# Patient Record
Sex: FEMALE | Race: WHITE | NOT HISPANIC OR LATINO | Employment: OTHER | URBAN - METROPOLITAN AREA
[De-identification: names, ages, dates, MRNs, and addresses within clinical notes are randomized per-mention and may not be internally consistent; named-entity substitution may affect disease eponyms.]

---

## 2017-01-27 ENCOUNTER — APPOINTMENT (OUTPATIENT)
Dept: LAB | Facility: CLINIC | Age: 67
End: 2017-01-27
Payer: MEDICARE

## 2017-01-27 ENCOUNTER — TRANSCRIBE ORDERS (OUTPATIENT)
Dept: LAB | Facility: CLINIC | Age: 67
End: 2017-01-27

## 2017-01-27 DIAGNOSIS — Z79.899 ENCOUNTER FOR LONG-TERM (CURRENT) USE OF OTHER MEDICATIONS: Primary | ICD-10-CM

## 2017-01-27 LAB
ALBUMIN SERPL BCP-MCNC: 3.4 G/DL (ref 3.5–5)
ALP SERPL-CCNC: 48 U/L (ref 46–116)
ALT SERPL W P-5'-P-CCNC: 21 U/L (ref 12–78)
ANION GAP SERPL CALCULATED.3IONS-SCNC: 6 MMOL/L (ref 4–13)
AST SERPL W P-5'-P-CCNC: 8 U/L (ref 5–45)
BASOPHILS # BLD AUTO: 0.01 THOUSANDS/ΜL (ref 0–0.1)
BASOPHILS NFR BLD AUTO: 0 % (ref 0–1)
BILIRUB SERPL-MCNC: 0.33 MG/DL (ref 0.2–1)
BUN SERPL-MCNC: 22 MG/DL (ref 5–25)
CALCIUM SERPL-MCNC: 8.6 MG/DL (ref 8.3–10.1)
CHLORIDE SERPL-SCNC: 108 MMOL/L (ref 100–108)
CO2 SERPL-SCNC: 28 MMOL/L (ref 21–32)
CREAT SERPL-MCNC: 0.74 MG/DL (ref 0.6–1.3)
EOSINOPHIL # BLD AUTO: 0.06 THOUSAND/ΜL (ref 0–0.61)
EOSINOPHIL NFR BLD AUTO: 1 % (ref 0–6)
ERYTHROCYTE [DISTWIDTH] IN BLOOD BY AUTOMATED COUNT: 12.5 % (ref 11.6–15.1)
GFR SERPL CREATININE-BSD FRML MDRD: >60 ML/MIN/1.73SQ M
GLUCOSE SERPL-MCNC: 97 MG/DL (ref 65–140)
HCT VFR BLD AUTO: 42 % (ref 34.8–46.1)
HGB BLD-MCNC: 13.6 G/DL (ref 11.5–15.4)
LYMPHOCYTES # BLD AUTO: 1.43 THOUSANDS/ΜL (ref 0.6–4.47)
LYMPHOCYTES NFR BLD AUTO: 35 % (ref 14–44)
MCH RBC QN AUTO: 30.7 PG (ref 26.8–34.3)
MCHC RBC AUTO-ENTMCNC: 32.4 G/DL (ref 31.4–37.4)
MCV RBC AUTO: 95 FL (ref 82–98)
MONOCYTES # BLD AUTO: 0.4 THOUSAND/ΜL (ref 0.17–1.22)
MONOCYTES NFR BLD AUTO: 10 % (ref 4–12)
NEUTROPHILS # BLD AUTO: 2.22 THOUSANDS/ΜL (ref 1.85–7.62)
NEUTS SEG NFR BLD AUTO: 54 % (ref 43–75)
NRBC BLD AUTO-RTO: 0 /100 WBCS
PLATELET # BLD AUTO: 280 THOUSANDS/UL (ref 149–390)
PMV BLD AUTO: 11.5 FL (ref 8.9–12.7)
POTASSIUM SERPL-SCNC: 4.7 MMOL/L (ref 3.5–5.3)
PROT SERPL-MCNC: 6.7 G/DL (ref 6.4–8.2)
RBC # BLD AUTO: 4.43 MILLION/UL (ref 3.81–5.12)
SODIUM SERPL-SCNC: 142 MMOL/L (ref 136–145)
WBC # BLD AUTO: 4.14 THOUSAND/UL (ref 4.31–10.16)

## 2017-01-27 PROCEDURE — 80053 COMPREHEN METABOLIC PANEL: CPT

## 2017-01-27 PROCEDURE — 80165 DIPROPYLACETIC ACID FREE: CPT

## 2017-01-27 PROCEDURE — 36415 COLL VENOUS BLD VENIPUNCTURE: CPT

## 2017-01-27 PROCEDURE — 85025 COMPLETE CBC W/AUTO DIFF WBC: CPT

## 2017-02-01 LAB — VALPROATE FREE SERPL-MCNC: 9.6 UG/ML (ref 6–22)

## 2017-02-08 ENCOUNTER — GENERIC CONVERSION - ENCOUNTER (OUTPATIENT)
Dept: OTHER | Facility: OTHER | Age: 67
End: 2017-02-08

## 2017-02-10 ENCOUNTER — ALLSCRIPTS OFFICE VISIT (OUTPATIENT)
Dept: OTHER | Facility: OTHER | Age: 67
End: 2017-02-10

## 2017-02-23 ENCOUNTER — ALLSCRIPTS OFFICE VISIT (OUTPATIENT)
Dept: OTHER | Facility: OTHER | Age: 67
End: 2017-02-23

## 2017-03-30 ENCOUNTER — ALLSCRIPTS OFFICE VISIT (OUTPATIENT)
Dept: OTHER | Facility: OTHER | Age: 67
End: 2017-03-30

## 2017-03-31 ENCOUNTER — ALLSCRIPTS OFFICE VISIT (OUTPATIENT)
Dept: OTHER | Facility: OTHER | Age: 67
End: 2017-03-31

## 2017-04-07 ENCOUNTER — GENERIC CONVERSION - ENCOUNTER (OUTPATIENT)
Dept: OTHER | Facility: OTHER | Age: 67
End: 2017-04-07

## 2017-04-11 ENCOUNTER — GENERIC CONVERSION - ENCOUNTER (OUTPATIENT)
Dept: OTHER | Facility: OTHER | Age: 67
End: 2017-04-11

## 2017-04-14 ENCOUNTER — ALLSCRIPTS OFFICE VISIT (OUTPATIENT)
Dept: OTHER | Facility: OTHER | Age: 67
End: 2017-04-14

## 2017-04-19 ENCOUNTER — GENERIC CONVERSION - ENCOUNTER (OUTPATIENT)
Dept: OTHER | Facility: OTHER | Age: 67
End: 2017-04-19

## 2017-04-26 ENCOUNTER — ALLSCRIPTS OFFICE VISIT (OUTPATIENT)
Dept: OTHER | Facility: OTHER | Age: 67
End: 2017-04-26

## 2017-04-26 DIAGNOSIS — Z12.31 ENCOUNTER FOR SCREENING MAMMOGRAM FOR MALIGNANT NEOPLASM OF BREAST: ICD-10-CM

## 2017-05-05 ENCOUNTER — ALLSCRIPTS OFFICE VISIT (OUTPATIENT)
Dept: OTHER | Facility: OTHER | Age: 67
End: 2017-05-05

## 2017-05-09 ENCOUNTER — GENERIC CONVERSION - ENCOUNTER (OUTPATIENT)
Dept: OTHER | Facility: OTHER | Age: 67
End: 2017-05-09

## 2017-05-17 ENCOUNTER — GENERIC CONVERSION - ENCOUNTER (OUTPATIENT)
Dept: OTHER | Facility: OTHER | Age: 67
End: 2017-05-17

## 2017-05-31 ENCOUNTER — ALLSCRIPTS OFFICE VISIT (OUTPATIENT)
Dept: OTHER | Facility: OTHER | Age: 67
End: 2017-05-31

## 2017-05-31 ENCOUNTER — GENERIC CONVERSION - ENCOUNTER (OUTPATIENT)
Dept: OTHER | Facility: OTHER | Age: 67
End: 2017-05-31

## 2017-05-31 DIAGNOSIS — R21 RASH AND OTHER NONSPECIFIC SKIN ERUPTION: ICD-10-CM
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
## 2017-06-06 ENCOUNTER — GENERIC CONVERSION - ENCOUNTER (OUTPATIENT)
Dept: OTHER | Facility: OTHER | Age: 67
End: 2017-06-06

## 2017-06-14 ENCOUNTER — GENERIC CONVERSION - ENCOUNTER (OUTPATIENT)
Dept: OTHER | Facility: OTHER | Age: 67
End: 2017-06-14

## 2017-06-27 ENCOUNTER — ALLSCRIPTS OFFICE VISIT (OUTPATIENT)
Dept: OTHER | Facility: OTHER | Age: 67
End: 2017-06-27

## 2017-06-29 ENCOUNTER — GENERIC CONVERSION - ENCOUNTER (OUTPATIENT)
Dept: OTHER | Facility: OTHER | Age: 67
End: 2017-06-29

## 2017-07-06 ENCOUNTER — ALLSCRIPTS OFFICE VISIT (OUTPATIENT)
Dept: OTHER | Facility: OTHER | Age: 67
End: 2017-07-06

## 2017-07-18 ENCOUNTER — ALLSCRIPTS OFFICE VISIT (OUTPATIENT)
Dept: OTHER | Facility: OTHER | Age: 67
End: 2017-07-18

## 2017-07-20 ENCOUNTER — GENERIC CONVERSION - ENCOUNTER (OUTPATIENT)
Dept: OTHER | Facility: OTHER | Age: 67
End: 2017-07-20

## 2017-07-27 ENCOUNTER — APPOINTMENT (OUTPATIENT)
Dept: LAB | Facility: CLINIC | Age: 67
End: 2017-07-27
Payer: MEDICARE

## 2017-07-27 ENCOUNTER — ALLSCRIPTS OFFICE VISIT (OUTPATIENT)
Dept: OTHER | Facility: OTHER | Age: 67
End: 2017-07-27

## 2017-07-27 DIAGNOSIS — R21 RASH AND OTHER NONSPECIFIC SKIN ERUPTION: ICD-10-CM
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
## 2017-07-27 PROCEDURE — 36415 COLL VENOUS BLD VENIPUNCTURE: CPT

## 2017-07-27 PROCEDURE — 86618 LYME DISEASE ANTIBODY: CPT

## 2017-07-28 LAB
B BURGDOR IGG SER IA-ACNC: 0.12
B BURGDOR IGM SER IA-ACNC: 0.36

## 2017-07-31 ENCOUNTER — GENERIC CONVERSION - ENCOUNTER (OUTPATIENT)
Dept: OTHER | Facility: OTHER | Age: 67
End: 2017-07-31

## 2017-08-03 ENCOUNTER — GENERIC CONVERSION - ENCOUNTER (OUTPATIENT)
Dept: OTHER | Facility: OTHER | Age: 67
End: 2017-08-03

## 2017-08-30 ENCOUNTER — TRANSCRIBE ORDERS (OUTPATIENT)
Dept: ADMINISTRATIVE | Facility: HOSPITAL | Age: 67
End: 2017-08-30

## 2017-08-30 DIAGNOSIS — G40.209 CRYPTOGENIC PARTIAL COMPLEX EPILEPSY (HCC): ICD-10-CM

## 2017-08-30 DIAGNOSIS — G40.001 LOCALIZATION-RELATED IDIOPATHIC EPILEPSY AND EPILEPTIC SYNDROMES WITH SEIZURES OF LOCALIZED ONSET, NOT INTRACTABLE, WITH STATUS EPILEPTICUS (HCC): Primary | ICD-10-CM

## 2017-09-09 ENCOUNTER — HOSPITAL ENCOUNTER (OUTPATIENT)
Dept: MAMMOGRAPHY | Facility: HOSPITAL | Age: 67
Discharge: HOME | End: 2017-09-09
Payer: MEDICARE

## 2017-09-09 DIAGNOSIS — Z12.31 ENCOUNTER FOR SCREENING MAMMOGRAM FOR MALIGNANT NEOPLASM OF BREAST: ICD-10-CM

## 2017-09-09 PROCEDURE — 77063 BREAST TOMOSYNTHESIS BI: CPT

## 2017-09-09 PROCEDURE — G0202 SCR MAMMO BI INCL CAD: HCPCS

## 2017-09-22 ENCOUNTER — HOSPITAL ENCOUNTER (OUTPATIENT)
Dept: NEUROLOGY | Facility: AMBULATORY SURGERY CENTER | Age: 67
Discharge: HOME | End: 2017-09-22
Payer: MEDICARE

## 2017-09-22 DIAGNOSIS — G40.209 CRYPTOGENIC PARTIAL COMPLEX EPILEPSY (HCC): ICD-10-CM

## 2017-09-22 PROCEDURE — 95819 EEG AWAKE AND ASLEEP: CPT

## 2017-09-24 ENCOUNTER — GENERIC CONVERSION - ENCOUNTER (OUTPATIENT)
Dept: OTHER | Facility: OTHER | Age: 67
End: 2017-09-24

## 2017-10-02 ENCOUNTER — ALLSCRIPTS OFFICE VISIT (OUTPATIENT)
Dept: OTHER | Facility: OTHER | Age: 67
End: 2017-10-02

## 2017-10-27 NOTE — PROGRESS NOTES
Assessment  1  Lichen sclerosus (770 5) (L90 0)   2  Post-menopausal atrophic vaginitis (627 3) (Q12 8)    Plan  Lichen sclerosus    · Clobetasol Propionate 0 05 % External Ointment; APPLY SPARINGLY TO  AFFECTED AREA(S) ONCE DAILY x 12 weeks   Rx By: Owen Welch; Dispense: 0 Days ; #:1 X 60 GM Tube; Refill: 3;For: Lichen sclerosus; TRESA = N; Sent To: [facility]/PHARMACY #[id number]   Discussion/Summary  Discussion Summary:   Use clobetasol ointment daily for 12 weeks  Return to the office in 3 weeks for evaluation  Try to be more consistent with use of Elestrin  Counseling Documentation With Imm: The patient was counseled regarding instructions for management,-- risks and benefits of treatment options  Chief Complaint  Chief Complaint Free Text Note Form: C/O vaginal irritation  Started last week with itching, burning, redness, but symptoms have improved since  History of Present Illness  HPI: 68-year-old female presents with vaginal irritation that started approximately 2 weeks ago  It has been actually ongoing all summer upon further questioning  She describes burning irritation adjacent to the clitoral estrada  Denies vaginal discharge  She is on aelestrin and osphena, however is not consistent with use of either medication  Vulvar Disorder: The patient is being seen for an initial evaluation of a vulvar disorder  Symptoms:  vulvar burning-- and-- vulvar itching, but-- no vulvar swelling,-- no vulvar drainage,-- no vulvar rash-- and-- no vulvar pain  The patient is currently experiencing symptoms  Symptoms are located diffusely in the vulva and more in clitoral area  Onset was gradual 3-4 month(s) ago  The symptoms occur intermittently  She describes this as moderate in severity-- and-- unchanged  Exacerbating factors:  tight clothing, but-- not exacerbated by sitting  No relieving factors are noted  Associated symptoms:  no vaginal discharge,-- no vaginal bleeding-- and-- no pelvic pain        Review of Systems  Focused-Female:   Constitutional: no fever-- and-- no chills  Genitourinary: no dysuria,-- no pelvic pain,-- no vaginal discharge,-- no incontinence-- and-- no unexplained vaginal bleeding  Neurological: no headache  ROS Reviewed:   ROS reviewed  Active Problems  1  Arthralgia (719 40) (M25 50)   2  Atypical chest pain (786 59) (R07 89)   3  Bilateral hip pain (719 45) (M25 551,M25 552)   4  Chronic interstitial cystitis (595 1) (N30 10)   5  Depression (311) (F32 9)   6  Encounter for routine gynecological examination (V72 31) (Z01 419)   7  Encounter for screening mammogram for malignant neoplasm of breast (V76 12)   (Z12 31)   8  Hyperlipidemia (272 4) (E78 5)   9  Hypertension (401 9) (I10)   10  Lipoma (214 9) (D17 9)   11  Myalgia (729 1) (M79 1)   12  Neck pain (723 1) (M54 2)   13  Need for immunization against influenza (V04 81) (Z23)   14  Need for pneumococcal vaccination (V03 82) (Z23)   15  Nonspecific vaginitis (616 10) (N76 0)   16  Osteopenia (733 90) (M85 80)   17  Overweight (BMI 25 0-29 9) (278 02) (E66 3)   18  Pain of right lower extremity (729 5) (M79 604)   19  Post-menopausal atrophic vaginitis (627 3) (N95 2)   20  Rash (782 1) (R21)   21  Rhinitis (472 0) (J31 0)   22  Right shoulder pain (719 41) (M25 511)   23  Screening for genitourinary condition (V81 6) (Z13 89)   24  Screening for neurological condition (V80 09) (Z13 89)   25  Shortness of breath on exertion (786 05) (R06 02)   26  Symptomatic menopausal or female climacteric states (627 2) (N95 1)   27  Tachycardia (785 0) (R00 0)   28  Tension headache (307 81) (G44 209)   29  Tick bite (919 4,E906 4) (W57 XXXA)   30  Vaginitis (616 10) (N76 0)   31  Weight gain (783 1) (R63 5)    Past Medical History  1  History of Acute bronchitis (466 0) (J20 9)   2  History of Anxiety (300 00) (F41 9)   3  History of Bleeding hemorrhoids (455 8) (K64 9)   4  History of cosmetic surgery (V45 89) (Z98 890)   5   History of urinary tract infection (V13 02) (Z87 440)   6  History of Simple partial seizures (345 50) (G40 109)   7  History of Urethral discharge (788 7) (R36 9)  Active Problems And Past Medical History Reviewed: The active problems and past medical history were reviewed and updated today  Surgical History  1  History of Hysterectomy   2  History of Shoulder Surgery  Surgical History Reviewed: The surgical history was reviewed and updated today  Family History  Mother    1  Family history of    2  Family history of cerebrovascular accident (CVA) (V17 1) (Z82 3)   3  Family history of hypertension (V17 49) (Z82 49)  Father    4  Family history of    5  Family history of hypertension (V17 49) (Z82 49)   6  Family history of lung cancer (V16 1) (Z80 1)  Brother    7  Family history of lung cancer (V16 1) (Z80 1)  Family History    8  Family history of Alcoholism  Family History Reviewed: The family history was reviewed and updated today  Social History   · Denied: History of Alcohol Use (History)   · Denied: History of Drug Use   · Exercising Regularly   · Never A Smoker   · Occupation   · Retired  Social History Reviewed: The social history was reviewed and updated today  Current Meds   1  Atorvastatin Calcium 10 MG Oral Tablet; take 1 tablet by mouth every day; Therapy: 80KCF4089 to (Evaluate:75Rad6778)  Requested for: 66Erf6307; Last   Rx:2017 Ordered   2  BusPIRone HCl - 5 MG Oral Tablet; TAKE 2 TABLET 3 times daily; Therapy: 2011 to  Requested for: 54IAG1042 Recorded   3  Calcium Citrate+D3 TABS; Therapy: (Recorded:2017) to Recorded   4  Depakote  MG Oral Tablet Extended Release 24 Hour; TAKE ONE TAB TWICE   DAILY; Therapy: (Recorded:35Jva5238) to Recorded   5  Elestrin 0 52 MG/0 87 GM (0 06%) Transdermal Gel; APPLY 1 PUMP ( 0 87GM) TO   UPPER ARM ONCE DAILY;    Therapy: 90PUE1998 to (Evaluate:2017)  Requested for: 81CAO3809; Last   Rx:2016 Ordered   6  Escitalopram Oxalate 20 MG Oral Tablet; take 1 tablet every day; Therapy: (Recorded:30Mar2017) to Recorded   7  Fexofenadine HCl - 180 MG Oral Tablet; TAKE 1 TABLET DAILY AS NEEDED; Therapy: (Recorded:30Mar2017) to Recorded   8  Ipratropium Bromide 0 06 % Nasal Solution; USE 1 SPRAY IN NOSTRIL(S) DAILY AS   NEEDED; Therapy: 90Nkc8322 to (Evaluate:10Rcl7874)  Requested for: 04GBC7515; Last   OT:38HRS1395 Ordered   9  Lisinopril 20 MG Oral Tablet; take 1 tablet every day; Therapy: 15JRJ1631 to (Evaluate:30Jan2018)  Requested for: 43Tfr0203; Last   Rx:63Jdp7830 Ordered   10  Meloxicam 15 MG Oral Tablet; TAKE 1 TABLET BY MOUTH EVERY DAY AS NEEDED; Therapy: 23Htz1838 to (Evaluate:90Cyq8733)  Requested for: 00Cqf0288; Last    Rx:50Obz6822 Ordered   11  Methocarbamol 500 MG Oral Tablet; two tabs QID prn  Requested for: 06Ouf5804; Last    Rx:94Ijh1064 Ordered   12  Osphena 60 MG Oral Tablet; Take 1 tablet daily; Therapy: 82Ixm1497 to (Evaluate:50Aur2438)  Requested for: 14Zbe8455; Last    Rx:71Igo7479 Ordered   13  Pindolol 5 MG Oral Tablet; take 1 tablet every day; Therapy: 86Cdb4240 to (Evaluate:20Jan2018)  Requested for: 86Sxb4599; Last    Rx:75Evb3384 Ordered   14  Zolpidem Tartrate 5 MG Oral Tablet; Therapy: 96OKJ5696 to (Last Majel Ates)  Requested for: 85Qnq4129 Ordered  Medication List Reviewed: The medication list was reviewed and updated today  Allergies  1  Codeine Derivatives   2  Erythromycin Derivatives   3  Morphine Derivatives   4  Penicillins  5  Seasonal    Vitals  Vital Signs    Recorded: 14JUG7671 02:00PM   Heart Rate 75   Systolic 973, Sitting   Diastolic 78, Sitting   Height 5 ft 4 in   Weight 168 lb 6 oz   BMI Calculated 28 9   BSA Calculated 1 82   Pain Scale 0     Physical Exam    Constitutional   General appearance: No acute distress, well appearing and well nourished      Pulmonary   Respiratory effort: No increased work of breathing or signs of respiratory distress  Genitourinary signs of lichen sclerosis present with areas of excoriation in labial folds near clitoral estrada  faint, white patches present  Vagina: Normal, no lesions or dryness appreciated  Vagina: atrophy-- and-- dry mucosa, but-- no bleeding  Psychiatric   Orientation to person, place, and time: Normal     Mood and affect: Normal        Health Management  Encounter for routine gynecological examination   BREAST EXAM; every 1 year; Last 37SVM6238; Next Due: 36VYQ2896; Overdue  PELVIC EXAM; every 1 year; Last 10FUK8790; Next Due: 09JUL1550; Overdue  Health Maintenance   Medicare Annual Wellness Visit; every 1 year; Last 15SXK3076; Next Due: 97ERO6044; Overdue    Future Appointments    Date/Time Provider Specialty Site   10/24/2017 10:45 AM IGOR Hough   Bariatric Medicine [facility]   10/25/2017 10:00 AM Jade Arrieta DO Internal Medicine [facility] INTERNAL MED     Signatures   Electronically signed by : Madeline Roy DO; Oct  2 0315  2:24PM EST                       (Author)

## 2017-11-01 ENCOUNTER — TRANSCRIBE ORDERS (OUTPATIENT)
Dept: LAB | Facility: CLINIC | Age: 67
End: 2017-11-01

## 2017-11-01 ENCOUNTER — APPOINTMENT (OUTPATIENT)
Dept: LAB | Facility: CLINIC | Age: 67
End: 2017-11-01
Payer: MEDICARE

## 2017-11-01 DIAGNOSIS — Z79.899 NEED FOR PROPHYLACTIC CHEMOTHERAPY: Primary | ICD-10-CM

## 2017-11-01 LAB
ALBUMIN SERPL BCP-MCNC: 3.4 G/DL (ref 3.5–5)
ALP SERPL-CCNC: 51 U/L (ref 46–116)
ALT SERPL W P-5'-P-CCNC: 20 U/L (ref 12–78)
ANION GAP SERPL CALCULATED.3IONS-SCNC: 5 MMOL/L (ref 4–13)
AST SERPL W P-5'-P-CCNC: 10 U/L (ref 5–45)
BASOPHILS # BLD AUTO: 0.02 THOUSANDS/ΜL (ref 0–0.1)
BASOPHILS NFR BLD AUTO: 0 % (ref 0–1)
BILIRUB SERPL-MCNC: 0.51 MG/DL (ref 0.2–1)
BUN SERPL-MCNC: 28 MG/DL (ref 5–25)
CALCIUM SERPL-MCNC: 9.2 MG/DL (ref 8.3–10.1)
CHLORIDE SERPL-SCNC: 103 MMOL/L (ref 100–108)
CO2 SERPL-SCNC: 29 MMOL/L (ref 21–32)
CREAT SERPL-MCNC: 0.78 MG/DL (ref 0.6–1.3)
EOSINOPHIL # BLD AUTO: 0.1 THOUSAND/ΜL (ref 0–0.61)
EOSINOPHIL NFR BLD AUTO: 2 % (ref 0–6)
ERYTHROCYTE [DISTWIDTH] IN BLOOD BY AUTOMATED COUNT: 13.3 % (ref 11.6–15.1)
GFR SERPL CREATININE-BSD FRML MDRD: 78 ML/MIN/1.73SQ M
GLUCOSE P FAST SERPL-MCNC: 94 MG/DL (ref 65–99)
HCT VFR BLD AUTO: 41.5 % (ref 34.8–46.1)
HGB BLD-MCNC: 13.8 G/DL (ref 11.5–15.4)
LYMPHOCYTES # BLD AUTO: 1.49 THOUSANDS/ΜL (ref 0.6–4.47)
LYMPHOCYTES NFR BLD AUTO: 30 % (ref 14–44)
MCH RBC QN AUTO: 31.4 PG (ref 26.8–34.3)
MCHC RBC AUTO-ENTMCNC: 33.3 G/DL (ref 31.4–37.4)
MCV RBC AUTO: 94 FL (ref 82–98)
MONOCYTES # BLD AUTO: 0.49 THOUSAND/ΜL (ref 0.17–1.22)
MONOCYTES NFR BLD AUTO: 10 % (ref 4–12)
NEUTROPHILS # BLD AUTO: 2.87 THOUSANDS/ΜL (ref 1.85–7.62)
NEUTS SEG NFR BLD AUTO: 58 % (ref 43–75)
NRBC BLD AUTO-RTO: 0 /100 WBCS
PLATELET # BLD AUTO: 276 THOUSANDS/UL (ref 149–390)
PMV BLD AUTO: 11 FL (ref 8.9–12.7)
POTASSIUM SERPL-SCNC: 4.8 MMOL/L (ref 3.5–5.3)
PROT SERPL-MCNC: 6.8 G/DL (ref 6.4–8.2)
RBC # BLD AUTO: 4.4 MILLION/UL (ref 3.81–5.12)
SODIUM SERPL-SCNC: 137 MMOL/L (ref 136–145)
VALPROATE SERPL-MCNC: 54 UG/ML (ref 50–100)
WBC # BLD AUTO: 5 THOUSAND/UL (ref 4.31–10.16)

## 2017-11-01 PROCEDURE — 36415 COLL VENOUS BLD VENIPUNCTURE: CPT

## 2017-11-01 PROCEDURE — 80164 ASSAY DIPROPYLACETIC ACD TOT: CPT

## 2017-11-01 PROCEDURE — 80053 COMPREHEN METABOLIC PANEL: CPT

## 2017-11-01 PROCEDURE — 85025 COMPLETE CBC W/AUTO DIFF WBC: CPT

## 2017-11-10 ENCOUNTER — APPOINTMENT (OUTPATIENT)
Dept: LAB | Facility: CLINIC | Age: 67
End: 2017-11-10
Payer: MEDICARE

## 2017-11-10 DIAGNOSIS — I10 ESSENTIAL (PRIMARY) HYPERTENSION: ICD-10-CM

## 2017-11-10 DIAGNOSIS — E78.5 HYPERLIPIDEMIA: ICD-10-CM

## 2017-11-10 LAB
ALBUMIN SERPL BCP-MCNC: 3.2 G/DL (ref 3.5–5)
ALP SERPL-CCNC: 49 U/L (ref 46–116)
ALT SERPL W P-5'-P-CCNC: 20 U/L (ref 12–78)
ANION GAP SERPL CALCULATED.3IONS-SCNC: 7 MMOL/L (ref 4–13)
AST SERPL W P-5'-P-CCNC: 11 U/L (ref 5–45)
BILIRUB SERPL-MCNC: 0.39 MG/DL (ref 0.2–1)
BUN SERPL-MCNC: 21 MG/DL (ref 5–25)
CALCIUM SERPL-MCNC: 8.8 MG/DL (ref 8.3–10.1)
CHLORIDE SERPL-SCNC: 106 MMOL/L (ref 100–108)
CHOLEST SERPL-MCNC: 164 MG/DL (ref 50–200)
CO2 SERPL-SCNC: 28 MMOL/L (ref 21–32)
CREAT SERPL-MCNC: 0.65 MG/DL (ref 0.6–1.3)
GFR SERPL CREATININE-BSD FRML MDRD: 91 ML/MIN/1.73SQ M
GLUCOSE P FAST SERPL-MCNC: 87 MG/DL (ref 65–99)
HDLC SERPL-MCNC: 69 MG/DL (ref 40–60)
LDLC SERPL CALC-MCNC: 79 MG/DL (ref 0–100)
POTASSIUM SERPL-SCNC: 4.2 MMOL/L (ref 3.5–5.3)
PROT SERPL-MCNC: 6.6 G/DL (ref 6.4–8.2)
SODIUM SERPL-SCNC: 141 MMOL/L (ref 136–145)
TRIGL SERPL-MCNC: 80 MG/DL

## 2017-11-10 PROCEDURE — 80053 COMPREHEN METABOLIC PANEL: CPT

## 2017-11-10 PROCEDURE — 80061 LIPID PANEL: CPT

## 2017-11-10 PROCEDURE — 36415 COLL VENOUS BLD VENIPUNCTURE: CPT

## 2017-12-06 ENCOUNTER — GENERIC CONVERSION - ENCOUNTER (OUTPATIENT)
Dept: OTHER | Facility: OTHER | Age: 67
End: 2017-12-06

## 2017-12-06 DIAGNOSIS — M79.604 PAIN OF RIGHT LEG: ICD-10-CM

## 2017-12-06 DIAGNOSIS — M85.80 OTHER SPECIFIED DISORDERS OF BONE DENSITY AND STRUCTURE, UNSPECIFIED SITE (CODE): ICD-10-CM

## 2017-12-06 DIAGNOSIS — M54.2 CERVICALGIA: ICD-10-CM

## 2018-01-06 ENCOUNTER — GENERIC CONVERSION - ENCOUNTER (OUTPATIENT)
Dept: INTERNAL MEDICINE CLINIC | Facility: CLINIC | Age: 68
End: 2018-01-06

## 2018-01-06 ENCOUNTER — GENERIC CONVERSION - ENCOUNTER (OUTPATIENT)
Dept: OBGYN CLINIC | Facility: CLINIC | Age: 68
End: 2018-01-06
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
## 2018-01-10 NOTE — PROGRESS NOTES
Assessment    1  Encounter for preventive health examination (V70 0) (Z00 00)    Plan  Encounter for screening mammogram for malignant neoplasm of breast    · * MAMMO SCREENING BILATERAL W CAD; Status:Hold For - Scheduling; Requested  for:26Apr2017; Health Maintenance    · Alcohol misuse can be a problem with advancing age ; Status:Complete;   Done:  26Apr2017   · Drink plenty of fluids ; Status:Complete;   Done: 86ZYI8278   · Eat a low fat and low cholesterol diet ; Status:Complete;   Done: 68QXQ3757   · There are many exercise options for seniors ; Status:Complete;   Done: 26Apr2017   · We recommend that you follow these steps to lower your risk of osteoporosis  ;  Status:Complete;   Done: 47XEW4551  Hyperlipidemia    · Follow-up visit in 6 months Evaluation and Treatment  Follow-up  Status: Hold For -  Scheduling  Requested for: 26Apr2017  Screening for genitourinary condition    · *VB - Urinary Incontinence Screen (Dx Z13 89 Screen for UI); Status:Active; Requested  for:26Apr2017;   Screening for neurological condition    · *VB - Fall Risk Assessment  (Dx Z13 89 Screen for Neurologic Disorder); Status:Active; Requested for:26Apr2017;   Tension headache    · Methocarbamol 500 MG Oral Tablet; two tabs QID prn    Discussion/Summary    Fall screen - negative     screen - negative  Impression: Subsequent Annual Wellness Visit, with preventive exam as well as age and risk appropriate counseling completed  Cardiovascular screening and counseling: the risks and benefits of screening were discussed, due for a lipid panel and script provided at an earlier appointment  Diabetes screening and counseling: the risks and benefits of screening were discussed, due for blood glucose and script provided at an earlier appointment  Colorectal cancer screening and counseling: the risks and benefits of screening were discussed and screening is current     Breast cancer screening and counseling: the risks and benefits of screening were discussed and due for a screening mammogram    Cervical cancer screening and counseling: screening not indicated and had hysterectomy  Osteoporosis screening and counseling: the risks and benefits of screening were discussed and screening is current  Abdominal aortic aneurysm screening and counseling: screening not indicated  Glaucoma screening and counseling: the risks and benefits of screening were discussed and ophthalmologist referral    HIV screening and counseling: screening not indicated  Immunizations: the risks and benefits of influenza vaccination were discussed with the patient, influenza vaccine is up to date this year, the risks and benefits of pneumococcal vaccination were discussed with the patient, Received in 2012 at the age of 57  Next dose due 2017, hepatitis B vaccination series is not indicated at this time due to the patient's low risk of candice the disease, the risks and benefits of the Zostavax vaccine were discussed with the patient, Zostavax script written and given to patient, the risks and benefits of the Tdap vaccine were discussed with the patient and Tdap vaccine needed today  Advance Directive Planning: complete and up to date  Advice and education were given regarding fall risk reduction and nutrition (non-diabetic)  She was referred to none today  Medical Equipment/Suppliers: none today  Patient Discussion: plan discussed with the patient, follow-up visit needed in 6 months, 30 minute visit, greater than half of the time was spent on counseling  History of Present Illness  65yo right-handed female with h/o HTN, HLD, h/o osteopenia, depression, chronic interstitial cystitis, PVCs, seizure disorder, cervical foraminal stenosis and seasonal allergies here for subsequent AWV  The patient is being seen for the subsequent annual wellness visit     Medicare Screening and Risk Factors   Hospitalizations: no previous hospitalizations, she has been hospitalized 0 times and No hospitalizations over past 12 months  Once per lifetime medicare screening tests: not eligible  Medicare Screening Tests Risk Questions   Abdominal aortic aneurysm risk assessment: none indicated  Osteoporosis risk assessment: , female gender, over 48years of age, alcohol use and has osteopenia  HIV risk assessment: none indicated  Drug and Alcohol Use: The patient has never smoked cigarettes  The patient reports occasional alcohol use and drinking 1-2 drinks per week  Diet and Physical Activity: Current diet includes well balanced meals  She exercises 2 times per week  Exercise: strength training, aerobics  Mood Disorder and Cognitive Impairment Screening: PHQ-9 Depression Scale   Over the past 2 weeks, how often have you been bothered by the following problems? 1 ) Little interest or pleasure in doing things? Several days  2 ) Feeling down, depressed or hopeless? Several days  3 ) Trouble falling asleep or sleeping too much? Half the days or more  4 ) Feeling tired or having little energy? Half the days or more  5 ) Poor appetite or overeating? Several days  6 ) Feeling bad about yourself, or that you are a failure, or have let yourself or your family down? Not at all    7 ) Trouble concentrating on things, such as reading a newspaper or watching television? Not at all    8 ) Moving or speaking so slowly that other people could have noticed, or the opposite, moving or speaking faster than usual? Not at all    9 ) Thoughts that you would be off dead or of hurting yourself in some way? Not at all  TOTAL SCORE: 7    Functional Ability/Level of Safety: Hearing is normal bilaterally  The patient is currently able to do activities of daily living without limitations, able to do instrumental activities of daily living without limitations, able to participate in social activities without limitations and able to drive without limitations   Fall risk factors: The patient fell 0 times in the past 12 months  Home safety risk factors:  Lives with her  in a multistory home, but no loose rugs, no poor household lighting, no uneven floors, no household clutter, grab bars in the bathroom and handrails on the stairs  Advance Directives: Advance directives: living will, durable power of  for health care directives and advance directives  Co-Managers and Medical Equipment/Suppliers: See Patient Care Team     Last Medicare Wellness Visit Information was reviewed, patient interviewed and updates made to the record today  Falls Risk: The patient fell 0 times in the past 12 months  The patient currently has no urinary incontinence symptoms  Patient Care Team    Care Team Member Role Specialty Office Number   [place] Specialist Obstetrics/Gynecology [phone]   Juhi RADER  Specialist Bariatric Medicine [phone]     Review of Systems    Constitutional: recent ~Ulb weight loss  Eyes: negative  Cardiovascular: negative  Respiratory: negative  Gastrointestinal: negative  Genitourinary: negative  Musculoskeletal: diffuse joint pain neck pain  Neurological: headache  Psychiatric: insomnia, but no anxiety and no depression  Active Problems    1  Acute URI (465 9) (J06 9)   2  Arthralgia (719 40) (M25 50)   3  Atypical chest pain (786 59) (R07 89)   4  Bilateral hip pain (719 45) (M25 551,M25 552)   5  Chronic interstitial cystitis (595 1) (N30 10)   6  Depression (311) (F32 9)   7  Encounter for routine gynecological examination (V72 31) (Z01 419)   8  Encounter for screening mammogram for malignant neoplasm of breast (V76 12)   (Z12 31)   9  Hyperlipidemia (272 4) (E78 5)   10  Hypertension (401 9) (I10)   11  Lipoma (214 9) (D17 9)   12  Myalgia (729 1) (M79 1)   13  Neck pain (723 1) (M54 2)   14  Need for immunization against influenza (V04 81) (Z23)   15   Need for pneumococcal vaccination (V03 82) (Z23) 16  Nonspecific vaginitis (616 10) (N76 0)   17  Osteopenia (733 90) (M85 80)   18  Overweight (BMI 25 0-29 9) (278 02) (E66 3)   19  Pain of right lower extremity (729 5) (M79 604)   20  Post-menopausal atrophic vaginitis (627 3) (N95 2)   21  Rhinitis (472 0) (J31 0)   22  Right shoulder pain (719 41) (M25 511)   23  Shortness of breath on exertion (786 05) (R06 02)   24  Symptomatic menopausal or female climacteric states (627 2) (N95 1)   25  Tachycardia (785 0) (R00 0)   26  Tension headache (307 81) (G44 209)   27  Vaginitis (616 10) (N76 0)   28  Weight gain (783 1) (R63 5)    Past Medical History    1  History of Acute bronchitis (466 0) (J20 9)   2  History of Anxiety (300 00) (F41 9)   3  History of Bleeding hemorrhoids (455 8) (K64 9)   4  History of cosmetic surgery (V45 89) (Z98 890)   5  History of urinary tract infection (V13 02) (Z87 440)   6  History of Simple partial seizures (345 50) (G40 109)   7  History of Urethral discharge (788 7) (R36 9)    The active problems and past medical history were reviewed and updated today  Surgical History    1  History of Hysterectomy   2  History of Shoulder Surgery    The surgical history was reviewed and updated today  Family History  Mother    1  Family history of    2  Family history of cerebrovascular accident (CVA) (V17 1) (Z82 3)   3  Family history of hypertension (V17 49) (Z82 49)  Father    4  Family history of    5  Family history of hypertension (V17 49) (Z82 49)   6  Family history of lung cancer (V16 1) (Z80 1)  Brother    7  Family history of lung cancer (V16 1) (Z80 1)  Family History    8  Family history of Alcoholism    The family history was reviewed and updated today  Social History    · Denied: History of Alcohol Use (History)   · Denied: History of Drug Use   · Exercising Regularly   · Never A Smoker   · Occupation   · Retired  The social history was reviewed and is unchanged  Current Meds   1  Atorvastatin Calcium 10 MG Oral Tablet; take 1 tablet by mouth every day; Therapy: 12RTB2647 to (Evaluate:89Mzl2670)  Requested for: 26Pef4597; Last   Rx:71Pat1559 Ordered   2  BusPIRone HCl - 5 MG Oral Tablet; TAKE 2 TABLET 3 times daily; Therapy: 12Apr2011 to  Requested for: 11AOD5458 Recorded   3  Calcium Citrate+D3 TABS; Therapy: (Recorded:30Mar2017) to Recorded   4  Depakote  MG Oral Tablet Extended Release 24 Hour; TAKE ONE TAB TWICE   DAILY; Therapy: (Recorded:10Feb2017) to Recorded   5  Elestrin 0 52 MG/0 87 GM (0 06%) Transdermal Gel; APPLY 1 PUMP ( 0 87GM) TO   UPPER ARM ONCE DAILY; Therapy: 63OGT9711 to (Evaluate:14Oct2017)  Requested for: 19Oct2016; Last   Rx:39Uod4254 Ordered   6  Escitalopram Oxalate 20 MG Oral Tablet; take 1 tablet every day; Therapy: (Recorded:30Mar2017) to Recorded   7  Fexofenadine HCl - 180 MG Oral Tablet; TAKE 1 TABLET DAILY AS NEEDED; Therapy: (Recorded:30Mar2017) to Recorded   8  Ipratropium Bromide 0 06 % Nasal Solution; USE 1 SPRAY IN NOSTRIL(S) DAILY AS   NEEDED; Therapy: 22Feb2011 to ([phone])  Requested for: 34EFX8332; Last   Rx:09Jan2017 Ordered   9  Lexapro 20 MG Oral Tablet; Therapy: 44LDY7457 to (Last Rx:56Hvx7628)  Requested for: 95LPZ8782 Ordered   10  Lisinopril 20 MG Oral Tablet; take 1 tablet every day; Therapy: 88TLI7391 to (Evaluate:10Jun2017)  Requested for: 97Met9155; Last    Rx:19Mhq3731 Ordered   11  Meloxicam 15 MG Oral Tablet; Therapy: (Recorded:57Txg6847) to Recorded   12  Methocarbamol 500 MG Oral Tablet; two tabs QID prn; Therapy: (Recorded:45Swa4082) to Recorded   13  Osphena 60 MG Oral Tablet; Take 1 tablet daily; Therapy: 23Ncr7972 to (Evaluate:12Apr2016)  Requested for: 15Oct2015; Last    Rx:15Oct2015 Ordered   14  Pindolol 5 MG Oral Tablet; take 1 tablet every day; Therapy: 12Apr2011 to (Evaluate:29Pgy5476)  Requested for: 10BDQ2192; Last    Rx:95Zil5158 Ordered   15   Zolpidem Tartrate 5 MG Oral Tablet; Therapy: 01RSD6109 to (Last Ace Joselito)  Requested for: 27Apr2011 Ordered    The medication list was reviewed and updated today  Allergies    1  Codeine Derivatives   2  Erythromycin Derivatives   3  Morphine Derivatives   4  Penicillins    5  Seasonal    Immunizations  Influenza --- Nga Crews: [id number] Blacker: YMUQVS 30BNP4633; Nathalie Cadetude: 30-Sep-2014; Series4:  Oct 2015; Series5: 28-Sep-2016   PCV --- Series1: 28-Sep-2016   PPSV --- Series1: 2012   Tdap --- Series1: 25-Sep-2013     Vitals  Signs   Recorded: 26Apr2017 10:55AM   Temperature: 98 F  Heart Rate: 68  Respiration: 16  Systolic: 885  Diastolic: 80  Height: 5 ft 4 in  Weight: 172 lb 4 00 oz  BMI Calculated: 29 57  BSA Calculated: 1 84  O2 Saturation: 94    Procedure    Procedure: Visual Acuity Test    Follow-up  n/a for subsequent [facility]  Health Management  Encounter for routine gynecological examination   BREAST EXAM; every 1 year; Last 64OAX4892; Next Due: 70VWQ6087; Overdue  PELVIC EXAM; every 1 year; Last 47FDO2973; Next Due: 38SAI9031; Overdue  Health Maintenance   Medicare Annual Wellness Visit; every 1 year; Last 62NVQ2886; Next Due: 37LUI5506; Overdue    Future Appointments    Date/Time Provider Specialty Site   05/05/2017 01:30 PM Flavia Tompkins [address] [facility] [facility]   05/12/2017 02:00 PM IGOR Marquez  Bariatric Medicine [facility]   10/25/2017 10:00 AM Guilherme Shelton DO Internal Medicine [facility] INTERNAL MED     Signatures   Electronically signed by : Naga Chacon DO;  Apr 26 2017 12:38PM EST                       (Author)

## 2018-01-12 VITALS — HEIGHT: 64 IN | BODY MASS INDEX: 29.02 KG/M2 | WEIGHT: 170 LBS

## 2018-01-12 VITALS — HEIGHT: 64 IN | WEIGHT: 165 LBS | BODY MASS INDEX: 28.17 KG/M2

## 2018-01-12 VITALS — WEIGHT: 165.13 LBS | BODY MASS INDEX: 28.19 KG/M2 | HEIGHT: 64 IN

## 2018-01-12 VITALS — BODY MASS INDEX: 29.62 KG/M2 | HEIGHT: 64 IN | WEIGHT: 173.5 LBS

## 2018-01-12 NOTE — PROGRESS NOTES
History of Present Illness  HPI: Leta Hatch is here for initial RD session for [facility]  Current wt 173 8 lbs which she reports is the heaviest she has been  Began gaining after nursing home  Physically active doing senior spin 2x/wk but looking to get more active  Food recall reveals likely inadequate protein and excess amounts of calories from carbs  She reports carbs being a main source of issue for her and we discussed option of using a meal replacement at night or bar if she finds she is unable to control portion sizes  Currently does not drink much water but working on this  When discussing the realistic weight loss expectations she tells me she is disappointment but understands this is realistic  Bariatric MNT MWIGOR Paulson Lettsworth:   Weight Assessment: IBW: 120 lbs, ABW: 133 4 lbs, Highest Weight: 174 lbs, Lowest Weight: 120 lbs ~10 yrs ago, Goal Weight:  lbs, STG <160   Excess calories come from in between meal snacking and high calorie high fat food choices  Dietary Recall:   Breakfast: wake 8:30  10:30-11:00: 2 egg omelet, veggies, cheese  Snack: skip  Lunch: 1-2: yogurt or veggie burger or leftovers or candy bar  Snack: skip  Dinner: 7:00: ~3 oz or less protein, 1 carb, 2 veggies, salad w/creamy dressing  Snack: candy, crackers, yogurt, leftovers   Beverages: water, coffee w/almond milk, 1/2 tsp sugar x 4-5  Estimated fluid intake: 24 oz water,  Alcohol consumption: rare  Food allergies/intolerances: ?shellfish  Cooking: self  Shopping: spouse   She dines out 1-2 times per week  Exercise Frequency:  She exercises senior spin 60 min 2x/wk  Nutrition Prescription: Estimated calories for weight loss: eCalc BMR 1305, wt loss w/o exercise 566-1066, w/exercise 826-1661; [place] BMR 1399x1  3-5616=094, Estimated daily protein needs: 65-82 gm (1 2-1 5 gm/kg IBW) and Estimated daily fluid needs: 64 oz (35 cc/kg IBW)  Breakfast: 1 egg, 2 whites, 2 tbsp reduced fat cheese, 1 fruit  Snack: skip  Lunch: replacement  Snack: bar or food snack  Dinner: 250-310, 21 gm (3 oz pro, 2 veggie + salad w/1 fat)  Snack: bar (if didn't already have) or food snack   Nutrition Intervention: Counseling provided with emphasis on meal planning, portion sizes, healthy snack choices, hydration, fiber intake, protein intake, exercise and food journaling  Education materials provided: [employer] [facility] manual and calorie controlled menus  Comprehension: good  Expected Compliance: good  Goals: follow meal plan prescribed, reduce portion sizes at mealtimes, plan meals and snacks daily, Choose lower-calorie, lower-fat meal options at home and when dining out, food journal, do not skip meals or snacks, increase fluid intake 64 oz and 800-1000 calories using 1 replacement and 1 bar  Monitor/Evaluation: weekly weight, food journal, fluid intake and exercise level  Active Problems    1  Acute URI (465 9) (J06 9)   2  Arthralgia (719 40) (M25 50)   3  Atypical chest pain (786 59) (R07 89)   4  Bilateral hip pain (719 45) (M25 551,M25 552)   5  Chronic interstitial cystitis (595 1) (N30 10)   6  Depression (311) (F32 9)   7  Encounter for routine gynecological examination (V72 31) (Z01 419)   8  Encounter for screening mammogram for malignant neoplasm of breast (V76 12)   (Z12 31)   9  Hyperlipidemia (272 4) (E78 5)   10  Hypertension (401 9) (I10)   11  Lipoma (214 9) (D17 9)   12  Myalgia (729 1) (M79 1)   13  Neck pain (723 1) (M54 2)   14  Need for immunization against influenza (V04 81) (Z23)   15  Need for pneumococcal vaccination (V03 82) (Z23)   16  Nonspecific vaginitis (616 10) (N76 0)   17  Osteopenia (733 90) (M85 80)   18  Overweight (BMI 25 0-29 9) (278 02) (E66 3)   19  Pain of right lower extremity (729 5) (M79 604)   20  Post-menopausal atrophic vaginitis (627 3) (N95 2)   21  Rhinitis (472 0) (J31 0)   22  Right shoulder pain (719 41) (M25 511)   23   Shortness of breath on exertion (786 05) (R06 02)   24  Symptomatic menopausal or female climacteric states (627 2) (N95 1)   25  Tachycardia (785 0) (R00 0)   26  Tension headache (307 81) (G44 209)   27  Vaginitis (616 10) (N76 0)   28  Weight gain (783 1) (R63 5)    Past Medical History    1  History of Acute bronchitis (466 0) (J20 9)   2  History of Anxiety (300 00) (F41 9)   3  History of Bleeding hemorrhoids (455 8) (K64 9)   4  History of cosmetic surgery (V45 89) (Z98 890)   5  History of urinary tract infection (V13 02) (Z87 440)   6  History of Simple partial seizures (345 50) (G40 109)   7  History of Urethral discharge (788 7) (R36 9)    Surgical History    1  History of Hysterectomy   2  History of Shoulder Surgery    Family History  Mother    1  Family history of    2  Family history of cerebrovascular accident (CVA) (V17 1) (Z82 3)   3  Family history of hypertension (V17 49) (Z82 49)  Father    4  Family history of    5  Family history of hypertension (V17 49) (Z82 49)   6  Family history of lung cancer (V16 1) (Z80 1)  Brother    7  Family history of lung cancer (V16 1) (Z80 1)  Family History    8  Family history of Alcoholism    Social History    · Denied: History of Alcohol Use (History)   · Denied: History of Drug Use   · Exercising Regularly   · Never A Smoker   · Occupation   · Retired    Current Meds   1  Atorvastatin Calcium 10 MG Oral Tablet; take 1 tablet by mouth every day; Therapy: 76SKN6263 to (Evaluate:66Bhn2815)  Requested for: 29Mzh6275; Last   Rx:21Bes6303 Ordered   2  BusPIRone HCl - 5 MG Oral Tablet; TAKE 2 TABLET 3 times daily; Therapy: 2011 to  Requested for: 24XOR0138 Recorded   3  Calcium Citrate+D3 TABS; Therapy: (Recorded:2017) to Recorded   4  Depakote  MG Oral Tablet Extended Release 24 Hour; TAKE ONE TAB TWICE   DAILY; Therapy: (Recorded:20Bma9552) to Recorded   5   Elestrin 0 52 MG/0 87 GM (0 06%) Transdermal Gel; APPLY 1 PUMP ( 0 87GM) TO   UPPER ARM ONCE DAILY; Therapy: 41GTM0361 to (Evaluate:14Oct2017)  Requested for: 19Oct2016; Last   Rx:19Oct2016 Ordered   6  Escitalopram Oxalate 20 MG Oral Tablet; take 1 tablet every day; Therapy: (Recorded:30Mar2017) to Recorded   7  Fexofenadine HCl - 180 MG Oral Tablet; TAKE 1 TABLET DAILY AS NEEDED; Therapy: (Recorded:30Mar2017) to Recorded   8  Ipratropium Bromide 0 06 % Nasal Solution; USE 1 SPRAY IN NOSTRIL(S) DAILY AS   NEEDED; Therapy: 51Zam8635 to (Ruffus Presser)  Requested for: 97MJI3854; Last   Rx:09Jan2017 Ordered   9  Lexapro 20 MG Oral Tablet; Therapy: 62EPR1964 to (Last Rx:14Vkm4208)  Requested for: 34QEG6465 Ordered   10  Lisinopril 20 MG Oral Tablet; take 1 tablet every day; Therapy: 70XLN7387 to (Evaluate:10Jun2017)  Requested for: 85Lxl8368; Last    Rx:05Whu2425 Ordered   11  Meloxicam 15 MG Oral Tablet; Therapy: (Recorded:62Ocr7546) to Recorded   12  Methocarbamol 500 MG Oral Tablet; two tabs QID prn; Therapy: (Recorded:82Bkz4647) to Recorded   13  Osphena 60 MG Oral Tablet; Take 1 tablet daily; Therapy: 76Pry5631 to (Evaluate:12Apr2016)  Requested for: 15Oct2015; Last    Rx:91Dnc2066 Ordered   14  Pindolol 5 MG Oral Tablet; take 1 tablet every day; Therapy: 97Yhc8280 to (Evaluate:08Wyc4327)  Requested for: 68ARQ6726; Last    Rx:92Qqu5346 Ordered   15  Zolpidem Tartrate 5 MG Oral Tablet; Therapy: 53YNT1552 to (Last Meme Net)  Requested for: 27Apr2011 Ordered    Allergies    1  Codeine Derivatives   2  Erythromycin Derivatives   3  Morphine Derivatives   4  Penicillins    5   Seasonal    Vitals  Signs   Recorded: 27NUH7353 02:35PM   Height: 5 ft 4 in  Weight: 173 lb 12 8 oz  BMI Calculated: 29 83  BSA Calculated: 1 84    Results/Data  [facility]ita Flowsheet 82MOA4677 02:35PM Nonah Fitting     Test Name Result Flag Reference   Height 64     Weight 173 8     Body Fat % 41 6     Fat Mass 72 4     FFM ( Fat Free Mass) 101 4     Muscle Mass 96 2     TBW ( Total Body Water) 69 0 TBW % 39 7     Bone Mass 5 2     BMR ( Basal Metabolic Rate) 5572     Metabolic Age 66     Visceral Fat Rating 11     BMI 29 8         Future Appointments    Date/Time Provider Specialty Site   04/14/2017 01:30 PM Flavia [facility]pkins [facility]   05/12/2017 02:00 PM IGOR Bravo  Bariatric Medicine [facility]   04/26/2017 10:30 AM Chao Bellamy DO Internal Medicine [facility] INTERNAL MED     Signatures   Electronically signed by :  Jordana Cedillo, ; Mar 31 2017  2:34PM EST                       (Author)    Electronically signed by : IGOR Lim ; Mar 31 2017  2:54PM EST                       (Co-author)

## 2018-01-13 VITALS
HEIGHT: 64 IN | OXYGEN SATURATION: 94 % | DIASTOLIC BLOOD PRESSURE: 80 MMHG | HEART RATE: 68 BPM | BODY MASS INDEX: 29.41 KG/M2 | TEMPERATURE: 98 F | RESPIRATION RATE: 16 BRPM | WEIGHT: 172.25 LBS | SYSTOLIC BLOOD PRESSURE: 110 MMHG

## 2018-01-13 VITALS
OXYGEN SATURATION: 94 % | HEIGHT: 64 IN | TEMPERATURE: 98 F | BODY MASS INDEX: 28.88 KG/M2 | WEIGHT: 169.13 LBS | RESPIRATION RATE: 16 BRPM | SYSTOLIC BLOOD PRESSURE: 100 MMHG | HEART RATE: 68 BPM | DIASTOLIC BLOOD PRESSURE: 60 MMHG

## 2018-01-13 VITALS — BODY MASS INDEX: 27.66 KG/M2 | HEIGHT: 64 IN | WEIGHT: 162 LBS

## 2018-01-13 VITALS — WEIGHT: 166.2 LBS | HEIGHT: 64 IN | BODY MASS INDEX: 28.38 KG/M2

## 2018-01-13 VITALS — WEIGHT: 168 LBS | HEIGHT: 64 IN | BODY MASS INDEX: 28.68 KG/M2

## 2018-01-13 NOTE — PROGRESS NOTES
History of Present Illness  HPI: Marissa Starks was seen this morning for completion of REE as she did not fast for appointment 2 wks ago  Current wt 162 lbs, loss of 1 8 lbs x 2 wks and overall loss of 11 8 lbs x 3 1/2 months  REE is 20% > predicted amongst women her age/wt/ht  Measured 1598 kcal vs  1336 kcal predicted  Active Problems    1  Arthralgia (719 40) (M25 50)   2  Atypical chest pain (786 59) (R07 89)   3  Bilateral hip pain (719 45) (M25 551,M25 552)   4  Chronic interstitial cystitis (595 1) (N30 10)   5  Depression (311) (F32 9)   6  Encounter for routine gynecological examination (V72 31) (Z01 419)   7  Encounter for screening mammogram for malignant neoplasm of breast (V76 12)   (Z12 31)   8  Hyperlipidemia (272 4) (E78 5)   9  Hypertension (401 9) (I10)   10  Lipoma (214 9) (D17 9)   11  Myalgia (729 1) (M79 1)   12  Neck pain (723 1) (M54 2)   13  Need for immunization against influenza (V04 81) (Z23)   14  Need for pneumococcal vaccination (V03 82) (Z23)   15  Nonspecific vaginitis (616 10) (N76 0)   16  Osteopenia (733 90) (M85 80)   17  Overweight (BMI 25 0-29 9) (278 02) (E66 3)   18  Pain of right lower extremity (729 5) (M79 604)   19  Post-menopausal atrophic vaginitis (627 3) (N95 2)   20  Rash (782 1) (R21)   21  Rhinitis (472 0) (J31 0)   22  Right shoulder pain (719 41) (M25 511)   23  Screening for genitourinary condition (V81 6) (Z13 89)   24  Screening for neurological condition (V80 09) (Z13 89)   25  Shortness of breath on exertion (786 05) (R06 02)   26  Symptomatic menopausal or female climacteric states (627 2) (N95 1)   27  Tachycardia (785 0) (R00 0)   28  Tension headache (307 81) (G44 209)   29  Tick bite (919 4,E906 4) (W57 XXXA)   30  Vaginitis (616 10) (N76 0)   31  Weight gain (783 1) (R63 5)    Past Medical History    1  History of Acute bronchitis (466 0) (J20 9)   2  History of Anxiety (300 00) (F41 9)   3  History of Bleeding hemorrhoids (455 8) (K64 9)   4  History of cosmetic surgery (V45 89) (Z98 890)   5  History of urinary tract infection (V13 02) (Z87 440)   6  History of Simple partial seizures (345 50) (G40 109)   7  History of Urethral discharge (788 7) (R36 9)    Surgical History    1  History of Hysterectomy   2  History of Shoulder Surgery    Family History  Mother    1  Family history of    2  Family history of cerebrovascular accident (CVA) (V17 1) (Z82 3)   3  Family history of hypertension (V17 49) (Z82 49)  Father    4  Family history of    5  Family history of hypertension (V17 49) (Z82 49)   6  Family history of lung cancer (V16 1) (Z80 1)  Brother    7  Family history of lung cancer (V16 1) (Z80 1)  Family History    8  Family history of Alcoholism    Social History    · Denied: History of Alcohol Use (History)   · Denied: History of Drug Use   · Exercising Regularly   · Never A Smoker   · Occupation   · Retired    Current Meds   1  Atorvastatin Calcium 10 MG Oral Tablet; take 1 tablet by mouth every day; Therapy: 79NIS2235 to (Evaluate:2017)  Requested for: 2017; Last   Rx:2017 Ordered   2  BusPIRone HCl - 5 MG Oral Tablet; TAKE 2 TABLET 3 times daily; Therapy: 2011 to  Requested for: 48OEL2716 Recorded   3  Calcium Citrate+D3 TABS; Therapy: (Recorded:2017) to Recorded   4  Depakote  MG Oral Tablet Extended Release 24 Hour; TAKE ONE TAB TWICE   DAILY; Therapy: (Recorded:25Lrm1362) to Recorded   5  Elestrin 0 52 MG/0 87 GM (0 06%) Transdermal Gel; APPLY 1 PUMP ( 0 87GM) TO   UPPER ARM ONCE DAILY; Therapy: 27PPI5624 to (Evaluate:2017)  Requested for: 2016; Last   Rx:2016 Ordered   6  Escitalopram Oxalate 20 MG Oral Tablet; take 1 tablet every day; Therapy: (Recorded:2017) to Recorded   7  Fexofenadine HCl - 180 MG Oral Tablet; TAKE 1 TABLET DAILY AS NEEDED; Therapy: (Recorded:2017) to Recorded   8   Ipratropium Bromide 0 06 % Nasal Solution; USE 1 SPRAY IN NOSTRIL(S) DAILY AS   NEEDED; Therapy: 54Tnw6446 to (Evaluate:37Kvu4745)  Requested for: 81DCK5042; Last   VA:38PUQ0049 Ordered   9  Lisinopril 20 MG Oral Tablet; take 1 tablet every day; Therapy: 52CQI9741 to (Evaluate:67Vck2742)  Requested for: 30Ohf0346; Last   Rx:57Ena3320 Ordered   10  Meloxicam 15 MG Oral Tablet; Therapy: (Recorded:83Tjk8587) to Recorded   11  Methocarbamol 500 MG Oral Tablet; two tabs QID prn  Requested for: 39Iep0704; Last    Rx:08Zfk7993 Ordered   12  Osphena 60 MG Oral Tablet; Take 1 tablet daily; Therapy: 45Mdo8669 to (Evaluate:25Zlz5956)  Requested for: 88Flw9938; Last    Rx:46Qpd8814 Ordered   13  Pindolol 5 MG Oral Tablet; take 1 tablet every day; Therapy: 61Nzy5733 to (Evaluate:43Vjd4616)  Requested for: 40PNH0971; Last    Rx:58Kkf2507 Ordered   14  Zolpidem Tartrate 5 MG Oral Tablet; Therapy: 59OPZ0468 to (Last Faith Deter)  Requested for: 61Nen2283 Ordered    Allergies    1  Codeine Derivatives   2  Erythromycin Derivatives   3  Morphine Derivatives   4  Penicillins    5  Seasonal    Vitals  Signs   Recorded: 25XII5626 09:15AM   Height: 5 ft 4 in  Weight: 162 lb   BMI Calculated: 27 81  BSA Calculated: 1 79    Results/Data  Metabolic Analyzer - POC 26OPQ8750 09:47AM Vitaliy Pachecorufinalenore     Test Name Result Flag Reference   Metabolic Analyzer 1115         Future Appointments    Date/Time Provider Specialty Site   07/27/2017 09:00 AM Flavia Tompkins [address] [facility]   10/24/2017 10:45 AM IGOR Meehan  Bariatric Medicine [place] [address] [facility]   10/25/2017 10:00 AM Isabel Retana DO Internal Medicine [facility]     Signatures   Electronically signed by :  Tonya Luna, ; Jul 18 2017  9:46AM EST                       (Author)    Electronically signed by : IGOR Soria ; Jul 18 2017  3:15PM EST                       (Co-author)

## 2018-01-13 NOTE — CONSULTS
Chief Complaint  Chief Complaint Free Text Note Form: Patient is here today for MWM Consultation  Stop Montez Kirk: 4/8  History of Present Illness  Free Text HPI: Excess weight-  Severity: Mild  Onset: Past few years  Modifiers: Worsened after long term  Has tried multiple diet and exercise regimens throughout the years but nothing sustained  Increased mindless eating  Does a senior spin class twice weekly for 1 hour and plans to do yoga  Associated Symptoms: Wants to be healthier, recent issues with blood pressure and cholesterol  Past Medical History    1  History of Acute bronchitis (466 0) (J20 9)   2  History of Anxiety (300 00) (F41 9)   3  History of Bleeding hemorrhoids (455 8) (K64 9)   4  History of cosmetic surgery (V45 89) (Z98 890)   5  History of urinary tract infection (V13 02) (Z87 440)   6  History of Simple partial seizures (345 50) (G40 109)   7  History of Urethral discharge (788 7) (R36 9)  Active Problems And Past Medical History Reviewed: The active problems and past medical history were reviewed and updated today  Assessment    1  Weight gain (783 1) (R63 5)   2  Hyperlipidemia (272 4) (E78 5)   3  Hypertension (401 9) (I10)   4  Depression (311) (F32 9)   5  Overweight (BMI 25 0-29 9) (278 02) (E66 3)   6  History of Simple partial seizures (345 50) (G40 109)    Discussion/Summary  Discussion Summary:   69-year-old female with hypertension, hyperlipidemia, depression and excess weight here to pursue medical weight management to improve weight and health  Excess weight/weight gain/overweight:   -discussed options of conservative vs CIARRA program +/- meal replacement vs VLCD  -initial focus of 5-10% weight loss over 3-6 mos for improved health  -screening labs-CMP from 1/2017 within acceptable limits  TSH normal 2016   Lipids stable 2016    Hypertension: Stable  -Stable on lisinopril    Depression/anxiety:  -On Lexapro, BuSpar    History of simple partial seizures:  -On Depakote  -Would not use bupropion    Hyperlipidemia: Stable  -On statin    Patient is interested in new direction-intensive lifestyle intervention program, 6 weeks follow-up with me  We will set up initial consultation with dietitian  Patient's Capacity to Self-Care: Patient is able to Self-Care  Understands and agrees with treatment plan: The treatment plan was reviewed with the patient/guardian  The patient/guardian understands and agrees with the treatment plan   Self Referrals:   Self Referrals: No      Review of Systems  Focused-Female:   Constitutional: no fever  ENT: no sore throat  Cardiovascular: no chest pain  Respiratory: no shortness of breath  Gastrointestinal: no abdominal pain  Genitourinary: no dysuria  Musculoskeletal: arthralgias  Integumentary: no rashes  Neurological: no headache  Other Symptoms: Psych: + Depression/anxiety  Active Problems    1  Acute URI (465 9) (J06 9)   2  Arthralgia (719 40) (M25 50)   3  Atypical chest pain (786 59) (R07 89)   4  Bilateral hip pain (719 45) (M25 551,M25 552)   5  Chronic interstitial cystitis (595 1) (N30 10)   6  Depression (311) (F32 9)   7  Encounter for routine gynecological examination (V72 31) (Z01 419)   8  Encounter for screening mammogram for malignant neoplasm of breast (V76 12)   (Z12 31)   9  Hyperlipidemia (272 4) (E78 5)   10  Hypertension (401 9) (I10)   11  Lipoma (214 9) (D17 9)   12  Myalgia (729 1) (M79 1)   13  Neck pain (723 1) (M54 2)   14  Need for immunization against influenza (V04 81) (Z23)   15  Need for pneumococcal vaccination (V03 82) (Z23)   16  Nonspecific vaginitis (616 10) (N76 0)   17  Osteopenia (733 90) (M85 80)   18  Overweight (BMI 25 0-29 9) (278 02) (E66 3)   19  Pain of right lower extremity (729 5) (M79 604)   20  Post-menopausal atrophic vaginitis (627 3) (N95 2)   21  Rhinitis (472 0) (J31 0)   22  Right shoulder pain (719 41) (M25 511)   23   Shortness of breath on exertion (786 05) (R06 02)   24  Symptomatic menopausal or female climacteric states (627 2) (N95 1)   25  Tachycardia (785 0) (R00 0)   26  Tension headache (307 81) (G44 209)   27  Vaginitis (616 10) (N76 0)    Surgical History    1  History of Hysterectomy   2  History of Shoulder Surgery  Surgical History Reviewed: The surgical history was reviewed and updated today  Family History  Family History    1  Family history of Alcoholism  Family History Reviewed: The family history was reviewed and updated today  Social History    · Denied: History of Alcohol Use (History)   · Denied: History of Drug Use   · Exercising Regularly   · Never A Smoker   · Occupation   · Retired  Social History Reviewed: The social history was reviewed and updated today  Current Meds   1  Atorvastatin Calcium 10 MG Oral Tablet; take 1 tablet by mouth every day; Therapy: 05OLL9904 to (Evaluate:02Aug2017)  Requested for: 27Feb2017; Last   Rx:27Feb2017 Ordered   2  BusPIRone HCl - 5 MG Oral Tablet; TAKE 2 TABLET 3 times daily; Therapy: 12Apr2011 to  Requested for: 97HIG1399 Recorded   3  Calcium Citrate+D3 TABS; Therapy: (Recorded:30Mar2017) to Recorded   4  Depakote  MG Oral Tablet Extended Release 24 Hour; TAKE ONE TAB TWICE   DAILY; Therapy: (Recorded:10Feb2017) to Recorded   5  Elestrin 0 52 MG/0 87 GM (0 06%) Transdermal Gel; APPLY 1 PUMP ( 0 87GM) TO   UPPER ARM ONCE DAILY; Therapy: 66BRB1585 to (Evaluate:14Oct2017)  Requested for: 19Oct2016; Last   Rx:19Oct2016 Ordered   6  Escitalopram Oxalate 20 MG Oral Tablet; take 1 tablet every day; Therapy: (Recorded:30Mar2017) to Recorded   7  Fexofenadine HCl - 180 MG Oral Tablet; TAKE 1 TABLET DAILY AS NEEDED; Therapy: (Recorded:30Mar2017) to Recorded   8  Ipratropium Bromide 0 06 % Nasal Solution; USE 1 SPRAY IN NOSTRIL(S) DAILY AS   NEEDED; Therapy: 22Feb2011 to (Mariela Driver)  Requested for: 36GRQ3596; Last   Rx:09Jan2017 Ordered   9   Lexapro 20 MG Oral Tablet; Therapy: 70LFL1554 to (Last Rx:82Zvf3501)  Requested for: 08BYF7600 Ordered   10  Lisinopril 20 MG Oral Tablet; take 1 tablet every day; Therapy: 70UPQ3085 to (Evaluate:34Mod9921)  Requested for: 08Gel2060; Last    Rx:10Hpg4542 Ordered   11  Meloxicam 15 MG Oral Tablet; Therapy: (Recorded:05Ibg9170) to Recorded   12  Methocarbamol 500 MG Oral Tablet; two tabs QID prn; Therapy: (Recorded:11Ytz9795) to Recorded   13  Osphena 60 MG Oral Tablet; Take 1 tablet daily; Therapy: 20Oee3910 to (Evaluate:00Run9242)  Requested for: 56Pyv2440; Last    Rx:97Dhe5978 Ordered   14  Pindolol 5 MG Oral Tablet; take 1 tablet every day; Therapy: 80Qxm4059 to (Evaluate:23Gfn4008)  Requested for: 45SQA4104; Last    Rx:97Ckf1344 Ordered   15  Zolpidem Tartrate 5 MG Oral Tablet; Therapy: 98RTU0517 to (Last Freya Pall)  Requested for: 92Yay7260 Ordered  Medication List Reviewed: The medication list was reviewed and updated today  Allergies    1  Codeine Derivatives   2  Erythromycin Derivatives   3  Morphine Derivatives   4  Penicillins    5  Seasonal    Vitals  Vital Signs    Recorded: 73ANA6728 02:39PM   Temperature 98 1 F, Tympanic    Heart Rate 76, L Radial    Pulse Quality Norm    Systolic 172, LUE, Sitting    Diastolic 70, LUE, Sitting    Height 5 ft 4 in    Weight 174 lb 1 6 oz    BMI Calculated 29 88    BSA Calculated 1 85    Waist Circumference  40 in   Neck Circumference  15 in     Physical Exam    Constitutional   General appearance: Abnormal   well developed and overweight  Eyes No conjunctival pallor  Ears, Nose, Mouth, and Throat Moist oral mucosa  Pulmonary   Respiratory effort: No increased work of breathing or signs of respiratory distress  Auscultation of lungs: Clear to auscultation  Cardiovascular   Auscultation of heart: Normal rate and rhythm, normal S1 and S2, without murmurs  Abdomen   Abdomen: Non-tender, no masses      Musculoskeletal   Gait and station: Normal  Psychiatric   Orientation to person, place, and time: Normal     Mood and affect: Normal          Results/Data  STOP BANG Questionnaire 75DMD9327 02:45PM User, Ahs     Test Name Result Flag Reference   STOP BANG Questionnaire Risk of JORDANA Intermediate Risk     Snoring: Yes  Tired: Yes  Observed: No  Blood Pressure: Yes  BMI: No  Age: Yes  Neck Circumference: No  Gender: No   STOP BANG Questionnaire JORDANA Total Score 4     Snoring: Yes  Tired: Yes  Observed: No  Blood Pressure: Yes  BMI: No  Age: Yes  Neck Circumference: No  Gender: No       Future Appointments    Date/Time Provider Specialty Site   03/31/2017 01:00 PM Flavia Tompkins [facility]   04/26/2017 10:30 AM Katerina Mcelroy DO Internal Medicine [address]     Signatures   Electronically signed by : IGOR Zavaleta ; Mar 30 2017  5:17PM EST                       (Author)

## 2018-01-14 VITALS
WEIGHT: 168.38 LBS | DIASTOLIC BLOOD PRESSURE: 78 MMHG | HEIGHT: 64 IN | BODY MASS INDEX: 28.75 KG/M2 | HEART RATE: 75 BPM | SYSTOLIC BLOOD PRESSURE: 132 MMHG

## 2018-01-14 VITALS — BODY MASS INDEX: 28.75 KG/M2 | HEIGHT: 64 IN | WEIGHT: 168.4 LBS

## 2018-01-14 VITALS
RESPIRATION RATE: 16 BRPM | DIASTOLIC BLOOD PRESSURE: 80 MMHG | BODY MASS INDEX: 28.82 KG/M2 | OXYGEN SATURATION: 96 % | HEIGHT: 65 IN | WEIGHT: 173 LBS | SYSTOLIC BLOOD PRESSURE: 110 MMHG | HEART RATE: 71 BPM | TEMPERATURE: 98.8 F

## 2018-01-14 VITALS
SYSTOLIC BLOOD PRESSURE: 164 MMHG | RESPIRATION RATE: 18 BRPM | TEMPERATURE: 97.5 F | HEART RATE: 69 BPM | DIASTOLIC BLOOD PRESSURE: 100 MMHG | WEIGHT: 177.13 LBS | BODY MASS INDEX: 29.93 KG/M2

## 2018-01-14 VITALS — BODY MASS INDEX: 28.68 KG/M2 | HEIGHT: 64 IN | WEIGHT: 168 LBS

## 2018-01-14 VITALS — WEIGHT: 169.8 LBS | BODY MASS INDEX: 28.99 KG/M2 | HEIGHT: 64 IN

## 2018-01-14 VITALS — HEIGHT: 64 IN | WEIGHT: 173.8 LBS | BODY MASS INDEX: 29.67 KG/M2

## 2018-01-14 VITALS — HEIGHT: 64 IN | BODY MASS INDEX: 28.85 KG/M2 | WEIGHT: 169 LBS

## 2018-01-14 VITALS — WEIGHT: 165.8 LBS | HEIGHT: 64 IN | BODY MASS INDEX: 28.31 KG/M2

## 2018-01-14 VITALS — WEIGHT: 166.6 LBS | BODY MASS INDEX: 28.44 KG/M2 | HEIGHT: 64 IN

## 2018-01-15 NOTE — RESULT NOTES
Verified Results  (1) GENITAL COMPREHENSIVE CULTURE 36PWN4870 71:51FP Solomon Olivera Order Number: [id number]     Test Name Result Flag Reference   CLINICAL REPORT (Report)     Test:        Genital Comprehensive Culture  Specimen Source:  Vaginal  Specimen Type:   Genital  Specimen Date:   3/2/2016 10:05 AM  Result Date:    3/4/2016 7:45 AM  Result Status:   Final result  Resulting Lab:   [place] [address]            Tel: [phone]                 CULTURE                                       ------------------                                   2+ Growth of beta hemolytic Streptococcus Group B     *** This organism is intrinisically susceptible to Penicillin  If sensitivites to other antibiotics are required, please call the      Microbiology Department at [phone] within 5 days   ***    3+ Growth of Mixed Vaginal Samra       Plan  Vaginitis    · Cephalexin 500 MG Oral Tablet (Cephalexin Monohydrate); TAKE 1 TABLET Every  6 hours

## 2018-01-15 NOTE — PROCEDURES
Procedures by Sangita Medellin MD  at 2017  7:00 PM      Author:  Sangita Medellin MD Service:  (none) Author Type:  Physician    Filed:  2017  7:00 PM Date of Service:  2017  7:00 PM Status:  Signed    :  Sangita Medellin MD (Physician)         [address]  [place], [place]  Phone [phone]  Fax [phone]                                 Patient Name: Fany Clemente       Date performed:  2017 Medical Record #: [id number]   Report date: 17 File #: QPM    Referring physician: Dr Sangita Medellin ACCT#: -   : 1950 Study type: Routine, awake and asleep   Age: 69 y Technologist: SOLITARIO Rodriguez EEG T    Location: Outpatient ICD diagnosis: -               ELECTROENCEPHALOGRAM      Patient History:  The patient is a 69 y o  female with 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, clinically well-controlled on Depakote  Medications:  Depakote, buspar, escitalopram, pindolol, lipitor, phosphena, ambien, calcium, allegra, lisinopril      Description of Procedure: This EEG was performed with simultaneous video recording  Electrodes were applied using the International 10-20 System, with additional electrodes used including EOG, ECG and T1/T2  The EEG was recorded from 2:13:10 PM  until 03:26:37 PM for  a total of 43 5 minutes  The recording was technically satisfactory for interpretation  Skull Defect (if any):  None  Findings:     State: This sleep-deprived EEG was obtained during wakefulness and drowsiness only  Activity: The background during wakefulness consisted of low to moderate amplitude, very well-modulated 11 - 12 Hz alpha activity located symmetrically over the posterior head regions with good reactivity and attenuation to eye opening  There  is admixed low voltage fast variant seen    Hyperventilation was performed with good effort for 4 minutes and did not alter the background rhythms  Photic stimulation produced a symmetric occipital driving response to the mid-range of flash frequencies,  and was non-activating for photo-paroxysmal abnormalities  Drowsiness was reflected by slowing and attenuation of background rhythms, but full sleep was not achieved  Abnormal findings:   During drowsiness, there was the occasional appearance of discrete, moderate-amplitude spikes arising predominantly from the right posterior quadrant (P4-T4-T6), with rare independent activity seen in the left postero-temporal  (T5) derivation  No sustained ictal rhythmic discharges were noted  Other findings: The single lead ECG revealed a normal sinus rhythm averaging 72 bpm without ectopy  Interpretation: This is an abnormal sleep-deprived EEG due to infrequent, multi-focal spike activity during drowsiness  Similar to her previous studies, these findings suggest underlying focal cortical hyper-irritability and enhanced epileptogenic potential       Marilyn RADER  [facility]                   Received for:Provider  EPIC   Sep 24 2017  7:00PM [facility] Standard Time

## 2018-01-16 NOTE — PROGRESS NOTES
History of Present Illness  HPI: Tasia Ortiz is here for f/u  Current wt 168 2 lbs, loss of 5 6 lbs x 4 wks  Tracking on paper but shows me how she started with specifics and has slacked off a little bit  Knows she needs to track a little better but does find it is helping her to be more aware  Questions addressed re: social situations  She continues to exercise at [employer] and has now started gardening  Much more positive outlook with her progress this visit  Continues to work on fluid intake  Bariatric MNT [facility]:   Weight Assessment: IBW: 120 lbs , ABW: 132 lbs, Weight Change: 5 6 lb loss x 4 wk, Highest Weight: 174 lbs, Lowest Weight: 120 lbs, Goal Weight: STG <160 lbs,  lbs  Excess calories come from in between meal snacking, large portions at mealtimes and high calorie high fat food choices  Dietary Recall:   Breakfast: shake or egg omelet w/veggies  Snack: yogurt  Lunch: pro, carb, veggies  Snack: bar  Dinner: pro, carb, veggies  Beverages: water, coffee, wine  Estimated fluid intake: ~24 oz water, 5 oz wine  Alcohol consumption: wine 5 oz few times per week  Food allergies/intolerances: ? shellfish  Cooking: self  Shopping: spouse  She dines out 1-2 times per week  Exercise Frequency:  She exercises senior spin, DWNLD  Nutrition Prescription: Estimated calories for weight loss: eCalc BMR 1278, wt loss w/o exercise 534-1034, w/exercise 757-1257, Estimated daily protein needs: 65-82 gm (1 2-1 5 gm/kg IBW) and Estimated daily fluid needs: 64 oz (35 cc/kg iBW)  Nutrition Intervention: Counseling provided with emphasis on portion sizes, healthy snack choices, hydration, fiber intake, protein intake, exercise and food journaling  Education materials provided: [employer] manual    Comprehension: good  Expected Compliance: good     Goals: follow meal plan prescribed, reduce portion sizes at mealtimes, plan meals and snacks daily, Choose lower-calorie, lower-fat meal options at home and when dining out, food journal, do not skip meals or snacks, increase fluid intake 64 oz  and 1000 calories rest, 1200 calories exercise   Monitor/Evaluation: weekly weight, food journal, fluid intake and exercise level  Active Problems    1  Acute URI (465 9) (J06 9)   2  Arthralgia (719 40) (M25 50)   3  Atypical chest pain (786 59) (R07 89)   4  Bilateral hip pain (719 45) (M25 551,M25 552)   5  Chronic interstitial cystitis (595 1) (N30 10)   6  Depression (311) (F32 9)   7  Encounter for routine gynecological examination (V72 31) (Z01 419)   8  Encounter for screening mammogram for malignant neoplasm of breast (V76 12)   (Z12 31)   9  Hyperlipidemia (272 4) (E78 5)   10  Hypertension (401 9) (I10)   11  Lipoma (214 9) (D17 9)   12  Myalgia (729 1) (M79 1)   13  Neck pain (723 1) (M54 2)   14  Need for immunization against influenza (V04 81) (Z23)   15  Need for pneumococcal vaccination (V03 82) (Z23)   16  Nonspecific vaginitis (616 10) (N76 0)   17  Osteopenia (733 90) (M85 80)   18  Overweight (BMI 25 0-29 9) (278 02) (E66 3)   19  Pain of right lower extremity (729 5) (M79 604)   20  Post-menopausal atrophic vaginitis (627 3) (N95 2)   21  Rhinitis (472 0) (J31 0)   22  Right shoulder pain (719 41) (M25 511)   23  Screening for genitourinary condition (V81 6) (Z13 89)   24  Screening for neurological condition (V80 09) (Z13 89)   25  Shortness of breath on exertion (786 05) (R06 02)   26  Symptomatic menopausal or female climacteric states (627 2) (N95 1)   27  Tachycardia (785 0) (R00 0)   28  Tension headache (307 81) (G44 209)   29  Vaginitis (616 10) (N76 0)   30  Weight gain (783 1) (R63 5)    Past Medical History    1  History of Acute bronchitis (466 0) (J20 9)   2  History of Anxiety (300 00) (F41 9)   3  History of Bleeding hemorrhoids (455 8) (K64 9)   4  History of cosmetic surgery (V45 89) (Z98 890)   5   History of urinary tract infection (V13 02) (Z87 440)   6  History of Simple partial seizures (345 50) (G40 109)   7  History of Urethral discharge (788 7) (R36 9)    Surgical History    1  History of Hysterectomy   2  History of Shoulder Surgery    Family History  Mother    1  Family history of    2  Family history of cerebrovascular accident (CVA) (V17 1) (Z82 3)   3  Family history of hypertension (V17 49) (Z82 49)  Father    4  Family history of    5  Family history of hypertension (V17 49) (Z82 49)   6  Family history of lung cancer (V16 1) (Z80 1)  Brother    7  Family history of lung cancer (V16 1) (Z80 1)  Family History    8  Family history of Alcoholism    Social History    · Denied: History of Alcohol Use (History)   · Denied: History of Drug Use   · Exercising Regularly   · Never A Smoker   · Occupation   · Retired    Current Meds   1  Atorvastatin Calcium 10 MG Oral Tablet; take 1 tablet by mouth every day; Therapy: 56QGK3482 to (Evaluate:50Pda0868)  Requested for: 2017; Last   Rx:2017 Ordered   2  BusPIRone HCl - 5 MG Oral Tablet; TAKE 2 TABLET 3 times daily; Therapy: 2011 to  Requested for: 42KOR6738 Recorded   3  Calcium Citrate+D3 TABS; Therapy: (Recorded:2017) to Recorded   4  Depakote  MG Oral Tablet Extended Release 24 Hour; TAKE ONE TAB TWICE   DAILY; Therapy: (Recorded:2017) to Recorded   5  Elestrin 0 52 MG/0 87 GM (0 06%) Transdermal Gel; APPLY 1 PUMP ( 0 87GM) TO   UPPER ARM ONCE DAILY; Therapy: 04XBO3345 to (Evaluate:2017)  Requested for: 2016; Last   Rx:2016 Ordered   6  Escitalopram Oxalate 20 MG Oral Tablet; take 1 tablet every day; Therapy: (Recorded:2017) to Recorded   7  Fexofenadine HCl - 180 MG Oral Tablet; TAKE 1 TABLET DAILY AS NEEDED; Therapy: (Recorded:2017) to Recorded   8  Ipratropium Bromide 0 06 % Nasal Solution; USE 1 SPRAY IN NOSTRIL(S) DAILY AS   NEEDED;    Therapy: 2011 to ( 478437)  Requested for: 22LRJ5909; Last   Rx:09Jan2017 Ordered   9  Lexapro 20 MG Oral Tablet; Therapy: 12KIW4695 to (Last Rx:80Cym1585)  Requested for: 36FJE9505 Ordered   10  Lisinopril 20 MG Oral Tablet; take 1 tablet every day; Therapy: 02DWQ9186 to (Evaluate:10Jun2017)  Requested for: 80Hee2683; Last    Rx:41Bdm4114 Ordered   11  Meloxicam 15 MG Oral Tablet; Therapy: (Recorded:88Fyy1855) to Recorded   12  Methocarbamol 500 MG Oral Tablet; two tabs QID prn  Requested for: 16Dvu2572; Last    Rx:83Cfa0731 Ordered   13  Osphena 60 MG Oral Tablet; Take 1 tablet daily; Therapy: 78Abh2381 to (Evaluate:12Apr2016)  Requested for: 07Txs7462; Last    Rx:11Yds5498 Ordered   14  Pindolol 5 MG Oral Tablet; take 1 tablet every day; Therapy: 98Vrs2595 to (Evaluate:26Puf0698)  Requested for: 73FUL2799; Last    Rx:55Ghr0929 Ordered   15  Zolpidem Tartrate 5 MG Oral Tablet; Therapy: 94NVD9316 to (Last Nidhi Raoul)  Requested for: 82Oyr6066 Ordered    Allergies    1  Codeine Derivatives   2  Erythromycin Derivatives   3  Morphine Derivatives   4  Penicillins    5  Seasonal    Vitals  Signs   Recorded: 97ORN8481 01:50PM   Height: 5 ft 4 in  Weight: 168 lb 3 2 oz  BMI Calculated: 28 87  BSA Calculated: 1 82    Future Appointments    Date/Time Provider Specialty Site   05/12/2017 02:00 PM IGOR Golden  Bariatric Medicine [facility]   10/25/2017 10:00 AM Tyrone Woods DO Internal Medicine [facility] INTERNAL MED     Signatures   Electronically signed by :  Elier Kim, ; May  5 2017  3:01PM EST                       (Author)    Electronically signed by : IGOR Lin ; May  8 2017  8:09AM EST                       (Co-author)

## 2018-01-16 NOTE — PROGRESS NOTES
History of Present Illness  HPI: Michael Garza is here for 2 wk f/u  Current wt: 171 4 lbs  Loss of 2 4 lbs x 2 wks  Discouraged by increase from Tuesday at class  Discussed fluid shifts  She also feels that she begin indulging in Easter candy and wine  She is not tracking but realizes that she should be and is going to make it a point to start  Recognizing mindfulness and how stress affects her  Having trouble differentiating hunger from thirst and needs to work on this  Discussed timing of meals and how she can move things around to aide in hunger late in the day  Bariatric MNT [facility]:   Weight Assessment: IBW: 120 lbs, ABW: 132 8 lbs, Weight Change: 2 4 lb loss x 2 wks, Highest Weight: 174 lbs, Lowest Weight: 120 lbs, Goal Weight: STG <160 lbs,  lbs  Excess calories come from in between meal snacking, large portions at mealtimes and high calorie high fat food choices  Dietary Recall:   Breakfast: 1 egg + 2 whites, veggies, cheese  Snack: skip  Lunch: ~2: replacement  Snack: 4: bar  Dinner: 7:30-8: ~3 oz lean pro, veggies, 1 tbsp carb  Snack: skip or fruit or popcorn   Beverages: water, coffee, wine  Alcohol consumption: occasional    Food allergies/intolerances: ? shellfish  Cooking: self  Shopping: spouse  She dines out 1-2 times per week  Exercise Frequency:  She exercises senior spin 60 min 2x/wk  Nutrition Prescription: Estimated calories for weight loss: eCalc BMR 1294, wt loss w/o exercise 682-1182, w/exercise 812-1311, Estimated daily protein needs: 65-82 gm (1 2-1 5 gm/kg IBW) and Estimated daily fluid needs: 64 oz (35 cc/kg IBW)  Snack: move bar a little later or 1/2 at one time & 1/2 close to dinner OR try veggies while prepping  Nutrition Intervention: Counseling provided with emphasis on meal planning, portion sizes, healthy snack choices, hydration, protein intake, exercise and food journaling     Education materials provided: [employer] manual  Comprehension: good  Expected Compliance: good  Goals: follow meal plan prescribed, plan meals and snacks daily, Choose lower-calorie, lower-fat meal options at home and when dining out, food journal, do not skip meals or snacks and 800-1000 calories using 1 replacement & 1 bar  Monitor/Evaluation: weekly weight, food journal, fluid intake and exercise level  Active Problems    1  Acute URI (465 9) (J06 9)   2  Arthralgia (719 40) (M25 50)   3  Atypical chest pain (786 59) (R07 89)   4  Bilateral hip pain (719 45) (M25 551,M25 552)   5  Chronic interstitial cystitis (595 1) (N30 10)   6  Depression (311) (F32 9)   7  Encounter for routine gynecological examination (V72 31) (Z01 419)   8  Encounter for screening mammogram for malignant neoplasm of breast (V76 12)   (Z12 31)   9  Hyperlipidemia (272 4) (E78 5)   10  Hypertension (401 9) (I10)   11  Lipoma (214 9) (D17 9)   12  Myalgia (729 1) (M79 1)   13  Neck pain (723 1) (M54 2)   14  Need for immunization against influenza (V04 81) (Z23)   15  Need for pneumococcal vaccination (V03 82) (Z23)   16  Nonspecific vaginitis (616 10) (N76 0)   17  Osteopenia (733 90) (M85 80)   18  Overweight (BMI 25 0-29 9) (278 02) (E66 3)   19  Pain of right lower extremity (729 5) (M79 604)   20  Post-menopausal atrophic vaginitis (627 3) (N95 2)   21  Rhinitis (472 0) (J31 0)   22  Right shoulder pain (719 41) (M25 511)   23  Shortness of breath on exertion (786 05) (R06 02)   24  Symptomatic menopausal or female climacteric states (627 2) (N95 1)   25  Tachycardia (785 0) (R00 0)   26  Tension headache (307 81) (G44 209)   27  Vaginitis (616 10) (N76 0)   28  Weight gain (783 1) (R63 5)    Past Medical History    1  History of Acute bronchitis (466 0) (J20 9)   2  History of Anxiety (300 00) (F41 9)   3  History of Bleeding hemorrhoids (455 8) (K64 9)   4  History of cosmetic surgery (V45 89) (Z98 890)   5  History of urinary tract infection (V13 02) (Z87 440)   6  History of Simple partial seizures (345 50) (G40 109)   7  History of Urethral discharge (788 7) (R36 9)    Surgical History    1  History of Hysterectomy   2  History of Shoulder Surgery    Family History  Mother    1  Family history of    2  Family history of cerebrovascular accident (CVA) (V17 1) (Z82 3)   3  Family history of hypertension (V17 49) (Z82 49)  Father    4  Family history of    5  Family history of hypertension (V17 49) (Z82 49)   6  Family history of lung cancer (V16 1) (Z80 1)  Brother    7  Family history of lung cancer (V16 1) (Z80 1)  Family History    8  Family history of Alcoholism    Social History    · Denied: History of Alcohol Use (History)   · Denied: History of Drug Use   · Exercising Regularly   · Never A Smoker   · Occupation   · Retired    Current Meds   1  Atorvastatin Calcium 10 MG Oral Tablet; take 1 tablet by mouth every day; Therapy: 69OEY6142 to (Evaluate:86Jah4851)  Requested for: 2017; Last   Rx:2017 Ordered   2  BusPIRone HCl - 5 MG Oral Tablet; TAKE 2 TABLET 3 times daily; Therapy: 2011 to  Requested for: 49GRT9511 Recorded   3  Calcium Citrate+D3 TABS; Therapy: (Recorded:2017) to Recorded   4  Depakote  MG Oral Tablet Extended Release 24 Hour; TAKE ONE TAB TWICE   DAILY; Therapy: (Recorded:89Nut8206) to Recorded   5  Elestrin 0 52 MG/0 87 GM (0 06%) Transdermal Gel; APPLY 1 PUMP ( 0 87GM) TO   UPPER ARM ONCE DAILY; Therapy: 42CRB7319 to (Evaluate:2017)  Requested for: 2016; Last   Rx:2016 Ordered   6  Escitalopram Oxalate 20 MG Oral Tablet; take 1 tablet every day; Therapy: (Recorded:2017) to Recorded   7  Fexofenadine HCl - 180 MG Oral Tablet; TAKE 1 TABLET DAILY AS NEEDED; Therapy: (Recorded:2017) to Recorded   8  Ipratropium Bromide 0 06 % Nasal Solution; USE 1 SPRAY IN NOSTRIL(S) DAILY AS   NEEDED;    Therapy: 57Nxb3949 to (Zaira Bowser)  Requested for: 54JSL6346; Last   XX:76KOD2200 Ordered   9  Lexapro 20 MG Oral Tablet; Therapy: 94NKE2831 to (Last Rx:74Spa0016)  Requested for: 42EAX1593 Ordered   10  Lisinopril 20 MG Oral Tablet; take 1 tablet every day; Therapy: 62UGY6371 to (Evaluate:59Ysb9532)  Requested for: 18Ugi4871; Last    Rx:61Red6152 Ordered   11  Meloxicam 15 MG Oral Tablet; Therapy: (Recorded:63Uvq1631) to Recorded   12  Methocarbamol 500 MG Oral Tablet; two tabs QID prn; Therapy: (Recorded:24Ncd9819) to Recorded   13  Osphena 60 MG Oral Tablet; Take 1 tablet daily; Therapy: 65Gja4658 to (Evaluate:41Vrc1499)  Requested for: 47Msj1622; Last    Rx:40Zdy1008 Ordered   14  Pindolol 5 MG Oral Tablet; take 1 tablet every day; Therapy: 96Bse6196 to (Evaluate:72Moc2411)  Requested for: 20SMS2184; Last    Rx:20Pfl1518 Ordered   15  Zolpidem Tartrate 5 MG Oral Tablet; Therapy: 01EBC8163 to (Last Anirudh Mole)  Requested for: 59Vjk5793 Ordered    Allergies    1  Codeine Derivatives   2  Erythromycin Derivatives   3  Morphine Derivatives   4  Penicillins    5  Seasonal    Future Appointments    Date/Time Provider Specialty Site   05/12/2017 02:00 PM IGOR Vasquez  Bariatric Medicine [facility]   04/26/2017 10:30 AM Mayi Lang DO Internal Medicine [facility] INTERNAL MED     Signatures   Electronically signed by :  Erick Mojica, ; Apr 14 2017  1:58PM EST                       (Author)

## 2018-01-17 NOTE — PROGRESS NOTES
Chief Complaint  Chief Complaint Free Text Note Form: Patient here for MWM follow up  History of Present Illness  HPI: indoor cycling 2hrs per week  heavy gardening-10hrs per week      Past Medical History    1  History of Acute bronchitis (466 0) (J20 9)   2  History of Anxiety (300 00) (F41 9)   3  History of Bleeding hemorrhoids (455 8) (K64 9)   4  History of cosmetic surgery (V45 89) (Z98 890)   5  History of urinary tract infection (V13 02) (Z87 440)   6  History of Simple partial seizures (345 50) (G40 109)   7  History of Urethral discharge (788 7) (R36 9)    Assessment    1  Weight gain (783 1) (R63 5)   2  Hypertension (401 9) (I10)   3  Hyperlipidemia (272 4) (E78 5)   4  Overweight (BMI 25 0-29 9) (278 02) (E66 3)    Discussion/Summary  Discussion Summary:   68-year-old female with hypertension, hyperlipidemia, depression and excess weight here to pursue medical weight management to improve weight and health  Initial: 173 8  Current:165 5  Change: -8 4lb (-5%)    Excess weight/weight gain/overweight:   -Enrolled in [employer] program   -initial focus of 5-10% weight loss over 3-6 mos for improved health (met)  -screening labs-CMP from 1/2017 within acceptable limits  TSH normal 2016  Lipids stable 2016    Hypertension: Stable  -Stable on lisinopril    Depression/anxiety:  -On Lexapro, BuSpar    History of simple partial seizures:  -On Depakote  -Would not use bupropion    Hyperlipidemia: Stable  -On statin    RTC in 3-6 mos w/ me  Goals and Barriers: The patient has the current Goals: Add 2 days per week of full body strength training-see resistance band handout  Patient's Capacity to Self-Care: Patient is able to Self-Care  Patient Education: Educational resources provided: Resistance band handout provided  Understands and agrees with treatment plan: The treatment plan was reviewed with the patient/guardian   The patient/guardian understands and agrees with the treatment plan      Active Problems    1  Arthralgia (719 40) (M25 50)   2  Atypical chest pain (786 59) (R07 89)   3  Bilateral hip pain (719 45) (M25 551,M25 552)   4  Chronic interstitial cystitis (595 1) (N30 10)   5  Depression (311) (F32 9)   6  Encounter for routine gynecological examination (V72 31) (Z01 419)   7  Encounter for screening mammogram for malignant neoplasm of breast (V76 12)   (Z12 31)   8  Hyperlipidemia (272 4) (E78 5)   9  Hypertension (401 9) (I10)   10  Lipoma (214 9) (D17 9)   11  Myalgia (729 1) (M79 1)   12  Neck pain (723 1) (M54 2)   13  Need for immunization against influenza (V04 81) (Z23)   14  Need for pneumococcal vaccination (V03 82) (Z23)   15  Nonspecific vaginitis (616 10) (N76 0)   16  Osteopenia (733 90) (M85 80)   17  Overweight (BMI 25 0-29 9) (278 02) (E66 3)   18  Pain of right lower extremity (729 5) (M79 604)   19  Post-menopausal atrophic vaginitis (627 3) (N95 2)   20  Rash (782 1) (R21)   21  Rhinitis (472 0) (J31 0)   22  Right shoulder pain (719 41) (M25 511)   23  Screening for genitourinary condition (V81 6) (Z13 89)   24  Screening for neurological condition (V80 09) (Z13 89)   25  Shortness of breath on exertion (786 05) (R06 02)   26  Symptomatic menopausal or female climacteric states (627 2) (N95 1)   27  Tachycardia (785 0) (R00 0)   28  Tension headache (307 81) (G44 209)   29  Tick bite (919 4,E906 4) (W57 XXXA)   30  Vaginitis (616 10) (N76 0)   31  Weight gain (783 1) (R63 5)    Surgical History    1  History of Hysterectomy   2  History of Shoulder Surgery    Family History  Mother    1  Family history of    2  Family history of cerebrovascular accident (CVA) (V17 1) (Z82 3)   3  Family history of hypertension (V17 49) (Z82 49)  Father    4  Family history of    5  Family history of hypertension (V17 49) (Z82 49)   6  Family history of lung cancer (V16 1) (Z80 1)  Brother    7  Family history of lung cancer (V16 1) (Z80 1)  Family History    8   Family history of Alcoholism    Social History    · Denied: History of Alcohol Use (History)   · Denied: History of Drug Use   · Exercising Regularly   · Never A Smoker   · Occupation   · Retired    Current Meds   1  Atorvastatin Calcium 10 MG Oral Tablet; take 1 tablet by mouth every day; Therapy: 46FQO0245 to (Evaluate:29Trh8585)  Requested for: 59Amt5511; Last   Rx:89Rcn6931 Ordered   2  BusPIRone HCl - 5 MG Oral Tablet; TAKE 2 TABLET 3 times daily; Therapy: 12Apr2011 to  Requested for: 90QBG0677 Recorded   3  Calcium Citrate+D3 TABS; Therapy: (Recorded:30Mar2017) to Recorded   4  Depakote  MG Oral Tablet Extended Release 24 Hour; TAKE ONE TAB TWICE   DAILY; Therapy: (Recorded:10Feb2017) to Recorded   5  Elestrin 0 52 MG/0 87 GM (0 06%) Transdermal Gel; APPLY 1 PUMP ( 0 87GM) TO   UPPER ARM ONCE DAILY; Therapy: 13DLH9375 to (Evaluate:14Oct2017)  Requested for: 19Oct2016; Last   Rx:78Toi1037 Ordered   6  Escitalopram Oxalate 20 MG Oral Tablet; take 1 tablet every day; Therapy: (Recorded:30Mar2017) to Recorded   7  Fexofenadine HCl - 180 MG Oral Tablet; TAKE 1 TABLET DAILY AS NEEDED; Therapy: (Recorded:30Mar2017) to Recorded   8  Ipratropium Bromide 0 06 % Nasal Solution; USE 1 SPRAY IN NOSTRIL(S) DAILY AS   NEEDED; Therapy: 71Lak6393 to (Evaluate:02Gif4925)  Requested for: 35AGM2119; Last   CY:72CNE4686 Ordered   9  Lisinopril 20 MG Oral Tablet; take 1 tablet every day; Therapy: 66PNH1010 to (Evaluate:10Jun2017)  Requested for: 83Ttj0192; Last   Rx:01Sle7635 Ordered   10  Meloxicam 15 MG Oral Tablet; Therapy: (Recorded:22Tcu8563) to Recorded   11  Methocarbamol 500 MG Oral Tablet; two tabs QID prn  Requested for: 26Apr2017; Last    Rx:95Fuo2787 Ordered   12  Osphena 60 MG Oral Tablet; Take 1 tablet daily; Therapy: 67Ibq9207 to (Evaluate:12Apr2016)  Requested for: 65Mog7940; Last    Rx:15Oct2015 Ordered   13  Pindolol 5 MG Oral Tablet; take 1 tablet every day;     Therapy: 12Apr2011 to (Evaluate:31Eek6583)  Requested for: 15SFW9912; Last    Rx:41Qlb1400 Ordered   14  Zolpidem Tartrate 5 MG Oral Tablet; Therapy: 57RMO3431 to (Last Zerita Stallion)  Requested for: 27Apr2011 Ordered    Allergies    1  Codeine Derivatives   2  Erythromycin Derivatives   3  Morphine Derivatives   4  Penicillins    5  Seasonal    Vitals  Vital Signs    Recorded: 82ZVT7591 11:03AM   Temperature 97 7 F   Heart Rate 64   Respiration 16   Systolic 708   Diastolic 70   Height 5 ft 4 in   Weight 165 lb 8 0 oz   BMI Calculated 28 41   BSA Calculated 1 8     Physical Exam    Constitutional   General appearance: Abnormal   well developed and overweight  Eyes No conjunctival pallor  Ears, Nose, Mouth, and Throat Moist oral mucosa  Pulmonary   Respiratory effort: No increased work of breathing or signs of respiratory distress  Auscultation of lungs: Clear to auscultation  Cardiovascular   Auscultation of heart: Normal rate and rhythm, normal S1 and S2, without murmurs  Abdomen   Abdomen: Non-tender, no masses  Musculoskeletal   Gait and station: Normal     Psychiatric   Orientation to person, place, and time: Normal     Mood and affect: Normal          Future Appointments    Date/Time Provider Specialty Site   07/06/2017 11:30 AM Sophia [facility]   10/24/2017 10:45 AM IGOR Amaral   Bariatric Medicine [facility]   10/25/2017 10:00 AM Rodriguez Bullard DO Internal Medicine [facility] INTERNAL MED     Signatures   Electronically signed by : IGOR Trejo ; Jun 27 2017 11:44AM EST                       (Author)

## 2018-01-18 NOTE — RESULT NOTES
Verified Results  (1) LYME ANTIBODY PROFILE W/REFLEX TO WESTERN BLOT [id number] 12:19PM Leticia Xiao Order Number: [id number]     Test Name Result Flag Reference   LYME IGG 0 12  0 00-0 79   NEGATIVE(0 00-0 79)-Absence of detectable Borrelia IgG Antibodies  A negative result does not exclude the possibility of Borrelia infection  If early Lyme disease is suspected,a second sample should be collected & tested 4 weeks after initial testing  LYME IGM 0 36  0 00-0 79   NEGATIVE (0 00-0 79)-Absence of detectable Borrelia IgM antibodies  A negative result does not exclude the possibility of Borrelia infection  If early lyme disease is suspected, a second sample should be collected & tested 4 weeks after initial testing

## 2018-01-18 NOTE — PROGRESS NOTES
History of Present Illness  HPI: Steven Anthony is here for 3 month f/u with [facility]  REE not performed as she did not fast  Current wt 163 8 lbs, loss of 10 lbs x 3 months (5 8%)  [facility] shows fat loss of 7 8 lbs (78%)  She is not tracking but states she downloaded the [employer] stacey because she needs to get serious  Goals adjusted for her  She is doing better in social situations and hydration is improved  She will continue in the [facility] program    Bariatric MNT [facility]:   Weight Assessment: IBW: 120 lbs, ABW: 130 9 lbs, Weight Change: 10 lb loss x 3m, Highest Weight: 174 lbs, Lowest Weight: 120 lbs, Goal Weight: STG <160 lbs,  lbs  Excess calories come from in between meal snacking, large portions at mealtimes and high calorie high fat food choices  Dietary Recall:   Breakfast: shake OR 3 egg beater, veggies  Snack: food snack  Lunch: bar or shake  Snack: bar or yogurt  Dinner: lean protein, veggies, sometimes carb  Beverages: water, coffee, wine  Alcohol consumption: occasional    Food allergies/intolerances: ?shellfish  Cooking: self  Shopping: spouse  She dines out 1-2 times per week  Exercise Frequency:  She exercises gardening, senior spin  Nutrition Prescription: Estimated calories for weight loss: [facility] BMR 1360x1  3-3291=387, eCalc BMR 1253, wt loss w/o exercise 504-1004, w/exercise 723-1223, Estimated daily protein needs: 65-82 gm (1 2-1 5 gm/kg IBW) and Estimated daily fluid needs: 64 oz (35 cc/kg IBW)  Nutrition Intervention: Counseling provided with emphasis on portion sizes, healthy snack choices, hydration, protein intake, exercise and food journaling  Education materials provided: [employer] manual    Comprehension: good  Expected Compliance: good     Goals: follow meal plan prescribed, plan meals and snacks daily, Choose lower-calorie, lower-fat meal options at home and when dining out, food journal, do not skip meals or snacks, increase fluid intake 64 oz  and 7934-4915 calories using 0-1 replacement and 0-1 bar  Monitor/Evaluation: weekly weight, food journal, fluid intake and exercise level  Active Problems    1  Arthralgia (719 40) (M25 50)   2  Atypical chest pain (786 59) (R07 89)   3  Bilateral hip pain (719 45) (M25 551,M25 552)   4  Chronic interstitial cystitis (595 1) (N30 10)   5  Depression (311) (F32 9)   6  Encounter for routine gynecological examination (V72 31) (Z01 419)   7  Encounter for screening mammogram for malignant neoplasm of breast (V76 12)   (Z12 31)   8  Hyperlipidemia (272 4) (E78 5)   9  Hypertension (401 9) (I10)   10  Lipoma (214 9) (D17 9)   11  Myalgia (729 1) (M79 1)   12  Neck pain (723 1) (M54 2)   13  Need for immunization against influenza (V04 81) (Z23)   14  Need for pneumococcal vaccination (V03 82) (Z23)   15  Nonspecific vaginitis (616 10) (N76 0)   16  Osteopenia (733 90) (M85 80)   17  Overweight (BMI 25 0-29 9) (278 02) (E66 3)   18  Pain of right lower extremity (729 5) (M79 604)   19  Post-menopausal atrophic vaginitis (627 3) (N95 2)   20  Rash (782 1) (R21)   21  Rhinitis (472 0) (J31 0)   22  Right shoulder pain (719 41) (M25 511)   23  Screening for genitourinary condition (V81 6) (Z13 89)   24  Screening for neurological condition (V80 09) (Z13 89)   25  Shortness of breath on exertion (786 05) (R06 02)   26  Symptomatic menopausal or female climacteric states (627 2) (N95 1)   27  Tachycardia (785 0) (R00 0)   28  Tension headache (307 81) (G44 209)   29  Tick bite (919 4,E906 4) (W57 XXXA)   30  Vaginitis (616 10) (N76 0)   31  Weight gain (783 1) (R63 5)    Past Medical History    1  History of Acute bronchitis (466 0) (J20 9)   2  History of Anxiety (300 00) (F41 9)   3  History of Bleeding hemorrhoids (455 8) (K64 9)   4  History of cosmetic surgery (V45 89) (Z98 890)   5  History of urinary tract infection (V13 02) (Z87 440)   6  History of Simple partial seizures (345 50) (G40 109)   7  History of Urethral discharge (788 7) (R36 9)    Surgical History    1  History of Hysterectomy   2  History of Shoulder Surgery    Family History  Mother    1  Family history of    2  Family history of cerebrovascular accident (CVA) (V17 1) (Z82 3)   3  Family history of hypertension (V17 49) (Z82 49)  Father    4  Family history of    5  Family history of hypertension (V17 49) (Z82 49)   6  Family history of lung cancer (V16 1) (Z80 1)  Brother    7  Family history of lung cancer (V16 1) (Z80 1)  Family History    8  Family history of Alcoholism    Social History    · Denied: History of Alcohol Use (History)   · Denied: History of Drug Use   · Exercising Regularly   · Never A Smoker   · Occupation   · Retired    Current Meds   1  Atorvastatin Calcium 10 MG Oral Tablet; take 1 tablet by mouth every day; Therapy: 68WIF4358 to (Evaluate:13Gwh5270)  Requested for: 93Llb8803; Last   Rx:04Mnv7968 Ordered   2  BusPIRone HCl - 5 MG Oral Tablet; TAKE 2 TABLET 3 times daily; Therapy: 2011 to  Requested for: 72NLL0677 Recorded   3  Calcium Citrate+D3 TABS; Therapy: (Recorded:2017) to Recorded   4  Depakote  MG Oral Tablet Extended Release 24 Hour; TAKE ONE TAB TWICE   DAILY; Therapy: (Recorded:52Qzu2176) to Recorded   5  Elestrin 0 52 MG/0 87 GM (0 06%) Transdermal Gel; APPLY 1 PUMP ( 0 87GM) TO   UPPER ARM ONCE DAILY; Therapy: 07SLZ9501 to (Evaluate:2017)  Requested for: 2016; Last   Rx:83Ddt0197 Ordered   6  Escitalopram Oxalate 20 MG Oral Tablet; take 1 tablet every day; Therapy: (Recorded:2017) to Recorded   7  Fexofenadine HCl - 180 MG Oral Tablet; TAKE 1 TABLET DAILY AS NEEDED; Therapy: (Recorded:2017) to Recorded   8  Ipratropium Bromide 0 06 % Nasal Solution; USE 1 SPRAY IN NOSTRIL(S) DAILY AS   NEEDED; Therapy: 86Jkf4835 to (Evaluate:30Wfk8464)  Requested for: 82IIX6966; Last   ZN:77QNL9945 Ordered   9   Lisinopril 20 MG Oral Tablet; take 1 tablet every day; Therapy: 23PGM4419 to (Evaluate:10Jun2017)  Requested for: 03Zym9918; Last   Rx:51Bmk3442 Ordered   10  Meloxicam 15 MG Oral Tablet; Therapy: (Recorded:87Hmr3356) to Recorded   11  Methocarbamol 500 MG Oral Tablet; two tabs QID prn  Requested for: 34Bdv2786; Last    Rx:48Qim2272 Ordered   12  Osphena 60 MG Oral Tablet; Take 1 tablet daily; Therapy: 25Xlf6788 to (Evaluate:12Apr2016)  Requested for: 44Gff2342; Last    Rx:47Xns1569 Ordered   13  Pindolol 5 MG Oral Tablet; take 1 tablet every day; Therapy: 42Loc5621 to (Evaluate:18Anl4664)  Requested for: 27BIB1137; Last    Rx:84Gao7258 Ordered   14  Zolpidem Tartrate 5 MG Oral Tablet; Therapy: 28OST8020 to (Last Luis Balbuena)  Requested for: 27Apr2011 Ordered    Allergies    1  Codeine Derivatives   2  Erythromycin Derivatives   3  Morphine Derivatives   4  Penicillins    5  Seasonal    Vitals  Signs   Recorded: 69TER0492 12:26PM   Waist Circumference: 36  Recorded: 53YYO9798 12:00PM   Height: 5 ft 4 in  Weight: 163 lb 12 8 oz  BMI Calculated: 28 12  BSA Calculated: 1 8    Results/Data  [facility] Flowsheet 59ZKS9854 12:29PM Russ Valdez     Test Name Result Flag Reference   Height 64     Weight 163 8     Body Fat % 39 4     Fat Mass 64 6     FFM ( Fat Free Mass) 99 2     Muscle Mass 94 2     TBW ( Total Body Water) 66 8     TBW % 40 8     Bone Mass 5 0     BMR ( Basal Metabolic Rate) 8762     Metabolic Age 75     Visceral Fat Rating 11     BMI 28 1         Future Appointments    Date/Time Provider Specialty Site   07/18/2017 09:00 AM Russ Mehtanco  [facility]   07/27/2017 09:00 AM Flavia Tompkins [facility]   10/24/2017 10:45 AM Mortimer Keeling, M D  Bariatric Medicine [facility]   10/25/2017 10:00 AM Yane Christiansen DO Internal Medicine [facility]     Signatures   Electronically signed by :  Zach Mo, ; Jul 6 2017 12:25PM EST                       (Author) Electronically signed by : IGOR Rodriguez ; Jul 6 2017  5:26PM EST                       (Co-author)

## 2018-01-22 VITALS
HEIGHT: 64 IN | HEART RATE: 76 BPM | BODY MASS INDEX: 29.72 KG/M2 | TEMPERATURE: 98.1 F | SYSTOLIC BLOOD PRESSURE: 128 MMHG | DIASTOLIC BLOOD PRESSURE: 70 MMHG | WEIGHT: 174.1 LBS

## 2018-01-22 VITALS
HEART RATE: 64 BPM | RESPIRATION RATE: 16 BRPM | WEIGHT: 165.5 LBS | BODY MASS INDEX: 28.25 KG/M2 | TEMPERATURE: 97.7 F | DIASTOLIC BLOOD PRESSURE: 70 MMHG | HEIGHT: 64 IN | SYSTOLIC BLOOD PRESSURE: 104 MMHG

## 2018-01-22 VITALS — WEIGHT: 163.8 LBS | HEIGHT: 64 IN | BODY MASS INDEX: 27.96 KG/M2

## 2018-01-22 VITALS — BODY MASS INDEX: 29.26 KG/M2 | WEIGHT: 171.4 LBS | HEIGHT: 64 IN

## 2018-01-22 VITALS — WEIGHT: 168.2 LBS | BODY MASS INDEX: 28.71 KG/M2 | HEIGHT: 64 IN

## 2018-01-24 VITALS
WEIGHT: 173 LBS | DIASTOLIC BLOOD PRESSURE: 80 MMHG | HEIGHT: 64 IN | BODY MASS INDEX: 29.53 KG/M2 | RESPIRATION RATE: 16 BRPM | OXYGEN SATURATION: 98 % | SYSTOLIC BLOOD PRESSURE: 120 MMHG | TEMPERATURE: 97.5 F | HEART RATE: 68 BPM

## 2018-01-25 DIAGNOSIS — I10 ESSENTIAL HYPERTENSION: Primary | ICD-10-CM

## 2018-01-25 RX ORDER — PINDOLOL 5 MG/1
TABLET ORAL
Qty: 30 TABLET | Refills: 5 | Status: SHIPPED | OUTPATIENT
Start: 2018-01-25 | End: 2018-01-26 | Stop reason: SDUPTHER

## 2018-01-26 DIAGNOSIS — I10 ESSENTIAL HYPERTENSION: ICD-10-CM

## 2018-01-26 RX ORDER — PINDOLOL 5 MG/1
TABLET ORAL
Qty: 30 TABLET | Refills: 5 | Status: SHIPPED | OUTPATIENT
Start: 2018-01-26 | End: 2018-03-26 | Stop reason: SDUPTHER

## 2018-02-02 DIAGNOSIS — I10 BENIGN ESSENTIAL HYPERTENSION: Primary | ICD-10-CM

## 2018-02-02 RX ORDER — LISINOPRIL 20 MG/1
TABLET ORAL
Qty: 30 TABLET | Refills: 5 | Status: SHIPPED | OUTPATIENT
Start: 2018-02-02 | End: 2018-07-21 | Stop reason: SDUPTHER

## 2018-02-03 ENCOUNTER — HOSPITAL ENCOUNTER (OUTPATIENT)
Dept: RADIOLOGY | Age: 68
Discharge: HOME | End: 2018-02-03
Payer: MEDICARE

## 2018-02-03 DIAGNOSIS — M85.80 OTHER SPECIFIED DISORDERS OF BONE DENSITY AND STRUCTURE, UNSPECIFIED SITE (CODE): ICD-10-CM

## 2018-02-03 PROCEDURE — 77080 DXA BONE DENSITY AXIAL: CPT

## 2018-02-05 ENCOUNTER — OFFICE VISIT (OUTPATIENT)
Dept: OBGYN CLINIC | Facility: CLINIC | Age: 68
End: 2018-02-05
Payer: MEDICARE

## 2018-02-05 VITALS
DIASTOLIC BLOOD PRESSURE: 78 MMHG | HEIGHT: 64 IN | BODY MASS INDEX: 30.01 KG/M2 | WEIGHT: 175.8 LBS | SYSTOLIC BLOOD PRESSURE: 138 MMHG

## 2018-02-05 DIAGNOSIS — E55.9 VITAMIN D DEFICIENCY: Primary | ICD-10-CM

## 2018-02-05 DIAGNOSIS — L90.0 LICHEN SCLEROSUS: ICD-10-CM

## 2018-02-05 DIAGNOSIS — N90.7 INCLUSION CYST OF VULVA: ICD-10-CM

## 2018-02-05 PROCEDURE — 99214 OFFICE O/P EST MOD 30 MIN: CPT | Performed by: OBSTETRICS & GYNECOLOGY

## 2018-02-05 RX ORDER — IPRATROPIUM BROMIDE 42 UG/1
SPRAY, METERED NASAL DAILY
COMMUNITY
Start: 2011-02-22

## 2018-02-05 RX ORDER — CLOBETASOL PROPIONATE 0.5 MG/G
OINTMENT TOPICAL
COMMUNITY
Start: 2017-10-02 | End: 2018-05-26 | Stop reason: SDUPTHER

## 2018-02-05 RX ORDER — METHOCARBAMOL 500 MG/1
TABLET, FILM COATED ORAL AS NEEDED
COMMUNITY
Start: 2017-11-20 | End: 2019-02-07 | Stop reason: ALTCHOICE

## 2018-02-05 RX ORDER — ATORVASTATIN CALCIUM 10 MG/1
1 TABLET, FILM COATED ORAL DAILY
COMMUNITY
Start: 2011-12-05 | End: 2018-02-19 | Stop reason: SDUPTHER

## 2018-02-05 RX ORDER — MELATONIN
5000 DAILY
Qty: 150 TABLET | Refills: 0
Start: 2018-02-05 | End: 2020-03-02

## 2018-02-05 RX ORDER — MELOXICAM 15 MG/1
1 TABLET ORAL DAILY PRN
COMMUNITY
Start: 2017-08-29 | End: 2018-02-09 | Stop reason: SDUPTHER

## 2018-02-05 RX ORDER — DIVALPROEX SODIUM 500 MG/1
1 TABLET, EXTENDED RELEASE ORAL 2 TIMES DAILY
COMMUNITY

## 2018-02-05 RX ORDER — FEXOFENADINE HCL 180 MG/1
1 TABLET ORAL DAILY PRN
COMMUNITY

## 2018-02-05 RX ORDER — ESCITALOPRAM OXALATE 20 MG/1
1 TABLET ORAL DAILY
COMMUNITY

## 2018-02-05 RX ORDER — ZOLPIDEM TARTRATE 5 MG/1
TABLET ORAL
COMMUNITY
Start: 2011-04-27 | End: 2018-08-22

## 2018-02-05 NOTE — PROGRESS NOTES
Assessment/Plan    Yeison Cantu was seen today for follow-up  Diagnoses and all orders for this visit:    Vitamin D deficiency  -     cholecalciferol (VITAMIN D3) 1,000 units tablet; Take 5 tablets (5,000 Units total) by mouth daily    Lichen sclerosus    Inclusion cyst of vulva      Continue Clobetasol ointment  May use coconut oil in between dosing  Cyst drained manually on exam  Warm compresses PRN  Follow up in 6 month(s)  Subjective     Talon Lund is a 69 y o  female here for a followup visit  Patient is complaining of itch near end of dosing interval   Sx's started many months ago and are improving  Patient reports that sx's are not related to her menses  She sometimes forgets a dose here or there  She continues her Elestrin gel  Patient Active Problem List   Diagnosis    Arthralgia    Bilateral hip pain    Chronic interstitial cystitis    Depression    Disorder of bone and cartilage    Hyperlipidemia    Hypertension    Lichen sclerosus    Lipoma    Myalgia    Osteopenia    Post-menopausal atrophic vaginitis    Symptomatic menopausal or female climacteric states       Gynecologic History  No LMP recorded  Patient is postmenopausal   Contraception: postmenopause    Past Medical History:   Diagnosis Date    Arthralgia     Chronic interstitial cystitis     Depression     Hyperlipidemia     Hypertension     Lichen sclerosus     Lipoma     Osteopenia      Past Surgical History:   Procedure Laterality Date    HYSTERECTOMY      SHOULDER SURGERY       Family History   Problem Relation Age of Onset    Stroke Mother     Hypertension Mother     Hypertension Father     Cancer Father      lung cancer    Cancer Brother      lung cancer     Social History     Social History    Marital status: /Civil Union     Spouse name: N/A    Number of children: N/A    Years of education: N/A     Occupational History    Not on file       Social History Main Topics    Smoking status: Never Smoker    Smokeless tobacco: Never Used    Alcohol use 6 0 oz/week     10 Glasses of wine per week    Drug use: No    Sexual activity: Yes      Comment: occasionally     Other Topics Concern    Not on file     Social History Narrative    No narrative on file     Codeine; Erythromycin base; Morphine; Oxycodone-acetaminophen; and Penicillins    Current Outpatient Prescriptions:     atorvastatin (LIPITOR) 10 mg tablet, Take 1 tablet by mouth daily, Disp: , Rfl:     calcium citrate-Vitamin D (CALCIUM CITRATE + D3) 200 mg-250 units, Take by mouth, Disp: , Rfl:     clobetasol (TEMOVATE) 0 05 % ointment, Apply topically, Disp: , Rfl:     divalproex sodium (DEPAKOTE ER) 500 mg 24 hr tablet, Take 1 tablet by mouth 2 (two) times a day, Disp: , Rfl:     escitalopram (LEXAPRO) 20 mg tablet, Take 1 tablet by mouth daily, Disp: , Rfl:     Estradiol (ELESTRIN) 0 52 MG/0 87 GM (0 06%) GEL, Place on the skin Daily, Disp: , Rfl:     fexofenadine (ALLEGRA) 180 MG tablet, Take 1 tablet by mouth daily as needed, Disp: , Rfl:     ipratropium (ATROVENT) 0 06 % nasal spray, into each nostril Daily, Disp: , Rfl:     lisinopril (ZESTRIL) 20 mg tablet, TAKE 1 TABLET EVERY DAY, Disp: 30 tablet, Rfl: 5    meloxicam (MOBIC) 15 mg tablet, Take 1 tablet by mouth daily as needed, Disp: , Rfl:     methocarbamol (ROBAXIN) 500 mg tablet, Take by mouth, Disp: , Rfl:     Ospemifene (OSPHENA) 60 MG TABS, Take 1 tablet by mouth daily, Disp: , Rfl:     pindolol (VISKEN) 5 mg tablet, TAKE 1 TABLET EVERY DAY, Disp: 30 tablet, Rfl: 5    zolpidem (AMBIEN) 5 mg tablet, Take by mouth, Disp: , Rfl:     cholecalciferol (VITAMIN D3) 1,000 units tablet, Take 5 tablets (5,000 Units total) by mouth daily, Disp: 150 tablet, Rfl: 0      Review of Systems  See HPI  Denies fevers/chills, nausea, vomiting, abnormal vaginal bleeding, discharge, pelvic pain, diarrhea      Objective     /78   Ht 5' 4" (1 626 m)   Wt 79 7 kg (175 lb 12 8 oz)   BMI 30 18 kg/m²       GEN: The patient was alert and oriented x3, pleasant well-appearing female in no acute distress  RESP:  Normal respirations  ABD:  Soft, nontender, non-distended  PELVIC:  Less than 1 centimeter inclusion cyst is the most posterior portion of the labia on the left     With manual compression, the cyst began to drain a thick white substance  No bleeding  The vaginal mucosa is thin and shiny appearing with redness and minimal ruggae  No bleeding noted  Faint white patches on labia bilaterally and extending to perineum

## 2018-02-09 DIAGNOSIS — M79.10 MYALGIA: ICD-10-CM

## 2018-02-09 DIAGNOSIS — M25.50 ARTHRALGIA, UNSPECIFIED JOINT: Primary | ICD-10-CM

## 2018-02-09 RX ORDER — MELOXICAM 15 MG/1
TABLET ORAL
Qty: 30 TABLET | Refills: 0 | Status: SHIPPED | OUTPATIENT
Start: 2018-02-09 | End: 2018-03-12 | Stop reason: SDUPTHER

## 2018-02-19 DIAGNOSIS — E78.2 MIXED HYPERLIPIDEMIA: Primary | ICD-10-CM

## 2018-02-19 RX ORDER — ATORVASTATIN CALCIUM 10 MG/1
TABLET, FILM COATED ORAL
Qty: 30 TABLET | Refills: 5 | Status: SHIPPED | OUTPATIENT
Start: 2018-02-19 | End: 2018-02-23 | Stop reason: SDUPTHER

## 2018-02-23 DIAGNOSIS — E78.2 MIXED HYPERLIPIDEMIA: ICD-10-CM

## 2018-02-23 RX ORDER — ATORVASTATIN CALCIUM 10 MG/1
TABLET, FILM COATED ORAL
Qty: 30 TABLET | Refills: 5 | Status: SHIPPED | OUTPATIENT
Start: 2018-02-23 | End: 2019-02-07 | Stop reason: SDUPTHER

## 2018-02-28 NOTE — MISCELLANEOUS
Message  pt showed up for an appt that needed to be rescheduled  we had called and left messages since december  spoke with  once who said she would call back, she was walking out the door  pt was annoyed asked about seeing another doctor, since it was a follow up appt, i told her she needed to stay with dr Sophia Gill  pt said she would go home and think about it      Active Problems    1  Arthralgia (719 40) (M25 50)   2  Atypical chest pain (786 59) (R07 89)   3  Bilateral hip pain (719 45) (M25 551,M25 552)   4  Chronic interstitial cystitis (595 1) (N30 10)   5  Depression (311) (F32 9)   6  Encounter for routine gynecological examination (V72 31) (Z01 419)   7  Encounter for screening mammogram for malignant neoplasm of breast (V76 12)   (Z12 31)   8  Hyperlipidemia (272 4) (E78 5)   9  Hypertension (401 9) (I10)   10  Lichen sclerosus (511 0) (L90 0)   11  Lipoma (214 9) (D17 9)   12  Myalgia (729 1) (M79 1)   13  Neck pain (723 1) (M54 2)   14  Need for immunization against influenza (V04 81) (Z23)   15  Nonspecific vaginitis (616 10) (N76 0)   16  Osteopenia (733 90) (M85 80)   17  Overweight (BMI 25 0-29 9) (278 02) (E66 3)   18  Pain of right lower extremity (729 5) (M79 604)   19  Post-menopausal atrophic vaginitis (627 3) (N95 2)   20  Right shoulder pain (719 41) (M25 511)   21  Screening for genitourinary condition (V81 6) (Z13 89)   22  Screening for neurological condition (V80 09) (Z13 89)   23  Symptomatic menopausal or female climacteric states (627 2) (N95 1)   24  Tension headache (307 81) (G44 209)   25  Weight gain (783 1) (R63 5)    Current Meds   1  Atorvastatin Calcium 10 MG Oral Tablet; take 1 tablet by mouth every day; Therapy: 78LGZ9406 to (Evaluate:89Llq4817)  Requested for: 82Tsk4209; Last   Rx:98Png7960 Ordered   2  BusPIRone HCl - 5 MG Oral Tablet; TAKE 2 TABLET 3 times daily; Therapy: 12Apr2011 to  Requested for: 28KPO0910 Recorded   3  Calcium Citrate+D3 TABS;    Therapy: (Recorded:30Mar2017) to Recorded   4  Clobetasol Propionate 0 05 % External Ointment; APPLY SPARINGLY TO AFFECTED   AREA(S) ONCE DAILY x 12 weeks; Therapy: 60SJL1690 to (Last Rx:02Oct2017)  Requested for: 64KGJ9620 Ordered   5  Depakote  MG Oral Tablet Extended Release 24 Hour (Divalproex Sodium ER);   TAKE ONE TAB TWICE DAILY; Therapy: (Recorded:12Lgu6583) to Recorded   6  Elestrin 0 52 MG/0 87 GM (0 06%) Transdermal Gel; APPLY 1 PUMP ( 0 87GM) TO   UPPER ARM ONCE DAILY; Therapy: 93HXN5177 to (Evaluate:14Oct2017)  Requested for: 86GCI4426; Last   Rx:19Oct2016 Ordered   7  Escitalopram Oxalate 20 MG Oral Tablet; take 1 tablet every day; Therapy: (Recorded:30Mar2017) to Recorded   8  Fexofenadine HCl - 180 MG Oral Tablet; TAKE 1 TABLET DAILY AS NEEDED; Therapy: (Recorded:30Mar2017) to Recorded   9  Ipratropium Bromide 0 06 % Nasal Solution; USE 1 SPRAY IN NOSTRIL(S) DAILY AS   NEEDED; Therapy: 94Pcc3003 to (Evaluate:20Jfy8401)  Requested for: 84UVN9943; Last   NE:62CNV6815 Ordered   10  Lisinopril 20 MG Oral Tablet; take 1 tablet every day; Therapy: 04EDR7907 to (Evaluate:30Jan2018)  Requested for: 03Gdf6711; Last    Rx:89Oyj1152 Ordered   11  Meloxicam 15 MG Oral Tablet; TAKE 1 TABLET BY MOUTH EVERY DAY AS NEEDED; Therapy: 97Fun8695 to (Evaluate:26Jan2018)  Requested for: 12Slp0449; Last    Rx:07Vhb4009 Ordered   12  Methocarbamol 500 MG Oral Tablet; Take 2 tablets 4 times a day as needed; Therapy: 21WKU4544 to (Evaluate:80Aqq8138)  Requested for: 20LWH8470; Last    Rx:20Nov2017 Ordered   13  Osphena 60 MG Oral Tablet; Take 1 tablet daily; Therapy: 46Nlw8809 to (Evaluate:73Ofx6227)  Requested for: 94Uin6963; Last    Rx:78Awo8359 Ordered   14  Pindolol 5 MG Oral Tablet; take 1 tablet every day; Therapy: 12Apr2011 to (Evaluate:20Jan2018)  Requested for: 89Chk6566; Last    Rx:41Osa7271 Ordered   15  Zolpidem Tartrate 5 MG Oral Tablet;     Therapy: 63UNB5074 to (Last Nady[facility]) Requested for: 27Apr2011 Ordered    Allergies    1  Codeine Derivatives   2  Erythromycin Derivatives   3  Morphine Derivatives   4  Penicillins    5   Seasonal    Signatures   Electronically signed by : Davina Singh, ; Brandt 15 2018  2:34PM EST                       (Author)

## 2018-03-12 DIAGNOSIS — M79.10 MYALGIA: ICD-10-CM

## 2018-03-12 DIAGNOSIS — M25.50 ARTHRALGIA, UNSPECIFIED JOINT: ICD-10-CM

## 2018-03-12 RX ORDER — MELOXICAM 15 MG/1
TABLET ORAL
Qty: 30 TABLET | Refills: 2 | Status: SHIPPED | OUTPATIENT
Start: 2018-03-12 | End: 2018-06-23 | Stop reason: SDUPTHER

## 2018-03-26 DIAGNOSIS — I10 ESSENTIAL HYPERTENSION: ICD-10-CM

## 2018-03-26 RX ORDER — PINDOLOL 5 MG/1
TABLET ORAL
Qty: 30 TABLET | Refills: 5 | Status: SHIPPED | OUTPATIENT
Start: 2018-03-26 | End: 2018-09-18 | Stop reason: SDUPTHER

## 2018-05-10 ENCOUNTER — APPOINTMENT (OUTPATIENT)
Dept: LAB | Facility: CLINIC | Age: 68
End: 2018-05-10
Payer: MEDICARE

## 2018-05-10 ENCOUNTER — TRANSCRIBE ORDERS (OUTPATIENT)
Dept: LAB | Facility: CLINIC | Age: 68
End: 2018-05-10

## 2018-05-10 DIAGNOSIS — Z79.899 HIGH RISK MEDICATION USE: Primary | ICD-10-CM

## 2018-05-10 LAB
ALBUMIN SERPL BCP-MCNC: 3.5 G/DL (ref 3.5–5)
ALP SERPL-CCNC: 46 U/L (ref 46–116)
ALT SERPL W P-5'-P-CCNC: 19 U/L (ref 12–78)
ANION GAP SERPL CALCULATED.3IONS-SCNC: 7 MMOL/L (ref 4–13)
AST SERPL W P-5'-P-CCNC: 12 U/L (ref 5–45)
BASOPHILS # BLD AUTO: 0.02 THOUSANDS/ΜL (ref 0–0.1)
BASOPHILS NFR BLD AUTO: 0 % (ref 0–1)
BILIRUB SERPL-MCNC: 0.35 MG/DL (ref 0.2–1)
BUN SERPL-MCNC: 29 MG/DL (ref 5–25)
CALCIUM SERPL-MCNC: 8.9 MG/DL (ref 8.3–10.1)
CHLORIDE SERPL-SCNC: 104 MMOL/L (ref 100–108)
CO2 SERPL-SCNC: 27 MMOL/L (ref 21–32)
CREAT SERPL-MCNC: 0.88 MG/DL (ref 0.6–1.3)
EOSINOPHIL # BLD AUTO: 0.06 THOUSAND/ΜL (ref 0–0.61)
EOSINOPHIL NFR BLD AUTO: 1 % (ref 0–6)
ERYTHROCYTE [DISTWIDTH] IN BLOOD BY AUTOMATED COUNT: 12.9 % (ref 11.6–15.1)
GFR SERPL CREATININE-BSD FRML MDRD: 67 ML/MIN/1.73SQ M
GLUCOSE P FAST SERPL-MCNC: 97 MG/DL (ref 65–99)
HCT VFR BLD AUTO: 41.8 % (ref 34.8–46.1)
HGB BLD-MCNC: 14 G/DL (ref 11.5–15.4)
LYMPHOCYTES # BLD AUTO: 1.49 THOUSANDS/ΜL (ref 0.6–4.47)
LYMPHOCYTES NFR BLD AUTO: 32 % (ref 14–44)
MCH RBC QN AUTO: 32 PG (ref 26.8–34.3)
MCHC RBC AUTO-ENTMCNC: 33.5 G/DL (ref 31.4–37.4)
MCV RBC AUTO: 96 FL (ref 82–98)
MONOCYTES # BLD AUTO: 0.53 THOUSAND/ΜL (ref 0.17–1.22)
MONOCYTES NFR BLD AUTO: 12 % (ref 4–12)
NEUTROPHILS # BLD AUTO: 2.47 THOUSANDS/ΜL (ref 1.85–7.62)
NEUTS SEG NFR BLD AUTO: 55 % (ref 43–75)
NRBC BLD AUTO-RTO: 0 /100 WBCS
PLATELET # BLD AUTO: 272 THOUSANDS/UL (ref 149–390)
PMV BLD AUTO: 11.2 FL (ref 8.9–12.7)
POTASSIUM SERPL-SCNC: 4.6 MMOL/L (ref 3.5–5.3)
PROT SERPL-MCNC: 6.7 G/DL (ref 6.4–8.2)
RBC # BLD AUTO: 4.37 MILLION/UL (ref 3.81–5.12)
SODIUM SERPL-SCNC: 138 MMOL/L (ref 136–145)
VALPROATE SERPL-MCNC: 56 UG/ML (ref 50–100)
WBC # BLD AUTO: 4.6 THOUSAND/UL (ref 4.31–10.16)

## 2018-05-10 PROCEDURE — 80164 ASSAY DIPROPYLACETIC ACD TOT: CPT

## 2018-05-10 PROCEDURE — 36415 COLL VENOUS BLD VENIPUNCTURE: CPT

## 2018-05-10 PROCEDURE — 80053 COMPREHEN METABOLIC PANEL: CPT

## 2018-05-10 PROCEDURE — 85025 COMPLETE CBC W/AUTO DIFF WBC: CPT

## 2018-05-14 ENCOUNTER — TELEPHONE (OUTPATIENT)
Dept: OBGYN CLINIC | Facility: CLINIC | Age: 68
End: 2018-05-14

## 2018-05-14 DIAGNOSIS — N95.1 SYMPTOMATIC MENOPAUSAL OR FEMALE CLIMACTERIC STATES: Primary | ICD-10-CM

## 2018-05-26 DIAGNOSIS — L90.0 LICHEN SCLEROSUS ET ATROPHICUS: Primary | ICD-10-CM

## 2018-05-26 RX ORDER — CLOBETASOL PROPIONATE 0.5 MG/G
OINTMENT TOPICAL
Qty: 60 G | Refills: 3 | Status: SHIPPED | OUTPATIENT
Start: 2018-05-26 | End: 2019-05-02 | Stop reason: SDUPTHER

## 2018-06-23 DIAGNOSIS — M79.10 MYALGIA: ICD-10-CM

## 2018-06-23 DIAGNOSIS — M25.50 ARTHRALGIA, UNSPECIFIED JOINT: ICD-10-CM

## 2018-06-25 RX ORDER — MELOXICAM 15 MG/1
TABLET ORAL
Qty: 30 TABLET | Refills: 2 | Status: SHIPPED | OUTPATIENT
Start: 2018-06-25 | End: 2018-08-02

## 2018-07-21 DIAGNOSIS — I10 BENIGN ESSENTIAL HYPERTENSION: ICD-10-CM

## 2018-07-22 RX ORDER — LISINOPRIL 20 MG/1
TABLET ORAL
Qty: 30 TABLET | Refills: 5 | Status: SHIPPED | OUTPATIENT
Start: 2018-07-22 | End: 2019-01-22 | Stop reason: SDUPTHER

## 2018-07-24 ENCOUNTER — OFFICE VISIT (OUTPATIENT)
Dept: INTERNAL MEDICINE CLINIC | Facility: CLINIC | Age: 68
End: 2018-07-24
Payer: MEDICARE

## 2018-07-24 VITALS
OXYGEN SATURATION: 98 % | BODY MASS INDEX: 29.53 KG/M2 | SYSTOLIC BLOOD PRESSURE: 130 MMHG | HEIGHT: 64 IN | WEIGHT: 173 LBS | TEMPERATURE: 98 F | HEART RATE: 65 BPM | DIASTOLIC BLOOD PRESSURE: 76 MMHG

## 2018-07-24 DIAGNOSIS — M19.90 ARTHRITIS: ICD-10-CM

## 2018-07-24 DIAGNOSIS — K29.00 ACUTE GASTRITIS WITHOUT HEMORRHAGE, UNSPECIFIED GASTRITIS TYPE: Primary | ICD-10-CM

## 2018-07-24 PROCEDURE — 99214 OFFICE O/P EST MOD 30 MIN: CPT | Performed by: NURSE PRACTITIONER

## 2018-07-24 RX ORDER — PANTOPRAZOLE SODIUM 40 MG/1
40 TABLET, DELAYED RELEASE ORAL DAILY
Qty: 60 TABLET | Refills: 0 | Status: SHIPPED | OUTPATIENT
Start: 2018-07-24 | End: 2018-11-11

## 2018-07-24 NOTE — ASSESSMENT & PLAN NOTE
As noted, pt is advised to withhold oral nsaids due to sx of gastritis  Pt is instead given an rx for voltaren gel to apply locally to her neck to treat her arthritis pain  Pt advised to call if her pain is not adequately controlled by this means

## 2018-07-24 NOTE — ASSESSMENT & PLAN NOTE
Pt is on chronic NSAIDs for neck pain and presentation suggests gastritis secondary to this long term use  I have prescribed 6 weeks of pantoprazole  Pt advised to take medication 30 minutes prior to her first meal   Advised that she should withhold oral nsaids during this treatment to allow healing of gastric mucosa  She is scheduled for f/u with Dr Christy Tompkins next week and will be able to determine if symptoms are improving or if additional evaluation is necessary

## 2018-08-02 ENCOUNTER — OFFICE VISIT (OUTPATIENT)
Dept: INTERNAL MEDICINE CLINIC | Facility: CLINIC | Age: 68
End: 2018-08-02
Payer: MEDICARE

## 2018-08-02 VITALS
OXYGEN SATURATION: 98 % | BODY MASS INDEX: 29.53 KG/M2 | SYSTOLIC BLOOD PRESSURE: 116 MMHG | TEMPERATURE: 98.1 F | DIASTOLIC BLOOD PRESSURE: 60 MMHG | RESPIRATION RATE: 16 BRPM | WEIGHT: 173 LBS | HEART RATE: 67 BPM | HEIGHT: 64 IN

## 2018-08-02 DIAGNOSIS — I10 ESSENTIAL HYPERTENSION: ICD-10-CM

## 2018-08-02 DIAGNOSIS — Z12.39 SCREENING FOR MALIGNANT NEOPLASM OF BREAST: ICD-10-CM

## 2018-08-02 DIAGNOSIS — R40.0 DAYTIME SLEEPINESS: ICD-10-CM

## 2018-08-02 DIAGNOSIS — K29.00 ACUTE GASTRITIS WITHOUT HEMORRHAGE, UNSPECIFIED GASTRITIS TYPE: Primary | ICD-10-CM

## 2018-08-02 DIAGNOSIS — Z11.59 NEED FOR HEPATITIS C SCREENING TEST: ICD-10-CM

## 2018-08-02 DIAGNOSIS — R06.83 SNORING: ICD-10-CM

## 2018-08-02 DIAGNOSIS — G47.00 INSOMNIA, UNSPECIFIED TYPE: ICD-10-CM

## 2018-08-02 PROCEDURE — 99214 OFFICE O/P EST MOD 30 MIN: CPT | Performed by: INTERNAL MEDICINE

## 2018-08-02 PROCEDURE — G0439 PPPS, SUBSEQ VISIT: HCPCS | Performed by: INTERNAL MEDICINE

## 2018-08-02 RX ORDER — BUSPIRONE HYDROCHLORIDE 15 MG/1
15 TABLET ORAL 2 TIMES DAILY
COMMUNITY

## 2018-08-02 NOTE — PATIENT INSTRUCTIONS
Obesity   AMBULATORY CARE:   Obesity  is when your body mass index (BMI) is greater than 30  Your healthcare provider will use your height and weight to measure your BMI  The risks of obesity include  many health problems, such as injuries or physical disability  You may need tests to check for the following:  · Diabetes     · High blood pressure or high cholesterol     · Heart disease     · Gallbladder or liver disease     · Cancer of the colon, breast, prostate, liver, or kidney     · Sleep apnea     · Arthritis or gout  Seek care immediately if:   · You have a severe headache, confusion, or difficulty speaking  · You have weakness on one side of your body  · You have chest pain, sweating, or shortness of breath  Contact your healthcare provider if:   · You have symptoms of gallbladder or liver disease, such as pain in your upper abdomen  · You have knee or hip pain and discomfort while walking  · You have symptoms of diabetes, such as intense hunger and thirst, and frequent urination  · You have symptoms of sleep apnea, such as snoring or daytime sleepiness  · You have questions or concerns about your condition or care  Treatment for obesity  focuses on helping you lose weight to improve your health  Even a small decrease in BMI can reduce the risk for many health problems  Your healthcare provider will help you set a weight-loss goal   · Lifestyle changes  are the first step in treating obesity  These include making healthy food choices and getting regular physical activity  Your healthcare provider may suggest a weight-loss program that involves coaching, education, and therapy  · Medicine  may help you lose weight when it is used with a healthy diet and physical activity  · Surgery  can help you lose weight if you are very obese and have other health problems  There are several types of weight-loss surgery  Ask your healthcare provider for more information    Be successful losing weight:   · Set small, realistic goals  An example of a small goal is to walk for 20 minutes 5 days a week  Anther goal is to lose 5% of your body weight  · Tell friends, family members, and coworkers about your goals  and ask for their support  Ask a friend to lose weight with you, or join a weight-loss support group  · Identify foods or triggers that may cause you to overeat , and find ways to avoid them  Remove tempting high-calorie foods from your home and workplace  Place a bowl of fresh fruit on your kitchen counter  If stress causes you to eat, then find other ways to cope with stress  · Keep a diary to track what you eat and drink  Also write down how many minutes of physical activity you do each day  Weigh yourself once a week and record it in your diary  Eating changes: You will need to eat 500 to 1,000 fewer calories each day than you currently eat to lose 1 to 2 pounds a week  The following changes will help you cut calories:  · Eat smaller portions  Use small plates, no larger than 9 inches in diameter  Fill your plate half full of fruits and vegetables  Measure your food using measuring cups until you know what a serving size looks like  · Eat 3 meals and 1 or 2 snacks each day  Plan your meals in advance  Yvonne More and eat at home most of the time  Eat slowly  · Eat fruits and vegetables at every meal   They are low in calories and high in fiber, which makes you feel full  Do not add butter, margarine, or cream sauce to vegetables  Use herbs to season steamed vegetables  · Eat less fat and fewer fried foods  Eat more baked or grilled chicken and fish  These protein sources are lower in calories and fat than red meat  Limit fast food  Dress your salads with olive oil and vinegar instead of bottled dressing  · Limit the amount of sugar you eat  Do not drink sugary beverages  Limit alcohol  Activity changes:  Physical activity is good for your body in many ways   It helps you burn calories and build strong muscles  It decreases stress and depression, and improves your mood  It can also help you sleep better  Talk to your healthcare provider before you begin an exercise program   · Exercise for at least 30 minutes 5 days a week  Start slowly  Set aside time each day for physical activity that you enjoy and that is convenient for you  It is best to do both weight training and an activity that increases your heart rate, such as walking, bicycling, or swimming  · Find ways to be more active  Do yard work and housecleaning  Walk up the stairs instead of using elevators  Spend your leisure time going to events that require walking, such as outdoor festivals or fairs  This extra physical activity can help you lose weight and keep it off  Follow up with your healthcare provider as directed: You may need to meet with a dietitian  Write down your questions so you remember to ask them during your visits  © 2017 [address] Information is for End User's use only and may not be sold, redistributed or otherwise used for commercial purposes  All illustrations and images included in CareNotes® are the copyrighted property of [employer]  or Dipak Hay  The above information is an  only  It is not intended as medical advice for individual conditions or treatments  Talk to your doctor, nurse or pharmacist before following any medical regimen to see if it is safe and effective for you  Urinary Incontinence   WHAT YOU NEED TO KNOW:   What is urinary incontinence? Urinary incontinence (UI) is when you lose control of your bladder  What causes UI? UI occurs because your bladder cannot store or empty urine properly  The following are the most common types of UI:  · Stress incontinence  is when you leak urine due to increased bladder pressure  This may happen when you cough, sneeze, or exercise       · Urge incontinence  is when you feel the need to urinate right away and leak urine accidentally  · Mixed incontinence  is when you have both stress and urge UI  What are the signs and symptoms of UI?   · You feel like your bladder does not empty completely when you urinate  · You urinate often and need to urinate immediately  · You leak urine when you sleep, or you wake up with the urge to urinate  · You leak urine when you cough, sneeze, exercise, or laugh  How is UI diagnosed? Your healthcare provider will ask how often you leak urine and whether you have stress or urge symptoms  Tell him which medicines you take, how often you urinate, and how much liquid you drink each day  You may need any of the following tests:  · Urine tests  may show infection or kidney function  · A pelvic exam  may be done to check for blockages  A pelvic exam will also show if your bladder, uterus, or other organs have moved out of place  · An x-ray, ultrasound, or CT  may show problems with parts of your urinary system  You may be given contrast liquid to help your organs show up better in the pictures  Tell the healthcare provider if you have ever had an allergic reaction to contrast liquid  Do not enter the MRI room with anything metal  Metal can cause serious injury  Tell the healthcare provider if you have any metal in or on your body  · A bladder scan  will show how much urine is left in your bladder after you urinate  You will be asked to urinate and then healthcare providers will use a small ultrasound machine to check the urine left in your bladder  · Cystometry  is used to check the function of your urinary system  Your healthcare provider checks the pressure in your bladder while filling it with fluid  Your bladder pressure may also be tested when your bladder is full and while you urinate  How is UI treated? · Medicines  can help strengthen your bladder control      · Electrical stimulation  is used to send a small amount of electrical energy to your pelvic floor muscles  This helps control your bladder function  Electrodes may be placed outside your body or in your rectum  For women, the electrodes may be placed in the vagina  · A bulking agent  may be injected into the wall of your urethra to make it thicker  This helps keep your urethra closed and decreases urine leakage  · Devices  such as a clamp, pessary, or tampon may help stop urine leaks  Ask your healthcare provider for more information about these and other devices  · Surgery  may be needed if other treatments do not work  Several types of surgery can help improve your bladder control  Ask your healthcare provider for more information about the surgery you may need  How can I manage my symptoms? · Do pelvic muscle exercises often  Your pelvic muscles help you stop urinating  Squeeze these muscles tight for 5 seconds, then relax for 5 seconds  Gradually work up to squeezing for 10 seconds  Do 3 sets of 15 repetitions a day, or as directed  This will help strengthen your pelvic muscles and improve bladder control  · A catheter  may be used to help empty your bladder  A catheter is a tiny, plastic tube that is put into your bladder to drain your urine  Your healthcare provider may tell you to use a catheter to prevent your bladder from getting too full and leaking urine  · Keep a UI record  Write down how often you leak urine and how much you leak  Make a note of what you were doing when you leaked urine  · Train your bladder  Go to the bathroom at set times, such as every 2 hours, even if you do not feel the urge to go  You can also try to hold your urine when you feel the urge to go  For example, hold your urine for 5 minutes when you feel the urge to go  As that becomes easier, hold your urine for 10 minutes  · Drink liquids as directed  Ask your healthcare provider how much liquid to drink each day and which liquids are best for you   You may need to limit the amount of liquid you drink to help control your urine leakage  Limit or do not have drinks that contain caffeine or alcohol  Do not drink any liquid right before you go to bed  · Prevent constipation  Eat a variety of high-fiber foods  Good examples are high-fiber cereals, beans, vegetables, and whole-grain breads  Prune juice may help make your bowel movement softer  Walking is the best way to trigger your intestines to have a bowel movement  · Exercise regularly and maintain a healthy weight  Ask your healthcare provider how much you should weigh and about the best exercise plan for you  Weight loss and exercise will decrease pressure on your bladder and help you control your leakage  Ask him to help you create a weight loss plan if you are overweight  When should I seek immediate care? · You have severe pain  · You are confused or cannot think clearly  When should I contact my healthcare provider? · You have a fever  · You see blood in your urine  · You have pain when you urinate  · You have new or worse pain, even after treatment  · Your mouth feels dry or you have vision changes  · Your urine is cloudy or smells bad  · You have questions or concerns about your condition or care  CARE AGREEMENT:   You have the right to help plan your care  Learn about your health condition and how it may be treated  Discuss treatment options with your caregivers to decide what care you want to receive  You always have the right to refuse treatment  The above information is an  only  It is not intended as medical advice for individual conditions or treatments  Talk to your doctor, nurse or pharmacist before following any medical regimen to see if it is safe and effective for you  © 2017 [address] Information is for End User's use only and may not be sold, redistributed or otherwise used for commercial purposes   All illustrations and images included in CareNotes® are the copyrighted property of A D A M , Inc  or Dipak Hay  Cigarette Smoking and Your Health   AMBULATORY CARE:   Risks to your health if you smoke:  Nicotine and other chemicals found in tobacco damage every cell in your body  Even if you are a light smoker, you have an increased risk for cancer, heart disease, and lung disease  If you are pregnant or have diabetes, smoking increases your risk for complications  Benefits to your health if you stop smoking:   · You decrease respiratory symptoms such as coughing, wheezing, and shortness of breath  · You reduce your risk for cancers of the lung, mouth, throat, kidney, bladder, pancreas, stomach, and cervix  If you already have cancer, you increase the benefits of chemotherapy  You also reduce your risk for cancer returning or a second cancer from developing  · You reduce your risk for heart disease, blood clots, heart attack, and stroke  · You reduce your risk for lung infections, and diseases such as pneumonia, asthma, chronic bronchitis, and emphysema  · Your circulation improves  More oxygen can be delivered to your body  If you have diabetes, you lower your risk for complications, such as kidney, artery, and eye diseases  You also lower your risk for nerve damage  Nerve damage can lead to amputations, poor vision, and blindness  · You improve your body's ability to heal and to fight infections  Benefits to the health of others if you stop smoking:  Tobacco is harmful to nonsmokers who breathe in your secondhand smoke  The following are ways the health of others around you may improve when you stop smoking:  · You lower the risks for lung cancer and heart disease in nonsmoking adults  · If you are pregnant, you lower the risk for miscarriage, early delivery, low birth weight, and stillbirth  You also lower your baby's risk for SIDS, obesity, developmental delay, and neurobehavioral problems, such as ADHD  · If you have children, you lower their risk for ear infections, colds, pneumonia, bronchitis, and asthma  For more information and support to stop smoking:   · Smokefree  gov  Phone: 6- 067 - 815-8067  Web Address: www smokefree  gov  Follow up with your healthcare provider as directed:  Write down your questions so you remember to ask them during your visits  © 2017 [facility] Information is for End User's use only and may not be sold, redistributed or otherwise used for commercial purposes  All illustrations and images included in CareNotes® are the copyrighted property of A D A M , Inc  or Dipak Hay  The above information is an  only  It is not intended as medical advice for individual conditions or treatments  Talk to your doctor, nurse or pharmacist before following any medical regimen to see if it is safe and effective for you  Fall Prevention   AMBULATORY CARE:   Fall prevention  includes ways to make your home and other areas safer  It also includes ways you can move more carefully to prevent a fall  Health conditions that cause changes in your blood pressure, vision, or muscle strength and coordination may increase your risk for falls  Medicines may also increase your risk for falls if they make you dizzy, weak, or sleepy  Call 911 or have someone else call if:   · You have fallen and are unconscious  · You have fallen and cannot move part of your body  Contact your healthcare provider if:   · You have fallen and have pain or a headache  · You have questions or concerns about your condition or care  Fall prevention tips:   · Stand or sit up slowly  This may help you keep your balance and prevent falls  · Use assistive devices as directed  Your healthcare provider may suggest that you use a cane or walker to help you keep your balance  You may need to have grab bars put in your bathroom near the toilet or in the shower      · Wear shoes that fit well and have soles that   Wear shoes both inside and outside  Use slippers with good   Do not wear shoes with high heels  · Wear a personal alarm  This is a device that allows you to call 911 if you fall and need help  Ask your healthcare provider for more information  · Stay active  Exercise can help strengthen your muscles and improve your balance  Your healthcare provider may recommend water aerobics or walking  He or she may also recommend physical therapy to improve your coordination  Never start an exercise program without talking to your healthcare provider first      · Manage your medical conditions  Keep all appointments with your healthcare providers  Visit your eye doctor as directed  Home safety tips:   · Add items to prevent falls in the bathroom  Put nonslip strips on your bath or shower floor to prevent you from slipping  Use a bath mat if you do not have carpet in the bathroom  This will prevent you from falling when you step out of the bath or shower  Use a shower seat so you do not need to stand while you shower  Sit on the toilet or a chair in your bathroom to dry yourself and put on clothing  This will prevent you from losing your balance from drying or dressing yourself while you are standing  · Keep paths clear  Remove books, shoes, and other objects from walkways and stairs  Place cords for telephones and lamps out of the way so that you do not need to walk over them  Tape them down if you cannot move them  Remove small rugs  If you cannot remove a rug, secure it with double-sided tape  This will prevent you from tripping  · Install bright lights in your home  Use night lights to help light paths to the bathroom or kitchen  Always turn on the light before you start walking  · Keep items you use often on shelves within reach  Do not use a step stool to help you reach an item  · Paint or place reflective tape on the edges of your stairs    This will help you see the stairs better  Follow up with your healthcare provider as directed:  Write down your questions so you remember to ask them during your visits  © 2017 [address] [employer] Information is for End User's use only and may not be sold, redistributed or otherwise used for commercial purposes  All illustrations and images included in CareNotes® are the copyrighted property of A D A M , Inc  or Dipak Hay  The above information is an  only  It is not intended as medical advice for individual conditions or treatments  Talk to your doctor, nurse or pharmacist before following any medical regimen to see if it is safe and effective for you  Advance Directives   WHAT YOU NEED TO KNOW:   What are advance directives? Advance directives are legal documents that state your wishes and plans for medical care  These plans are made ahead of time in case you lose your ability to make decisions for yourself  Advance directives can apply to any medical decision, such as the treatments you want, and if you want to donate organs  What are the types of advance directives? There are many types of advance directives, and each state has rules about how to use them  You may choose a combination of any of the following:  · Living will: This is a written record of the treatment you want  You can also choose which treatments you do not want, which to limit, and which to stop at a certain time  This includes surgery, medicine, IV fluid, and tube feedings  · Durable power of  for healthcare [facility]): This is a written record that states who you want to make healthcare choices for you when you are unable to make them for yourself  This person, called a proxy, is usually a family member or a friend  You may choose more than 1 proxy  · Do not resuscitate (DNR) order:  A DNR order is used in case your heart stops beating or you stop breathing   It is a request not to have certain forms of treatment, such as CPR  A DNR order may be included in other types of advance directives  · Medical directive: This covers the care that you want if you are in a coma, near death, or unable to make decisions for yourself  You can list the treatments you want for each condition  Treatment may include pain medicine, surgery, blood transfusions, dialysis, IV or tube feedings, and a ventilator (breathing machine)  · Values history: This document has questions about your views, beliefs, and how you feel and think about life  This information can help others choose the care that you would choose  Why are advance directives important? An advance directive helps you control your care  Although spoken wishes may be used, it is better to have your wishes written down  Spoken wishes can be misunderstood, or not followed  Treatments may be given even if you do not want them  An advance directive may make it easier for your family to make difficult choices about your care  How do I decide what to put in my advance directives? · Make informed decisions:  Make sure you fully understand treatments or care you may receive  Think about the benefits and problems your decisions could cause for you or your family  Talk to healthcare providers if you have concerns or questions before you write down your wishes  You may also want to talk with your Temple or , or a   Check your state laws to make sure that what you put in your advance directive is legal      · Sign all forms:  Sign and date your advance directive when you have finished  You may also need 2 witnesses to sign the forms  Witnesses cannot be your doctor or his staff, your spouse, heirs or beneficiaries, people you owe money to, or your chosen proxy  Talk to your family, proxy, and healthcare providers about your advance directive  Give each person a copy, and keep one for yourself in a place you can get to easily   Do not keep it hidden or locked away  · Review and revise your plans: You can revise your advance directive at any time, as long as you are able to make decisions  Review your plan every year, and when there are changes in your life, or your health  When you make changes, let your family, proxy, and healthcare providers know  Give each a new copy  Where can I find more information? · [employer]  [address] , [place]  Phone: 1- 825 - [phone]  Phone: 7- 535 - 635-9805  Web Address: http://www  aafp org  · [address] [facility])  [address] [facility], [address] [facility] , [address] [place] [address]  Phone: 1- [phone] - [phone]  Phone: [phone]  Web Address: Devin kramer  CARE AGREEMENT:   You have the right to help plan your care  To help with this plan, you must learn about your health condition and treatment options  You must also learn about advance directives and how they are used  Work with your healthcare providers to decide what care will be used to treat you  You always have the right to refuse treatment  The above information is an  only  It is not intended as medical advice for individual conditions or treatments  Talk to your doctor, nurse or pharmacist before following any medical regimen to see if it is safe and effective for you  © 2017 [address]  Information is for End User's use only and may not be sold, redistributed or otherwise used for commercial purposes  All illustrations and images included in CareNotes® are the copyrighted property of [employer]  or Dipak Hay  Thank you for enrolling in [address] [place] [address]  Please follow the instructions below to securely access your online medical record  [employer] allows you to send messages to your doctor, view your test results, renew your prescriptions, schedule appointments, and more      [address] uses Single Sign on (SSO) Technology for you to log in and access our [facility], including [employer]  No more remembering multiple user names and passwords! We are going to guide you through, step by step, to help you set up your Michele Ho account which will provide access to your MyChart account  How Do I Sign Up? 1  In your Internet browser, go to [email or web address]       2  Click on the [facility] patient account and then click Dont have an                 Account? Create one now      3  Enter your demographic information and chose a user name (email address) and password  Think of one that is secure and easy to remember  Enter a Referral code if you have one (this is not your MyChart code ) Accept the Terms and Conditions and the Privacy Policy  4  Select your security questions that you will use to reset your password should you forget it  Click Submit  5  Enter your [employer] Activation Code exactly as it appears below  You will not need to use this code after you have completed the sign-up process  If you do not sign up before the expiration date, you must request a new code  "[employer] Activation Code: [email or web address]  Expires: 8/7/2018 10:39 AM    6  Confirm your email address  An email confirmation was sent to you  Please open that email and click Confirm your Email   You should then be redirected to our Michele Ho Single sign on page, where you will log on with the user name and password you created! Proceed to the "[employer] Icon to view your personal health information  Additional Information  If you have questions, you can e-mail patient  [email or web address] or call [phone] to talk to our customer support staff  Remember, [email or web address] is NOT to be used for urgent needs  For medical emergencies, dial 911

## 2018-08-02 NOTE — PROGRESS NOTES
Assessment/Plan:    Acute gastritis without hemorrhage  Improving, she has d/c'ed NSAIDs and advised to continue course of pantoprazole x 1 month then taper off  May use TUMS and H2B for breakthrough symptoms  Hypertension  Benign, controlled on lisinopril 20mg daily and pindolol 5mg daily     Insomnia  She has been using [place] long term and often wakes up and urinates  Associated with daytime sleepiness and occasional snoring  Will eval for JORDANA      1  Acute gastritis without hemorrhage, unspecified gastritis type     2  Need for hepatitis C screening test  Hepatitis C antibody   3  Screening for malignant neoplasm of breast  Mammo screening bilateral w cad   4  Essential hypertension  Lipid panel    Home Study   5  Snoring  Home Study   6  Daytime sleepiness  Home Study   7  Insomnia, unspecified type  Home Study       Subjective:      Patient ID: Jacqueline Hook is a 77 y o  female  67yo female with HLD, seizure d/o, MDD, HTN, CIC, PVCs, cervical foraminal stenosis, allergic rhinitis here for follow up care  Hypertension   This is a chronic problem  The current episode started more than 1 year ago  The problem is controlled  Associated symptoms include headaches and neck pain  Past treatments include beta blockers and ACE inhibitors  There are no compliance problems  She was started on protonix due to heartburn, abd pain, gas and regurgitation that had been occurring for several months  She has since stopped advil and meloxicam   She has used voltaren gel on her neck and takes tylenol  She takes [place] for sleep, 5mg 1/2 tab  She denies side effects in the morning  She has daytime sleepiness, nocturia  Denies morning HA, apnea  She occasionally snores      The following portions of the patient's history were reviewed and updated as appropriate: allergies, current medications, past family history, past medical history, past social history, past surgical history and problem list     Current Outpatient Prescriptions:     atorvastatin (LIPITOR) 10 mg tablet, TAKE 1 TABLET BY MOUTH EVERY DAY, Disp: 30 tablet, Rfl: 5    busPIRone (BUSPAR) 15 mg tablet, Take 15 mg by mouth 2 (two) times a day, Disp: , Rfl:     calcium citrate-Vitamin D (CALCIUM CITRATE + D3) 200 mg-250 units, Take by mouth, Disp: , Rfl:     cholecalciferol (VITAMIN D3) 1,000 units tablet, Take 5 tablets (5,000 Units total) by mouth daily, Disp: 150 tablet, Rfl: 0    clobetasol (TEMOVATE) 0 05 % ointment, APPLY SPARINGLY TO AFFECTED AREA(S) ONCE DAILY X 12 WEEKS, Disp: 60 g, Rfl: 3    diclofenac sodium (VOLTAREN) 1 %, Apply 2 g topically 4 (four) times a day as needed (pain), Disp: 100 g, Rfl: 0    divalproex sodium (DEPAKOTE ER) 500 mg 24 hr tablet, Take 1 tablet by mouth 2 (two) times a day, Disp: , Rfl:     escitalopram (LEXAPRO) 20 mg tablet, Take 1 tablet by mouth daily, Disp: , Rfl:     Estradiol (ELESTRIN) 0 52 MG/0 87 GM (0 06%) GEL, Place 0 87 g on the skin daily, Disp: 2 Bottle, Rfl: 6    fexofenadine (ALLEGRA) 180 MG tablet, Take 1 tablet by mouth daily as needed, Disp: , Rfl:     ipratropium (ATROVENT) 0 06 % nasal spray, into each nostril Daily, Disp: , Rfl:     lisinopril (ZESTRIL) 20 mg tablet, TAKE 1 TABLET BY MOUTH EVERY DAY, Disp: 30 tablet, Rfl: 5    methocarbamol (ROBAXIN) 500 mg tablet, Take by mouth, Disp: , Rfl:     Ospemifene (OSPHENA) 60 MG TABS, Take 1 tablet by mouth daily, Disp: , Rfl:     pantoprazole (PROTONIX) 40 mg tablet, Take 1 tablet (40 mg total) by mouth daily, Disp: 60 tablet, Rfl: 0    pindolol (VISKEN) 5 mg tablet, TAKE 1 TABLET EVERY DAY, Disp: 30 tablet, Rfl: 5    zolpidem (AMBIEN) 5 mg tablet, Take by mouth, Disp: , Rfl:       Review of Systems   Constitutional: Negative  HENT: Negative  Respiratory: Negative  Cardiovascular: Negative  Gastrointestinal: Negative for abdominal pain  Heartburn   Genitourinary: Positive for urgency          Urinary frequency   Musculoskeletal: Positive for neck pain and neck stiffness  Hip pain     Neurological: Positive for seizures and headaches  Negative for dizziness  Psychiatric/Behavioral: Positive for dysphoric mood and sleep disturbance  Objective:    /60 (BP Location: Left arm, Patient Position: Sitting)   Pulse 67   Temp 98 1 °F (36 7 °C)   Resp 16   Ht 5' 4" (1 626 m)   Wt 78 5 kg (173 lb)   SpO2 98%   BMI 29 70 kg/m²        Physical Exam   Constitutional: She is oriented to person, place, and time  She appears well-developed and well-nourished  No distress  HENT:   Right Ear: External ear normal    Left Ear: External ear normal    Nose: Nose normal    Mouth/Throat: Oropharynx is clear and moist  No oropharyngeal exudate  Neck: Neck supple  No thyromegaly present  Cardiovascular: Normal rate, regular rhythm, normal heart sounds and intact distal pulses  Pulmonary/Chest: Effort normal and breath sounds normal  No respiratory distress  She has no wheezes  Right breast exhibits no inverted nipple, no mass, no nipple discharge, no skin change and no tenderness  Left breast exhibits no inverted nipple, no mass, no nipple discharge, no skin change and no tenderness  Breasts are symmetrical    Abdominal: Soft  Bowel sounds are normal  She exhibits no distension  There is no tenderness  Lymphadenopathy:     She has no cervical adenopathy  Neurological: She is alert and oriented to person, place, and time  Psychiatric: She has a normal mood and affect  Vitals reviewed

## 2018-08-02 NOTE — ASSESSMENT & PLAN NOTE
She has been using [place] long term and often wakes up and urinates  Associated with daytime sleepiness and occasional snoring    Will eval for JORDANA

## 2018-08-02 NOTE — PROGRESS NOTES
Assessment and Plan:    Problem List Items Addressed This Visit     None      Visit Diagnoses     Need for hepatitis C screening test        Relevant Orders    Hepatitis C antibody    Screening for malignant neoplasm of breast        Relevant Orders    Mammo screening bilateral w cad        Health Maintenance Due   Topic Date Due    Hepatitis C Screening  1950    SLP PLAN OF CARE  1950    Fall Risk  06/18/2013    Urinary Incontinence Screening  06/18/2013    GLAUCOMA SCREENING 65 + YR  06/18/2015         HPI:  Noe Sloan is a 77 y o  female here for her Subsequent Wellness Visit      Patient Active Problem List   Diagnosis    Arthralgia    Bilateral hip pain    Chronic interstitial cystitis    Depression    Disorder of bone and cartilage    Hyperlipidemia    Hypertension    Lichen sclerosus    Lipoma    Myalgia    Osteopenia    Post-menopausal atrophic vaginitis    Symptomatic menopausal or female climacteric states    Arthritis    Acute gastritis without hemorrhage     Past Medical History:   Diagnosis Date    Anxiety     Arthralgia     Chronic interstitial cystitis     Depression     Hyperlipidemia     Hypertension     Lichen sclerosus     Lipoma     Osteopenia     Simple partial seizures ([address] )     last assessed 3/30/17    Tachycardia     last assessed 6/24/15     Past Surgical History:   Procedure Laterality Date    HYSTERECTOMY      SHOULDER SURGERY       Family History   Problem Relation Age of Onset    Stroke Mother         CVA    Hypertension Mother     Hypertension Father     Cancer Father         lung cancer    Cancer Brother         lung cancer    Alcohol abuse Family      History   Smoking Status    Never Smoker   Smokeless Tobacco    Never Used     History   Alcohol Use    6 0 oz/week    10 Glasses of wine per week     Comment: no per [employer]      History   Drug Use No       Current Outpatient Prescriptions   Medication Sig Dispense Refill    atorvastatin (LIPITOR) 10 mg tablet TAKE 1 TABLET BY MOUTH EVERY DAY 30 tablet 5    busPIRone (BUSPAR) 15 mg tablet Take 15 mg by mouth 2 (two) times a day      calcium citrate-Vitamin D (CALCIUM CITRATE + D3) 200 mg-250 units Take by mouth      cholecalciferol (VITAMIN D3) 1,000 units tablet Take 5 tablets (5,000 Units total) by mouth daily 150 tablet 0    clobetasol (TEMOVATE) 0 05 % ointment APPLY SPARINGLY TO AFFECTED AREA(S) ONCE DAILY X 12 WEEKS 60 g 3    diclofenac sodium (VOLTAREN) 1 % Apply 2 g topically 4 (four) times a day as needed (pain) 100 g 0    divalproex sodium (DEPAKOTE ER) 500 mg 24 hr tablet Take 1 tablet by mouth 2 (two) times a day      escitalopram (LEXAPRO) 20 mg tablet Take 1 tablet by mouth daily      Estradiol (ELESTRIN) 0 52 MG/0 87 GM (0 06%) GEL Place 0 87 g on the skin daily 2 Bottle 6    fexofenadine (ALLEGRA) 180 MG tablet Take 1 tablet by mouth daily as needed      ipratropium (ATROVENT) 0 06 % nasal spray into each nostril Daily      lisinopril (ZESTRIL) 20 mg tablet TAKE 1 TABLET BY MOUTH EVERY DAY 30 tablet 5    meloxicam (MOBIC) 15 mg tablet TAKE 1 TABLET BY MOUTH EVERY DAY AS NEEDED 30 tablet 2    methocarbamol (ROBAXIN) 500 mg tablet Take by mouth      Ospemifene (OSPHENA) 60 MG TABS Take 1 tablet by mouth daily      pantoprazole (PROTONIX) 40 mg tablet Take 1 tablet (40 mg total) by mouth daily 60 tablet 0    pindolol (VISKEN) 5 mg tablet TAKE 1 TABLET EVERY DAY 30 tablet 5    zolpidem (AMBIEN) 5 mg tablet Take by mouth       No current facility-administered medications for this visit        Allergies   Allergen Reactions    Codeine Hives    Erythromycin Base GI Intolerance    Morphine GI Intolerance    Oxycodone-Acetaminophen Hives    Penicillins Rash     Immunization History   Administered Date(s) Administered    H1N1, All Formulations 11/10/2009    Influenza Split High Dose Preservative Free IM 09/30/2014, 09/28/2016    Influenza TIV (IM) 09/19/2012, 09/25/2013, 10/01/2015, 09/20/2017    Pneumococcal Conjugate 13-Valent 09/28/2016    Pneumococcal Polysaccharide PPV23 01/01/2012    Tdap 09/25/2013       Patient Care Team:  Duncan Kearney DO as PCP - General  Marianne Monae MD  [facility], DO      Medicare Screening Tests and Risk Assessments:  AWV Clinical     ISAR:   Previous hospitalizations?:  No       Once in a Lifetime Medicare Screening:   EKG performed?:  No    AAA screening performed? (if performed, please add date to Health Maintenance):  No       Medicare Screening Tests and Risk Assessment:   AAA Risk Assessment    None Indicated:  Yes    Osteoporosis Risk Assessment     Female:  Yes   :  Yes :  No   Age over 48:  Yes    Tobacco use:  No Alcohol use:  Yes   HIV Risk Assessment    None indicated:  Yes        Drug and Alcohol Use:   Tobacco use    Cigarettes:  never smoker    Smokeless:  never used smokeless tobacco    Tobacco use duration    Tobacco Cessation Readiness    Alcohol use    Alcohol use:  occasional use    Amount of alcohol consumed:  2 drinks per week   Alcohol Treatment Readiness   Illicit Drug Use    Drug use:  never        Diet & Exercise:   Diet   What is your diet?:  No Added Salt   How many servings a day of the following:   Fruits and Vegetables:  3-4 Meat:  1-2   Whole Grains:  3 Simple Carbs:  1   Dairy:  1 Soda:  0   Coffee:  4 Tea:  0   Exercise    Do you currently exercise?:  yes    Frequency:  daily    Type of exercise:  cardio, walking       Cognitive Impairment Screening:   Depression screening preformed:  Yes     PHQ-9 Depression scale score:  0   Depression screening results:  no significant symptoms   Cognitive Impairment Screening    Do you have difficulty learning or retaining new information?:  No Do you have difficulty handling new tasks?:  No   Do you have difficulty with reasoning?:  No Do you have difficulty with spatial ability and orientation?:  No   Do you have difficulty with language?:  No Do you have difficulty with behavior?:  No       Functional Ability/Level of Safety:   Hearing    Hearing difficulties:  No Bilateral:  normal   Hearing Impairment Assessment    Hearing status:  No impairment   Do your family members ever complain that you turn on the radio or T V  too loudly?:  No Do you find that other people have to repeat themselves when talking to you?:  No   Do you have difficulty hearing while talking on the phone?:  No Has anyone ever told you that you are speaking too loudly when talking with them?:  No   Do you have trouble hearing the doorbell or phone ringing?:  No Do you have difficulty hearing such that you feel frustrated talking to people?:  No   Current Activities    Status:  unlimited ADL's, unlimited driving, unlimited IADL's, unlimited social activities   Help needed with the folllowing:    Using the phone:  No Transportation:  No   Shopping:  No Preparing Meals:  No   Doing Housework:  No Doing Laundry:  No   Managing Medications:  No Managing Money:  No   ADL    Feeding:  Independant   Oral hygiene and Facial grooming:  Independant   Bathing:  Independant   Upper Body Dressing:  Independant   Lower Body Dressing:  Independant   Toileting:  Independant   Bed Mobility:  Independant   Fall Risk   Have you fallen in the last 12 months?:  No Are you unsteady on your feet?:  No   How many times?:  0    Injury History       Home Safety:   Are there hazards in your environment?:  No   If you fell, would you need help to get back up from the ground?:  No Do you have problems or concerns getting in/out of a bed, chair, tub, or toilet?:  No   Do you feel unsteady when walking?:  No Is your activity limited by pain?:  No   Do you have handrails and grab-bars in the home?:  Yes Are emergency numbers kept by the phone and regularly updated?:  Yes   Are you and/or family members aware of the dangers of smoking in bed?:  Yes    Do you have working smoke alarms and fire extinguisher?:  Yes Do all household members know how to use them?:  Yes   Have you left the stove on unsupervised?:  No    Home Safety Risk Factors   Unfamilar with surroundings:  No Uneven floors:  No   Stairs or handrail saftey risk:  No Loose rugs:  No   Household clutter:  No Poor household lighting:  No   No grab bars in bathroom:  No Further evaluation needed:  No       Advanced Directives:   Advanced Directives    Living Will:  Yes Durable POA for healthcare:  Yes   Patient's End of Life Decisions        Urinary Incontinence:   Do you have urinary incontinence?:  No Do you have incomplete emptying?:  No   Do you urinate frequently?:  Yes Do you have urinary urgency?:  No   Do you have urinary hesitancy?:  No Do you have dysuria (painful and/or difficult urination)?:  No   Do you have nocturia (waking up to urinate)?:  Yes Do you strain when urinating (have to push to urinate)?:  No   Do you have a weak stream when urinating?:  No Do you have intermittent streaming when urinating?:  No   Do you dribble urine after finishing?:  Yes    Do you have vaginal pressure?:  No Do you have vaginal dryness?:  Yes       Glaucoma:            Provider Screening     Preventative Screening/Counseling:   Cardiovascular Screening/Counseling:   (Labs Q5 years, EKG optional one-time)   General:  Risks and Benefits Discussed, Screening Current           Diabetes Screening/Counseling:   (2 tests/year if Pre-Diabetes or 1 test/year if no Diabetes)   General:  Risks and Benefits Discussed, Screening Current           Colorectal Cancer Screening/Counseling:   (FOBT Q1 yr; Flex Sig Q4 yrs or Q10 yrs after Screening Colonoscopy; Screening Colonoscpy Q2 yrs High Risk or Q10 yrs Low Risk; Barium Enema Q2 yrs High Risk or Q4 yrs Low Risk)   General:  Risks and Benefits Discussed, Screening Current           Prostate Cancer Screening/Counseling:   (Annual)          Breast Cancer Screening/Counseling:   (Baseline Age 28 - 43;  Annual Age 40+)   General:  Risks and Benefits Discussed Due for: Studies:  mammogram         Cervical Cancer Screening/Counseling:   (Annual for High Risk or Childbearing Age with Abnormal Pap in Last 3 yrs; Every 2 all others)   General:  Screening Not Indicated           Osteoporosis Screening/Counseling:   (Every 2 Yrs if at risk or more if medically necessary)   General:  Risks and Benefits Discussed, Screening Current           AAA Screening/Counseling:   (Once per Lifetime with risk factors)    General:  Screening Not Indicated           Glaucoma Screening/Counseling:   (Annual)   General:  Risks and Benefits Discussed Due for: Referrals:  referral to ophthalmology         HIV Screening/Counseling:   (Voluntary; Once annually for high risk OR 3 times for Pregnancy at diagnosis of IUP; 3rd trimester; and at Labor   General:  Screening Not Indicated           Hepatitis C Screening:   Hepatitis C Counseling Provided:  Yes Hepatitis C Screening Accepted: Yes              Immunizations:   Influenza (annual): Influenza UTD This Year, Influenza Recommended Annually   Pneumococcal (Once in a Lifetime):  Lifetime Vaccine Completed   Hepatitis B Series (low risk patients):  Series Not Indicated   Zostavax (Medicare D Coverage, Pt >66 yo):  Zostavax Vaccine UTD   TD (Non-Medicare Wellness  Visit required): Td Vaccine UTD   Tdap (Non-Medicare Wellness Visit required):   Tdap Vaccine UTD       Other Preventative Couseling (Non-Medicare Wellness Visit Required):   fall prevention education provided, alcohol use counseling provided, weight reduction was discussed, Increased physical activity counseling given       Referrals (Non-Medicare Wellness Visit Required):       Medical Equipment/Suppliers:   none today

## 2018-08-02 NOTE — ASSESSMENT & PLAN NOTE
Improving, she has d/c'ed NSAIDs and advised to continue course of pantoprazole x 1 month then taper off  May use TUMS and H2B for breakthrough symptoms

## 2018-08-14 ENCOUNTER — TRANSCRIBE ORDERS (OUTPATIENT)
Dept: LAB | Facility: CLINIC | Age: 68
End: 2018-08-14

## 2018-08-14 ENCOUNTER — APPOINTMENT (OUTPATIENT)
Dept: LAB | Facility: CLINIC | Age: 68
End: 2018-08-14
Payer: MEDICARE

## 2018-08-14 DIAGNOSIS — Z79.899 ENCOUNTER FOR LONG-TERM (CURRENT) USE OF HIGH-RISK MEDICATION: Primary | ICD-10-CM

## 2018-08-14 DIAGNOSIS — I10 ESSENTIAL HYPERTENSION: ICD-10-CM

## 2018-08-14 DIAGNOSIS — Z11.59 NEED FOR HEPATITIS C SCREENING TEST: ICD-10-CM

## 2018-08-14 LAB
ALBUMIN SERPL BCP-MCNC: 3.4 G/DL (ref 3.5–5)
ALP SERPL-CCNC: 42 U/L (ref 46–116)
ALT SERPL W P-5'-P-CCNC: 20 U/L (ref 12–78)
ANION GAP SERPL CALCULATED.3IONS-SCNC: 9 MMOL/L (ref 4–13)
AST SERPL W P-5'-P-CCNC: 10 U/L (ref 5–45)
BASOPHILS # BLD AUTO: 0.04 THOUSANDS/ΜL (ref 0–0.1)
BASOPHILS NFR BLD AUTO: 1 % (ref 0–1)
BILIRUB SERPL-MCNC: 0.3 MG/DL (ref 0.2–1)
BUN SERPL-MCNC: 28 MG/DL (ref 5–25)
CALCIUM SERPL-MCNC: 9 MG/DL (ref 8.3–10.1)
CHLORIDE SERPL-SCNC: 104 MMOL/L (ref 100–108)
CHOLEST SERPL-MCNC: 226 MG/DL (ref 50–200)
CO2 SERPL-SCNC: 27 MMOL/L (ref 21–32)
CREAT SERPL-MCNC: 0.91 MG/DL (ref 0.6–1.3)
EOSINOPHIL # BLD AUTO: 0.07 THOUSAND/ΜL (ref 0–0.61)
EOSINOPHIL NFR BLD AUTO: 1 % (ref 0–6)
ERYTHROCYTE [DISTWIDTH] IN BLOOD BY AUTOMATED COUNT: 12.4 % (ref 11.6–15.1)
GFR SERPL CREATININE-BSD FRML MDRD: 64 ML/MIN/1.73SQ M
GLUCOSE P FAST SERPL-MCNC: 83 MG/DL (ref 65–99)
HCT VFR BLD AUTO: 42.8 % (ref 34.8–46.1)
HCV AB SER QL: NORMAL
HDLC SERPL-MCNC: 61 MG/DL (ref 40–60)
HGB BLD-MCNC: 13.5 G/DL (ref 11.5–15.4)
IMM GRANULOCYTES # BLD AUTO: 0.03 THOUSAND/UL (ref 0–0.2)
IMM GRANULOCYTES NFR BLD AUTO: 1 % (ref 0–2)
LDLC SERPL CALC-MCNC: 139 MG/DL (ref 0–100)
LYMPHOCYTES # BLD AUTO: 1.64 THOUSANDS/ΜL (ref 0.6–4.47)
LYMPHOCYTES NFR BLD AUTO: 32 % (ref 14–44)
MCH RBC QN AUTO: 30.8 PG (ref 26.8–34.3)
MCHC RBC AUTO-ENTMCNC: 31.5 G/DL (ref 31.4–37.4)
MCV RBC AUTO: 98 FL (ref 82–98)
MONOCYTES # BLD AUTO: 0.49 THOUSAND/ΜL (ref 0.17–1.22)
MONOCYTES NFR BLD AUTO: 10 % (ref 4–12)
NEUTROPHILS # BLD AUTO: 2.79 THOUSANDS/ΜL (ref 1.85–7.62)
NEUTS SEG NFR BLD AUTO: 55 % (ref 43–75)
NONHDLC SERPL-MCNC: 165 MG/DL
NRBC BLD AUTO-RTO: 0 /100 WBCS
PLATELET # BLD AUTO: 268 THOUSANDS/UL (ref 149–390)
PMV BLD AUTO: 11 FL (ref 8.9–12.7)
POTASSIUM SERPL-SCNC: 4.7 MMOL/L (ref 3.5–5.3)
PROT SERPL-MCNC: 6.7 G/DL (ref 6.4–8.2)
RBC # BLD AUTO: 4.39 MILLION/UL (ref 3.81–5.12)
SODIUM SERPL-SCNC: 140 MMOL/L (ref 136–145)
TRIGL SERPL-MCNC: 129 MG/DL
VALPROATE SERPL-MCNC: 69 UG/ML (ref 50–100)
WBC # BLD AUTO: 5.06 THOUSAND/UL (ref 4.31–10.16)

## 2018-08-14 PROCEDURE — 80061 LIPID PANEL: CPT

## 2018-08-14 PROCEDURE — 80164 ASSAY DIPROPYLACETIC ACD TOT: CPT

## 2018-08-14 PROCEDURE — 86803 HEPATITIS C AB TEST: CPT

## 2018-08-14 PROCEDURE — 85025 COMPLETE CBC W/AUTO DIFF WBC: CPT

## 2018-08-14 PROCEDURE — 36415 COLL VENOUS BLD VENIPUNCTURE: CPT

## 2018-08-14 PROCEDURE — 80053 COMPREHEN METABOLIC PANEL: CPT

## 2018-08-19 DIAGNOSIS — N95.2 POST-MENOPAUSE ATROPHIC VAGINITIS: Primary | ICD-10-CM

## 2018-08-20 RX ORDER — OSPEMIFENE 60 MG/1
TABLET, FILM COATED ORAL
Qty: 90 TABLET | Refills: 3 | Status: SHIPPED | OUTPATIENT
Start: 2018-08-20 | End: 2019-12-03 | Stop reason: SDUPTHER

## 2018-08-22 ENCOUNTER — OFFICE VISIT (OUTPATIENT)
Dept: OBGYN CLINIC | Facility: CLINIC | Age: 68
End: 2018-08-22
Payer: MEDICARE

## 2018-08-22 VITALS — DIASTOLIC BLOOD PRESSURE: 84 MMHG | WEIGHT: 175.4 LBS | SYSTOLIC BLOOD PRESSURE: 114 MMHG | BODY MASS INDEX: 30.11 KG/M2

## 2018-08-22 DIAGNOSIS — L90.0 LICHEN SCLEROSUS: ICD-10-CM

## 2018-08-22 DIAGNOSIS — N95.1 SYMPTOMATIC MENOPAUSAL OR FEMALE CLIMACTERIC STATES: Primary | ICD-10-CM

## 2018-08-22 DIAGNOSIS — N95.2 POST-MENOPAUSAL ATROPHIC VAGINITIS: ICD-10-CM

## 2018-08-22 PROBLEM — M53.0 CERVICOCRANIAL SYNDROME: Status: ACTIVE | Noted: 2017-04-05

## 2018-08-22 PROBLEM — M70.60 TROCHANTERIC BURSITIS: Status: ACTIVE | Noted: 2018-08-22

## 2018-08-22 PROBLEM — G40.209 COMPLEX PARTIAL SEIZURE WITH IMPAIRMENT OF CONSCIOUSNESS (HCC): Status: ACTIVE | Noted: 2017-04-05

## 2018-08-22 PROBLEM — M25.559 HIP PAIN: Status: ACTIVE | Noted: 2018-08-22

## 2018-08-22 PROBLEM — M47.812 CERVICAL SPONDYLOSIS: Status: ACTIVE | Noted: 2017-02-08

## 2018-08-22 PROBLEM — G43.719 INTRACTABLE CHRONIC MIGRAINE WITHOUT AURA: Status: ACTIVE | Noted: 2017-04-05

## 2018-08-22 PROBLEM — S46.009A INJURY OF TENDON OF ROTATOR CUFF: Status: ACTIVE | Noted: 2018-08-22

## 2018-08-22 PROBLEM — F43.29 ADJUSTMENT DISORDER WITH MIXED EMOTIONAL FEATURES: Status: ACTIVE | Noted: 2017-04-05

## 2018-08-22 PROCEDURE — 99213 OFFICE O/P EST LOW 20 MIN: CPT | Performed by: OBSTETRICS & GYNECOLOGY

## 2018-08-22 RX ORDER — MELOXICAM 15 MG/1
15 TABLET ORAL DAILY
COMMUNITY
End: 2018-11-12 | Stop reason: HOSPADM

## 2018-08-22 NOTE — PROGRESS NOTES
Assessment Prasanth Ambriz was seen today for follow-up  Diagnoses and all orders for this visit:    Symptomatic menopausal or female climacteric states    Post-menopausal atrophic vaginitis    Lichen sclerosus         Plan  Continue current medications  Contact office when refills are needed  Use Vaseline as needed to vulva for irritation  Return to office in 6 months for recheck, sooner PRN  Subjective     Dilia Lawrence is a 77 y o  female here for a problem visit  Patient is complaining of discomfort and itching in vaginal area  Sx's started many years ago  Symptoms are somewhat controlled but sometimes the area will itch towards end of dosing interval  No vaginal bleeding  Patient Active Problem List   Diagnosis    Arthralgia    Bilateral hip pain    Chronic interstitial cystitis    Depression    Disorder of bone and cartilage    Hyperlipidemia    Hypertension    Lichen sclerosus    Lipoma    Myalgia    Post-menopausal atrophic vaginitis    Symptomatic menopausal or female climacteric states    Arthritis    Acute gastritis without hemorrhage    Daytime sleepiness    Snoring    Insomnia    Adjustment disorder with mixed emotional features    Cervical spondylosis    Cervicocranial syndrome    Intractable chronic migraine without aura    Complex partial seizure with impairment of consciousness (HCC)    Hip pain    Injury of tendon of rotator cuff    Trochanteric bursitis         Gynecologic History  No LMP recorded  Patient is postmenopausal   Last mammogram: 2017   Results: BIRAD 1    Obstetric History  OB History    Para Term  AB Living   0 0 0 0 0 0   SAB TAB Ectopic Multiple Live Births   0 0 0 0 0             Past Medical/Surgical/Family/Social History  The following portions of the patient's history were reviewed and updated as appropriate: allergies, current medications, past family history, past medical history, past social history, past surgical history and problem list     Allergies  Codeine; Erythromycin base; Morphine;  Oxycodone-acetaminophen; and Penicillins    Medications    Current Outpatient Prescriptions:     atorvastatin (LIPITOR) 10 mg tablet, TAKE 1 TABLET BY MOUTH EVERY DAY, Disp: 30 tablet, Rfl: 5    busPIRone (BUSPAR) 15 mg tablet, Take 15 mg by mouth 2 (two) times a day, Disp: , Rfl:     calcium citrate-Vitamin D (CALCIUM CITRATE + D3) 200 mg-250 units, Take by mouth, Disp: , Rfl:     cholecalciferol (VITAMIN D3) 1,000 units tablet, Take 5 tablets (5,000 Units total) by mouth daily, Disp: 150 tablet, Rfl: 0    clobetasol (TEMOVATE) 0 05 % ointment, APPLY SPARINGLY TO AFFECTED AREA(S) ONCE DAILY X 12 WEEKS, Disp: 60 g, Rfl: 3    diclofenac sodium (VOLTAREN) 1 %, Apply 2 g topically 4 (four) times a day as needed (pain), Disp: 100 g, Rfl: 0    divalproex sodium (DEPAKOTE ER) 500 mg 24 hr tablet, Take 1 tablet by mouth 2 (two) times a day, Disp: , Rfl:     escitalopram (LEXAPRO) 20 mg tablet, Take 1 tablet by mouth daily, Disp: , Rfl:     Estradiol (ELESTRIN) 0 52 MG/0 87 GM (0 06%) GEL, Place 0 87 g on the skin daily, Disp: 2 Bottle, Rfl: 6    fexofenadine (ALLEGRA) 180 MG tablet, Take 1 tablet by mouth daily as needed, Disp: , Rfl:     ipratropium (ATROVENT) 0 06 % nasal spray, into each nostril Daily, Disp: , Rfl:     lisinopril (ZESTRIL) 20 mg tablet, TAKE 1 TABLET BY MOUTH EVERY DAY, Disp: 30 tablet, Rfl: 5    meloxicam (MOBIC) 15 mg tablet, Take 15 mg by mouth daily, Disp: , Rfl:     methocarbamol (ROBAXIN) 500 mg tablet, Take by mouth, Disp: , Rfl:     OSPHENA 60 MG TABS, TAKE 1 TABLET BY MOUTH EVERY DAY, Disp: 90 tablet, Rfl: 3    pantoprazole (PROTONIX) 40 mg tablet, Take 1 tablet (40 mg total) by mouth daily, Disp: 60 tablet, Rfl: 0    pindolol (VISKEN) 5 mg tablet, TAKE 1 TABLET EVERY DAY, Disp: 30 tablet, Rfl: 5      Review of Systems  Constitutional: no fever, feels well, no tiredness, no recent weight gain or loss  Breasts:no complaints of breast pain, breast lump, or nipple discharge  Gastrointestinal: no complaints of abdominal pain, constipation, nausea, vomiting, or diarrhea or bloody stools  Genitourinary : no complaints of dysuria, incontinence, pelvic pain, dysmenorrhea,vaginal discharge or abnormal vaginal bleeding       Objective     /84   Wt 79 6 kg (175 lb 6 4 oz)   BMI 30 11 kg/m²     General: alert and oriented, in no acute distress  Vulva: atrophic, white patches with excoriations  Small inclusion cyst on left vulva, release of contents with pressure     Vagina: moderate - severe vaginal atrophy

## 2018-09-18 DIAGNOSIS — I10 ESSENTIAL HYPERTENSION: ICD-10-CM

## 2018-09-18 DIAGNOSIS — M25.50 ARTHRALGIA, UNSPECIFIED JOINT: ICD-10-CM

## 2018-09-18 DIAGNOSIS — M79.10 MYALGIA: ICD-10-CM

## 2018-09-18 RX ORDER — PINDOLOL 5 MG/1
TABLET ORAL
Qty: 30 TABLET | Refills: 5 | Status: SHIPPED | OUTPATIENT
Start: 2018-09-18 | End: 2019-02-07 | Stop reason: SDUPTHER

## 2018-09-18 RX ORDER — MELOXICAM 15 MG/1
TABLET ORAL
Qty: 30 TABLET | Refills: 2 | OUTPATIENT
Start: 2018-09-18

## 2018-11-11 ENCOUNTER — APPOINTMENT (EMERGENCY)
Dept: RADIOLOGY | Facility: HOSPITAL | Age: 68
End: 2018-11-11
Payer: MEDICARE

## 2018-11-11 ENCOUNTER — HOSPITAL ENCOUNTER (OUTPATIENT)
Facility: HOSPITAL | Age: 68
Setting detail: OBSERVATION
Discharge: HOME | End: 2018-11-12
Attending: EMERGENCY MEDICINE | Admitting: INTERNAL MEDICINE
Payer: MEDICARE

## 2018-11-11 DIAGNOSIS — R07.9 CHEST PAIN: Primary | ICD-10-CM

## 2018-11-11 DIAGNOSIS — R77.8 ELEVATED TROPONIN: ICD-10-CM

## 2018-11-11 DIAGNOSIS — I10 HYPERTENSION: ICD-10-CM

## 2018-11-11 PROBLEM — R07.89 CHEST TIGHTNESS: Status: ACTIVE | Noted: 2018-11-11

## 2018-11-11 PROBLEM — F41.9 ANXIETY: Status: ACTIVE | Noted: 2018-11-11

## 2018-11-11 PROBLEM — R79.89 ELEVATED TROPONIN: Status: ACTIVE | Noted: 2018-11-11

## 2018-11-11 PROBLEM — K29.70 GASTRITIS: Status: ACTIVE | Noted: 2018-11-11

## 2018-11-11 PROBLEM — G40.909 SEIZURE DISORDER (HCC): Status: ACTIVE | Noted: 2018-11-11

## 2018-11-11 LAB
ALBUMIN SERPL BCP-MCNC: 3.2 G/DL (ref 3.5–5)
ALP SERPL-CCNC: 47 U/L (ref 46–116)
ALT SERPL W P-5'-P-CCNC: 25 U/L (ref 12–78)
ANION GAP SERPL CALCULATED.3IONS-SCNC: 9 MMOL/L (ref 4–13)
AST SERPL W P-5'-P-CCNC: 13 U/L (ref 5–45)
ATRIAL RATE: 63 BPM
ATRIAL RATE: 68 BPM
BACTERIA UR QL AUTO: ABNORMAL /HPF
BASOPHILS # BLD AUTO: 0.03 THOUSANDS/ΜL (ref 0–0.1)
BASOPHILS NFR BLD AUTO: 0 % (ref 0–1)
BILIRUB SERPL-MCNC: 0.16 MG/DL (ref 0.2–1)
BILIRUB UR QL STRIP: NEGATIVE
BUN SERPL-MCNC: 25 MG/DL (ref 5–25)
CALCIUM SERPL-MCNC: 9 MG/DL (ref 8.3–10.1)
CHLORIDE SERPL-SCNC: 106 MMOL/L (ref 100–108)
CLARITY UR: ABNORMAL
CO2 SERPL-SCNC: 24 MMOL/L (ref 21–32)
COLOR UR: YELLOW
COLOR, POC: NORMAL
CREAT SERPL-MCNC: 0.82 MG/DL (ref 0.6–1.3)
EOSINOPHIL # BLD AUTO: 0.06 THOUSAND/ΜL (ref 0–0.61)
EOSINOPHIL NFR BLD AUTO: 1 % (ref 0–6)
ERYTHROCYTE [DISTWIDTH] IN BLOOD BY AUTOMATED COUNT: 12.3 % (ref 11.6–15.1)
GFR SERPL CREATININE-BSD FRML MDRD: 73 ML/MIN/1.73SQ M
GLUCOSE SERPL-MCNC: 98 MG/DL (ref 65–140)
GLUCOSE UR STRIP-MCNC: NEGATIVE MG/DL
HCT VFR BLD AUTO: 39.9 % (ref 34.8–46.1)
HGB BLD-MCNC: 12.8 G/DL (ref 11.5–15.4)
HGB UR QL STRIP.AUTO: NEGATIVE
IMM GRANULOCYTES # BLD AUTO: 0.05 THOUSAND/UL (ref 0–0.2)
IMM GRANULOCYTES NFR BLD AUTO: 1 % (ref 0–2)
KETONES UR STRIP-MCNC: ABNORMAL MG/DL
LEUKOCYTE ESTERASE UR QL STRIP: ABNORMAL
LYMPHOCYTES # BLD AUTO: 1.34 THOUSANDS/ΜL (ref 0.6–4.47)
LYMPHOCYTES NFR BLD AUTO: 20 % (ref 14–44)
MCH RBC QN AUTO: 31.2 PG (ref 26.8–34.3)
MCHC RBC AUTO-ENTMCNC: 32.1 G/DL (ref 31.4–37.4)
MCV RBC AUTO: 97 FL (ref 82–98)
MONOCYTES # BLD AUTO: 0.68 THOUSAND/ΜL (ref 0.17–1.22)
MONOCYTES NFR BLD AUTO: 10 % (ref 4–12)
NEUTROPHILS # BLD AUTO: 4.63 THOUSANDS/ΜL (ref 1.85–7.62)
NEUTS SEG NFR BLD AUTO: 68 % (ref 43–75)
NITRITE UR QL STRIP: NEGATIVE
NON-SQ EPI CELLS URNS QL MICRO: ABNORMAL /HPF
NRBC BLD AUTO-RTO: 0 /100 WBCS
NT-PROBNP SERPL-MCNC: 130 PG/ML
P AXIS: 57 DEGREES
P AXIS: 62 DEGREES
PH UR STRIP.AUTO: 7 [PH] (ref 4.5–8)
PLATELET # BLD AUTO: 236 THOUSANDS/UL (ref 149–390)
PLATELET # BLD AUTO: 262 THOUSANDS/UL (ref 149–390)
PMV BLD AUTO: 10.5 FL (ref 8.9–12.7)
PMV BLD AUTO: 10.7 FL (ref 8.9–12.7)
POTASSIUM SERPL-SCNC: 4.7 MMOL/L (ref 3.5–5.3)
PR INTERVAL: 144 MS
PR INTERVAL: 144 MS
PROT SERPL-MCNC: 6.4 G/DL (ref 6.4–8.2)
PROT UR STRIP-MCNC: NEGATIVE MG/DL
QRS AXIS: 75 DEGREES
QRS AXIS: 82 DEGREES
QRSD INTERVAL: 86 MS
QRSD INTERVAL: 86 MS
QT INTERVAL: 400 MS
QT INTERVAL: 404 MS
QTC INTERVAL: 409 MS
QTC INTERVAL: 429 MS
RBC # BLD AUTO: 4.1 MILLION/UL (ref 3.81–5.12)
RBC #/AREA URNS AUTO: ABNORMAL /HPF
SODIUM SERPL-SCNC: 139 MMOL/L (ref 136–145)
SP GR UR STRIP.AUTO: 1.02 (ref 1–1.03)
T WAVE AXIS: 54 DEGREES
T WAVE AXIS: 74 DEGREES
TROPONIN I SERPL-MCNC: 0.02 NG/ML
TROPONIN I SERPL-MCNC: 0.02 NG/ML
TROPONIN I SERPL-MCNC: 0.05 NG/ML
TSH SERPL DL<=0.05 MIU/L-ACNC: 1.78 UIU/ML (ref 0.36–3.74)
TSH SERPL DL<=0.05 MIU/L-ACNC: 2.78 UIU/ML (ref 0.36–3.74)
UROBILINOGEN UR QL STRIP.AUTO: 0.2 E.U./DL
VENTRICULAR RATE: 63 BPM
VENTRICULAR RATE: 68 BPM
WBC # BLD AUTO: 6.79 THOUSAND/UL (ref 4.31–10.16)
WBC #/AREA URNS AUTO: ABNORMAL /HPF

## 2018-11-11 PROCEDURE — 99220 PR INITIAL OBSERVATION CARE/DAY 70 MINUTES: CPT | Performed by: PHYSICIAN ASSISTANT

## 2018-11-11 PROCEDURE — 84443 ASSAY THYROID STIM HORMONE: CPT | Performed by: PHYSICIAN ASSISTANT

## 2018-11-11 PROCEDURE — 93010 ELECTROCARDIOGRAM REPORT: CPT | Performed by: INTERNAL MEDICINE

## 2018-11-11 PROCEDURE — 93005 ELECTROCARDIOGRAM TRACING: CPT

## 2018-11-11 PROCEDURE — 36415 COLL VENOUS BLD VENIPUNCTURE: CPT | Performed by: EMERGENCY MEDICINE

## 2018-11-11 PROCEDURE — 84484 ASSAY OF TROPONIN QUANT: CPT | Performed by: PHYSICIAN ASSISTANT

## 2018-11-11 PROCEDURE — 84443 ASSAY THYROID STIM HORMONE: CPT | Performed by: EMERGENCY MEDICINE

## 2018-11-11 PROCEDURE — 80053 COMPREHEN METABOLIC PANEL: CPT | Performed by: EMERGENCY MEDICINE

## 2018-11-11 PROCEDURE — 84484 ASSAY OF TROPONIN QUANT: CPT | Performed by: EMERGENCY MEDICINE

## 2018-11-11 PROCEDURE — 99285 EMERGENCY DEPT VISIT HI MDM: CPT

## 2018-11-11 PROCEDURE — 83880 ASSAY OF NATRIURETIC PEPTIDE: CPT | Performed by: EMERGENCY MEDICINE

## 2018-11-11 PROCEDURE — 85049 AUTOMATED PLATELET COUNT: CPT | Performed by: PHYSICIAN ASSISTANT

## 2018-11-11 PROCEDURE — 71046 X-RAY EXAM CHEST 2 VIEWS: CPT

## 2018-11-11 PROCEDURE — 85025 COMPLETE CBC W/AUTO DIFF WBC: CPT | Performed by: EMERGENCY MEDICINE

## 2018-11-11 PROCEDURE — 86803 HEPATITIS C AB TEST: CPT | Performed by: PHYSICIAN ASSISTANT

## 2018-11-11 PROCEDURE — 81001 URINALYSIS AUTO W/SCOPE: CPT

## 2018-11-11 RX ORDER — DIVALPROEX SODIUM 500 MG/1
500 TABLET, EXTENDED RELEASE ORAL EVERY 12 HOURS SCHEDULED
Status: DISCONTINUED | OUTPATIENT
Start: 2018-11-11 | End: 2018-11-12 | Stop reason: HOSPADM

## 2018-11-11 RX ORDER — LANOLIN ALCOHOL/MO/W.PET/CERES
9 CREAM (GRAM) TOPICAL
Status: DISCONTINUED | OUTPATIENT
Start: 2018-11-11 | End: 2018-11-12 | Stop reason: HOSPADM

## 2018-11-11 RX ORDER — ESCITALOPRAM OXALATE 20 MG/1
20 TABLET ORAL DAILY
Status: DISCONTINUED | OUTPATIENT
Start: 2018-11-12 | End: 2018-11-12 | Stop reason: HOSPADM

## 2018-11-11 RX ORDER — CLOBETASOL PROPIONATE 0.5 MG/G
CREAM TOPICAL DAILY
Status: DISCONTINUED | OUTPATIENT
Start: 2018-11-12 | End: 2018-11-12 | Stop reason: HOSPADM

## 2018-11-11 RX ORDER — ATORVASTATIN CALCIUM 10 MG/1
10 TABLET, FILM COATED ORAL DAILY
Status: DISCONTINUED | OUTPATIENT
Start: 2018-11-12 | End: 2018-11-12 | Stop reason: HOSPADM

## 2018-11-11 RX ORDER — HEPARIN SODIUM 5000 [USP'U]/ML
5000 INJECTION, SOLUTION INTRAVENOUS; SUBCUTANEOUS EVERY 8 HOURS SCHEDULED
Status: DISCONTINUED | OUTPATIENT
Start: 2018-11-11 | End: 2018-11-12 | Stop reason: HOSPADM

## 2018-11-11 RX ORDER — LISINOPRIL 20 MG/1
20 TABLET ORAL DAILY
Status: DISCONTINUED | OUTPATIENT
Start: 2018-11-12 | End: 2018-11-12 | Stop reason: HOSPADM

## 2018-11-11 RX ORDER — ASPIRIN 81 MG/1
81 TABLET, CHEWABLE ORAL DAILY
Status: DISCONTINUED | OUTPATIENT
Start: 2018-11-12 | End: 2018-11-12 | Stop reason: HOSPADM

## 2018-11-11 RX ORDER — BUSPIRONE HYDROCHLORIDE 10 MG/1
15 TABLET ORAL 2 TIMES DAILY
Status: DISCONTINUED | OUTPATIENT
Start: 2018-11-11 | End: 2018-11-12 | Stop reason: HOSPADM

## 2018-11-11 RX ORDER — LANOLIN ALCOHOL/MO/W.PET/CERES
9 CREAM (GRAM) TOPICAL
Status: DISCONTINUED | OUTPATIENT
Start: 2018-11-11 | End: 2018-11-11

## 2018-11-11 RX ORDER — MELATONIN
5000 DAILY
Status: DISCONTINUED | OUTPATIENT
Start: 2018-11-12 | End: 2018-11-12 | Stop reason: HOSPADM

## 2018-11-11 RX ORDER — LABETALOL HYDROCHLORIDE 5 MG/ML
10 INJECTION, SOLUTION INTRAVENOUS EVERY 6 HOURS PRN
Status: DISCONTINUED | OUTPATIENT
Start: 2018-11-11 | End: 2018-11-12 | Stop reason: HOSPADM

## 2018-11-11 RX ADMIN — HEPARIN SODIUM 5000 UNITS: 5000 INJECTION INTRAVENOUS; SUBCUTANEOUS at 22:02

## 2018-11-11 RX ADMIN — DIVALPROEX SODIUM 500 MG: 500 TABLET, EXTENDED RELEASE ORAL at 20:10

## 2018-11-11 RX ADMIN — MELATONIN TAB 3 MG 9 MG: 3 TAB at 22:02

## 2018-11-11 RX ADMIN — BUSPIRONE HYDROCHLORIDE 15 MG: 10 TABLET ORAL at 20:09

## 2018-11-11 RX ADMIN — LABETALOL 20 MG/4 ML (5 MG/ML) INTRAVENOUS SYRINGE 10 MG: at 20:10

## 2018-11-11 NOTE — ED NOTES
[place] with food services \[facility]\"" will sent a house tray to pts room Lesly Osullivan RN  11/11/18 [id number]

## 2018-11-11 NOTE — ED PROVIDER NOTES
History  Chief Complaint   Patient presents with    Chest Pain     states whlie at [employer] around 12:15 started with throat pain, chest tighness, and shooting pain down b/l arms states has similar symptoms in past resolved by consentrating on breath this time not resolved set down after 5-10 min resolved then after walking to car pain returned more severe  c/o slight tinggling in hands at this time       History provided by:  Patient   used: No    Chest Pain   Pain location:  Substernal area  Pain quality: pressure and tightness    Pain radiates to:  Does not radiate  Pain radiates to the back: no    Pain severity:  Moderate  Onset quality:  Gradual  Timing:  Intermittent  Progression:  Waxing and waning  Chronicity:  Recurrent (Intermittent for about 1 month)  Context: at rest    Relieved by:  Nothing  Worsened by:  Nothing tried  Ineffective treatments:  None tried  Associated symptoms: anxiety, lower extremity edema and shortness of breath    Associated symptoms: no abdominal pain, no altered mental status, no back pain, no cough, no dizziness, no dysphagia, no fever, no headache, no nausea, no palpitations and not vomiting    Shortness of breath:     Severity:  Mild    Onset quality:  Gradual    Duration: minutes  Timing:  Intermittent    Progression:  Waxing and waning  Risk factors: hypertension    Risk factors: no coronary artery disease, no diabetes mellitus, no prior DVT/PE and no surgery        Prior to Admission Medications   Prescriptions Last Dose Informant Patient Reported? Taking?    Estradiol (ELESTRIN) 0 52 MG/0 87 GM (0 06%) GEL   No Yes   Sig: Place 0 87 g on the skin daily   OSPHENA 60 MG TABS   No Yes   Sig: TAKE 1 TABLET BY MOUTH EVERY DAY   atorvastatin (LIPITOR) 10 mg tablet   No Yes   Sig: TAKE 1 TABLET BY MOUTH EVERY DAY   busPIRone (BUSPAR) 15 mg tablet  Self Yes Yes   Sig: Take 15 mg by mouth 2 (two) times a day   calcium citrate-Vitamin D (CALCIUM CITRATE + D3) 200 mg-250 units  Self Yes Yes   Sig: Take by mouth   cholecalciferol (VITAMIN D3) 1,000 units tablet   No Yes   Sig: Take 5 tablets (5,000 Units total) by mouth daily   clobetasol (TEMOVATE) 0 05 % ointment   No Yes   Sig: APPLY SPARINGLY TO AFFECTED AREA(S) ONCE DAILY X 12 WEEKS   divalproex sodium (DEPAKOTE ER) 500 mg 24 hr tablet  Self Yes Yes   Sig: Take 1 tablet by mouth 2 (two) times a day   escitalopram (LEXAPRO) 20 mg tablet  Self Yes Yes   Sig: Take 1 tablet by mouth daily   fexofenadine (ALLEGRA) 180 MG tablet  Self Yes Yes   Sig: Take 1 tablet by mouth daily as needed   ipratropium (ATROVENT) 0 06 % nasal spray  Self Yes Yes   Sig: into each nostril Daily   lisinopril (ZESTRIL) 20 mg tablet   No Yes   Sig: TAKE 1 TABLET BY MOUTH EVERY DAY   meloxicam (MOBIC) 15 mg tablet   Yes Yes   Sig: Take 15 mg by mouth daily   methocarbamol (ROBAXIN) 500 mg tablet  Self Yes Yes   Sig: Take by mouth   pindolol (VISKEN) 5 mg tablet   No Yes   Sig: TAKE 1 TABLET BY MOUTH DAILY      Facility-Administered Medications: None       Past Medical History:   Diagnosis Date    Anxiety     Arthralgia     Chronic interstitial cystitis     Depression     Hyperlipidemia     Hypertension     Lichen sclerosus     Lipoma     Osteopenia     Simple partial seizures (HCC)     last assessed 3/30/17    Tachycardia     last assessed 6/24/15       Past Surgical History:   Procedure Laterality Date    COSMETIC SURGERY      HYSTERECTOMY      SHOULDER SURGERY         Family History   Problem Relation Age of Onset    Stroke Mother         CVA    Hypertension Mother     Hypertension Father     Cancer Father         lung cancer    Cancer Brother         lung cancer    Alcohol abuse Family      I have reviewed and agree with the history as documented      Social History   Substance Use Topics    Smoking status: Never Smoker    Smokeless tobacco: Never Used    Alcohol use 6 0 oz/week     10 Glasses of wine per week Comment: no per [employer]        Review of Systems   Constitutional: Negative for activity change, chills and fever  HENT: Negative for facial swelling, sore throat and trouble swallowing  Eyes: Negative for pain and visual disturbance  Respiratory: Positive for shortness of breath  Negative for cough and chest tightness  Cardiovascular: Positive for chest pain  Negative for palpitations and leg swelling  Gastrointestinal: Negative for abdominal pain, blood in stool, diarrhea, nausea and vomiting  Genitourinary: Negative for dysuria and flank pain  Musculoskeletal: Negative for back pain, neck pain and neck stiffness  Skin: Negative for pallor and rash  Allergic/Immunologic: Negative for environmental allergies and immunocompromised state  Neurological: Negative for dizziness and headaches  Hematological: Negative for adenopathy  Does not bruise/bleed easily  Psychiatric/Behavioral: Negative for agitation and behavioral problems  All other systems reviewed and are negative  Physical Exam  Physical Exam   Constitutional: She is oriented to person, place, and time  She appears well-developed and well-nourished  No distress  HENT:   Head: Normocephalic and atraumatic  Eyes: EOM are normal    Neck: Normal range of motion  Neck supple  Cardiovascular: Normal rate, regular rhythm, normal heart sounds and intact distal pulses  Pulmonary/Chest: Effort normal and breath sounds normal    Abdominal: Soft  Bowel sounds are normal  There is no tenderness  There is no rebound and no guarding  Musculoskeletal: Normal range of motion  Neurological: She is alert and oriented to person, place, and time  Skin: Skin is warm and dry  Psychiatric: She has a normal mood and affect  Nursing note and vitals reviewed        Vital Signs  ED Triage Vitals [11/11/18 1331]   Temperature Pulse Respirations Blood Pressure SpO2   98 3 °F (36 8 °C) 62 18 (!) 183/84 97 %      Temp Source Heart Rate Source Patient Position - Orthostatic VS BP Location FiO2 (%)   Oral Monitor Sitting Right arm --      Pain Score       No Pain           Vitals:    11/11/18 1445 11/11/18 1630 11/11/18 1840 11/11/18 1850   BP: 151/70 157/69 (!) 208/79 (!) 192/90   Pulse: 64 76 69 70   Patient Position - Orthostatic VS: Lying Lying Lying Sitting       Visual Acuity      ED Medications  Medications - No data to display    Diagnostic Studies  Results Reviewed     Procedure Component Value Units Date/Time    Troponin I [[id number]]  (Abnormal) Collected:  11/11/18 1720    Lab Status:  Final result Specimen:  Blood from Arm, Left Updated:  11/11/18 1748     Troponin I 0 05 (H) ng/mL     Urine Microscopic [[id number]]  (Abnormal) Collected:  11/11/18 1452    Lab Status:  Final result Specimen:  Urine from Urine, Clean Catch Updated:  11/11/18 1523     RBC, UA None Seen /hpf      WBC, UA 2-4 (A) /hpf      Epithelial Cells Occasional /hpf      Bacteria, UA Occasional /hpf     Comprehensive metabolic panel [[id number]]  (Abnormal) Collected:  11/11/18 1426    Lab Status:  Final result Specimen:  Blood from Arm, Left Updated:  11/11/18 1507     Sodium 139 mmol/L      Potassium 4 7 mmol/L      Chloride 106 mmol/L      CO2 24 mmol/L      ANION GAP 9 mmol/L      BUN 25 mg/dL      Creatinine 0 82 mg/dL      Glucose 98 mg/dL      Calcium 9 0 mg/dL      AST 13 U/L      ALT 25 U/L      Alkaline Phosphatase 47 U/L      Total Protein 6 4 g/dL      Albumin 3 2 (L) g/dL      Total Bilirubin 0 16 (L) mg/dL      eGFR 73 ml/min/1 73sq m     Narrative:         National Kidney Disease Education Program recommendations are as follows:  GFR calculation is accurate only with a steady state creatinine  Chronic Kidney disease less than 60 ml/min/1 73 sq  meters  Kidney failure less than 15 ml/min/1 73 sq  meters      TSH [[id number]]  (Normal) Collected:  11/11/18 1426    Lab Status:  Final result Specimen:  Blood from Arm, Left Updated:  11/11/18 1458     TSH 3RD SEAN 1 780 uIU/mL     Narrative:         Patients undergoing fluorescein dye angiography may retain small amounts of fluorescein in the body for 48-72 hours post procedure  Samples containing fluorescein can produce falsely depressed TSH values  If the patient had this procedure,a specimen should be resubmitted post fluorescein clearance            The recommended reference ranges for TSH during pregnancy are as follows:  First trimester 0 1 to 2 5 uIU/mL  Second trimester  0 2 to 3 0 uIU/mL  Third trimester 0 3 to 3 0 uIU/m      B-type natriuretic peptide [[id number]]  (Abnormal) Collected:  11/11/18 1426    Lab Status:  Final result Specimen:  Blood from Arm, Left Updated:  11/11/18 1458     NT-proBNP 130 (H) pg/mL     Troponin I [[id number]]  (Normal) Collected:  11/11/18 1426    Lab Status:  Final result Specimen:  Blood from Arm, Left Updated:  11/11/18 1453     Troponin I 0 02 ng/mL     POCT urinalysis dipstick [[id number]]  (Normal) Resulted:  11/11/18 1440    Lab Status:  Final result Specimen:  Urine Updated:  11/11/18 1441     Color, UA y    ED Urine Macroscopic [[id number]]  (Abnormal) Collected:  11/11/18 1452    Lab Status:  Final result Specimen:  Urine Updated:  11/11/18 1439     Color, UA Yellow     Clarity, UA Slightly Cloudy     pH, UA 7 0     Leukocytes, UA Trace (A)     Nitrite, UA Negative     Protein, UA Negative mg/dl      Glucose, UA Negative mg/dl      Ketones, UA Trace (A) mg/dl      Urobilinogen, UA 0 2 E U /dl      Bilirubin, UA Negative     Blood, UA Negative     Specific Gravity, UA 1 020    Narrative:       CLINITEK RESULT    CBC and differential [[id number]] Collected:  11/11/18 1426    Lab Status:  Final result Specimen:  Blood from Arm, Left Updated:  11/11/18 1433     WBC 6 79 Thousand/uL      RBC 4 10 Million/uL      Hemoglobin 12 8 g/dL      Hematocrit 39 9 %      MCV 97 fL      MCH 31 2 pg      MCHC 32 1 g/dL      RDW 12 3 %      MPV 10 7 fL      Platelets 379 Thousands/uL nRBC 0 /100 WBCs      Neutrophils Relative 68 %      Immat GRANS % 1 %      Lymphocytes Relative 20 %      Monocytes Relative 10 %      Eosinophils Relative 1 %      Basophils Relative 0 %      Neutrophils Absolute 4 63 Thousands/µL      Immature Grans Absolute 0 05 Thousand/uL      Lymphocytes Absolute 1 34 Thousands/µL      Monocytes Absolute 0 68 Thousand/µL      Eosinophils Absolute 0 06 Thousand/µL      Basophils Absolute 0 03 Thousands/µL                  XR chest 2 views    (Results Pending)              Procedures  ECG 12 Lead Documentation  Date/Time: 11/11/2018 2:27 PM  Performed by: Stacy Fall  Authorized by: Stacy Fall     Indications / Diagnosis:  Chest pain  ECG reviewed by me, the ED Provider: yes    Patient location:  ED  Interpretation:     Interpretation: normal    Rate:     ECG rate assessment: normal    Rhythm:     Rhythm: sinus rhythm    Ectopy:     Ectopy: none    QRS:     QRS axis:  Normal  Conduction:     Conduction: normal    ST segments:     ST segments:  Normal  T waves:     T waves: normal             Phone Contacts  ED Phone Contact    ED Course  ED Course as of Nov 11 1854   Sun Nov 11, 2018   1515 Pulse: 64   1515 Vital signs reviewed, no tachycardia or hypoxia  SpO2: 97 %   1645 Troponin I: 0 02   1645 Labs, chest x-ray, EKG reviewed, no acute abnormality  Discussed results and chest pain decision making with patient in detail, will do delta EKG/troponin at 3 hr  TSH 3RD GENERATON: 1 780   1737 Repeat EKG shows NSR, 63 bpm, no acute changes, unchanged from prior EKG  1749 Delta Troponin positive 0 05; patient informed, will admit   Troponin I: (!) 0 05                         Wells' Criteria for PE      Most Recent Value   Wells' Criteria for PE   Clinical signs and symptoms of DVT  0 Filed at: 11/11/2018 1515   PE is primary diagnosis or equally likely  0 Filed at: 11/11/2018 1515   HR >100  0 Filed at: 11/11/2018 1515   Immobilization at least 3 days or Surgery in the previous 4 weeks  0 Filed at: 11/11/2018 1515   Previous, objectively diagnosed PE or DVT  0 Filed at: 11/11/2018 1515   Hemoptysis  0 Filed at: 11/11/2018 1515   Malignancy with treatment within 6 months or palliative  0 Filed at: 11/11/2018 1515   Wells' Criteria Total  0 Filed at: 11/11/2018 1515            [facility]  Number of Diagnoses or Management Options  Chest pain: new and requires workup  Elevated troponin:   Diagnosis management comments: Patient is a 25-year-old female, history of hypertension, anxiety, headaches diagnosed seizure disorder on EEG; was at [address] [place] [address], standing, shaking hands with people; felt throat tightness, that went to her chest, felt pressure, was short of breath, lasted for few minutes, recovered, walked to the car, developed similar symptoms again, call 911, by the time they reached the symptoms resolved  Patient states that the symptoms have been going on intermittently for about 1 month  On exam no acute distress:  Vital signs stable, speaking full sentences, no lip, tongue, airway swelling, airway intact, lungs clear, cardiovascular normal heart sounds, extremities no edema, no calf tenderness  Differential diagnosis:  Nonspecific chest pain, ACS, anxiety, stress, rule out electrolyte imbalance, hypothyroidism  Will check labs including CBC, CMP, troponin, BNP, chest x-ray, EKG, TSH         Amount and/or Complexity of Data Reviewed  Clinical lab tests: reviewed and ordered  Tests in the radiology section of CPT®: ordered and reviewed  Tests in the medicine section of CPT®: ordered and reviewed  Discuss the patient with other providers: yes  Independent visualization of images, tracings, or specimens: yes      CritCare Time    Disposition  Final diagnoses:   Chest pain   Elevated troponin     Time reflects when diagnosis was documented in both MDM as applicable and the Disposition within this note     Time User Action Codes Description Comment    11/11/2018  6:04 PM Elidia Correia Add [R07 9] Chest pain     11/11/2018  6:04 PM Zoila Gorman [R74 [id number]] Elevated troponin       ED Disposition     ED Disposition Condition Comment    Admit  Case was discussed with Dr Aaron Mcdowell and the patient's admission status was agreed to be Admission Status: observation status to the service of Dr Aaron Mcdowell          Follow-up Information    None         Current Discharge Medication List      CONTINUE these medications which have NOT CHANGED    Details   atorvastatin (LIPITOR) 10 mg tablet TAKE 1 TABLET BY MOUTH EVERY DAY  Qty: 30 tablet, Refills: 5    Associated Diagnoses: Mixed hyperlipidemia      busPIRone (BUSPAR) 15 mg tablet Take 15 mg by mouth 2 (two) times a day      calcium citrate-Vitamin D (CALCIUM CITRATE + D3) 200 mg-250 units Take by mouth      cholecalciferol (VITAMIN D3) 1,000 units tablet Take 5 tablets (5,000 Units total) by mouth daily  Qty: 150 tablet, Refills: 0    Associated Diagnoses: Vitamin D deficiency      clobetasol (TEMOVATE) 0 05 % ointment APPLY SPARINGLY TO AFFECTED AREA(S) ONCE DAILY X 12 WEEKS  Qty: 60 g, Refills: 3    Associated Diagnoses: Lichen sclerosus et atrophicus      divalproex sodium (DEPAKOTE ER) 500 mg 24 hr tablet Take 1 tablet by mouth 2 (two) times a day      escitalopram (LEXAPRO) 20 mg tablet Take 1 tablet by mouth daily      Estradiol (ELESTRIN) 0 52 MG/0 87 GM (0 06%) GEL Place 0 87 g on the skin daily  Qty: 2 Bottle, Refills: 6    Associated Diagnoses: Symptomatic menopausal or female climacteric states      fexofenadine (ALLEGRA) 180 MG tablet Take 1 tablet by mouth daily as needed      ipratropium (ATROVENT) 0 06 % nasal spray into each nostril Daily      lisinopril (ZESTRIL) 20 mg tablet TAKE 1 TABLET BY MOUTH EVERY DAY  Qty: 30 tablet, Refills: 5    Associated Diagnoses: Benign essential hypertension      meloxicam (MOBIC) 15 mg tablet Take 15 mg by mouth daily      methocarbamol (ROBAXIN) 500 mg tablet Take by mouth      OSPHENA 60 MG TABS TAKE 1 TABLET BY MOUTH EVERY DAY  Qty: 90 tablet, Refills: 3    Associated Diagnoses: Post-menopause atrophic vaginitis      pindolol (VISKEN) 5 mg tablet TAKE 1 TABLET BY MOUTH DAILY  Qty: 30 tablet, Refills: 5    Associated Diagnoses: Essential hypertension           No discharge procedures on file      ED Provider  Electronically Signed by           Mary Alice Devries MD  11/11/18 9830

## 2018-11-12 ENCOUNTER — APPOINTMENT (OUTPATIENT)
Dept: NON INVASIVE DIAGNOSTICS | Facility: HOSPITAL | Age: 68
End: 2018-11-12
Payer: MEDICARE

## 2018-11-12 ENCOUNTER — APPOINTMENT (OUTPATIENT)
Dept: NUCLEAR MEDICINE | Facility: HOSPITAL | Age: 68
End: 2018-11-12
Payer: MEDICARE

## 2018-11-12 VITALS
HEIGHT: 64 IN | RESPIRATION RATE: 18 BRPM | DIASTOLIC BLOOD PRESSURE: 70 MMHG | HEART RATE: 76 BPM | SYSTOLIC BLOOD PRESSURE: 168 MMHG | TEMPERATURE: 98.7 F | BODY MASS INDEX: 30.98 KG/M2 | WEIGHT: 181.44 LBS | OXYGEN SATURATION: 98 %

## 2018-11-12 PROBLEM — R07.89 CHEST TIGHTNESS: Status: RESOLVED | Noted: 2018-11-11 | Resolved: 2018-11-12

## 2018-11-12 PROBLEM — R77.8 ELEVATED TROPONIN: Status: RESOLVED | Noted: 2018-11-11 | Resolved: 2018-11-12

## 2018-11-12 PROBLEM — R79.89 ELEVATED TROPONIN: Status: RESOLVED | Noted: 2018-11-11 | Resolved: 2018-11-12

## 2018-11-12 LAB
ANION GAP SERPL CALCULATED.3IONS-SCNC: 8 MMOL/L (ref 4–13)
BUN SERPL-MCNC: 22 MG/DL (ref 5–25)
CALCIUM SERPL-MCNC: 8.9 MG/DL (ref 8.3–10.1)
CHEST PAIN STATEMENT: NORMAL
CHLORIDE SERPL-SCNC: 105 MMOL/L (ref 100–108)
CHOLEST SERPL-MCNC: 190 MG/DL (ref 50–200)
CO2 SERPL-SCNC: 26 MMOL/L (ref 21–32)
CREAT SERPL-MCNC: 0.89 MG/DL (ref 0.6–1.3)
ERYTHROCYTE [DISTWIDTH] IN BLOOD BY AUTOMATED COUNT: 12.2 % (ref 11.6–15.1)
GFR SERPL CREATININE-BSD FRML MDRD: 66 ML/MIN/1.73SQ M
GLUCOSE SERPL-MCNC: 96 MG/DL (ref 65–140)
HCT VFR BLD AUTO: 41.7 % (ref 34.8–46.1)
HCV AB SER QL: NORMAL
HDLC SERPL-MCNC: 54 MG/DL (ref 40–60)
HGB BLD-MCNC: 13.6 G/DL (ref 11.5–15.4)
LDLC SERPL CALC-MCNC: 103 MG/DL (ref 0–100)
MAX DIASTOLIC BP: 33 MMHG
MAX HEART RATE: 144 BPM
MAX PREDICTED HEART RATE: 150 BPM
MAX. SYSTOLIC BP: 226 MMHG
MCH RBC QN AUTO: 31.5 PG (ref 26.8–34.3)
MCHC RBC AUTO-ENTMCNC: 32.6 G/DL (ref 31.4–37.4)
MCV RBC AUTO: 97 FL (ref 82–98)
NONHDLC SERPL-MCNC: 136 MG/DL
PLATELET # BLD AUTO: 258 THOUSANDS/UL (ref 149–390)
PMV BLD AUTO: 10.8 FL (ref 8.9–12.7)
POTASSIUM SERPL-SCNC: 4.5 MMOL/L (ref 3.5–5.3)
PROTOCOL NAME: NORMAL
RBC # BLD AUTO: 4.32 MILLION/UL (ref 3.81–5.12)
SODIUM SERPL-SCNC: 139 MMOL/L (ref 136–145)
TARGET HR FORMULA: NORMAL
TEST INDICATION: NORMAL
TIME IN EXERCISE PHASE: NORMAL
TRIGL SERPL-MCNC: 164 MG/DL
WBC # BLD AUTO: 6.08 THOUSAND/UL (ref 4.31–10.16)

## 2018-11-12 PROCEDURE — 93016 CV STRESS TEST SUPVJ ONLY: CPT | Performed by: INTERNAL MEDICINE

## 2018-11-12 PROCEDURE — 78452 HT MUSCLE IMAGE SPECT MULT: CPT | Performed by: INTERNAL MEDICINE

## 2018-11-12 PROCEDURE — 80061 LIPID PANEL: CPT | Performed by: PHYSICIAN ASSISTANT

## 2018-11-12 PROCEDURE — A9502 TC99M TETROFOSMIN: HCPCS

## 2018-11-12 PROCEDURE — 78452 HT MUSCLE IMAGE SPECT MULT: CPT

## 2018-11-12 PROCEDURE — 93018 CV STRESS TEST I&R ONLY: CPT | Performed by: INTERNAL MEDICINE

## 2018-11-12 PROCEDURE — 85027 COMPLETE CBC AUTOMATED: CPT | Performed by: PHYSICIAN ASSISTANT

## 2018-11-12 PROCEDURE — 99214 OFFICE O/P EST MOD 30 MIN: CPT | Performed by: INTERNAL MEDICINE

## 2018-11-12 PROCEDURE — 93017 CV STRESS TEST TRACING ONLY: CPT

## 2018-11-12 PROCEDURE — 80048 BASIC METABOLIC PNL TOTAL CA: CPT | Performed by: PHYSICIAN ASSISTANT

## 2018-11-12 RX ORDER — AMLODIPINE BESYLATE 5 MG/1
5 TABLET ORAL DAILY
Status: DISCONTINUED | OUTPATIENT
Start: 2018-11-12 | End: 2018-11-12 | Stop reason: HOSPADM

## 2018-11-12 RX ORDER — AMLODIPINE BESYLATE 10 MG/1
10 TABLET ORAL DAILY
Qty: 30 TABLET | Refills: 0 | Status: SHIPPED | OUTPATIENT
Start: 2018-11-13 | End: 2018-12-17 | Stop reason: SDUPTHER

## 2018-11-12 RX ORDER — ASPIRIN 81 MG/1
81 TABLET, CHEWABLE ORAL DAILY
Refills: 0
Start: 2018-11-13 | End: 2018-12-04 | Stop reason: SINTOL

## 2018-11-12 RX ADMIN — DIVALPROEX SODIUM 500 MG: 500 TABLET, EXTENDED RELEASE ORAL at 08:40

## 2018-11-12 RX ADMIN — CLOBETASOL PROPIONATE: 0.5 CREAM TOPICAL at 08:41

## 2018-11-12 RX ADMIN — BUSPIRONE HYDROCHLORIDE 15 MG: 10 TABLET ORAL at 08:38

## 2018-11-12 RX ADMIN — ESCITALOPRAM OXALATE 20 MG: 20 TABLET ORAL at 08:40

## 2018-11-12 RX ADMIN — LISINOPRIL 20 MG: 20 TABLET ORAL at 08:40

## 2018-11-12 RX ADMIN — HEPARIN SODIUM 5000 UNITS: 5000 INJECTION INTRAVENOUS; SUBCUTANEOUS at 14:02

## 2018-11-12 RX ADMIN — ATORVASTATIN CALCIUM 10 MG: 10 TABLET, FILM COATED ORAL at 08:38

## 2018-11-12 RX ADMIN — ASPIRIN 81 MG: 81 TABLET, CHEWABLE ORAL at 08:38

## 2018-11-12 RX ADMIN — VITAMIN D, TAB 1000IU (100/BT) 5000 UNITS: 25 TAB at 08:39

## 2018-11-12 RX ADMIN — AMLODIPINE BESYLATE 5 MG: 5 TABLET ORAL at 08:38

## 2018-11-12 NOTE — ASSESSMENT & PLAN NOTE
· Patient is on lisinopril and pindolol as outpatient  · Continue lisinopril  · Pindolol is non-formulary here   May bring in from home

## 2018-11-12 NOTE — PLAN OF CARE
Problem: CARDIOVASCULAR - ADULT  Goal: Absence of cardiac dysrhythmias or at baseline rhythm  INTERVENTIONS:  - Continuous cardiac monitoring, monitor vital signs, obtain 12 lead EKG if indicated  - Administer antiarrhythmic and heart rate control medications as ordered  - Monitor electrolytes and administer replacement therapy as ordered   Outcome: Progressing

## 2018-11-12 NOTE — UTILIZATION REVIEW
Initial Clinical Review    Admission: Date/Time/Statement:   OBS  ORDER   11/11  @  1805     Orders Placed This Encounter   Procedures    Place in Observation (expected length of stay for this patient is less than two midnights)     Standing Status:   Standing     Number of Occurrences:   1     Order Specific Question:   Admitting Physician     Answer:   Lito January     Order Specific Question:   Level of Care     Answer:   Med Surg [16]         ED: Date/Time/Mode of Arrival:   ED Arrival Information     Expected Arrival Acuity Means of Arrival Escorted By Service Admission Type    - 11/11/2018 13:14 Urgent Ambulance [facility] Hospitalist Urgent    Arrival Complaint    Chest pain          Chief Complaint:   Chief Complaint   Patient presents with    Chest Pain     states whlie at [employer] around 12:15 started with throat pain, chest tighness, and shooting pain down b/l arms states has similar symptoms in past resolved by consentrating on breath this time not resolved set down after 5-10 min resolved then after walking to car pain returned more severe  c/o slight tinggling in hands at this time       History of Illness: Coretta Heimlich is a 77 y o  female with a history of HTN, HLD, seizures, gastritis, arthritis who presents with multiple symptoms  She states that today her symptoms began around 12:15 PM while [facility]  [facility] their [employer] at an event to celebrate volunteers when she developed feeling "really tight" in her throat, pain and tingling in bilateral arms, shortness of breath, and diaphoresis  She states that she has had these symptoms before, beginning around 1 month ago  She states that she gets them with about 1 week in between episodes  Her prior episodes resolved with "yoga breathing" and last one was 10 days ago   She tried to relieve her symptoms tonight with the same techniques she did before minimal relief at first but then after about 10 minutes the symptoms resolved  She felt better and her and her  went to the car, a few hundred feet away he explains, and the symptoms returned, worse this time, as she approached the car  She reports "tightness in my chest" and SOB  They called 9-1-1  And she was brought via EMS to the hospital, en route she was given 4x 81mg aspirins and nitro  She denies any personal known cardiac disease except a possible mitral valve prolapse but does not recall ever having an echo  She has had a stress test in the past  She reports that her mother passed away from a stroke and had hypertension and her father passed away from cancer and also had hypertension  She denies any associated anxiety-inducing events around the times she gets these episodes but does report finding out about a new relative 2 days ago (however she states she is excited about this and symptoms have been present for around 1 month)  Her only new medication around the same time is Nexium  She denies any difficulty swallowing  She feels like the muscles on both sides of her neck tense up  She denies difficulty speaking  She reports chronic, unchanged posterior neck pain and chronic low back pain which is worse when she exercises  She does not know when her last seizure was  She does not take a daily aspirin  She reports exercising by doing Yoga and Pilates and had to stop spin class due to knee pain   Symptoms have now resolved         ED Vital Signs:   ED Triage Vitals [11/11/18 1331]   Temperature Pulse Respirations Blood Pressure SpO2   98 3 °F (36 8 °C) 62 18 (!) 183/84 97 %      Temp Source Heart Rate Source Patient Position - Orthostatic VS BP Location FiO2 (%)   Oral Monitor Sitting Right arm --      Pain Score       No Pain        Wt Readings from Last 1 Encounters:   11/11/18 82 3 kg (181 lb 7 oz)       Vital Signs (abnormal):    above    Abnormal Labs/Diagnostic Test Results:    Troponin   0 05  Albumin    3 2  Total   Bili  0 16  BNP 130    ED Treatment:   Medication Administration from 11/11/2018 1314 to 11/11/2018 1846     None          Past Medical/Surgical History:    Active Ambulatory Problems     Diagnosis Date Noted    Arthralgia 03/16/2016    Bilateral hip pain 05/11/2015    Chronic interstitial cystitis 03/05/2014    Depression 07/26/2012    Disorder of bone and cartilage 05/24/2010    Hyperlipidemia 08/20/2012    Hypertension 28/48/5387    Lichen sclerosus 44/96/1650    Lipoma 01/02/2014    Myalgia 03/16/2016    Post-menopausal atrophic vaginitis 02/24/2014    Symptomatic menopausal or female climacteric states 02/24/2014    Arthritis 07/24/2018    Acute gastritis without hemorrhage 07/24/2018    Daytime sleepiness 08/02/2018    Snoring 08/02/2018    Insomnia 04/05/2017    Adjustment disorder with mixed emotional features 04/05/2017    Cervical spondylosis 02/08/2017    Cervicocranial syndrome 04/05/2017    Intractable chronic migraine without aura 04/05/2017    Complex partial seizure with impairment of consciousness ([facility] [address] ) 04/05/2017    Hip pain 08/22/2018    Injury of tendon of rotator cuff 08/22/2018    Trochanteric bursitis 08/22/2018     Resolved Ambulatory Problems     Diagnosis Date Noted    Osteopenia 03/26/2014     Past Medical History:   Diagnosis Date    Anxiety     Arthralgia     Chronic interstitial cystitis     Depression     Hyperlipidemia     Hypertension     Lichen sclerosus     Lipoma     Osteopenia     Simple partial seizures (HCC)     Tachycardia        Admitting Diagnosis: Chest pain [R07 9]  Elevated troponin [R74 8]    Age/Sex: 77 y o  female    Assessment/Plan:   Chest tightness   Assessment & Plan     · With concomitant "throat pain", and pain/tingling in bilateral arms  · Unclear etiology  · Patient's last stress test was January 2016 and was normal after submaximal exercise without reproduction of symptoms, however diagnostic sensitivity was limited by submaximal stress (the indication for this test was dyspnea and hypertension)  · She reports a possible personal history of mitral valve prolapse, however I cannot see any prior echocardiograms   · With elevated trop (0 05)  Will consult Cardiology  · Was given 324mg ASA by EMS  She is not on aspirin at home  Start on daily  · Continue statin and check lipid panel      Elevated troponin   Assessment & Plan     · First troponin was 0 02, with repeat of 0 05  · Will continue to trend trops  · Cardiology consult  · Tele with ST monitoring  · Patient had 2 ECGs done, both of which were already read by the Cardiologists as normal  There does appear to be TWI in V1 in both      Hypertension   Assessment & Plan     · Patient is on lisinopril and pindolol as outpatient  ? Continue lisinopril  ? Pindolol is non-formulary here  May bring in from home        Hyperlipidemia   Assessment & Plan     · Continue statin  · Check lipid panel      Seizure disorder [facility])   Assessment & Plan     · Patient takes Depakote delayed release 500mg BID as confirmed with patient and outpatient Neurology records  Continue        Anxiety   Assessment & Plan     · Continue Buspar      Gastritis   Assessment & Plan     · Patient has stopped taking Mobic  · She was prescribed Nexium and has been taking this for the last 1 month she approximates  She states that this may have been around the time she first noticed her chief complaint symptoms, therefore will hold the PPI for now      Arthritis   Assessment & Plan     · Very active (Pilates, Yoga)  · Has stopped taking Mobic due to gastritis        Anticipated Length of Stay:  Patient will be admitted on an Observation basis with an anticipated length of stay of  < 2 midnights     Justification for Hospital Stay: chest tightness, throat pain, b/l arm tingling, elevated trop, normal ECG, Cardiology consult         Admission Orders:  OBS  ORDER  11/11 @   180  Scheduled Meds:   Current Facility-Administered Medications:  amLODIPine 5 mg Oral Daily Elenita Cardona MD   aspirin 81 mg Oral Daily Bambi Smith PA-C   atorvastatin 10 mg Oral Daily Bambi Smith PA-C   busPIRone 15 mg Oral BID Bambi Smith PA-C   cholecalciferol 5,000 Units Oral Daily Bambi Smith PA-C   clobetasol  Topical Daily Bambi Smith PA-C   divalproex sodium 500 mg Oral Q12H [address] Bambi Smith PA-C   escitalopram 20 mg Oral Daily Bambi Smith PA-C   heparin (porcine) 5,000 Units Subcutaneous [facility] [address] Bambi Smith PA-C   labetalol 10 mg Intravenous Q6H PRN Bambi Smith PA-C   lisinopril 20 mg Oral Daily Bambi Smith PA-C   melatonin 9 mg Oral HS PRN SHANTI Hurtado   Ospemifene 1 tablet Oral Daily Bambi Smith PA-C     Continuous Infusions:    PRN Meds: labetalol    melatonin     Tele  Cardiac  Diet  Cons cardiology  Serial troponin  NM  Stress test    Per  Cardiology  Consult:  Chest pain at rest  2  Minimally elevated troponin-possibly type 2 myocardial infarction secondary to hypertension  3  Hypertension  4  Dyslipidemia  5   Seizure

## 2018-11-12 NOTE — ASSESSMENT & PLAN NOTE
· Patient takes Depakote delayed release 500mg BID as confirmed with patient and outpatient Neurology records  Continue

## 2018-11-12 NOTE — H&P
H&P- Mary Revel 1950, 77 y o  female MRN: [id number]    Unit/Bed#: E4 -01 Encounter: [id number]    Primary Care Provider: Karina Ocampo DO   Date and time admitted to hospital: 11/11/2018  1:14 PM        * Chest tightness   Assessment & Plan    · With concomitant "throat pain", and pain/tingling in bilateral arms  · Unclear etiology  · Patient's last stress test was January 2016 and was normal after submaximal exercise without reproduction of symptoms, however diagnostic sensitivity was limited by submaximal stress (the indication for this test was dyspnea and hypertension)  · She reports a possible personal history of mitral valve prolapse, however I cannot see any prior echocardiograms   · With elevated trop (0 05)  Will consult Cardiology  · Was given 324mg ASA by EMS  She is not on aspirin at home  Start on daily  · Continue statin and check lipid panel     Elevated troponin   Assessment & Plan    · First troponin was 0 02, with repeat of 0 05  · Will continue to trend trops  · Cardiology consult  · Tele with ST monitoring  · Patient had 2 ECGs done, both of which were already read by the Cardiologists as normal  There does appear to be TWI in V1 in both     Hypertension   Assessment & Plan    · Patient is on lisinopril and pindolol as outpatient  · Continue lisinopril  · Pindolol is non-formulary here  May bring in from home     Hyperlipidemia   Assessment & Plan    · Continue statin  · Check lipid panel     Seizure disorder [facility])   Assessment & Plan    · Patient takes Depakote delayed release 500mg BID as confirmed with patient and outpatient Neurology records  Continue  Anxiety   Assessment & Plan    · Continue Buspar     Gastritis   Assessment & Plan    · Patient has stopped taking Mobic  · She was prescribed Nexium and has been taking this for the last 1 month she approximates   She states that this may have been around the time she first noticed her chief complaint symptoms, therefore will hold the PPI for now     Arthritis   Assessment & Plan    · Very active (Pilates, Yoga)  · Has stopped taking Mobic due to gastritis        VTE Prophylaxis: Heparin  / sequential compression device   Code Status: FULL CODE - I discussed with the patient and her  present at bedside personally on admission   POLST: POLST form is not discussed and not completed at this time  Discussion with family:      Anticipated Length of Stay:  Patient will be admitted on an Observation basis with an anticipated length of stay of  < 2 midnights  Justification for Hospital Stay: chest tightness, throat pain, b/l arm tingling, elevated trop, normal ECG, Cardiology consult    Total Time for Visit, including Counseling / Coordination of Care: 1 hour  Greater than 50% of this total time spent on direct patient counseling and coordination of care  Chief Complaint:   "throat pain", "tighness in chest", pain/tinglingin bilateral arms    History of Present Illness: Tito Ring is a 77 y o  female with a history of HTN, HLD, seizures, gastritis, arthritis who presents with multiple symptoms  She states that today her symptoms began around 12:15 PM while her and her  were at a [employer] at their [employer] at an event to celebrate volunteers when she developed feeling "really tight" in her throat, pain and tingling in bilateral arms, shortness of breath, and diaphoresis  She states that she has had these symptoms before, beginning around 1 month ago  She states that she gets them with about 1 week in between episodes  Her prior episodes resolved with "yoga breathing" and last one was 10 days ago  She tried to relieve her symptoms tonight with the same techniques she did before minimal relief at first but then after about 10 minutes the symptoms resolved   She felt better and her and her  went to the car, a few hundred feet away he explains, and the symptoms returned, worse this time, as she approached the car  She reports "tightness in my chest" and SOB  They called 9-1-1  And she was brought via EMS to the hospital, en route she was given 4x 81mg aspirins and nitro  She denies any personal known cardiac disease except a possible mitral valve prolapse but does not recall ever having an echo  She has had a stress test in the past  She reports that her mother passed away from a stroke and had hypertension and her father passed away from cancer and also had hypertension  She denies any associated anxiety-inducing events around the times she gets these episodes but does report finding out about a new relative 2 days ago (however she states she is excited about this and symptoms have been present for around 1 month)  Her only new medication around the same time is Nexium  She denies any difficulty swallowing  She feels like the muscles on both sides of her neck tense up  She denies difficulty speaking  She reports chronic, unchanged posterior neck pain and chronic low back pain which is worse when she exercises  She does not know when her last seizure was  She does not take a daily aspirin  She reports exercising by doing Yoga and Pilates and had to stop spin class due to knee pain  Symptoms have now resolved  Review of Systems:    Review of Systems   Constitutional: Positive for diaphoresis  Negative for activity change, appetite change, chills and unexpected weight change  HENT: Negative  Respiratory: Positive for shortness of breath  Cardiovascular: Positive for chest pain  Gastrointestinal: Negative for abdominal pain, blood in stool, constipation, diarrhea, nausea and vomiting  Genitourinary: Negative for dysuria and hematuria  Musculoskeletal: Positive for arthralgias  Skin: Negative for rash  Neurological: Negative for dizziness and light-headedness  Psychiatric/Behavioral: The patient is not nervous/anxious          Past Medical and Surgical History:     Past Medical History:   Diagnosis Date    Anxiety     Arthralgia     Chronic interstitial cystitis     Depression     Hyperlipidemia     Hypertension     Lichen sclerosus     Lipoma     Osteopenia     Simple partial seizures ([address] )     last assessed 3/30/17    Tachycardia     last assessed 6/24/15       Past Surgical History:   Procedure Laterality Date    COSMETIC SURGERY      HYSTERECTOMY      SHOULDER SURGERY         Meds/Allergies:    Prior to Admission medications    Medication Sig Start Date End Date Taking?  Authorizing Provider   atorvastatin (LIPITOR) 10 mg tablet TAKE 1 TABLET BY MOUTH EVERY DAY 2/23/18  Yes Naomi Flanagan DO   busPIRone (BUSPAR) 15 mg tablet Take 15 mg by mouth 2 (two) times a day   Yes Historical Provider, MD   calcium citrate-Vitamin D (CALCIUM CITRATE + D3) 200 mg-250 units Take by mouth   Yes Historical Provider, MD   cholecalciferol (VITAMIN D3) 1,000 units tablet Take 5 tablets (5,000 Units total) by mouth daily 2/5/18  Yes Sandra Aranda DO   clobetasol (TEMOVATE) 0 05 % ointment APPLY SPARINGLY TO AFFECTED AREA(S) ONCE DAILY X 12 WEEKS 5/26/18  Yes Sandra Aranda DO   divalproex sodium (DEPAKOTE ER) 500 mg 24 hr tablet Take 1 tablet by mouth 2 (two) times a day   Yes Historical Provider, MD   escitalopram (LEXAPRO) 20 mg tablet Take 1 tablet by mouth daily   Yes Historical Provider, MD   Estradiol (ELESTRIN) 0 52 MG/0 87 GM (0 06%) GEL Place 0 87 g on the skin daily 5/14/18  Yes Sandra Aranda DO   fexofenadine (ALLEGRA) 180 MG tablet Take 1 tablet by mouth daily as needed   Yes Historical Provider, MD   lisinopril (ZESTRIL) 20 mg tablet TAKE 1 TABLET BY MOUTH EVERY DAY 7/22/18  Yes Naomi Flanagan DO   OSPHENA 60 MG TABS TAKE 1 TABLET BY MOUTH EVERY DAY 8/20/18  Yes Sandra Aranda DO   pindolol (VISKEN) 5 mg tablet TAKE 1 TABLET BY MOUTH DAILY 9/18/18  Yes Naomi Flanagan DO   ipratropium (ATROVENT) 0 06 % nasal spray into each nostril Daily 2/22/11   Historical Provider, MD meloxicam (MOBIC) 15 mg tablet Take 15 mg by mouth daily    Historical Provider, MD   methocarbamol (ROBAXIN) 500 mg tablet Take by mouth 11/20/17   Historical Provider, MD   diclofenac sodium (VOLTAREN) 1 % Apply 2 g topically 4 (four) times a day as needed (pain) 7/24/18 11/11/18  SHANTI Harrison   pantoprazole (PROTONIX) 40 mg tablet Take 1 tablet (40 mg total) by mouth daily 7/24/18 11/11/18  SHANTI Harrison     I have reviewed home medications with patient personally  Allergies: Allergies   Allergen Reactions    Codeine Hives    Erythromycin Base GI Intolerance    Morphine GI Intolerance    Oxycodone-Acetaminophen Hives    Penicillins Rash       Social History:     Marital Status: /Civil Union   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 [employer]   Patient Pre-hospital Living Situation: Home  Patient Pre-hospital Level of Mobility: Ambulatory  Patient Pre-hospital Diet Restrictions: Patient denies   Substance Use History:   History   Alcohol Use    6 0 oz/week    10 Glasses of wine per week     Comment: no per Allscripts     History   Smoking Status    Never Smoker   Smokeless Tobacco    Never Used     History   Drug Use No       Family History:    Family History   Problem Relation Age of Onset    Stroke Mother         CVA    Hypertension Mother     Hypertension Father     Cancer Father         lung cancer    Cancer Brother         lung cancer    Alcohol abuse Family        Physical Exam:     Vitals:   Blood Pressure: (!) 192/90 (11/11/18 1850)  Pulse: 70 (11/11/18 1850)  Temperature: 98 °F (36 7 °C) (11/11/18 1850)  Temp Source: Tympanic (11/11/18 1850)  Respirations: 18 (11/11/18 1850)  Height: 5' 4" (162 6 cm) (11/11/18 1850)  Weight - Scale: 82 3 kg (181 lb 7 oz) (11/11/18 1850)  SpO2: 94 % (11/11/18 1850)    Physical Exam   Constitutional: She is oriented to person, place, and time  No distress     Patient seen sitting upright in ED bed with her  present at bedside  She appears younger than her stated age  Non-toxic appearing  Very pleasant and cooperative   Neck:   No midline cervical tenderness   Cardiovascular: Normal rate and regular rhythm  Pulmonary/Chest: Effort normal and breath sounds normal  No respiratory distress  She has no wheezes  Abdominal: Soft  Bowel sounds are normal  There is no tenderness  Musculoskeletal: She exhibits no edema  Lymphadenopathy:     She has no cervical adenopathy  Neurological: She is alert and oriented to person, place, and time  Skin: Skin is warm  She is not diaphoretic  Psychiatric: She has a normal mood and affect  Vitals reviewed  Additional Data:     Lab Results: I have personally reviewed pertinent reports  Results from last 7 days  Lab Units 11/11/18  1426   WBC Thousand/uL 6 79   HEMOGLOBIN g/dL 12 8   HEMATOCRIT % 39 9   PLATELETS Thousands/uL 236   NEUTROS ABS Thousands/µL 4 63   NEUTROS PCT % 68   LYMPHS PCT % 20   MONOS PCT % 10   EOS PCT % 1       Results from last 7 days  Lab Units 11/11/18  1426   POTASSIUM mmol/L 4 7   CHLORIDE mmol/L 106   CO2 mmol/L 24   BUN mg/dL 25   CREATININE mg/dL 0 82   ANION GAP mmol/L 9   CALCIUM mg/dL 9 0   ALBUMIN g/dL 3 2*   TOTAL BILIRUBIN mg/dL 0 16*   ALK PHOS U/L 47   ALT U/L 25   AST U/L 13                       Imaging: I have personally reviewed pertinent reports  XR chest 2 views    (Results Pending)       EKG, Pathology, and Other Studies Reviewed on Admission:   · EKG: NSR, TWI in V1    Allscripts / Epic Records Reviewed: Yes     ** Please Note: This note has been constructed using a voice recognition system   **

## 2018-11-12 NOTE — ASSESSMENT & PLAN NOTE
·  TC controlled on lipitor 10mg   · If stress test concerning for CAD will need escalation, however given multiple issues w/gastritis, anxiety, etc  Would defer further elevation at this time given no other clear indication for tighter LDL control  · Continue statin

## 2018-11-12 NOTE — ASSESSMENT & PLAN NOTE
· Patient has stopped taking Mobic  · She was prescribed Nexium and has been taking this for the last 1 month she approximates   She states that this may have been around the time she first noticed her chief complaint symptoms, therefore will hold the PPI for now

## 2018-11-12 NOTE — ASSESSMENT & PLAN NOTE
· With concomitant "throat pain", and pain/tingling in bilateral arms  Seems more consistent w/anxiety  · Last stress suboptimal due to submaximal stress in 01/16  Cardiology was consulted and pt for NM stress today  · S/p asa 325mg, will continue w/asa 81mg  · Continue statin  Lipid panel as below  Fasting BS are in 90s  · Repeat stress test today demonstrated

## 2018-11-12 NOTE — ASSESSMENT & PLAN NOTE
· With concomitant "throat pain", and pain/tingling in bilateral arms  · Unclear etiology  · Patient's last stress test was January 2016 and was normal after submaximal exercise without reproduction of symptoms, however diagnostic sensitivity was limited by submaximal stress (the indication for this test was dyspnea and hypertension)  · She reports a possible personal history of mitral valve prolapse, however I cannot see any prior echocardiograms   · With elevated trop (0 05)  Will consult Cardiology  · Was given 324mg ASA by EMS  She is not on aspirin at home   Start on daily  · Continue statin and check lipid panel

## 2018-11-12 NOTE — ASSESSMENT & PLAN NOTE
· Minimal, doubt NSTEMI type 2 as this is peaked at 0 05 and ULN of test is 0 04  Furthermore 3rd troponin is at 0 02  Favor nonspecific elevation    · ekg has been stable w/o ischemic changes in 2+ leads  · Stress test results as above

## 2018-11-12 NOTE — DISCHARGE INSTRUCTIONS
You were admitted for chest pain  You were ruled out for a heart attack  You underwent a stress test which was negative for 'angina' or heart pain  You were started on a blood pressure medication called amlodipine or norvasc for your blood pressure  Please continue to check your blood pressure at home and follow up with your primary care provider to reassess your blood pressure medication within one week

## 2018-11-12 NOTE — ASSESSMENT & PLAN NOTE
· First troponin was 0 02, with repeat of 0 05  · Will continue to trend trops  · Cardiology consult  · Tele with ST monitoring  · Patient had 2 ECGs done, both of which were already read by the Cardiologists as normal  There does appear to be TWI in V1 in both

## 2018-11-12 NOTE — CONSULTS
Cardiology Consult  Gale Camara  [phone]    11/12/18    Referring Physian: Boston Norman MD   SLIM    Chief Complain/Reason for Referal:     IMPRESSION:    1  Chest pain at rest  2  Minimally elevated troponin-possibly type 2 myocardial infarction secondary to hypertension  3  Hypertension  4  Dyslipidemia  5  Seizure    DISCUSSION/RECOMMENDATIONS:    Would favor inpatient stress testing  Will add Norvasc for hypertension     ======================================================    HPI:  I am seeing this patient in cardiology consultation for: Chest tightness      Cadence Mendez is a 77 y o  female with past medical history of hypertension, dyslipidemia, seizures, gastritis, arthritis who presents with chest tightness  She was at a [facility] at [facility] when she developed throat tightness which then resulted in bilateral arm pain and tingling and then progressed to chest tightness  She has had a few episodes of this over the last 6 weeks but this was the most significant      Troponin is minimally elevated at 0 05    Past Medical History:   Diagnosis Date    Anxiety     Arthralgia     Chronic interstitial cystitis     Depression     Hyperlipidemia     Hypertension     Lichen sclerosus     Lipoma     Osteopenia     Simple partial seizures ([address] )     last assessed 3/30/17    Tachycardia     last assessed 6/24/15         Scheduled Meds:  Current Facility-Administered Medications:  aspirin 81 mg Oral Daily Augusto Combs PA-C   atorvastatin 10 mg Oral Daily Augusto Combs PA-C   busPIRone 15 mg Oral BID Augusto Combs PA-C   cholecalciferol 5,000 Units Oral Daily Augusto Combs PA-C   clobetasol  Topical Daily Augusto Combs PA-C   divalproex sodium 500 mg Oral Q12H [facility] Augusto Combs PA-C   escitalopram 20 mg Oral Daily Augusto Combs PA-C   heparin (porcine) 5,000 Units Subcutaneous Count includes the [facility] Augusto Combs PA-C   labetalol 10 mg Intravenous Q6H PRN Augusto Combs PA-C   lisinopril 20 mg Oral Daily Kallie Kaplan PA-C   melatonin 9 mg Oral HS PRN SHANTI Barros   Ospemifene 1 tablet Oral Daily Kallie Kaplan PA-C     Continuous Infusions:   PRN Meds: labetalol    melatonin  Allergies   Allergen Reactions    Codeine Hives    Erythromycin Base GI Intolerance    Morphine GI Intolerance    Oxycodone-Acetaminophen Hives    Penicillins Rash     I reviewed the Home Medication list in the chart  Family History   Problem Relation Age of Onset    Stroke Mother         CVA    Hypertension Mother     Hypertension Father     Cancer Father         lung cancer    Cancer Brother         lung cancer    Alcohol abuse Family        Social History     Social History    Marital status: /Civil Union     Spouse name: N/A    Number of children: N/A    Years of education: N/A     Occupational History    retired school superintendant      Social History Main Topics    Smoking status: Never Smoker    Smokeless tobacco: Never Used    Alcohol use 6 0 oz/week     10 Glasses of wine per week      Comment: no per [employer]    Drug use: No    Sexual activity: Yes      Comment: occasionally     Other Topics Concern    Not on file     Social History Narrative    Exercising regularly       Review of Systems - as per HPI, all others reviewed and negative  Vitals:    11/11/18 2302   BP: 141/60   Pulse: 74   Resp: 18   Temp: 97 5 °F (36 4 °C)   SpO2: 93%     I/O     None        Weight (last 2 days)     Date/Time   Weight    11/11/18 1850  82 3 (181 44)    11/11/18 1331  81 2 (179)              GEN: NAD, Alert  HEENT: Mucus membranes moist, pink conjunctivae  EYES: Pupils equal, sclera anicteric  NECK: No JVD/HJR, no carotid bruit  CARDIOVASCULAR: RRR, No murmur, rub, gallops S1,S2, no parasternal heave/thrill  LUNGS: Clear To auscultation bilaterally  ABDOMEN: Soft, nondistended, no hepatic systolic pulsation  EXTREMITIES/VASCULAR: No edema    PSYCH: Normal Affect by limited examination  NEURO: Grossly intact by limited examination    HEME: No significant bleeding, bruising, petechia by limited examination  SKIN: No significant rashes by limited examination  MSK:  Normal upper extremity and trunk strengths by limited examination      EKG:Normal sinus rhythm   TELE:  No events  CXR:  No acute cardiopulmonary disease  PRIOR STRESS TEST: plain stress test was negative for ischemia but did not reach target heart rate       Results from last 7 days  Lab Units 11/12/18 0449 11/11/18 2025 11/11/18  1426   WBC Thousand/uL 6 08  --  6 79   HEMOGLOBIN g/dL 13 6  --  12 8   HEMATOCRIT % 41 7  --  39 9   PLATELETS Thousands/uL 258 262 236   NEUTROS PCT %  --   --  68   MONOS PCT %  --   --  10       Results from last 7 days  Lab Units 11/12/18 0449 11/11/18  1426   POTASSIUM mmol/L 4 5 4 7   CHLORIDE mmol/L 105 106   CO2 mmol/L 26 24   BUN mg/dL 22 25   CREATININE mg/dL 0 89 0 82   CALCIUM mg/dL 8 9 9 0       Results from last 7 days  Lab Units 11/12/18 0449 11/11/18  1426   POTASSIUM mmol/L 4 5 4 7   CHLORIDE mmol/L 105 106   CO2 mmol/L 26 24   BUN mg/dL 22 25   CREATININE mg/dL 0 89 0 82   CALCIUM mg/dL 8 9 9 0   ALK PHOS U/L  --  47   ALT U/L  --  25   AST U/L  --  13     No results found for: TROPONINT        Results from last 7 days  Lab Units 11/11/18 2025   TROPONIN I ng/mL 0 02                       I have personally reviewed the EKG, CXR and Telemetry images directly        Patient Active Problem List    Diagnosis Date Noted    Chest tightness 11/11/2018     Priority: Low    Elevated troponin 11/11/2018     Priority: Low    Seizure disorder ([address] ) 11/11/2018     Priority: Low    Anxiety 11/11/2018     Priority: Low    Gastritis 11/11/2018     Priority: Low    Hip pain 08/22/2018     Priority: Low    Injury of tendon of rotator cuff 08/22/2018     Priority: Low    Trochanteric bursitis 08/22/2018     Priority: Low    Daytime sleepiness 08/02/2018     Priority: Low    Snoring 08/02/2018     Priority: Low    Arthritis 07/24/2018     Priority: Low    Acute gastritis without hemorrhage 07/24/2018     Priority: Low    Lichen sclerosus 85/57/4647     Priority: Low    Insomnia 04/05/2017     Priority: Low    Adjustment disorder with mixed emotional features 04/05/2017     Priority: Low    Cervicocranial syndrome 04/05/2017     Priority: Low    Intractable chronic migraine without aura 04/05/2017     Priority: Low    Complex partial seizure with impairment of consciousness [address] [address] ) 04/05/2017     Priority: Low    Cervical spondylosis 02/08/2017     Priority: Low    Arthralgia 03/16/2016     Priority: Low    Myalgia 03/16/2016     Priority: Low    Bilateral hip pain 05/11/2015     Priority: Low    Chronic interstitial cystitis 03/05/2014     Priority: Low    Post-menopausal atrophic vaginitis 02/24/2014     Priority: Low    Symptomatic menopausal or female climacteric states 02/24/2014     Priority: Low    Lipoma 01/02/2014     Priority: Low    Hyperlipidemia 08/20/2012     Priority: Low    Hypertension 08/20/2012     Priority: Low    Depression 07/26/2012     Priority: Low    Disorder of bone and cartilage 05/24/2010     Priority: Low       Portions of the record may have been created with voice recognition software  Occasional wrong word or "sound a like" substitutions may have occurred due to the inherent limitations of voice recognition software  Read the chart carefully and recognize, using context, where substitutions have occurred        Del Schilder, MD  11/12/2018 7:53 AM

## 2018-11-12 NOTE — DISCHARGE SUMMARY
Discharge- Han Chowdhury 1950, 77 y o  female MRN: [id number]    Unit/Bed#: E4 -01 Encounter: [id number]    Primary Care Provider: Elizabeth Wilson DO   Date and time admitted to hospital: 11/11/2018  1:14 PM      Discharge Summary - [facility] Medicine    Patient Information: Han Chowdhury 77 y o  female MRN: [id number]  Unit/Bed#: E4 -01 Encounter: [id number]    Discharging Physician / Practitioner: Hima Garcia PA-C  PCP: Elizabeth Wilson DO  Admission Date: 11/11/2018  Discharge Date: 11/12/18    Disposition:     Home    Reason for Admission: chest tightness    Discharge Diagnoses:     Principal Problem (Resolved):    Chest tightness  Active Problems:    Hyperlipidemia    Hypertension    Arthritis    Elevated troponin    Seizure disorder ([address] )    Anxiety    Gastritis      Consultations During Hospital Stay:  · cardiology    Procedures Performed:     · nm stress test    Significant Findings / Test Results:     · Stress test    EKG nonischemic    Troponin 0 02--> 0 05 --> 0 02    Fasting BS 90s    CXR no widened mediastinum    Incidental Findings:   ·      Test Results Pending at Discharge (will require follow up):   ·      Outpatient Tests Requested:  ·     Complications:      Hospital Course: Han Chowdhury is a 77 y o  female patient who originally presented to the hospital on 11/11/2018 due to having 2 episodes substernal chest tightness at the angle of Jeremy with associated throat tightening and pain shooting down both of her arms which was transient and nonexertional while at a social event with her   She also had diaphoresis and was sob and as this happened a third time while ambulating to the car EMS was called  She has PMH of HTN, HLD, seizures, gastritis, anxiety  She has 13 steps in her home which has never caused these s/sx previously  She has been having these s/sx approximately for the last month intermittently    She had a stress test in 01/2016 which was negative but suboptimal due to submaximal HR  Cardiology consultation was obtained and pt underwent stress test   Per verbal d/w Renny Her this was normal   Pt safe for d/c home today  Anxiety   Assessment & Plan    · Continue Buspar     Seizure disorder (HCC)   Assessment & Plan    · Continue depakote     Elevated troponin   Assessment & Plan    · Minimal, doubt NSTEMI type 2 as this is peaked at 0 05 and ULN of test is 0 04  Furthermore 3rd troponin is at 0 02  Favor nonspecific elevation  · ekg has been stable w/o ischemic changes in 2+ leads  · Stress test results as below  Hypertension   Assessment & Plan    · Accelerated on admission now improved in 330I systolic  · Continue lisinopril, pt requested to bring pindolol from OP setting  Started Patient is on lisinopril and pindolol as outpatient  · Continue lisinopril 20mg, started on norvasc 5mg  · Pindolol is non-formulary here  May bring in from home     Hyperlipidemia   Assessment & Plan    ·  TC controlled on lipitor 10mg   · If stress test concerning for CAD will need escalation, however given multiple issues w/gastritis, anxiety, etc  Would defer further elevation at this time given no other clear indication for tighter LDL control  · Continue statin     * Chest tightnessresolved as of 11/12/2018   Assessment & Plan    · With concomitant "throat pain", and pain/tingling in bilateral arms  Seems more consistent w/anxiety  · Last stress suboptimal due to submaximal stress in 01/16  Cardiology was consulted and pt for NM stress today  · S/p asa 325mg, will continue w/asa 81mg  · Continue statin  Lipid panel as below  Fasting BS are in 90s  Repeat stress test today was negative for ischemia  Condition at Discharge: good     Discharge Day Visit / Exam:     Subjective:  Patient seen and examined  No events overnight per nursing  Neck tightness and chest tightness resolved    No cp or palpitations today   No arm pain  No n/v/f/c and pt is in good spirits  Vitals: Blood Pressure: 138/66 (11/12/18 0800)  Pulse: 78 (11/12/18 0800)  Temperature: 97 9 °F (36 6 °C) (11/12/18 0800)  Temp Source: Tympanic (11/12/18 0800)  Respirations: 18 (11/12/18 0800)  Height: 5' 4" (162 6 cm) (11/11/18 1850)  Weight - Scale: 82 3 kg (181 lb 7 oz) (11/11/18 1850)  SpO2: 96 % (11/12/18 0800)  Exam:   Physical Exam   Constitutional: She appears well-developed  No distress  HENT:   Head: Normocephalic and atraumatic  Right Ear: External ear normal    Left Ear: External ear normal    Mouth/Throat: No oropharyngeal exudate  Eyes: Conjunctivae are normal  Right eye exhibits no discharge  Left eye exhibits no discharge  No scleral icterus  Neck: Normal range of motion  Cardiovascular: Normal rate, regular rhythm and normal heart sounds  Exam reveals no gallop and no friction rub  No murmur heard  Pulmonary/Chest: Effort normal  No respiratory distress  She has no wheezes  She has no rales  She exhibits no tenderness  Abdominal: Soft  She exhibits no distension  There is no tenderness  There is no rebound and no guarding  Musculoskeletal: She exhibits no edema  Neurological: She is alert  Skin: Skin is warm and dry  She is not diaphoretic  Vitals reviewed  (  Be Sure to Include Physical Exam: Delete this entire line when you have entered your exam)  Discussion with Family:  who is at bedside  Discharge instructions/Information to patient and family:   See after visit summary for information provided to patient and family  Provisions for Follow-Up Care:  See after visit summary for information related to follow-up care and any pertinent home health orders  Planned Readmission: none     Discharge Statement:  I spent 45 minutes discharging the patient  This time was spent on the day of discharge  I had direct contact with the patient on the day of discharge   Greater than 50% of the total time was spent examining patient, answering all patient questions, arranging and discussing plan of care with patient as well as directly providing post-discharge instructions  Additional time then spent on discharge activities  Discharge Medications:  See after visit summary for reconciled discharge medications provided to patient and family        ** Please Note: This note has been constructed using a voice recognition system **

## 2018-11-12 NOTE — ASSESSMENT & PLAN NOTE
· Accelerated on admission now improved in 234Z systolic  · Continue lisinopril, pt requested to bring pindolol from OP setting  Started Patient is on lisinopril and pindolol as outpatient  · Continue lisinopril 20mg, started on norvasc 5mg  · Pindolol is non-formulary here   May bring in from home

## 2018-11-16 ENCOUNTER — OFFICE VISIT (OUTPATIENT)
Dept: INTERNAL MEDICINE CLINIC | Facility: CLINIC | Age: 68
End: 2018-11-16
Payer: MEDICARE

## 2018-11-16 ENCOUNTER — TRANSITIONAL CARE MANAGEMENT (OUTPATIENT)
Dept: INTERNAL MEDICINE CLINIC | Facility: CLINIC | Age: 68
End: 2018-11-16

## 2018-11-16 VITALS
DIASTOLIC BLOOD PRESSURE: 74 MMHG | HEIGHT: 64 IN | BODY MASS INDEX: 30.56 KG/M2 | OXYGEN SATURATION: 98 % | RESPIRATION RATE: 14 BRPM | TEMPERATURE: 98.3 F | WEIGHT: 179 LBS | HEART RATE: 67 BPM | SYSTOLIC BLOOD PRESSURE: 132 MMHG

## 2018-11-16 DIAGNOSIS — I10 ESSENTIAL HYPERTENSION: Primary | ICD-10-CM

## 2018-11-16 DIAGNOSIS — R07.89 CHEST TIGHTNESS: ICD-10-CM

## 2018-11-16 PROCEDURE — 99495 TRANSJ CARE MGMT MOD F2F 14D: CPT | Performed by: INTERNAL MEDICINE

## 2018-11-16 NOTE — PROGRESS NOTES
Assessment/Plan:    Hypertension  Amlodipine 10mg daily added to her regimen of lisinopril 20mg daily and pindolol 5mg daily  BP in the office is adequate, advised to start checking home bp with log for review at next visit  Chest tightness  Non cardiac, stress test negative  Ddx cervical radiculopathy, anxiety less likely GERD as she has been on treatment with Nexium  She admits she had been forgetting to take buspar and will restart  Recommend observation of her symptoms, though she will return for re-eval if new symptoms occur or if they worsen  1  Essential hypertension     2  Chest tightness         Subjective:      Patient ID: Jose Payne is a 77 y o  female  HPI   TCM Call (since 10/20/2018)     Date and time call was made  11/13/2018  9:23 AM    Hospital care reviewed  Records reviewed    Patient was hospitialized at  [facility]    Date of Admission  11/11/18    Date of discharge  11/11/18    Diagnosis  chest tightness    Disposition  Home      TCM Call (since 10/20/2018)     Scheduled for follow up? Yes    I have advised the patient to call PCP with any new or worsening symptoms  Rachele Sandoval    Counseling  Patient    Comments  Patient appointment is scheduled for 11/16/18          78 yo female with HLD, seizure d/o, MDD, HTN, CIC, PVCs and cervical foraminal stenosis and allergic rhinitis here for TONY  She was hospitalized at [facility] [place] [facility] 11/11-11/12/18 due to CP, described tightness in her throat, BUE tingling in her arms and in her chest   It occurred twice in succession, did deep breathing with improvement then she walked to her car and symptoms returned  When she was evaluated her SBP >200  Cardiology was consulted and nuclear stress test was performed and negative  She had resting HTN and therefore was started on amlodipine 10mg daily  She remained on pindolol 5mg daily and lisinopril 20mg daily  She has not been monitoring her home bp    Yesterday, she had same symptoms occur  She did deep breathing exercises again and went to sleep and when she woke up symptoms resolved  She reports she has been taking nexium for several months  She does admit she has forgotten to take buspar a few days  She has known cervical foraminal stenosis and cervical spasms that she has been going to therapy for with improvement      The following portions of the patient's history were reviewed and updated as appropriate: allergies, current medications, past family history, past medical history, past social history, past surgical history and problem list     Current Outpatient Prescriptions:     amLODIPine (NORVASC) 10 mg tablet, Take 1 tablet (10 mg total) by mouth daily, Disp: 30 tablet, Rfl: 0    aspirin 81 mg chewable tablet, Chew 1 tablet (81 mg total) daily, Disp: , Rfl: 0    atorvastatin (LIPITOR) 10 mg tablet, TAKE 1 TABLET BY MOUTH EVERY DAY, Disp: 30 tablet, Rfl: 5    busPIRone (BUSPAR) 15 mg tablet, Take 15 mg by mouth 2 (two) times a day, Disp: , Rfl:     calcium citrate-Vitamin D (CALCIUM CITRATE + D3) 200 mg-250 units, Take by mouth, Disp: , Rfl:     cholecalciferol (VITAMIN D3) 1,000 units tablet, Take 5 tablets (5,000 Units total) by mouth daily, Disp: 150 tablet, Rfl: 0    clobetasol (TEMOVATE) 0 05 % ointment, APPLY SPARINGLY TO AFFECTED AREA(S) ONCE DAILY X 12 WEEKS, Disp: 60 g, Rfl: 3    divalproex sodium (DEPAKOTE ER) 500 mg 24 hr tablet, Take 1 tablet by mouth 2 (two) times a day, Disp: , Rfl:     escitalopram (LEXAPRO) 20 mg tablet, Take 1 tablet by mouth daily, Disp: , Rfl:     Estradiol (ELESTRIN) 0 52 MG/0 87 GM (0 06%) GEL, Place 0 87 g on the skin daily, Disp: 2 Bottle, Rfl: 6    fexofenadine (ALLEGRA) 180 MG tablet, Take 1 tablet by mouth daily as needed, Disp: , Rfl:     ipratropium (ATROVENT) 0 06 % nasal spray, into each nostril Daily, Disp: , Rfl:     lisinopril (ZESTRIL) 20 mg tablet, TAKE 1 TABLET BY MOUTH EVERY DAY, Disp: 30 tablet, Rfl: 5    MELATONIN PO, Take 10 mg by mouth daily at bedtime, Disp: , Rfl:     methocarbamol (ROBAXIN) 500 mg tablet, Take by mouth, Disp: , Rfl:     OSPHENA 60 MG TABS, TAKE 1 TABLET BY MOUTH EVERY DAY, Disp: 90 tablet, Rfl: 3    pindolol (VISKEN) 5 mg tablet, TAKE 1 TABLET BY MOUTH DAILY, Disp: 30 tablet, Rfl: 5    Review of Systems   Constitutional: Negative  HENT:        Throat tightness   Respiratory: Negative  Cardiovascular: Positive for chest pain  Negative for leg swelling  Gastrointestinal: Negative  Musculoskeletal: Positive for neck stiffness  Neurological: Positive for numbness and headaches  Negative for dizziness and weakness  Psychiatric/Behavioral: The patient is nervous/anxious  Objective:    /74   Pulse 67   Temp 98 3 °F (36 8 °C)   Resp 14   Ht 5' 4" (1 626 m)   Wt 81 2 kg (179 lb)   SpO2 98%   BMI 30 73 kg/m²      Physical Exam   Constitutional: She is oriented to person, place, and time  She appears well-developed and well-nourished  No distress  HENT:   Right Ear: External ear normal    Nose: Nose normal    Mouth/Throat: No oropharyngeal exudate  Neck: Neck supple  Cardiovascular: Normal rate, regular rhythm and normal heart sounds  Pulmonary/Chest: Effort normal and breath sounds normal  No respiratory distress  She has no wheezes  No chest wall tenderness on palpation   Abdominal: Soft  Bowel sounds are normal  She exhibits no distension  There is no tenderness  Musculoskeletal:        Cervical back: She exhibits decreased range of motion and spasm  She exhibits no tenderness  Lymphadenopathy:     She has no cervical adenopathy  Neurological: She is alert and oriented to person, place, and time  Psychiatric: She has a normal mood and affect  Her behavior is normal    Vitals reviewed

## 2018-11-16 NOTE — ASSESSMENT & PLAN NOTE
Non cardiac, stress test negative  Ddx cervical radiculopathy, anxiety less likely GERD as she has been on treatment with Nexium  She admits she had been forgetting to take buspar and will restart  Recommend observation of her symptoms, though she will return for re-eval if new symptoms occur or if they worsen

## 2018-11-16 NOTE — ASSESSMENT & PLAN NOTE
Amlodipine 10mg daily added to her regimen of lisinopril 20mg daily and pindolol 5mg daily  BP in the office is adequate, advised to start checking home bp with log for review at next visit

## 2018-11-19 ENCOUNTER — TRANSITIONAL CARE MANAGEMENT (OUTPATIENT)
Dept: INTERNAL MEDICINE CLINIC | Facility: CLINIC | Age: 68
End: 2018-11-19

## 2018-12-04 ENCOUNTER — OFFICE VISIT (OUTPATIENT)
Dept: INTERNAL MEDICINE CLINIC | Facility: CLINIC | Age: 68
End: 2018-12-04
Payer: MEDICARE

## 2018-12-04 VITALS
OXYGEN SATURATION: 99 % | TEMPERATURE: 97.8 F | HEART RATE: 83 BPM | WEIGHT: 177 LBS | SYSTOLIC BLOOD PRESSURE: 114 MMHG | DIASTOLIC BLOOD PRESSURE: 70 MMHG | HEIGHT: 64 IN | BODY MASS INDEX: 30.22 KG/M2

## 2018-12-04 DIAGNOSIS — R07.89 CHEST PAIN, ATYPICAL: Primary | ICD-10-CM

## 2018-12-04 DIAGNOSIS — I48.91 ATRIAL FIBRILLATION, UNSPECIFIED TYPE (HCC): ICD-10-CM

## 2018-12-04 DIAGNOSIS — I10 ESSENTIAL HYPERTENSION: ICD-10-CM

## 2018-12-04 PROCEDURE — 93000 ELECTROCARDIOGRAM COMPLETE: CPT | Performed by: NURSE PRACTITIONER

## 2018-12-04 PROCEDURE — 99214 OFFICE O/P EST MOD 30 MIN: CPT | Performed by: NURSE PRACTITIONER

## 2018-12-04 NOTE — PROGRESS NOTES
Assessment/Plan:    Hypertension  Blood pressure is well controlled on current medications  No indication for therapy modification at this time  Will continue to monitor  Chest pain, atypical  Physical assessment was unremarkable except for EKG with abnormal findings as noted above under atrial fibrillation description  Atrial fibrillation ([address] )  New finding of atrial fibrillation evidenced on EKG in the office today  I discussed this finding with Dr Richy Theodore personally and treatment plan was established as follows:  Patient prescribed apixaban 5 mg twice daily and was referred for evaluation with Cardiology  Patient is presently on pindolol 5 mg which is keeping her rate controlled at this time  Reviewed precautions in terms of risk of bleeding on Eliquis  Patient advised to call the office with any recurring symptoms or new and concerning symptoms  Patient is advised to please call our office once she is scheduled with Cardiology so that we can follow up with her after that visit  Diagnoses and all orders for this visit:    Chest pain, atypical  -     POCT ECG    Atrial fibrillation, unspecified type [facility])  -     Ambulatory referral to Cardiology; Future  -     apixaban (ELIQUIS) 5 mg; Take 1 tablet (5 mg total) by mouth 2 (two) times a day    Essential hypertension          Subjective:      Patient ID: Terrell Ko is a 77 y o  female  Pt is a 77 y o  y/o female who is seen today for evaluation of episodes of bilateral extremity warmth and pain followed by chest pressure and pain  Episodes last about 2-3 minutes  Pt was evaluated as [facility] a couple of weeks ago for similar symptoms that started with throat tightness and she believed she was having a heart attack  After evaluation it was determined to be non-cardiac in origin  She then saw Dr Richy Tehodore for f/u and was reassured that it was not a heart attack and to continue to monitor    Today she had 2 subsequent episodes similar in presentation  She does have cervical stenosis as well as GERD  The following portions of the patient's history were reviewed and updated as appropriate: allergies, current medications, past family history, past medical history, past social history, past surgical history and problem list     Review of Systems   Constitutional: Negative for activity change, appetite change, chills, fatigue, fever and unexpected weight change  HENT: Negative for congestion and hearing loss  Eyes: Negative for visual disturbance  Respiratory: Negative for cough, chest tightness and shortness of breath  Cardiovascular: Positive for chest pain  Negative for palpitations and leg swelling  Gastrointestinal: Negative for constipation, diarrhea, nausea and vomiting  Musculoskeletal: Positive for myalgias (Bilateral arms)  Negative for arthralgias  Skin: Negative for color change  Neurological: Negative for dizziness, weakness, numbness and headaches  See HPI regarding upper extremities  Psychiatric/Behavioral: Negative for sleep disturbance  The patient is nervous/anxious  Objective:      /70 (BP Location: Left arm, Patient Position: Sitting, Cuff Size: Adult)   Pulse 83   Temp 97 8 °F (36 6 °C) (Tympanic)   Ht 5' 4" (1 626 m)   Wt 80 3 kg (177 lb)   SpO2 99%   BMI 30 38 kg/m²          Physical Exam   Constitutional: She is oriented to person, place, and time  Vital signs are normal  She appears well-developed and well-nourished  She is cooperative  HENT:   Right Ear: Hearing, tympanic membrane, external ear and ear canal normal    Left Ear: Hearing, tympanic membrane, external ear and ear canal normal    Nose: Nose normal  No mucosal edema  Mouth/Throat: Uvula is midline, oropharynx is clear and moist and mucous membranes are normal    Eyes: Pupils are equal, round, and reactive to light  Conjunctivae and lids are normal    Neck: No JVD present   Carotid bruit is not present  No thyromegaly present  Cardiovascular: Normal rate, normal heart sounds and intact distal pulses  An irregularly irregular rhythm present  No murmur heard  EKG positive for atrial fibrillation  When compared to previous EKG, this is a new finding  Pulmonary/Chest: Effort normal and breath sounds normal  No respiratory distress  Abdominal: Soft  Normal appearance and bowel sounds are normal  There is no tenderness  Musculoskeletal: Normal range of motion  She exhibits no edema  Lymphadenopathy:     She has no cervical adenopathy  Neurological: She is alert and oriented to person, place, and time  She has normal strength  No cranial nerve deficit or sensory deficit  Skin: Skin is warm, dry and intact  Psychiatric: She has a normal mood and affect  Her speech is normal and behavior is normal  Judgment and thought content normal  Cognition and memory are normal    Vitals reviewed

## 2018-12-05 NOTE — ASSESSMENT & PLAN NOTE
Physical assessment was unremarkable except for EKG with abnormal findings as noted above under atrial fibrillation description

## 2018-12-05 NOTE — ASSESSMENT & PLAN NOTE
Blood pressure is well controlled on current medications  No indication for therapy modification at this time  Will continue to monitor

## 2018-12-05 NOTE — ASSESSMENT & PLAN NOTE
New finding of atrial fibrillation evidenced on EKG in the office today  I discussed this finding with Dr Kristin Roberts personally and treatment plan was established as follows:  Patient prescribed apixaban 5 mg twice daily and was referred for evaluation with Cardiology  Patient is presently on pindolol 5 mg which is keeping her rate controlled at this time  Reviewed precautions in terms of risk of bleeding on Eliquis  Patient advised to call the office with any recurring symptoms or new and concerning symptoms  Patient is advised to please call our office once she is scheduled with Cardiology so that we can follow up with her after that visit

## 2018-12-06 ENCOUNTER — OFFICE VISIT (OUTPATIENT)
Dept: CARDIOLOGY CLINIC | Facility: CLINIC | Age: 68
End: 2018-12-06
Payer: MEDICARE

## 2018-12-06 VITALS
WEIGHT: 176.7 LBS | HEIGHT: 64 IN | BODY MASS INDEX: 30.17 KG/M2 | SYSTOLIC BLOOD PRESSURE: 128 MMHG | DIASTOLIC BLOOD PRESSURE: 70 MMHG | HEART RATE: 65 BPM

## 2018-12-06 DIAGNOSIS — I48.91 ATRIAL FIBRILLATION, UNSPECIFIED TYPE (HCC): Primary | ICD-10-CM

## 2018-12-06 DIAGNOSIS — R07.89 CHEST TIGHTNESS: ICD-10-CM

## 2018-12-06 PROCEDURE — 99213 OFFICE O/P EST LOW 20 MIN: CPT | Performed by: INTERNAL MEDICINE

## 2018-12-06 PROCEDURE — 93000 ELECTROCARDIOGRAM COMPLETE: CPT | Performed by: INTERNAL MEDICINE

## 2018-12-06 NOTE — PROGRESS NOTES
Cardiology Follow Up    Zelda Winn  1950  [id number]  [address] [facility]  [address] [place] [address] [address] [address]  [phone]    1  Atrial fibrillation, unspecified type [facility])  Ambulatory referral to Cardiology    POCT ECG    Holter monitor - 24 hour    Echo complete with contrast if indicated   2  Chest tightness  Holter monitor - 24 hour    Echo complete with contrast if indicated       Interval History:   Cardiology consultation  Most pleasant 15-year-old female who has no previous cardiac history  Patient complains of intermittent episode of chest discomfort  She has got a very intense midsternal chest discomfort without radiation  She did have a severe episode 3 weeks ago, she presented to the emergency room initial EKG was unrevealing  She did have a minimal troponin elevation at 0 05, 1 reading, she underwent stress test which she did 6 min on a [place]ton protocol achieving target heart rate  There were no EKG changes, she was noted to have premature ventricular contractions, the nuclear portion of stress test suggested no ischemia and ejection fraction 74%  The patient was discharge, she has continued to have intermittent episode of this chest discomfort, and a couple occasions intense but mostly milder she admits to be somewhat anxious about it, she had recurrent significant symptoms 2 days ago and present to the primary physician  She underwent an EKG copy of the reports available to me, it reveals evidence of atrial fibrillation with mildly tachycardic ventricular response  She does take beta-blocker  She was started on anticoagulation with factor Xa inhibitor  And continue other medications  Today's EKG however reveals normal sinus rhythm      Patient Active Problem List   Diagnosis    Arthralgia    Bilateral hip pain    Chronic interstitial cystitis    Depression    Disorder of bone and cartilage    Hyperlipidemia    Hypertension    Lichen sclerosus    Lipoma    Myalgia    Post-menopausal atrophic vaginitis    Symptomatic menopausal or female climacteric states    Arthritis    Acute gastritis without hemorrhage    Daytime sleepiness    Snoring    Insomnia    Adjustment disorder with mixed emotional features    Cervical spondylosis    Cervicocranial syndrome    Intractable chronic migraine without aura    Complex partial seizure with impairment of consciousness (HCC)    Hip pain    Injury of tendon of rotator cuff    Trochanteric bursitis    Chest tightness    Seizure disorder (HCC)    Anxiety    Gastritis    Chest pain, atypical    Atrial fibrillation (HCC)     Past Medical History:   Diagnosis Date    Anxiety     Arthralgia     Chronic interstitial cystitis     Depression     Hyperlipidemia     Hypertension     Lichen sclerosus     Lipoma     Osteopenia     Simple partial seizures (HCC)     last assessed 3/30/17    Tachycardia     last assessed 6/24/15     Social History     Social History    Marital status: /Civil Union     Spouse name: N/A    Number of children: N/A    Years of education: N/A     Occupational History    retired school superintendant      Social History Main Topics    Smoking status: Never Smoker    Smokeless tobacco: Never Used    Alcohol use 6 0 oz/week     10 Glasses of wine per week      Comment: no per [employer]    Drug use: No    Sexual activity: Yes      Comment: occasionally     Other Topics Concern    Not on file     Social History Narrative    Exercising regularly      Family History   Problem Relation Age of Onset    Stroke Mother         CVA    Hypertension Mother     Hypertension Father     Cancer Father         lung cancer    Cancer Brother         lung cancer    Alcohol abuse Family      Past Surgical History:   Procedure Laterality Date    COSMETIC SURGERY      HYSTERECTOMY      SHOULDER SURGERY Current Outpatient Prescriptions:     amLODIPine (NORVASC) 10 mg tablet, Take 1 tablet (10 mg total) by mouth daily, Disp: 30 tablet, Rfl: 0    apixaban (ELIQUIS) 5 mg, Take 1 tablet (5 mg total) by mouth 2 (two) times a day, Disp: 30 tablet, Rfl: 0    atorvastatin (LIPITOR) 10 mg tablet, TAKE 1 TABLET BY MOUTH EVERY DAY, Disp: 30 tablet, Rfl: 5    busPIRone (BUSPAR) 15 mg tablet, Take 15 mg by mouth 2 (two) times a day, Disp: , Rfl:     calcium citrate-Vitamin D (CALCIUM CITRATE + D3) 200 mg-250 units, Take by mouth, Disp: , Rfl:     cholecalciferol (VITAMIN D3) 1,000 units tablet, Take 5 tablets (5,000 Units total) by mouth daily, Disp: 150 tablet, Rfl: 0    clobetasol (TEMOVATE) 0 05 % ointment, APPLY SPARINGLY TO AFFECTED AREA(S) ONCE DAILY X 12 WEEKS, Disp: 60 g, Rfl: 3    divalproex sodium (DEPAKOTE ER) 500 mg 24 hr tablet, Take 1 tablet by mouth 2 (two) times a day, Disp: , Rfl:     escitalopram (LEXAPRO) 20 mg tablet, Take 1 tablet by mouth daily, Disp: , Rfl:     Estradiol (ELESTRIN) 0 52 MG/0 87 GM (0 06%) GEL, Place 0 87 g on the skin daily, Disp: 2 Bottle, Rfl: 6    fexofenadine (ALLEGRA) 180 MG tablet, Take 1 tablet by mouth daily as needed, Disp: , Rfl:     ipratropium (ATROVENT) 0 06 % nasal spray, into each nostril Daily, Disp: , Rfl:     lisinopril (ZESTRIL) 20 mg tablet, TAKE 1 TABLET BY MOUTH EVERY DAY, Disp: 30 tablet, Rfl: 5    MELATONIN PO, Take 10 mg by mouth daily at bedtime, Disp: , Rfl:     methocarbamol (ROBAXIN) 500 mg tablet, Take by mouth as needed  , Disp: , Rfl:     OSPHENA 60 MG TABS, TAKE 1 TABLET BY MOUTH EVERY DAY, Disp: 90 tablet, Rfl: 3    pindolol (VISKEN) 5 mg tablet, TAKE 1 TABLET BY MOUTH DAILY, Disp: 30 tablet, Rfl: 5  Allergies   Allergen Reactions    Codeine Hives    Erythromycin Base GI Intolerance    Hydromorphone Itching    Morphine GI Intolerance    Oxycodone-Acetaminophen Hives    Penicillins Rash       Labs:  Admission on 11/11/2018, Discharged on 11/12/2018   Component Date Value    Ventricular Rate 11/11/2018 68     Atrial Rate 11/11/2018 68     OR Interval 11/11/2018 144     QRSD Interval 11/11/2018 86     QT Interval 11/11/2018 404     QTC Interval 11/11/2018 429     P Axis 11/11/2018 62     QRS Axis 11/11/2018 75     T Wave Axis 11/11/2018 54     WBC 11/11/2018 6 79     RBC 11/11/2018 4 10     Hemoglobin 11/11/2018 12 8     Hematocrit 11/11/2018 39 9     MCV 11/11/2018 97     MCH 11/11/2018 31 2     MCHC 11/11/2018 32 1     RDW 11/11/2018 12 3     MPV 11/11/2018 10 7     Platelets 98/87/7579 236     nRBC 11/11/2018 0     Neutrophils Relative 11/11/2018 68     Immat GRANS % 11/11/2018 1     Lymphocytes Relative 11/11/2018 20     Monocytes Relative 11/11/2018 10     Eosinophils Relative 11/11/2018 1     Basophils Relative 11/11/2018 0     Neutrophils Absolute 11/11/2018 4 63     Immature Grans Absolute 11/11/2018 0 05     Lymphocytes Absolute 11/11/2018 1 34     Monocytes Absolute 11/11/2018 0 68     Eosinophils Absolute 11/11/2018 0 06     Basophils Absolute 11/11/2018 0 03     Sodium 11/11/2018 139     Potassium 11/11/2018 4 7     Chloride 11/11/2018 106     CO2 11/11/2018 24     ANION GAP 11/11/2018 9     BUN 11/11/2018 25     Creatinine 11/11/2018 0 82     Glucose 11/11/2018 98     Calcium 11/11/2018 9 0     AST 11/11/2018 13     ALT 11/11/2018 25     Alkaline Phosphatase 11/11/2018 47     Total Protein 11/11/2018 6 4     Albumin 11/11/2018 3 2*    Total Bilirubin 11/11/2018 0 16*    eGFR 11/11/2018 73     Troponin I 11/11/2018 0 02     Color, UA 11/11/2018 y     TSH 3RD GENERATON 11/11/2018 1 780     NT-proBNP 11/11/2018 130*    Color, UA 11/11/2018 Yellow     Clarity, UA 11/11/2018 Slightly Cloudy     pH, UA 11/11/2018 7 0     Leukocytes, UA 11/11/2018 Trace*    Nitrite, UA 11/11/2018 Negative     Protein, UA 11/11/2018 Negative     Glucose, UA 11/11/2018 Negative     Ketones, UA 11/11/2018 Trace*    Urobilinogen, UA 11/11/2018 0 2     Bilirubin, UA 11/11/2018 Negative     Blood, UA 11/11/2018 Negative     Specific Gravity, UA 11/11/2018 1 020     RBC, UA 11/11/2018 None Seen     WBC, UA 11/11/2018 2-4*    Epithelial Cells 11/11/2018 Occasional     Bacteria, UA 11/11/2018 Occasional     Troponin I 11/11/2018 0 05*    Ventricular Rate 11/11/2018 63     Atrial Rate 11/11/2018 63     UT Interval 11/11/2018 144     QRSD Interval 11/11/2018 86     QT Interval 11/11/2018 400     QTC Interval 11/11/2018 409     P Axis 11/11/2018 57     QRS Axis 11/11/2018 82     T Wave Axis 11/11/2018 74     TSH 3RD GENERATON 11/11/2018 2 783     Troponin I 11/11/2018 0 02     Platelets 52/87/7081 262     MPV 11/11/2018 10 5     Hepatitis C Ab 11/11/2018 Non-reactive     Sodium 11/12/2018 139     Potassium 11/12/2018 4 5     Chloride 11/12/2018 105     CO2 11/12/2018 26     ANION GAP 11/12/2018 8     BUN 11/12/2018 22     Creatinine 11/12/2018 0 89     Glucose 11/12/2018 96     Calcium 11/12/2018 8 9     eGFR 11/12/2018 66     WBC 11/12/2018 6 08     RBC 11/12/2018 4 32     Hemoglobin 11/12/2018 13 6     Hematocrit 11/12/2018 41 7     MCV 11/12/2018 97     MCH 11/12/2018 31 5     MCHC 11/12/2018 32 6     RDW 11/12/2018 12 2     Platelets 61/29/8717 258     MPV 11/12/2018 10 8     Cholesterol 11/12/2018 190     Triglycerides 11/12/2018 164*    HDL, Direct 11/12/2018 54     LDL Calculated 11/12/2018 103*    Non-HDL-Chol (CHOL-HDL) 11/12/2018 136     Protocol Name 11/12/2018 OSCAR     Time In Exercise Phase 11/12/2018 00:06:00     MAX   SYSTOLIC BP 48/55/0276 443     Max Diastolic Bp 97/54/8099 33     Max Heart Rate 11/12/2018 144     Max Predicted Heart Rate 11/12/2018 150     Reason for Termination 11/12/2018                      Value:Target Heart Rate Achieved  Fatigue      Test Indication 11/12/2018 Chest Discomfort     Target Hr Formular 11/12/2018 (220 - Age)*85%     Chest Pain Statement 11/12/2018 non-limiting    Transcribe Orders on 08/14/2018   Component Date Value    WBC 08/14/2018 5 06     RBC 08/14/2018 4 39     Hemoglobin 08/14/2018 13 5     Hematocrit 08/14/2018 42 8     MCV 08/14/2018 98     MCH 08/14/2018 30 8     MCHC 08/14/2018 31 5     RDW 08/14/2018 12 4     MPV 08/14/2018 11 0     Platelets 33/98/2168 268     nRBC 08/14/2018 0     Neutrophils Relative 08/14/2018 55     Immat GRANS % 08/14/2018 1     Lymphocytes Relative 08/14/2018 32     Monocytes Relative 08/14/2018 10     Eosinophils Relative 08/14/2018 1     Basophils Relative 08/14/2018 1     Neutrophils Absolute 08/14/2018 2 79     Immature Grans Absolute 08/14/2018 0 03     Lymphocytes Absolute 08/14/2018 1 64     Monocytes Absolute 08/14/2018 0 49     Eosinophils Absolute 08/14/2018 0 07     Basophils Absolute 08/14/2018 0 04     Sodium 08/14/2018 140     Potassium 08/14/2018 4 7     Chloride 08/14/2018 104     CO2 08/14/2018 27     ANION GAP 08/14/2018 9     BUN 08/14/2018 28*    Creatinine 08/14/2018 0 91     Glucose, Fasting 08/14/2018 83     Calcium 08/14/2018 9 0     AST 08/14/2018 10     ALT 08/14/2018 20     Alkaline Phosphatase 08/14/2018 42*    Total Protein 08/14/2018 6 7     Albumin 08/14/2018 3 4*    Total Bilirubin 08/14/2018 0 30     eGFR 08/14/2018 64     Valproic Acid, Total 08/14/2018 69    Appointment on 08/14/2018   Component Date Value    Hepatitis C Ab 08/14/2018 Non-reactive     Cholesterol 08/14/2018 226*    Triglycerides 08/14/2018 129     HDL, Direct 08/14/2018 61*    LDL Calculated 08/14/2018 139*    Non-HDL-Chol (CHOL-HDL) 08/14/2018 165      Imaging: Xr Chest 2 Views    Result Date: 11/12/2018  Narrative: CHEST INDICATION:   Chest pain  COMPARISON:  None EXAM PERFORMED/VIEWS:  XR CHEST PA & LATERAL FINDINGS: Cardiomediastinal silhouette appears unremarkable  The lungs are clear  No pneumothorax or pleural effusion  Osseous structures appear within normal limits for patient age  Impression: No acute cardiopulmonary disease  Workstation performed: [id number]       Review of Systems:  Review of Systems   Constitutional: Negative for activity change and fatigue  HENT: Negative for nosebleeds  Eyes: Negative for visual disturbance  Respiratory: Positive for shortness of breath  Negative for apnea, wheezing and stridor  Cardiovascular: Positive for chest pain and palpitations  Negative for leg swelling  Gastrointestinal: Negative for abdominal pain and blood in stool  Genitourinary: Positive for frequency  Negative for dysuria and hematuria  Musculoskeletal: Negative for arthralgias and myalgias  Skin: Negative for pallor  Neurological: Positive for seizures  Negative for dizziness, syncope, facial asymmetry, speech difficulty and weakness  Hematological: Does not bruise/bleed easily  Psychiatric/Behavioral: Negative for sleep disturbance  The patient is nervous/anxious  Physical Exam:  Physical Exam   Constitutional: She is oriented to person, place, and time  She appears well-developed and well-nourished  No distress (Looks younger than the stated age)  HENT:   Head: Normocephalic  Eyes: Conjunctivae are normal  No scleral icterus  Neck: No JVD present  No tracheal deviation present  No thyromegaly present  Cardiovascular: Normal rate, regular rhythm, normal heart sounds and intact distal pulses  Exam reveals no gallop and no friction rub  No murmur heard  Pulmonary/Chest: Effort normal and breath sounds normal  No stridor  No respiratory distress  She has no wheezes  She has no rales  She exhibits no tenderness  Abdominal: Soft  Bowel sounds are normal  She exhibits no distension and no mass  There is no tenderness  There is no rebound and no guarding  Neurological: She is alert and oriented to person, place, and time  Skin: Skin is warm and dry  No rash noted   She is not diaphoretic  No erythema  Psychiatric: She has a normal mood and affect  Discussion/Summary:  Paroxysmal atrial fibrillation  She is back in normal sinus rhythm, she is on beta-blocker therapy , will consider changing her beta-blocker pindolol to metoprolol  Is of moderate cardioembolic risk, will continue full anticoagulation  An echocardiogram and Holter monitor has been ordered as well  Given the persistent chest discomfort, and borderline elevated troponin at the time of emergency room will do a CT of the coronaries  Recent blood work was unrevealing including thyroid function test   Her LDL is 103  We will consider statin therapy

## 2018-12-11 ENCOUNTER — TELEPHONE (OUTPATIENT)
Dept: CARDIOLOGY CLINIC | Facility: CLINIC | Age: 68
End: 2018-12-11

## 2018-12-11 DIAGNOSIS — R07.89 OTHER CHEST PAIN: Primary | ICD-10-CM

## 2018-12-11 DIAGNOSIS — R07.89 CHEST TIGHTNESS OR PRESSURE: Primary | ICD-10-CM

## 2018-12-11 DIAGNOSIS — I48.91 ATRIAL FIBRILLATION, UNSPECIFIED TYPE (HCC): Primary | ICD-10-CM

## 2018-12-11 NOTE — TELEPHONE ENCOUNTER
Radiology calling, pt having CTA 12/13 HR needs to be 60 or less  Did you want to order extra beta blocker for the procedure? If so when should she take it? Also if in Afib at the time the test will not be done

## 2018-12-13 ENCOUNTER — HOSPITAL ENCOUNTER (OUTPATIENT)
Dept: CT IMAGING | Facility: HOSPITAL | Age: 68
Discharge: HOME | End: 2018-12-13
Attending: INTERNAL MEDICINE

## 2018-12-14 DIAGNOSIS — I48.91 ATRIAL FIBRILLATION, UNSPECIFIED TYPE (HCC): Primary | ICD-10-CM

## 2018-12-14 RX ORDER — FLECAINIDE ACETATE 50 MG/1
50 TABLET ORAL 2 TIMES DAILY
Qty: 60 TABLET | Refills: 11 | Status: SHIPPED | OUTPATIENT
Start: 2018-12-14 | End: 2019-02-07 | Stop reason: ALTCHOICE

## 2018-12-14 RX ORDER — FLECAINIDE ACETATE 50 MG/1
50 TABLET ORAL 2 TIMES DAILY
Qty: 60 TABLET | Refills: 0 | OUTPATIENT
Start: 2018-12-14

## 2018-12-15 DIAGNOSIS — I48.91 ATRIAL FIBRILLATION, UNSPECIFIED TYPE (HCC): ICD-10-CM

## 2018-12-15 RX ORDER — FLECAINIDE ACETATE 50 MG/1
50 TABLET ORAL 2 TIMES DAILY
Qty: 60 TABLET | Refills: 5 | Status: SHIPPED | OUTPATIENT
Start: 2018-12-15 | End: 2019-02-07 | Stop reason: ALTCHOICE

## 2018-12-17 ENCOUNTER — TELEPHONE (OUTPATIENT)
Dept: CARDIOLOGY CLINIC | Facility: CLINIC | Age: 68
End: 2018-12-17

## 2018-12-17 DIAGNOSIS — I48.91 ATRIAL FIBRILLATION, UNSPECIFIED TYPE (HCC): Primary | ICD-10-CM

## 2018-12-17 DIAGNOSIS — I10 BENIGN ESSENTIAL HTN: Primary | ICD-10-CM

## 2018-12-17 RX ORDER — APIXABAN 5 MG/1
TABLET, FILM COATED ORAL
Qty: 30 TABLET | Refills: 0 | Status: SHIPPED | OUTPATIENT
Start: 2018-12-17 | End: 2018-12-30 | Stop reason: SDUPTHER

## 2018-12-17 NOTE — TELEPHONE ENCOUNTER
P/C states she would rather wait to do the ECHO and Holter then OV 1/3 before doing the cardiac cath  Also the pharmacy stated there is contraindication with lexapro and the Flecainide, it can cause QT prolongation  Initially ordered flecainide because radiology would probably not be able to do the CTA  If she is not having the cardiac cath, does she still need to take the flecainide?       Please dvise

## 2018-12-18 RX ORDER — AMLODIPINE BESYLATE 10 MG/1
10 TABLET ORAL DAILY
Qty: 30 TABLET | Refills: 2 | Status: SHIPPED | OUTPATIENT
Start: 2018-12-18 | End: 2019-01-15 | Stop reason: SDUPTHER

## 2018-12-24 NOTE — TELEPHONE ENCOUNTER
Pt was on [facility] my chart and researched Flecainide that read WARNING: should not take if you have Afib, only if this is life threatening  Pt will not take medication until she discusses in person w/Dr Donny López in January and has cancelled her EKG visit

## 2018-12-26 ENCOUNTER — HOSPITAL ENCOUNTER (OUTPATIENT)
Dept: NON INVASIVE DIAGNOSTICS | Facility: HOSPITAL | Age: 68
Discharge: HOME | End: 2018-12-26
Payer: MEDICARE

## 2018-12-26 DIAGNOSIS — I48.91 ATRIAL FIBRILLATION, UNSPECIFIED TYPE (HCC): ICD-10-CM

## 2018-12-26 DIAGNOSIS — R07.89 CHEST TIGHTNESS: ICD-10-CM

## 2018-12-26 PROCEDURE — 93306 TTE W/DOPPLER COMPLETE: CPT

## 2018-12-26 PROCEDURE — 93225 XTRNL ECG REC<48 HRS REC: CPT

## 2018-12-26 PROCEDURE — 93306 TTE W/DOPPLER COMPLETE: CPT | Performed by: INTERNAL MEDICINE

## 2018-12-30 DIAGNOSIS — I48.91 ATRIAL FIBRILLATION, UNSPECIFIED TYPE (HCC): ICD-10-CM

## 2018-12-31 PROCEDURE — 93227 XTRNL ECG REC<48 HR R&I: CPT | Performed by: INTERNAL MEDICINE

## 2019-01-02 RX ORDER — APIXABAN 5 MG/1
TABLET, FILM COATED ORAL
Qty: 30 TABLET | Refills: 0 | Status: SHIPPED | OUTPATIENT
Start: 2019-01-02 | End: 2019-01-13 | Stop reason: SDUPTHER

## 2019-01-04 DIAGNOSIS — I48.91 ATRIAL FIBRILLATION, UNSPECIFIED TYPE (HCC): ICD-10-CM

## 2019-01-10 ENCOUNTER — TRANSCRIBE ORDERS (OUTPATIENT)
Dept: ADMINISTRATIVE | Facility: HOSPITAL | Age: 69
End: 2019-01-10

## 2019-01-13 DIAGNOSIS — I48.91 ATRIAL FIBRILLATION, UNSPECIFIED TYPE (HCC): ICD-10-CM

## 2019-01-15 DIAGNOSIS — I10 BENIGN ESSENTIAL HTN: ICD-10-CM

## 2019-01-15 DIAGNOSIS — I48.91 ATRIAL FIBRILLATION, UNSPECIFIED TYPE (HCC): ICD-10-CM

## 2019-01-15 RX ORDER — AMLODIPINE BESYLATE 10 MG/1
10 TABLET ORAL DAILY
Qty: 30 TABLET | Refills: 5 | Status: SHIPPED | OUTPATIENT
Start: 2019-01-15 | End: 2019-02-08 | Stop reason: SDUPTHER

## 2019-01-15 RX ORDER — APIXABAN 5 MG/1
TABLET, FILM COATED ORAL
Qty: 30 TABLET | Refills: 0 | Status: SHIPPED | OUTPATIENT
Start: 2019-01-15 | End: 2019-01-15 | Stop reason: SDUPTHER

## 2019-01-22 DIAGNOSIS — I10 BENIGN ESSENTIAL HYPERTENSION: ICD-10-CM

## 2019-01-22 RX ORDER — LISINOPRIL 20 MG/1
TABLET ORAL
Qty: 30 TABLET | Refills: 5 | Status: SHIPPED | OUTPATIENT
Start: 2019-01-22 | End: 2019-02-08 | Stop reason: SDUPTHER

## 2019-01-24 ENCOUNTER — HOSPITAL ENCOUNTER (OUTPATIENT)
Dept: MAMMOGRAPHY | Facility: HOSPITAL | Age: 69
Discharge: HOME | End: 2019-01-24
Payer: MEDICARE

## 2019-01-24 VITALS — WEIGHT: 176 LBS | BODY MASS INDEX: 30.05 KG/M2 | HEIGHT: 64 IN

## 2019-01-24 DIAGNOSIS — Z12.39 SCREENING FOR MALIGNANT NEOPLASM OF BREAST: ICD-10-CM

## 2019-01-24 PROCEDURE — 77067 SCR MAMMO BI INCL CAD: CPT

## 2019-01-24 PROCEDURE — 77063 BREAST TOMOSYNTHESIS BI: CPT

## 2019-02-04 ENCOUNTER — APPOINTMENT (OUTPATIENT)
Dept: LAB | Facility: CLINIC | Age: 69
End: 2019-02-04
Payer: MEDICARE

## 2019-02-04 ENCOUNTER — TRANSCRIBE ORDERS (OUTPATIENT)
Dept: ADMINISTRATIVE | Facility: HOSPITAL | Age: 69
End: 2019-02-04

## 2019-02-04 DIAGNOSIS — Z79.899 NEED FOR PROPHYLACTIC CHEMOTHERAPY: ICD-10-CM

## 2019-02-04 DIAGNOSIS — Z79.899 NEED FOR PROPHYLACTIC CHEMOTHERAPY: Primary | ICD-10-CM

## 2019-02-04 DIAGNOSIS — G40.119 EPILEPSIA PARTIALIS CONTINUA WITH INTRACTABLE EPILEPSY (HCC): ICD-10-CM

## 2019-02-04 DIAGNOSIS — G40.209 COMPLEX PARTIAL SEIZURES WITH CONSCIOUSNESS IMPAIRED (HCC): ICD-10-CM

## 2019-02-04 LAB
ALBUMIN SERPL BCP-MCNC: 3.5 G/DL (ref 3.5–5)
ALP SERPL-CCNC: 50 U/L (ref 46–116)
ALT SERPL W P-5'-P-CCNC: 20 U/L (ref 12–78)
ANION GAP SERPL CALCULATED.3IONS-SCNC: 5 MMOL/L (ref 4–13)
AST SERPL W P-5'-P-CCNC: 11 U/L (ref 5–45)
BASOPHILS # BLD AUTO: 0.03 THOUSANDS/ΜL (ref 0–0.1)
BASOPHILS NFR BLD AUTO: 1 % (ref 0–1)
BILIRUB SERPL-MCNC: 0.28 MG/DL (ref 0.2–1)
BUN SERPL-MCNC: 27 MG/DL (ref 5–25)
CALCIUM SERPL-MCNC: 8.8 MG/DL (ref 8.3–10.1)
CHLORIDE SERPL-SCNC: 108 MMOL/L (ref 100–108)
CO2 SERPL-SCNC: 26 MMOL/L (ref 21–32)
CREAT SERPL-MCNC: 0.82 MG/DL (ref 0.6–1.3)
EOSINOPHIL # BLD AUTO: 0.04 THOUSAND/ΜL (ref 0–0.61)
EOSINOPHIL NFR BLD AUTO: 1 % (ref 0–6)
ERYTHROCYTE [DISTWIDTH] IN BLOOD BY AUTOMATED COUNT: 12.7 % (ref 11.6–15.1)
GFR SERPL CREATININE-BSD FRML MDRD: 73 ML/MIN/1.73SQ M
GLUCOSE P FAST SERPL-MCNC: 96 MG/DL (ref 65–99)
HCT VFR BLD AUTO: 40.7 % (ref 34.8–46.1)
HGB BLD-MCNC: 13 G/DL (ref 11.5–15.4)
IMM GRANULOCYTES # BLD AUTO: 0.03 THOUSAND/UL (ref 0–0.2)
IMM GRANULOCYTES NFR BLD AUTO: 1 % (ref 0–2)
LYMPHOCYTES # BLD AUTO: 1.41 THOUSANDS/ΜL (ref 0.6–4.47)
LYMPHOCYTES NFR BLD AUTO: 29 % (ref 14–44)
MCH RBC QN AUTO: 31 PG (ref 26.8–34.3)
MCHC RBC AUTO-ENTMCNC: 31.9 G/DL (ref 31.4–37.4)
MCV RBC AUTO: 97 FL (ref 82–98)
MONOCYTES # BLD AUTO: 0.53 THOUSAND/ΜL (ref 0.17–1.22)
MONOCYTES NFR BLD AUTO: 11 % (ref 4–12)
NEUTROPHILS # BLD AUTO: 2.9 THOUSANDS/ΜL (ref 1.85–7.62)
NEUTS SEG NFR BLD AUTO: 57 % (ref 43–75)
NRBC BLD AUTO-RTO: 0 /100 WBCS
PLATELET # BLD AUTO: 289 THOUSANDS/UL (ref 149–390)
PMV BLD AUTO: 11.5 FL (ref 8.9–12.7)
POTASSIUM SERPL-SCNC: 4.7 MMOL/L (ref 3.5–5.3)
PROT SERPL-MCNC: 6.7 G/DL (ref 6.4–8.2)
RBC # BLD AUTO: 4.2 MILLION/UL (ref 3.81–5.12)
SODIUM SERPL-SCNC: 139 MMOL/L (ref 136–145)
VALPROATE SERPL-MCNC: 42 UG/ML (ref 50–100)
WBC # BLD AUTO: 4.94 THOUSAND/UL (ref 4.31–10.16)

## 2019-02-04 PROCEDURE — 80164 ASSAY DIPROPYLACETIC ACD TOT: CPT

## 2019-02-04 PROCEDURE — 36415 COLL VENOUS BLD VENIPUNCTURE: CPT

## 2019-02-04 PROCEDURE — 85025 COMPLETE CBC W/AUTO DIFF WBC: CPT

## 2019-02-04 PROCEDURE — 80053 COMPREHEN METABOLIC PANEL: CPT

## 2019-02-06 DIAGNOSIS — I48.91 ATRIAL FIBRILLATION, UNSPECIFIED TYPE (HCC): ICD-10-CM

## 2019-02-06 NOTE — PROGRESS NOTES
Assessment/Plan:    Current use of long term anticoagulation  Secondary to PAF  Discussed adverse effects of eliquis  Continue medication due to [id number] score of 3  Atrial fibrillation (HCC)  Paroxysmal, seen on holter  Now followed by Deanna Noguera  On Propranolol, which she has been on for migraines as well and on anticoagulation  Chest pain, atypical  Stress test negative, coronary CTA with elevated coronary artery calcium score of 146  She is to follow up with Cardiology  There is suspicion for seizure activity per her Neurologist and she is now scheduled for EEG  1  Current use of long term anticoagulation     2  Atrial fibrillation, unspecified type ([address] )     3  Essential hypertension     4  Chest pain, atypical         Subjective:      Patient ID: Jaison Christopher is a 77 y o  female  HPI  67yo female with PAF, HTN, seizure d/o, MDD, HLD, PVCs, CIC here for follow up care  She established with Deanna Harris  Had coronary CTA yesterday  She is wearing holter monitor for 30days that has shown she has been in intermittent a fib  She denies CP, palpitations, has been experiencing ROJAS however  She has been on eliquis, has easy bruising but no bleeding episodes  In November she was hospitalized, further evaluation by her Neuro due to b/l neck and chest symptoms who now suspects possible seizure  She is now scheduled for EEG      The following portions of the patient's history were reviewed and updated as appropriate: allergies, current medications, past family history, past medical history, past social history, past surgical history and problem list     Current Outpatient Prescriptions:     amLODIPine (NORVASC) 10 mg tablet, Take 1 tablet (10 mg total) by mouth daily, Disp: 30 tablet, Rfl: 5    apixaban (ELIQUIS) 5 mg, Take 1 tablet (5 mg total) by mouth 2 (two) times a day, Disp: 180 tablet, Rfl: 1    atorvastatin (LIPITOR) 10 mg tablet, TAKE 1 TABLET BY MOUTH EVERY DAY, Disp: 30 tablet, Rfl: 5    busPIRone (BUSPAR) 15 mg tablet, Take 15 mg by mouth 2 (two) times a day, Disp: , Rfl:     calcium citrate-Vitamin D (CALCIUM CITRATE + D3) 200 mg-250 units, Take by mouth, Disp: , Rfl:     cholecalciferol (VITAMIN D3) 1,000 units tablet, Take 5 tablets (5,000 Units total) by mouth daily, Disp: 150 tablet, Rfl: 0    clobetasol (TEMOVATE) 0 05 % ointment, APPLY SPARINGLY TO AFFECTED AREA(S) ONCE DAILY X 12 WEEKS, Disp: 60 g, Rfl: 3    divalproex sodium (DEPAKOTE ER) 500 mg 24 hr tablet, Take 1 tablet by mouth 2 (two) times a day, Disp: , Rfl:     escitalopram (LEXAPRO) 20 mg tablet, Take 1 tablet by mouth daily, Disp: , Rfl:     esomeprazole (NexIUM) 20 mg capsule, Take 20 mg by mouth every morning before breakfast, Disp: , Rfl:     Estradiol (ELESTRIN) 0 52 MG/0 87 GM (0 06%) GEL, Place 0 87 g on the skin daily, Disp: 2 Bottle, Rfl: 6    fexofenadine (ALLEGRA) 180 MG tablet, Take 1 tablet by mouth daily as needed, Disp: , Rfl:     ipratropium (ATROVENT) 0 06 % nasal spray, into each nostril Daily, Disp: , Rfl:     lisinopril (ZESTRIL) 20 mg tablet, TAKE 1 TABLET BY MOUTH EVERY DAY, Disp: 30 tablet, Rfl: 5    MELATONIN PO, Take 10 mg by mouth daily at bedtime, Disp: , Rfl:     OSPHENA 60 MG TABS, TAKE 1 TABLET BY MOUTH EVERY DAY, Disp: 90 tablet, Rfl: 3    pindolol (VISKEN) 5 mg tablet, TAKE 1 TABLET BY MOUTH DAILY, Disp: 30 tablet, Rfl: 5    Review of Systems   Constitutional:        +weight gain   Respiratory:        ROJAS   Cardiovascular: Negative  Gastrointestinal:        Heartburn   Neurological: Positive for headaches  Negative for dizziness  Hematological: Bruises/bleeds easily  Objective:    /70 (BP Location: Left arm, Patient Position: Sitting)   Pulse 63   Temp 97 9 °F (36 6 °C)   Resp 16   Ht 5' 4" (1 626 m)   Wt 82 6 kg (182 lb)   SpO2 93%   BMI 31 24 kg/m²      Physical Exam   Constitutional: She is oriented to person, place, and time  She appears well-developed and well-nourished  HENT:   Mouth/Throat: Oropharynx is clear and moist  No oropharyngeal exudate  Neck: Neck supple  Cardiovascular: Normal rate, regular rhythm, normal heart sounds and intact distal pulses  Pulmonary/Chest: Effort normal and breath sounds normal  No respiratory distress  She has no wheezes  Neurological: She is alert and oriented to person, place, and time  Psychiatric: She has a normal mood and affect  Vitals reviewed

## 2019-02-07 ENCOUNTER — OFFICE VISIT (OUTPATIENT)
Dept: INTERNAL MEDICINE CLINIC | Facility: CLINIC | Age: 69
End: 2019-02-07
Payer: MEDICARE

## 2019-02-07 VITALS
DIASTOLIC BLOOD PRESSURE: 70 MMHG | HEIGHT: 64 IN | RESPIRATION RATE: 16 BRPM | BODY MASS INDEX: 31.07 KG/M2 | WEIGHT: 182 LBS | HEART RATE: 63 BPM | TEMPERATURE: 97.9 F | OXYGEN SATURATION: 93 % | SYSTOLIC BLOOD PRESSURE: 116 MMHG

## 2019-02-07 DIAGNOSIS — I10 ESSENTIAL HYPERTENSION: ICD-10-CM

## 2019-02-07 DIAGNOSIS — R07.89 CHEST PAIN, ATYPICAL: ICD-10-CM

## 2019-02-07 DIAGNOSIS — E78.2 MIXED HYPERLIPIDEMIA: ICD-10-CM

## 2019-02-07 DIAGNOSIS — I48.91 ATRIAL FIBRILLATION, UNSPECIFIED TYPE (HCC): ICD-10-CM

## 2019-02-07 DIAGNOSIS — Z79.01 CURRENT USE OF LONG TERM ANTICOAGULATION: Primary | ICD-10-CM

## 2019-02-07 PROCEDURE — 99214 OFFICE O/P EST MOD 30 MIN: CPT | Performed by: INTERNAL MEDICINE

## 2019-02-07 RX ORDER — ATORVASTATIN CALCIUM 10 MG/1
10 TABLET, FILM COATED ORAL DAILY
Qty: 90 TABLET | Refills: 3 | Status: SHIPPED | OUTPATIENT
Start: 2019-02-07 | End: 2020-03-26 | Stop reason: SDUPTHER

## 2019-02-07 RX ORDER — PINDOLOL 5 MG/1
5 TABLET ORAL DAILY
Qty: 90 TABLET | Refills: 3 | Status: SHIPPED | OUTPATIENT
Start: 2019-02-07 | End: 2020-05-14 | Stop reason: SDUPTHER

## 2019-02-07 NOTE — ASSESSMENT & PLAN NOTE
Stress test negative, coronary CTA with elevated coronary artery calcium score of 146  She is to follow up with Cardiology  There is suspicion for seizure activity per her Neurologist and she is now scheduled for EEG

## 2019-02-07 NOTE — ASSESSMENT & PLAN NOTE
Paroxysmal, seen on holter  Now followed by Martín Clements  On Propranolol, which she has been on for migraines as well and on anticoagulation

## 2019-02-07 NOTE — ASSESSMENT & PLAN NOTE
Secondary to PAF  Discussed adverse effects of eliquis  Continue medication due to [id number] score of 3

## 2019-02-08 DIAGNOSIS — I10 BENIGN ESSENTIAL HYPERTENSION: ICD-10-CM

## 2019-02-08 DIAGNOSIS — I10 BENIGN ESSENTIAL HTN: ICD-10-CM

## 2019-02-08 RX ORDER — AMLODIPINE BESYLATE 10 MG/1
10 TABLET ORAL DAILY
Qty: 90 TABLET | Refills: 3 | Status: SHIPPED | OUTPATIENT
Start: 2019-02-08 | End: 2020-01-17

## 2019-02-08 RX ORDER — LISINOPRIL 20 MG/1
20 TABLET ORAL DAILY
Qty: 90 TABLET | Refills: 3 | Status: SHIPPED | OUTPATIENT
Start: 2019-02-08 | End: 2020-03-01

## 2019-02-15 ENCOUNTER — EVALUATION (OUTPATIENT)
Dept: PHYSICAL THERAPY | Facility: CLINIC | Age: 69
End: 2019-02-15
Payer: MEDICARE

## 2019-02-15 DIAGNOSIS — M70.61 TROCHANTERIC BURSITIS OF RIGHT HIP: Primary | ICD-10-CM

## 2019-02-15 PROCEDURE — 97110 THERAPEUTIC EXERCISES: CPT | Performed by: PHYSICAL THERAPIST

## 2019-02-15 PROCEDURE — 97162 PT EVAL MOD COMPLEX 30 MIN: CPT | Performed by: PHYSICAL THERAPIST

## 2019-02-15 NOTE — PROGRESS NOTES
PT Evaluation     Today's date: 2/15/2019  Patient name: Neo Flores  : 1950  MRN: [id number]  Referring provider: Galo Gillespie MD  Dx:   Encounter Diagnosis     ICD-10-CM    1  Trochanteric bursitis of right hip M70 61                   Assessment  Assessment details: Neo Flores is a 77 y o  female present with:   Trochanteric bursitis of right hip  (primary encounter diagnosis)    Marissa Starks has the above listed impairments and will benefit from skilled PT to improve deficits to return to prior level of function  Impairments: abnormal muscle firing, abnormal or restricted ROM, activity intolerance, impaired physical strength, lacks appropriate home exercise program and pain with function  Understanding of Dx/Px/POC: good   Prognosis: good    Goals  Impairment Goals  - Decrease pain   - Improve ROM to [facility]  - Increase strength     Functional Goals  - Return to Prior Level of Function  - Increase Functional Status Measure  - Patient will be independent with HEP    Plan  Patient would benefit from: skilled PT  Planned therapy interventions: joint mobilization, manual therapy, patient education, postural training, activity modification, abdominal trunk stabilization, body mechanics training, flexibility, functional ROM exercises, graded exercise, home exercise program, neuromuscular re-education, strengthening, stretching, therapeutic activities and therapeutic exercise  Frequency: 2x week  Duration in weeks: 8  Treatment plan discussed with: patient        Subjective Evaluation    History of Present Illness  Mechanism of injury: This pt presents with c/o bilat hip pain R > L     About 1 y a  She recvd an injection in R hip with good relief  Pain gradually returned over time  Had an injection about 2 wks ago with no improvement  Pain is in area of Gr  Trochanter  Pain with R sidelying, sitting in bucket seat causes pain to radiate into post thigh  Frequent cramping in R calf    Some relief with stretching  Pain  Current pain ratin  At best pain ratin  At worst pain ratin    Patient Goals  Patient goals for therapy: decreased pain, independence with ADLs/IADLs and return to sport/leisure activities          Objective  Hips grossly level  Trunk flexion 50% limited by tightness R hamstring  Gait: slightly antalgic, decreased arm swing  SLB: < 5 sec R and L, mild Trendelenburg R  SLR + for tight hams R > L    L hip PROM wfl, pain at end range ER, tightness noted end range flexion    MMT  Hip flx: 3+/5 billat  Abd: R 3+/5 L 4/5  Ext 3+/5      TTP R trochanter      Precautions: none    Daily Treatment Diary       Manuals 2/15            PROM             jt mobs prn             Man ham stretch                          Exercise Diary              Bridge with CA             Supine  Clam shells             Bent knee fall out             SLR with CA             S/l abd             Side step             SLB                                                                                                                                                                                                                Modalities

## 2019-02-18 ENCOUNTER — OFFICE VISIT (OUTPATIENT)
Dept: PHYSICAL THERAPY | Facility: CLINIC | Age: 69
End: 2019-02-18
Payer: MEDICARE

## 2019-02-18 DIAGNOSIS — M70.61 TROCHANTERIC BURSITIS OF RIGHT HIP: Primary | ICD-10-CM

## 2019-02-18 PROCEDURE — 97140 MANUAL THERAPY 1/> REGIONS: CPT | Performed by: PHYSICAL THERAPIST

## 2019-02-18 PROCEDURE — 97110 THERAPEUTIC EXERCISES: CPT | Performed by: PHYSICAL THERAPIST

## 2019-02-18 NOTE — PROGRESS NOTES
Daily Note     Today's date: 2019  Patient name: Bel Garnica  : 1950  MRN: [id number]  Referring provider: Sheryl Stauffer MD  Dx:   Encounter Diagnosis     ICD-10-CM    1  Trochanteric bursitis of right hip M70 61                   Subjective: notices some improvement      Objective: See treatment diary below  Precautions: none    Daily Treatment Diary       Manuals 2/15 2/18           PROM  5'           jt mobs prn             Man ham stretch  3'                        Exercise Diary              Bridge with CA  30           Supine  Clam shells  Blue x30           Bent knee fall out  30           SLR with CA             S/l abd             Side step             SLB  home           glute sets  30                                                                                                                                                                                                 Modalities                                                 Assessment: Tolerated treatment well  Patient demonstrated fatigue post treatment      Plan: Continue per plan of care

## 2019-02-20 ENCOUNTER — OFFICE VISIT (OUTPATIENT)
Dept: PHYSICAL THERAPY | Facility: CLINIC | Age: 69
End: 2019-02-20
Payer: MEDICARE

## 2019-02-20 DIAGNOSIS — M70.61 TROCHANTERIC BURSITIS OF RIGHT HIP: Primary | ICD-10-CM

## 2019-02-20 PROCEDURE — 97140 MANUAL THERAPY 1/> REGIONS: CPT | Performed by: PHYSICAL THERAPIST

## 2019-02-20 PROCEDURE — 97110 THERAPEUTIC EXERCISES: CPT | Performed by: PHYSICAL THERAPIST

## 2019-02-20 NOTE — PROGRESS NOTES
Daily Note     Today's date: 2019  Patient name: La León  : 1950  MRN: [id number]  Referring provider: Jeri Simmons MD  Dx:   Encounter Diagnosis     ICD-10-CM    1  Trochanteric bursitis of right hip M70 61                   Subjective: notices some improvement      Objective: See treatment diary below  Precautions: none    Daily Treatment Diary       Manuals 2/15 2/18 2/20          PROM  5' 6'          jt mobs prn             Man ham stretch  3' 2'                       Exercise Diary              Bridge with CA  30 30          Supine  Clam shells  Blue x30 duke x30          Bent knee fall out  30 30          SLR with CA   2x10          S/l abd   -          Side step   3'          SLB  home           glute sets  30 30                                                                                                                                                                                                Modalities                                                 Assessment: Tolerated treatment well  Patient demonstrated fatigue post treatment      Plan: Continue per plan of care

## 2019-02-22 ENCOUNTER — HOSPITAL ENCOUNTER (OUTPATIENT)
Dept: NEUROLOGY | Facility: AMBULATORY SURGERY CENTER | Age: 69
Discharge: HOME | End: 2019-02-22
Payer: MEDICARE

## 2019-02-22 DIAGNOSIS — G40.209 COMPLEX PARTIAL SEIZURES WITH CONSCIOUSNESS IMPAIRED (HCC): ICD-10-CM

## 2019-02-22 DIAGNOSIS — G40.119 EPILEPSIA PARTIALIS CONTINUA WITH INTRACTABLE EPILEPSY (HCC): ICD-10-CM

## 2019-02-22 PROCEDURE — 95819 EEG AWAKE AND ASLEEP: CPT

## 2019-02-26 ENCOUNTER — OFFICE VISIT (OUTPATIENT)
Dept: PHYSICAL THERAPY | Facility: CLINIC | Age: 69
End: 2019-02-26
Payer: MEDICARE

## 2019-02-26 DIAGNOSIS — M70.61 TROCHANTERIC BURSITIS OF RIGHT HIP: Primary | ICD-10-CM

## 2019-02-26 PROCEDURE — 97110 THERAPEUTIC EXERCISES: CPT | Performed by: PHYSICAL THERAPIST

## 2019-02-26 PROCEDURE — 97140 MANUAL THERAPY 1/> REGIONS: CPT | Performed by: PHYSICAL THERAPIST

## 2019-02-26 NOTE — PROGRESS NOTES
Daily Note     Today's date: 2019  Patient name: Yesi Abarca  : 1950  MRN: [id number]  Referring provider: Lee Roberts MD  Dx:   Encounter Diagnosis     ICD-10-CM    1  Trochanteric bursitis of right hip M70 61                   Subjective: notices some improvement      Objective: See treatment diary below  Precautions: none    Daily Treatment Diary       Manuals 2/15 2/18 2/20 2/26         PROM  5' 6' 6'         jt mobs prn             Man ham stretch  3' 2' 2'                      Exercise Diary              Bridge with CA  30 30 30         Supine  Clam shells  Blue x30 duke x30 blux30         Bent knee fall out  30 30 30         SLR with CA   2x10 2x10         S/l abd   -          Side step   3' 3'         SLB  home           glute sets  30 30 30         Single leg squat    2x10 ea         Step up    3x10 6"         Single leg squat    2s10                                                                                                                                                        Modalities                                                 Assessment: Tolerated treatment well  Patient demonstrated fatigue post treatment      Plan: Continue per plan of care

## 2019-03-01 ENCOUNTER — OFFICE VISIT (OUTPATIENT)
Dept: PHYSICAL THERAPY | Facility: CLINIC | Age: 69
End: 2019-03-01
Payer: MEDICARE

## 2019-03-01 DIAGNOSIS — M70.61 TROCHANTERIC BURSITIS OF RIGHT HIP: Primary | ICD-10-CM

## 2019-03-01 PROCEDURE — 97110 THERAPEUTIC EXERCISES: CPT | Performed by: PHYSICAL THERAPIST

## 2019-03-01 PROCEDURE — 97140 MANUAL THERAPY 1/> REGIONS: CPT | Performed by: PHYSICAL THERAPIST

## 2019-03-01 NOTE — PROGRESS NOTES
Daily Note     Today's date: 3/1/2019  Patient name: Kaylan Rene  : 1950  MRN: [id number]  Referring provider: Carly Curran MD  Dx:   Encounter Diagnosis     ICD-10-CM    1  Trochanteric bursitis of right hip M70 61                   Subjective: reports feeling some tightness/soreness from exercising yesterday, thinks she might have "overdone" it      Objective: See treatment diary below  Precautions: none    Daily Treatment Diary       Manuals 2/15 2/18 2/20 2/26 3/1        PROM  5' 6' 6' 6'        jt mobs prn             Man ham stretch  3' 2' 2' 3'                     Exercise Diary              Bridge with CA  30 30 30 30        Supine  Clam shells  Blue x30 duke x30 blux30 duke x30        Bent knee fall out  30 30 30 30        SLR with CA   2x10 2x10 2x10        S/l abd   -          Side step   3' 3' 3'        SLB  home           glute sets  30 30 30 30        Single leg squat    2x10 ea         Step up    3x10 6" nv        Single leg squat    2s10 nv        bike     5'                                                                                                                                          Modalities                                                 Assessment: Tolerated treatment well  Patient demonstrated fatigue post treatment      Plan: Continue per plan of care

## 2019-03-04 ENCOUNTER — APPOINTMENT (OUTPATIENT)
Dept: PHYSICAL THERAPY | Facility: CLINIC | Age: 69
End: 2019-03-04
Payer: MEDICARE

## 2019-03-07 ENCOUNTER — OFFICE VISIT (OUTPATIENT)
Dept: PHYSICAL THERAPY | Facility: CLINIC | Age: 69
End: 2019-03-07
Payer: MEDICARE

## 2019-03-07 DIAGNOSIS — M70.61 TROCHANTERIC BURSITIS OF RIGHT HIP: Primary | ICD-10-CM

## 2019-03-07 PROCEDURE — 97140 MANUAL THERAPY 1/> REGIONS: CPT | Performed by: PHYSICAL THERAPIST

## 2019-03-07 PROCEDURE — 97110 THERAPEUTIC EXERCISES: CPT | Performed by: PHYSICAL THERAPIST

## 2019-03-07 NOTE — PROGRESS NOTES
DC summary    Today's date: 3/7/2019  Patient name: La León  : 1950  MRN: [id number]  Referring provider: Jeri Simmons MD  Dx:   Encounter Diagnosis     ICD-10-CM    1  Trochanteric bursitis of right hip M70 61                   Subjective: reports feeling some tightness/soreness from doing a new yoga program yesterday  She is pleased with her progress, goal to decrease pain and improve LOF has been met  Current pain 0/10      Objective: See treatment diary below  Precautions: none    Daily Treatment Diary       Manuals 2/15 2/18 2/20 2/26 3/1 3/7       PROM  5' 6' 6' 6' 6'       jt mobs prn             Man ham stretch  3' 2' 2' 3' 2'                    Exercise Diary              Bridge with CA  30 30 30 30 30       Supine  Clam shells  Blue x30 duke x30 blux30 duke x30 duke x30       Bent knee fall out  30 30 30 30 30       SLR with CA   2x10 2x10 2x10 2x10       S/l abd   -          Side step   3' 3' 3'        SLB  home           glute sets  30 30 30 30 30       Single leg squat    2x10 ea         Step up    3x10 6" nv        Single leg squat    2s10 nv        bike     5' 5'                                                                                                                                         Modalities                                           Goals  Impairment Goals  - Decrease pain met  - Improve ROM to [facility] met  - Increase strength  met    Functional Goals  - Return to Prior Level of Function - met  - Increase Functional Status Measure - met  - Patient will be independent with HEP - met        Assessment: Tolerated treatment well  Patient demonstrated fatigue post treatment      Plan: DC to HEP    F/u prn

## 2019-03-11 ENCOUNTER — APPOINTMENT (OUTPATIENT)
Dept: LAB | Facility: CLINIC | Age: 69
End: 2019-03-11
Payer: MEDICARE

## 2019-03-11 ENCOUNTER — APPOINTMENT (OUTPATIENT)
Dept: PHYSICAL THERAPY | Facility: CLINIC | Age: 69
End: 2019-03-11
Payer: MEDICARE

## 2019-03-11 ENCOUNTER — TRANSCRIBE ORDERS (OUTPATIENT)
Dept: ADMINISTRATIVE | Facility: HOSPITAL | Age: 69
End: 2019-03-11

## 2019-03-11 DIAGNOSIS — Z79.899 NEED FOR PROPHYLACTIC CHEMOTHERAPY: Primary | ICD-10-CM

## 2019-03-11 DIAGNOSIS — Z79.899 NEED FOR PROPHYLACTIC CHEMOTHERAPY: ICD-10-CM

## 2019-03-11 LAB — VALPROATE SERPL-MCNC: 67 UG/ML (ref 50–100)

## 2019-03-11 PROCEDURE — 36415 COLL VENOUS BLD VENIPUNCTURE: CPT

## 2019-03-11 PROCEDURE — 80164 ASSAY DIPROPYLACETIC ACD TOT: CPT

## 2019-03-14 ENCOUNTER — APPOINTMENT (OUTPATIENT)
Dept: PHYSICAL THERAPY | Facility: CLINIC | Age: 69
End: 2019-03-14
Payer: MEDICARE

## 2019-03-18 ENCOUNTER — APPOINTMENT (OUTPATIENT)
Dept: PHYSICAL THERAPY | Facility: CLINIC | Age: 69
End: 2019-03-18
Payer: MEDICARE

## 2019-03-21 ENCOUNTER — APPOINTMENT (OUTPATIENT)
Dept: PHYSICAL THERAPY | Facility: CLINIC | Age: 69
End: 2019-03-21
Payer: MEDICARE

## 2019-03-25 ENCOUNTER — APPOINTMENT (OUTPATIENT)
Dept: PHYSICAL THERAPY | Facility: CLINIC | Age: 69
End: 2019-03-25
Payer: MEDICARE

## 2019-03-26 ENCOUNTER — TELEPHONE (OUTPATIENT)
Dept: OBGYN CLINIC | Facility: CLINIC | Age: 69
End: 2019-03-26

## 2019-03-26 DIAGNOSIS — N95.1 SYMPTOMATIC MENOPAUSAL OR FEMALE CLIMACTERIC STATES: ICD-10-CM

## 2019-03-26 NOTE — TELEPHONE ENCOUNTER
Patient call for refill on elestrin gel      Last appt 8/22/18    Next appt 5/6/19    Pharmacy updated  Routing to provider

## 2019-03-28 ENCOUNTER — APPOINTMENT (OUTPATIENT)
Dept: PHYSICAL THERAPY | Facility: CLINIC | Age: 69
End: 2019-03-28
Payer: MEDICARE

## 2019-04-14 ENCOUNTER — APPOINTMENT (OUTPATIENT)
Dept: LAB | Facility: HOSPITAL | Age: 69
End: 2019-04-14
Attending: PSYCHIATRY & NEUROLOGY
Payer: MEDICARE

## 2019-04-14 ENCOUNTER — TRANSCRIBE ORDERS (OUTPATIENT)
Dept: LAB | Facility: HOSPITAL | Age: 69
End: 2019-04-14

## 2019-04-14 DIAGNOSIS — Z79.899 ENCOUNTER FOR LONG-TERM (CURRENT) USE OF OTHER MEDICATIONS: Primary | ICD-10-CM

## 2019-04-14 DIAGNOSIS — Z79.899 ENCOUNTER FOR LONG-TERM (CURRENT) USE OF OTHER MEDICATIONS: ICD-10-CM

## 2019-04-14 LAB
ALBUMIN SERPL BCP-MCNC: 3.3 G/DL (ref 3.5–5)
ALP SERPL-CCNC: 53 U/L (ref 46–116)
ALT SERPL W P-5'-P-CCNC: 23 U/L (ref 12–78)
ANION GAP SERPL CALCULATED.3IONS-SCNC: 5 MMOL/L (ref 4–13)
AST SERPL W P-5'-P-CCNC: 14 U/L (ref 5–45)
BASOPHILS # BLD AUTO: 0.03 THOUSANDS/ΜL (ref 0–0.1)
BASOPHILS NFR BLD AUTO: 1 % (ref 0–1)
BILIRUB SERPL-MCNC: 0.44 MG/DL (ref 0.2–1)
BUN SERPL-MCNC: 27 MG/DL (ref 5–25)
CALCIUM SERPL-MCNC: 8.8 MG/DL (ref 8.3–10.1)
CHLORIDE SERPL-SCNC: 109 MMOL/L (ref 100–108)
CO2 SERPL-SCNC: 27 MMOL/L (ref 21–32)
CREAT SERPL-MCNC: 0.83 MG/DL (ref 0.6–1.3)
EOSINOPHIL # BLD AUTO: 0.04 THOUSAND/ΜL (ref 0–0.61)
EOSINOPHIL NFR BLD AUTO: 1 % (ref 0–6)
ERYTHROCYTE [DISTWIDTH] IN BLOOD BY AUTOMATED COUNT: 12.7 % (ref 11.6–15.1)
GFR SERPL CREATININE-BSD FRML MDRD: 72 ML/MIN/1.73SQ M
GLUCOSE SERPL-MCNC: 93 MG/DL (ref 65–140)
HCT VFR BLD AUTO: 41 % (ref 34.8–46.1)
HGB BLD-MCNC: 13 G/DL (ref 11.5–15.4)
IMM GRANULOCYTES # BLD AUTO: 0.03 THOUSAND/UL (ref 0–0.2)
IMM GRANULOCYTES NFR BLD AUTO: 1 % (ref 0–2)
LYMPHOCYTES # BLD AUTO: 1.77 THOUSANDS/ΜL (ref 0.6–4.47)
LYMPHOCYTES NFR BLD AUTO: 31 % (ref 14–44)
MCH RBC QN AUTO: 30.8 PG (ref 26.8–34.3)
MCHC RBC AUTO-ENTMCNC: 31.7 G/DL (ref 31.4–37.4)
MCV RBC AUTO: 97 FL (ref 82–98)
MONOCYTES # BLD AUTO: 0.59 THOUSAND/ΜL (ref 0.17–1.22)
MONOCYTES NFR BLD AUTO: 10 % (ref 4–12)
NEUTROPHILS # BLD AUTO: 3.35 THOUSANDS/ΜL (ref 1.85–7.62)
NEUTS SEG NFR BLD AUTO: 56 % (ref 43–75)
NRBC BLD AUTO-RTO: 0 /100 WBCS
PLATELET # BLD AUTO: 275 THOUSANDS/UL (ref 149–390)
PMV BLD AUTO: 11.4 FL (ref 8.9–12.7)
POTASSIUM SERPL-SCNC: 4.5 MMOL/L (ref 3.5–5.3)
PROT SERPL-MCNC: 6.4 G/DL (ref 6.4–8.2)
RBC # BLD AUTO: 4.22 MILLION/UL (ref 3.81–5.12)
SODIUM SERPL-SCNC: 141 MMOL/L (ref 136–145)
VALPROATE SERPL-MCNC: 62 UG/ML (ref 50–100)
WBC # BLD AUTO: 5.81 THOUSAND/UL (ref 4.31–10.16)

## 2019-04-14 PROCEDURE — 80053 COMPREHEN METABOLIC PANEL: CPT

## 2019-04-14 PROCEDURE — 85025 COMPLETE CBC W/AUTO DIFF WBC: CPT

## 2019-04-14 PROCEDURE — 80164 ASSAY DIPROPYLACETIC ACD TOT: CPT

## 2019-04-14 PROCEDURE — 36415 COLL VENOUS BLD VENIPUNCTURE: CPT

## 2019-04-18 RX ORDER — APIXABAN 5 MG/1
TABLET, FILM COATED ORAL
Qty: 30 TABLET | Refills: 0 | OUTPATIENT
Start: 2019-04-18

## 2019-05-02 DIAGNOSIS — L90.0 LICHEN SCLEROSUS ET ATROPHICUS: ICD-10-CM

## 2019-05-02 RX ORDER — CLOBETASOL PROPIONATE 0.5 MG/G
OINTMENT TOPICAL DAILY
Qty: 60 G | Refills: 3 | Status: SHIPPED | OUTPATIENT
Start: 2019-05-02 | End: 2020-03-02

## 2019-05-06 ENCOUNTER — OFFICE VISIT (OUTPATIENT)
Dept: OBGYN CLINIC | Facility: CLINIC | Age: 69
End: 2019-05-06
Payer: MEDICARE

## 2019-05-06 VITALS — WEIGHT: 180.6 LBS | BODY MASS INDEX: 31 KG/M2 | DIASTOLIC BLOOD PRESSURE: 66 MMHG | SYSTOLIC BLOOD PRESSURE: 104 MMHG

## 2019-05-06 DIAGNOSIS — N95.2 POSTMENOPAUSAL ATROPHIC VAGINITIS: ICD-10-CM

## 2019-05-06 DIAGNOSIS — L90.0 LICHEN SCLEROSUS: ICD-10-CM

## 2019-05-06 DIAGNOSIS — B37.2 CUTANEOUS CANDIDIASIS: Primary | ICD-10-CM

## 2019-05-06 PROBLEM — F10.20 ALCOHOL DEPENDENCE (HCC): Status: ACTIVE | Noted: 2019-04-15

## 2019-05-06 PROBLEM — F43.29 ADJUSTMENT DISORDER WITH MIXED EMOTIONAL FEATURES: Status: RESOLVED | Noted: 2017-04-05 | Resolved: 2019-05-06

## 2019-05-06 PROBLEM — F10.11 HISTORY OF ALCOHOL ABUSE: Status: ACTIVE | Noted: 2019-03-04

## 2019-05-06 PROBLEM — G40.109: Status: ACTIVE | Noted: 2019-02-06

## 2019-05-06 PROCEDURE — 99213 OFFICE O/P EST LOW 20 MIN: CPT | Performed by: OBSTETRICS & GYNECOLOGY

## 2019-05-06 PROCEDURE — 1123F ACP DISCUSS/DSCN MKR DOCD: CPT | Performed by: OBSTETRICS & GYNECOLOGY

## 2019-05-06 RX ORDER — NYSTATIN AND TRIAMCINOLONE ACETONIDE 100000; 1 [USP'U]/G; MG/G
OINTMENT TOPICAL 2 TIMES DAILY
Qty: 60 G | Refills: 0 | Status: SHIPPED | OUTPATIENT
Start: 2019-05-06 | End: 2020-03-02

## 2019-05-06 RX ORDER — FLUCONAZOLE 150 MG/1
150 TABLET ORAL
Qty: 3 TABLET | Refills: 0 | Status: SHIPPED | OUTPATIENT
Start: 2019-05-06 | End: 2019-05-10 | Stop reason: SDUPTHER

## 2019-05-10 DIAGNOSIS — B37.2 CUTANEOUS CANDIDIASIS: ICD-10-CM

## 2019-05-10 RX ORDER — FLUCONAZOLE 150 MG/1
TABLET ORAL
Qty: 3 TABLET | Refills: 0 | Status: SHIPPED | OUTPATIENT
Start: 2019-05-10 | End: 2019-05-13

## 2019-07-18 DIAGNOSIS — I48.91 ATRIAL FIBRILLATION, UNSPECIFIED TYPE (HCC): ICD-10-CM

## 2019-07-19 RX ORDER — APIXABAN 5 MG/1
TABLET, FILM COATED ORAL
Qty: 180 TABLET | Refills: 1 | Status: SHIPPED | OUTPATIENT
Start: 2019-07-19 | End: 2020-01-15

## 2019-07-27 ENCOUNTER — APPOINTMENT (OUTPATIENT)
Dept: LAB | Facility: HOSPITAL | Age: 69
End: 2019-07-27
Payer: MEDICARE

## 2019-07-27 DIAGNOSIS — Z79.899 ENCOUNTER FOR LONG-TERM (CURRENT) USE OF OTHER MEDICATIONS: Primary | ICD-10-CM

## 2019-07-27 DIAGNOSIS — I10 ESSENTIAL HYPERTENSION, MALIGNANT: ICD-10-CM

## 2019-07-27 DIAGNOSIS — R07.9 CHEST PAIN, UNSPECIFIED TYPE: ICD-10-CM

## 2019-07-27 LAB
ALBUMIN SERPL BCP-MCNC: 3.3 G/DL (ref 3.5–5)
ALP SERPL-CCNC: 54 U/L (ref 46–116)
ALT SERPL W P-5'-P-CCNC: 17 U/L (ref 12–78)
ANION GAP SERPL CALCULATED.3IONS-SCNC: 5 MMOL/L (ref 4–13)
AST SERPL W P-5'-P-CCNC: 13 U/L (ref 5–45)
BASOPHILS # BLD AUTO: 0.02 THOUSANDS/ΜL (ref 0–0.1)
BASOPHILS NFR BLD AUTO: 0 % (ref 0–1)
BILIRUB DIRECT SERPL-MCNC: 0.1 MG/DL (ref 0–0.2)
BILIRUB SERPL-MCNC: 0.34 MG/DL (ref 0.2–1)
BUN SERPL-MCNC: 20 MG/DL (ref 5–25)
CALCIUM SERPL-MCNC: 9.1 MG/DL (ref 8.3–10.1)
CHLORIDE SERPL-SCNC: 105 MMOL/L (ref 100–108)
CHOLEST SERPL-MCNC: 156 MG/DL (ref 50–200)
CO2 SERPL-SCNC: 28 MMOL/L (ref 21–32)
CREAT SERPL-MCNC: 0.73 MG/DL (ref 0.6–1.3)
EOSINOPHIL # BLD AUTO: 0.07 THOUSAND/ΜL (ref 0–0.61)
EOSINOPHIL NFR BLD AUTO: 2 % (ref 0–6)
ERYTHROCYTE [DISTWIDTH] IN BLOOD BY AUTOMATED COUNT: 12.7 % (ref 11.6–15.1)
GFR SERPL CREATININE-BSD FRML MDRD: 83 ML/MIN/1.73SQ M
GLUCOSE P FAST SERPL-MCNC: 94 MG/DL (ref 65–99)
HCT VFR BLD AUTO: 41.3 % (ref 34.8–46.1)
HDLC SERPL-MCNC: 45 MG/DL (ref 40–60)
HGB BLD-MCNC: 13 G/DL (ref 11.5–15.4)
IMM GRANULOCYTES # BLD AUTO: 0.03 THOUSAND/UL (ref 0–0.2)
IMM GRANULOCYTES NFR BLD AUTO: 1 % (ref 0–2)
LDLC SERPL CALC-MCNC: 81 MG/DL (ref 0–100)
LYMPHOCYTES # BLD AUTO: 1.18 THOUSANDS/ΜL (ref 0.6–4.47)
LYMPHOCYTES NFR BLD AUTO: 25 % (ref 14–44)
MCH RBC QN AUTO: 30.5 PG (ref 26.8–34.3)
MCHC RBC AUTO-ENTMCNC: 31.5 G/DL (ref 31.4–37.4)
MCV RBC AUTO: 97 FL (ref 82–98)
MONOCYTES # BLD AUTO: 0.59 THOUSAND/ΜL (ref 0.17–1.22)
MONOCYTES NFR BLD AUTO: 13 % (ref 4–12)
NEUTROPHILS # BLD AUTO: 2.8 THOUSANDS/ΜL (ref 1.85–7.62)
NEUTS SEG NFR BLD AUTO: 59 % (ref 43–75)
NONHDLC SERPL-MCNC: 111 MG/DL
NRBC BLD AUTO-RTO: 0 /100 WBCS
PLATELET # BLD AUTO: 285 THOUSANDS/UL (ref 149–390)
PMV BLD AUTO: 11.4 FL (ref 8.9–12.7)
POTASSIUM SERPL-SCNC: 4.3 MMOL/L (ref 3.5–5.3)
PROT SERPL-MCNC: 6.5 G/DL (ref 6.4–8.2)
RBC # BLD AUTO: 4.26 MILLION/UL (ref 3.81–5.12)
SODIUM SERPL-SCNC: 138 MMOL/L (ref 136–145)
TRIGL SERPL-MCNC: 152 MG/DL
VALPROATE SERPL-MCNC: 69 UG/ML (ref 50–100)
WBC # BLD AUTO: 4.69 THOUSAND/UL (ref 4.31–10.16)

## 2019-07-27 PROCEDURE — 80061 LIPID PANEL: CPT

## 2019-07-27 PROCEDURE — 82248 BILIRUBIN DIRECT: CPT

## 2019-07-27 PROCEDURE — 80053 COMPREHEN METABOLIC PANEL: CPT

## 2019-07-27 PROCEDURE — 85025 COMPLETE CBC W/AUTO DIFF WBC: CPT

## 2019-07-27 PROCEDURE — 36415 COLL VENOUS BLD VENIPUNCTURE: CPT

## 2019-07-27 PROCEDURE — 80164 ASSAY DIPROPYLACETIC ACD TOT: CPT

## 2019-08-01 ENCOUNTER — TELEPHONE (OUTPATIENT)
Dept: INTERNAL MEDICINE CLINIC | Facility: CLINIC | Age: 69
End: 2019-08-01

## 2019-08-01 DIAGNOSIS — M47.812 OSTEOARTHRITIS OF CERVICAL SPINE, UNSPECIFIED SPINAL OSTEOARTHRITIS COMPLICATION STATUS: Primary | ICD-10-CM

## 2019-08-01 RX ORDER — METHOCARBAMOL 500 MG/1
TABLET, FILM COATED ORAL
Qty: 40 TABLET | Refills: 0 | Status: SHIPPED | OUTPATIENT
Start: 2019-08-01 | End: 2021-03-22 | Stop reason: ALTCHOICE

## 2019-08-01 NOTE — TELEPHONE ENCOUNTER
Take temp    Her muscle relaxant, robaxin was called in earlier today, hope that helps with her muscle aches

## 2019-08-06 ENCOUNTER — OFFICE VISIT (OUTPATIENT)
Dept: INTERNAL MEDICINE CLINIC | Facility: CLINIC | Age: 69
End: 2019-08-06
Payer: MEDICARE

## 2019-08-06 VITALS
BODY MASS INDEX: 30.97 KG/M2 | WEIGHT: 181.4 LBS | HEIGHT: 64 IN | DIASTOLIC BLOOD PRESSURE: 70 MMHG | RESPIRATION RATE: 15 BRPM | SYSTOLIC BLOOD PRESSURE: 116 MMHG | HEART RATE: 67 BPM | OXYGEN SATURATION: 93 % | TEMPERATURE: 97.9 F

## 2019-08-06 DIAGNOSIS — I48.0 PAROXYSMAL ATRIAL FIBRILLATION (HCC): ICD-10-CM

## 2019-08-06 DIAGNOSIS — G47.00 INSOMNIA, UNSPECIFIED TYPE: ICD-10-CM

## 2019-08-06 DIAGNOSIS — E66.9 OBESITY (BMI 30.0-34.9): ICD-10-CM

## 2019-08-06 DIAGNOSIS — R20.2 PARESTHESIA OF BOTH HANDS: Primary | ICD-10-CM

## 2019-08-06 DIAGNOSIS — I10 ESSENTIAL HYPERTENSION: ICD-10-CM

## 2019-08-06 DIAGNOSIS — E78.2 MIXED HYPERLIPIDEMIA: ICD-10-CM

## 2019-08-06 DIAGNOSIS — R40.0 DAYTIME SLEEPINESS: ICD-10-CM

## 2019-08-06 DIAGNOSIS — R06.83 SNORING: ICD-10-CM

## 2019-08-06 PROBLEM — R07.89 CHEST TIGHTNESS: Status: RESOLVED | Noted: 2018-11-11 | Resolved: 2019-08-06

## 2019-08-06 PROBLEM — E66.811 OBESITY (BMI 30.0-34.9): Status: ACTIVE | Noted: 2019-08-06

## 2019-08-06 PROCEDURE — 99214 OFFICE O/P EST MOD 30 MIN: CPT | Performed by: INTERNAL MEDICINE

## 2019-08-06 NOTE — ASSESSMENT & PLAN NOTE
Has noctural awakenings, daytime sleepiness, snoring, high BMI and fairly new onset of a fib    Eval for sleep apnea

## 2019-08-06 NOTE — ASSESSMENT & PLAN NOTE
Has known b/l cervical foraminal stenosis on MRI, symptoms also correspond to median nerve entrapment, therefore will eval with EMG  Use wrist splints in the meantime

## 2019-08-06 NOTE — ASSESSMENT & PLAN NOTE
Dose adjustment made several months ago with improved cholesterol    Continue on atorvastatin 20mg daily

## 2019-08-06 NOTE — ASSESSMENT & PLAN NOTE
BMI 31 with comorbidities  She actively tries to lose weight with exercising, advised also decreasing overall calorie intake  Monitor

## 2019-08-06 NOTE — PATIENT INSTRUCTIONS
Obesity   AMBULATORY CARE:   Obesity  is when your body mass index (BMI) is greater than 30  Your healthcare provider will use your height and weight to measure your BMI  The risks of obesity include  many health problems, such as injuries or physical disability  You may need tests to check for the following:  · Diabetes     · High blood pressure or high cholesterol     · Heart disease     · Gallbladder or liver disease     · Cancer of the colon, breast, prostate, liver, or kidney     · Sleep apnea     · Arthritis or gout  Seek care immediately if:   · You have a severe headache, confusion, or difficulty speaking  · You have weakness on one side of your body  · You have chest pain, sweating, or shortness of breath  Contact your healthcare provider if:   · You have symptoms of gallbladder or liver disease, such as pain in your upper abdomen  · You have knee or hip pain and discomfort while walking  · You have symptoms of diabetes, such as intense hunger and thirst, and frequent urination  · You have symptoms of sleep apnea, such as snoring or daytime sleepiness  · You have questions or concerns about your condition or care  Treatment for obesity  focuses on helping you lose weight to improve your health  Even a small decrease in BMI can reduce the risk for many health problems  Your healthcare provider will help you set a weight-loss goal   · Lifestyle changes  are the first step in treating obesity  These include making healthy food choices and getting regular physical activity  Your healthcare provider may suggest a weight-loss program that involves coaching, education, and therapy  · Medicine  may help you lose weight when it is used with a healthy diet and physical activity  · Surgery  can help you lose weight if you are very obese and have other health problems  There are several types of weight-loss surgery  Ask your healthcare provider for more information    Be successful losing weight:   · Set small, realistic goals  An example of a small goal is to walk for 20 minutes 5 days a week  Anther goal is to lose 5% of your body weight  · Tell friends, family members, and coworkers about your goals  and ask for their support  Ask a friend to lose weight with you, or join a weight-loss support group  · Identify foods or triggers that may cause you to overeat , and find ways to avoid them  Remove tempting high-calorie foods from your home and workplace  Place a bowl of fresh fruit on your kitchen counter  If stress causes you to eat, then find other ways to cope with stress  · Keep a diary to track what you eat and drink  Also write down how many minutes of physical activity you do each day  Weigh yourself once a week and record it in your diary  Eating changes: You will need to eat 500 to 1,000 fewer calories each day than you currently eat to lose 1 to 2 pounds a week  The following changes will help you cut calories:  · Eat smaller portions  Use small plates, no larger than 9 inches in diameter  Fill your plate half full of fruits and vegetables  Measure your food using measuring cups until you know what a serving size looks like  · Eat 3 meals and 1 or 2 snacks each day  Plan your meals in advance  [employer] and eat at home most of the time  Eat slowly  · Eat fruits and vegetables at every meal   They are low in calories and high in fiber, which makes you feel full  Do not add butter, margarine, or cream sauce to vegetables  Use herbs to season steamed vegetables  · Eat less fat and fewer fried foods  Eat more baked or grilled chicken and fish  These protein sources are lower in calories and fat than red meat  Limit fast food  Dress your salads with olive oil and vinegar instead of bottled dressing  · Limit the amount of sugar you eat  Do not drink sugary beverages  Limit alcohol  Activity changes:  Physical activity is good for your body in many ways   It helps you burn calories and build strong muscles  It decreases stress and depression, and improves your mood  It can also help you sleep better  Talk to your healthcare provider before you begin an exercise program   · Exercise for at least 30 minutes 5 days a week  Start slowly  Set aside time each day for physical activity that you enjoy and that is convenient for you  It is best to do both weight training and an activity that increases your heart rate, such as walking, bicycling, or swimming  · Find ways to be more active  Do yard work and housecleaning  Walk up the stairs instead of using elevators  Spend your leisure time going to events that require walking, such as outdoor festivals or fairs  This extra physical activity can help you lose weight and keep it off  Follow up with your healthcare provider as directed: You may need to meet with a dietitian  Write down your questions so you remember to ask them during your visits  © 2017 [facility] Information is for End User's use only and may not be sold, redistributed or otherwise used for commercial purposes  All illustrations and images included in CareNotes® are the copyrighted property of [employer]  or [employer]  The above information is an  only  It is not intended as medical advice for individual conditions or treatments  Talk to your doctor, nurse or pharmacist before following any medical regimen to see if it is safe and effective for you  Low Fat Diet   AMBULATORY CARE:   A low-fat diet  is an eating plan that is low in total fat, unhealthy fat, and cholesterol  You may need to follow a low-fat diet if you have trouble digesting or absorbing fat  You may also need to follow this diet if you have high cholesterol  You can also lower your cholesterol by increasing the amount of fiber in your diet  Soluble fiber is a type of fiber that helps to decrease cholesterol levels     Different types of fat in food:   · Limit unhealthy fats  A diet that is high in cholesterol, saturated fat, and trans fat may cause unhealthy cholesterol levels  Unhealthy cholesterol levels increase your risk of heart disease  ¨ Cholesterol:  Limit intake of cholesterol to less than 200 mg per day  Cholesterol is found in meat, eggs, and dairy  ¨ Saturated fat:  Limit saturated fat to less than 7% of your total daily calories  Ask your dietitian how many calories you need each day  Saturated fat is found in butter, cheese, ice cream, whole milk, and palm oil  Saturated fat is also found in meat, such as beef, pork, chicken skin, and processed meats  Processed meats include sausage, hot dogs, and bologna  ¨ Trans fat:  Avoid trans fat as much as possible  Trans fat is used in fried and baked foods  Foods that say trans fat free on the label may still have up to 0 5 grams of trans fat per serving  · Include healthy fats  Replace foods that are high in saturated and trans fat with foods high in healthy fats  This may help to decrease high cholesterol levels  ¨ Monounsaturated fats: These are found in avocados, nuts, and vegetable oils, such as olive, canola, and sunflower oil  ¨ Polyunsaturated fats: These can be found in vegetable oils, such as soybean or corn oil  Omega-3 fats can help to decrease the risk of heart disease  Omega-3 fats are found in fish, such as salmon, herring, trout, and tuna  Omega-3 fats can also be found in plant foods, such as walnuts, flaxseed, soybeans, and canola oil    Foods to limit or avoid:   · Grains:      ¨ Snacks that are made with partially hydrogenated oils, such as chips, regular crackers, and butter-flavored popcorn    ¨ High-fat baked goods, such as biscuits, croissants, doughnuts, pies, cookies, and pastries    · Dairy:      ¨ Whole milk, 2% milk, and yogurt and ice cream made with whole milk    ¨ Half and half creamer, heavy cream, and whipping cream    ¨ Cheese, cream cheese, and sour cream    · Meats and proteins:      ¨ High-fat cuts of meat (T-bone steak, regular hamburger, and ribs)    ¨ Fried meat, poultry (turkey and chicken), and fish    ¨ Poultry (chicken and turkey) with skin    ¨ Cold cuts (salami or bologna), hot dogs, wheat, and sausage    ¨ Whole eggs and egg yolks    · Vegetables and fruits with added fat:      ¨ Fried vegetables or vegetables in butter or high-fat sauces, such as cream or cheese sauces    ¨ Fried fruit or fruit served with butter or cream    · Fats:      ¨ Butter, stick margarine, and shortening    ¨ Coconut, palm oil, and palm kernel oil  Foods to include:   · Grains:      ¨ Whole-grain breads, cereals, pasta, and brown rice    ¨ Low-fat crackers and pretzels    · Vegetables and fruits:      ¨ Fresh, frozen, or canned vegetables (no salt or low-sodium)    ¨ Fresh, frozen, dried, or canned fruit (canned in light syrup or fruit juice)    ¨ Avocado    · Low-fat dairy products:      ¨ Nonfat (skim) or 1% milk    ¨ Nonfat or low-fat cheese, yogurt, and cottage cheese    · Meats and proteins:      ¨ Chicken or turkey with no skin    ¨ Baked or broiled fish    ¨ Lean beef and pork (loin, round, extra lean hamburger)    ¨ Beans and peas, unsalted nuts, soy products    ¨ Egg whites and substitutes    ¨ Seeds and nuts    · Fats:      ¨ Unsaturated oil, such as canola, olive, peanut, soybean, or sunflower oil    ¨ Soft or liquid margarine and vegetable oil spread    ¨ Low-fat salad dressing  Other ways to decrease fat:   · Read food labels before you buy foods  Choose foods that have less than 30% of calories from fat  Choose low-fat or fat-free dairy products  Remember that fat free does not mean calorie free  These foods still contain calories, and too many calories can lead to weight gain  · Trim fat from meat and avoid fried food  Trim all visible fat from meat before you cook it  Remove the skin from poultry  Do not [place] meat, fish, or poultry  Bake, roast, boil, or broil these foods instead  Avoid fried foods  Eat a baked potato instead of Western Amanda fries  Steam vegetables instead of sautéing them in butter  · Add less fat to foods  Use imitation wheat bits on salads and baked potatoes instead of regular wheat bits  Use fat-free or low-fat salad dressings instead of regular dressings  Use low-fat or nonfat butter-flavored topping instead of regular butter or margarine on popcorn and other foods  Ways to decrease fat in recipes:  Replace high-fat ingredients with low-fat or nonfat ones  This may cause baked goods to be drier than usual  You may need to use nonfat cooking spray on pans to prevent food from sticking  You also may need to change the amount of other ingredients, such as water, in the recipe  Try the following:  · Use low-fat or light margarine instead of regular margarine or shortening  · Use lean ground turkey breast or chicken, or lean ground beef (less than 5% fat) instead of hamburger  · Add 1 teaspoon of canola oil to 8 ounces of skim milk instead of using cream or half and half  · Use grated zucchini, carrots, or apples in breads instead of coconut  · Use blenderized, low-fat cottage cheese, plain tofu, or low-fat ricotta cheese instead of cream cheese  · Use 1 egg white and 1 teaspoon of canola oil, or use ¼ cup (2 ounces) of fat-free egg substitute instead of a whole egg  · Replace half of the oil that is called for in a recipe with applesauce when you bake  Use 3 tablespoons of cocoa powder and 1 tablespoon of canola oil instead of a square of baking chocolate  How to increase fiber:  Eat enough high-fiber foods to get 20 to 30 grams of fiber every day  Slowly increase your fiber intake to avoid stomach cramps, gas, and other problems  · Eat 3 ounces of whole-grain foods each day  An ounce is about 1 slice of bread  Eat whole-grain breads, such as whole-wheat bread   Whole wheat, whole-wheat flour, or other whole grains should be listed as the first ingredient on the food label  Replace white flour with whole-grain flour or use half of each in recipes  Whole-grain flour is heavier than white flour, so you may have to add more yeast or baking powder  · Eat a high-fiber cereal for breakfast   Oatmeal is a good source of soluble fiber  Look for cereals that have bran or fiber in the name  Choose whole-grain products, such as brown rice, barley, and whole-wheat pasta  · Eat more beans, peas, and lentils  For example, add beans to soups or salads  Eat at least 5 cups of fruits and vegetables each day  Eat fruits and vegetables with the peel because the peel is high in fiber  © 2017 [address] Information is for End User's use only and may not be sold, redistributed or otherwise used for commercial purposes  All illustrations and images included in CareNotes® are the copyrighted property of [employer]  or [employer]ak Hay  The above information is an  only  It is not intended as medical advice for individual conditions or treatments  Talk to your doctor, nurse or pharmacist before following any medical regimen to see if it is safe and effective for you  Heart Healthy Diet   AMBULATORY CARE:   A heart healthy diet  is an eating plan low in total fat, unhealthy fats, and sodium (salt)  A heart healthy diet helps decrease your risk for heart disease and stroke  Limit the amount of fat you eat to 25% to 35% of your total daily calories  Limit sodium to less than 2,300 mg each day  Healthy fats:  Healthy fats can help improve cholesterol levels  The risk for heart disease is decreased when cholesterol levels are normal  Choose healthy fats, such as the following:  · Unsaturated fat  is found in foods such as soybean, canola, olive, corn, and safflower oils  It is also found in soft tub margarine that is made with liquid vegetable oil       · Omega-3 fat  is found in certain fish, such as salmon, tuna, and trout, and in walnuts and flaxseed  Unhealthy fats:  Unhealthy fats can cause unhealthy cholesterol levels in your blood and increase your risk of heart disease  Limit unhealthy fats, such as the following:  · Cholesterol  is found in animal foods, such as eggs and lobster, and in dairy products made from whole milk  Limit cholesterol to less than 300 milligrams (mg) each day  You may need to limit cholesterol to 200 mg each day if you have heart disease  · Saturated fat  is found in meats, such as wheat and hamburger  It is also found in chicken or turkey skin, whole milk, and butter  Limit saturated fat to less than 7% of your total daily calories  Limit saturated fat to less than 6% if you have heart disease or are at increased risk for it  · Trans fat  is found in packaged foods, such as potato chips and cookies  It is also in hard margarine, some fried foods, and shortening  Avoid trans fats as much as possible    Heart healthy foods and drinks to include:  Ask your dietitian or healthcare provider how many servings to have from each of the following food groups:  · Grains:      ¨ Whole-wheat breads, cereals, and pastas, and brown rice    ¨ Low-fat, low-sodium crackers and chips    · Vegetables:      ¨ Broccoli, green beans, green peas, and spinach    ¨ Collards, kale, and lima beans    ¨ Carrots, sweet potatoes, tomatoes, and peppers    ¨ Canned vegetables with no salt added    · Fruits:      ¨ Bananas, peaches, pears, and pineapple    ¨ Grapes, raisins, and dates    ¨ Oranges, tangerines, grapefruit, orange juice, and grapefruit juice    ¨ Apricots, mangoes, melons, and papaya    ¨ Raspberries and strawberries    ¨ Canned fruit with no added sugar    · Low-fat dairy products:      ¨ Nonfat (skim) milk, 1% milk, and low-fat almond, cashew, or soy milks fortified with calcium    ¨ Low-fat cheese, regular or frozen yogurt, and cottage cheese    · Meats and proteins , such as lean cuts of beef and pork (loin, leg, round), skinless chicken and turkey, legumes, soy products, egg whites, and nuts  Foods and drinks to limit or avoid:  Ask your dietitian or healthcare provider about these and other foods that are high in unhealthy fat, sodium, and sugar:  · Snack or packaged foods , such as frozen dinners, cookies, macaroni and cheese, and cereals with more than 300 mg of sodium per serving    · Canned or dry mixes  for cakes, soups, sauces, or gravies    · Vegetables with added sodium , such as instant potatoes, vegetables with added sauces, or regular canned vegetables    · Other foods high in sodium , such as ketchup, barbecue sauce, salad dressing, pickles, olives, soy sauce, and miso    · High-fat dairy foods  such as whole or 2% milk, cream cheese, or sour cream, and cheeses     · High-fat protein foods  such as high-fat cuts of beef (T-bone steaks, ribs), chicken or turkey with skin, and organ meats, such as liver    · Cured or smoked meats , such as hot dogs, wheat, and sausage    · Unhealthy fats and oils , such as butter, stick margarine, shortening, and cooking oils such as coconut or palm oil    · Food and drinks high in sugar , such as soft drinks (soda), sports drinks, sweetened tea, candy, cake, cookies, pies, and doughnuts  Other diet guidelines to follow:   · Eat more foods containing omega-3 fats  Eat fish high in omega-3 fats at least 2 times a week  · Limit alcohol  Too much alcohol can damage your heart and raise your blood pressure  Women should limit alcohol to 1 drink a day  Men should limit alcohol to 2 drinks a day  A drink of alcohol is 12 ounces of beer, 5 ounces of wine, or 1½ ounces of liquor  · Choose low-sodium foods  High-sodium foods can lead to high blood pressure  Add little or no salt to food you prepare  Use herbs and spices in place of salt  · Eat more fiber  to help lower cholesterol levels   Eat at least 5 servings of fruits and vegetables each day  Eat 3 ounces of whole-grain foods each day  Legumes (beans) are also a good source of fiber  Lifestyle guidelines:   · Do not smoke  Nicotine and other chemicals in cigarettes and cigars can cause lung and heart damage  Ask your healthcare provider for information if you currently smoke and need help to quit  E-cigarettes or smokeless tobacco still contain nicotine  Talk to your healthcare provider before you use these products  · Exercise regularly  to help you maintain a healthy weight and improve your blood pressure and cholesterol levels  Ask your healthcare provider about the best exercise plan for you  Do not start an exercise program without asking your healthcare provider  Follow up with your healthcare provider as directed:  Write down your questions so you remember to ask them during your visits  © 2017 [facility] Information is for End User's use only and may not be sold, redistributed or otherwise used for commercial purposes  All illustrations and images included in CareNotes® are the copyrighted property of [employer]  or [employer]  The above information is an  only  It is not intended as medical advice for individual conditions or treatments  Talk to your doctor, nurse or pharmacist before following any medical regimen to see if it is safe and effective for you  Calorie Counting Diet   WHAT YOU NEED TO KNOW:   What is a calorie counting diet? It is a meal plan based on counting calories each day to reach a healthy body weight  You will need to eat fewer calories if you are trying to lose weight  Weight loss may decrease your risk for certain health problems or improve your health if you have health problems  Some of these health problems include heart disease, high blood pressure, and diabetes  What foods should I avoid?   Your dietitian will tell you if you need to avoid certain foods based on your body weight and health condition  You may need to avoid high-fat foods if you are at risk for or have heart disease  You may need to eat fewer foods from the breads and starches food group if you have diabetes  How many calories are in foods? The following is a list of foods and drinks with the approximate number of calories in each  Check the food label to find the exact number of calories  A dietitian can tell you how many calories you should have from each food group each day    · Carbohydrate:      ¨ ½ of a 3-inch bagel, 1 slice of bread, or ½ of a hamburger bun or hot dog bun (80)    ¨ 1 (8-inch) flour tortilla or ½ cup of cooked rice (100)    ¨ 1 (6-inch) corn tortilla (80)    ¨ 1 (6-inch) pancake or 1 cup of bran flakes cereal (110)    ¨ ½ cup of cooked cereal (80)    ¨ ½ cup of cooked pasta (85)    ¨ 1 ounce of pretzels (100)    ¨ 3 cups of air-popped popcorn without butter or oil (80)    · Dairy:      ¨ 1 cup of skim or 1% milk (90)    ¨ 1 cup of 2% milk (120)    ¨ 1 cup of whole milk (160)    ¨ 1 cup of 2% chocolate milk (220)    ¨ 1 ounce of low-fat cheese with 3 grams of fat per ounce (70)    ¨ 1 ounce of cheddar cheese (114)    ¨ ½ cup of 1% fat cottage cheese (80)    ¨ 1 cup of plain or sugar-free, fat-free yogurt (90)    · Protein foods:      ¨ 3 ounces of fish (not breaded or fried) (95)    ¨ 3 ounces of breaded, fried fish (195)    ¨ ¾ cup of tuna canned in water (105)    ¨ 3 ounces of chicken breast without skin (105)    ¨ 1 fried chicken breast with skin (350)    ¨ ¼ cup of fat free egg substitute (40)    ¨ 1 large egg (75)    ¨ 3 ounces of lean beef or pork (165)    ¨ 3 ounces of fried pork chop or ham (185)    ¨ ½ cup of cooked dried beans, such as kidney, toledo, lentils, or navy (115)    ¨ 3 ounces of bologna or lunch meat (225)    ¨ 2 links of breakfast sausage (140)    · Vegetables:      ¨ ½ cup of sliced mushrooms (10)    ¨ 1 cup of salad greens, such as lettuce, spinach, or lashonda (15)    ¨ ½ cup of steamed asparagus (20)    ¨ ½ cup of cooked summer squash, zucchini squash, or green or wax beans (25)    ¨ 1 cup of broccoli or cauliflower florets, or 1 medium tomato (25)    ¨ 1 large raw carrot or ½ cup of cooked carrots (40)    ¨ ? of a medium cucumber or 1 stalk of celery (5)    ¨ 1 small baked potato (160)    ¨ 1 cup of breaded, fried vegetables (230)    · Fruit:      ¨ 1 (6-inch) banana (55)     ¨ ½ of a 4-inch grapefruit (55)    ¨ 15 grapes (60)    ¨ 1 medium orange or apple (70)    ¨ 1 large peach (65)    ¨ 1 cup of fresh pineapple chunks (75)    ¨ 1 cup of melon cubes (50)    ¨ 1¼ cups of whole strawberries (45)    ¨ ½ cup of fruit canned in juice (55)    ¨ ½ cup of fruit canned in heavy syrup (110)    ¨ ?  cup of raisins (130)    ¨ ½ cup of unsweetened fruit juice (60)    ¨ ½ cup of grape, cranberry, or prune juice (90)    · Fat:      ¨ 10 peanuts or 2 teaspoons of peanut butter (55)    ¨ 2 tablespoons of avocado or 1 tablespoon of regular salad dressing (45)    ¨ 2 slices of wheat (90)    ¨ 1 teaspoon of oil, such as safflower, canola, corn, or olive oil (45)    ¨ 2 teaspoons of low-fat margarine, or 1 tablespoon of low-fat mayonnaise (50)    ¨ 1 teaspoon of regular margarine (40)    ¨ 1 tablespoon of regular mayonnaise (135)    ¨ 1 tablespoon of cream cheese or 2 tablespoons of low-fat cream cheese (45)    ¨ 2 tablespoons of vegetable shortening (215)    · Dessert and sweets:      ¨ 8 animal crackers or 5 vanilla wafers (80)    ¨ 1 frozen fruit juice bar (80)    ¨ ½ cup of ice milk or low-fat frozen yogurt (90)    ¨ ½ cup of sherbet or sorbet (125)    ¨ ½ cup of sugar-free pudding or custard (60)    ¨ ½ cup of ice cream (140)    ¨ ½ cup of pudding or custard (175)    ¨ 1 (2-inch) square chocolate brownie (185)    · Combination foods:      ¨ Bean burrito made with an 8-inch tortilla, without cheese (275)    ¨ Chicken breast sandwich with lettuce and tomato (325)    ¨ 1 cup of chicken noodle soup (60)    ¨ 1 beef taco (175)    ¨ Regular hamburger with lettuce and tomato (310)    ¨ Regular cheeseburger with lettuce and tomato (410)     ¨ ¼ of a 12-inch cheese pizza (280)    ¨ Fried fish sandwich with lettuce and tomato (425)    ¨ Hot dog and bun (275)    ¨ 1½ cups of macaroni and cheese (310)    ¨ Taco salad with a fried tortilla shell (870)    · Low-calorie foods:      ¨ 1 tablespoon of ketchup or 1 tablespoon of fat free sour cream (15)    ¨ 1 teaspoon of mustard (5)    ¨ ¼ cup of salsa (20)    ¨ 1 large dill pickle (15)    ¨ 1 tablespoon of fat free salad dressing (10)    ¨ 2 teaspoons of low-sugar, light jam or jelly, or 1 tablespoon of sugar-free syrup (15)    ¨ 1 sugar-free popsicle (15)    ¨ 1 cup of club soda, seltzer water, or diet soda (0)  CARE AGREEMENT:   You have the right to help plan your care  Discuss treatment options with your caregivers to decide what care you want to receive  You always have the right to refuse treatment  The above information is an  only  It is not intended as medical advice for individual conditions or treatments  Talk to your doctor, nurse or pharmacist before following any medical regimen to see if it is safe and effective for you  © 2017 [employer] Information is for End User's use only and may not be sold, redistributed or otherwise used for commercial purposes  All illustrations and images included in CareNotes® are the copyrighted property of A D A M [employer]  or Dipak Hay

## 2019-08-06 NOTE — PROGRESS NOTES
Assessment/Plan:    Problem List Items Addressed This Visit        Cardiovascular and Mediastinum    Hypertension     BP acceptable on med changes with lowered amlodipine 5mg daily, pindolol 5mg bid and lisinopril 20mg daily  LE edema is improved  Atrial fibrillation ([address] )    Relevant Orders    Home Study       Other    Hyperlipidemia     Dose adjustment made several months ago with improved cholesterol  Continue on atorvastatin 20mg daily         Daytime sleepiness    Relevant Orders    Home Study    Snoring    Relevant Orders    Home Study    Insomnia     Has noctural awakenings, daytime sleepiness, snoring, high BMI and fairly new onset of a fib  Eval for sleep apnea         Relevant Orders    Home Study    Paresthesia of both hands - Primary     Has known b/l cervical foraminal stenosis on MRI, symptoms also correspond to median nerve entrapment, therefore will eval with EMG  Use wrist splints in the meantime  Relevant Orders    EMG 2 Limb Upper Extremity    Obesity (BMI 30 0-34  9)     BMI 31 with comorbidities  She actively tries to lose weight with exercising, advised also decreasing overall calorie intake  Monitor  Relevant Orders    Home Study          Subjective:      Patient ID: Monse Pineda is a 68 y o  female  58-year-old male with seizure disorder, migraines, HTN, MDD, PVCs, HLD, chronic interstitial cystitis and PAF here for follow-up care  Hyperlipidemia   This is a chronic problem  The current episode started more than 1 year ago  The problem is controlled  Recent lipid tests were reviewed and are normal  Exacerbating diseases include obesity  Factors aggravating her hyperlipidemia include beta blockers  Current antihyperlipidemic treatment includes statins, diet change and exercise  There are no compliance problems  Risk factors for coronary artery disease include post-menopausal      LE edema is improved with lowered dose of amlodipine 5mg daily    Reports her home bp have been erratic  She had a HA one day near the time she had sprained her low back  She denies dizziness, CP or SOB  Reports b/l fingers, first three fingers get numb in the morning  She moves her hands around and after 15minutes symptoms resolve  Neck has not bothered her much with physical therapy  She has not dropped items from her hands  She has not had breakthrough seizures, no med changes with her depakote  Has less insomnia, has difficulty maintaining sleep due to nocturia  Continues to have daytime sleepiness  Denies morning RAMIREZ  Snores more frequently  Was referred for sleep apnea testing last year but was not yet ready at the time      The following portions of the patient's history were reviewed and updated as appropriate: allergies, current medications, past family history, past medical history, past social history, past surgical history and problem list     Current Outpatient Medications:     amLODIPine (NORVASC) 10 mg tablet, Take 1 tablet (10 mg total) by mouth daily, Disp: 90 tablet, Rfl: 3    atorvastatin (LIPITOR) 10 mg tablet, Take 1 tablet (10 mg total) by mouth daily (Patient taking differently: Take 20 mg by mouth daily ), Disp: 90 tablet, Rfl: 3    busPIRone (BUSPAR) 15 mg tablet, Take 15 mg by mouth 2 (two) times a day, Disp: , Rfl:     calcium citrate-Vitamin D (CALCIUM CITRATE + D3) 200 mg-250 units, Take by mouth, Disp: , Rfl:     cholecalciferol (VITAMIN D3) 1,000 units tablet, Take 5 tablets (5,000 Units total) by mouth daily, Disp: 150 tablet, Rfl: 0    conjugated estrogens (PREMARIN) vaginal cream, Insert 0 5 g into the vagina daily, Disp: 42 5 g, Rfl: 0    divalproex sodium (DEPAKOTE ER) 500 mg 24 hr tablet, Take 1 tablet by mouth 2 (two) times a day, Disp: , Rfl:     ELIQUIS 5 MG, TAKE 1 TABLET TWICE A DAY, Disp: 180 tablet, Rfl: 1    escitalopram (LEXAPRO) 20 mg tablet, Take 1 tablet by mouth daily, Disp: , Rfl:     esomeprazole (NexIUM) 20 mg capsule, Take 20 mg by mouth every morning before breakfast, Disp: , Rfl:     Estradiol (ELESTRIN) 0 52 MG/0 87 GM (0 06%) GEL, Place 0 87 g on the skin daily, Disp: 2 Bottle, Rfl: 6    fexofenadine (ALLEGRA) 180 MG tablet, Take 1 tablet by mouth daily as needed, Disp: , Rfl:     ipratropium (ATROVENT) 0 06 % nasal spray, into each nostril Daily, Disp: , Rfl:     lisinopril (ZESTRIL) 20 mg tablet, Take 1 tablet (20 mg total) by mouth daily, Disp: 90 tablet, Rfl: 3    MELATONIN PO, Take 10 mg by mouth daily at bedtime, Disp: , Rfl:     methocarbamol (ROBAXIN) 500 mg tablet, TAKE 2 TABLETS 4 TIMES A DAY AS NEEDED, Disp: 40 tablet, Rfl: 0    OSPHENA 60 MG TABS, TAKE 1 TABLET BY MOUTH EVERY DAY, Disp: 90 tablet, Rfl: 3    pindolol (VISKEN) 5 mg tablet, Take 1 tablet (5 mg total) by mouth daily, Disp: 90 tablet, Rfl: 3    clobetasol (TEMOVATE) 0 05 % ointment, Apply topically daily (Patient not taking: Reported on 8/6/2019), Disp: 60 g, Rfl: 3    nystatin-triamcinolone (MYCOLOG-II) ointment, Apply topically 2 (two) times a day (Patient not taking: Reported on 8/6/2019), Disp: 60 g, Rfl: 0    Review of Systems   Constitutional: Negative for unexpected weight change  Respiratory: Negative  Cardiovascular: Positive for leg swelling  Genitourinary: Negative  Musculoskeletal: Positive for back pain  Neurological: Positive for numbness  Psychiatric/Behavioral: Positive for decreased concentration and sleep disturbance  The patient is nervous/anxious  Objective:    /70 (BP Location: Left arm, Patient Position: Sitting)   Pulse 67   Temp 97 9 °F (36 6 °C)   Resp 15   Ht 5' 4" (1 626 m)   Wt 82 3 kg (181 lb 6 4 oz)   SpO2 93%   BMI 31 14 kg/m²      Physical Exam   Constitutional: She is oriented to person, place, and time  She appears well-developed and well-nourished  Cardiovascular: Normal rate, regular rhythm and normal heart sounds     Pulses:       Radial pulses are 2+ on the right side, and 2+ on the left side  Posterior tibial pulses are 2+ on the right side, and 2+ on the left side  Trace BLE edema   Pulmonary/Chest: Effort normal and breath sounds normal  No stridor  No respiratory distress  Musculoskeletal:        Cervical back: She exhibits decreased range of motion and spasm  She exhibits normal pulse   strength 5/5  Tinel's positive b/l   Neurological: She is alert and oriented to person, place, and time  Psychiatric: She has a normal mood and affect  Vitals reviewed        Recent Results (from the past 336 hour(s))   Valproic acid level, total    Collection Time: 07/27/19  8:55 AM   Result Value Ref Range    Valproic Acid, Total 69 50 - 100 ug/mL   CBC and differential    Collection Time: 07/27/19  8:55 AM   Result Value Ref Range    WBC 4 69 4 31 - 10 16 Thousand/uL    RBC 4 26 3 81 - 5 12 Million/uL    Hemoglobin 13 0 11 5 - 15 4 g/dL    Hematocrit 41 3 34 8 - 46 1 %    MCV 97 82 - 98 fL    MCH 30 5 26 8 - 34 3 pg    MCHC 31 5 31 4 - 37 4 g/dL    RDW 12 7 11 6 - 15 1 %    MPV 11 4 8 9 - 12 7 fL    Platelets 307 961 - 297 Thousands/uL    nRBC 0 /100 WBCs    Neutrophils Relative 59 43 - 75 %    Immat GRANS % 1 0 - 2 %    Lymphocytes Relative 25 14 - 44 %    Monocytes Relative 13 (H) 4 - 12 %    Eosinophils Relative 2 0 - 6 %    Basophils Relative 0 0 - 1 %    Neutrophils Absolute 2 80 1 85 - 7 62 Thousands/µL    Immature Grans Absolute 0 03 0 00 - 0 20 Thousand/uL    Lymphocytes Absolute 1 18 0 60 - 4 47 Thousands/µL    Monocytes Absolute 0 59 0 17 - 1 22 Thousand/µL    Eosinophils Absolute 0 07 0 00 - 0 61 Thousand/µL    Basophils Absolute 0 02 0 00 - 0 10 Thousands/µL   Comprehensive metabolic panel    Collection Time: 07/27/19  8:55 AM   Result Value Ref Range    Sodium 138 136 - 145 mmol/L    Potassium 4 3 3 5 - 5 3 mmol/L    Chloride 105 100 - 108 mmol/L    CO2 28 21 - 32 mmol/L    ANION GAP 5 4 - 13 mmol/L    BUN 20 5 - 25 mg/dL    Creatinine 0 73 0 60 - 1 30 mg/dL    Glucose, Fasting 94 65 - 99 mg/dL    Calcium 9 1 8 3 - 10 1 mg/dL    AST 13 5 - 45 U/L    ALT 17 12 - 78 U/L    Alkaline Phosphatase 54 46 - 116 U/L    Total Protein 6 5 6 4 - 8 2 g/dL    Albumin 3 3 (L) 3 5 - 5 0 g/dL    Total Bilirubin 0 34 0 20 - 1 00 mg/dL    eGFR 83 ml/min/1 73sq m   Lipid panel    Collection Time: 07/27/19  8:55 AM   Result Value Ref Range    Cholesterol 156 50 - 200 mg/dL    Triglycerides 152 (H) <=150 mg/dL    HDL, Direct 45 40 - 60 mg/dL    LDL Calculated 81 0 - 100 mg/dL    Non-HDL-Chol (CHOL-HDL) 111 mg/dl   Bilirubin, direct    Collection Time: 07/27/19  8:55 AM   Result Value Ref Range    Bilirubin, Direct 0 10 0 00 - 0 20 mg/dL       BMI Counseling: Body mass index is 31 14 kg/m²  Discussed the patient's BMI with her  The BMI is above average  BMI counseling and education was provided to the patient  Nutrition recommendations include reducing portion sizes, decreasing overall calorie intake and moderation in carbohydrate intake  Exercise recommendations include moderate aerobic physical activity for 150 minutes/week and strength training exercises

## 2019-08-06 NOTE — ASSESSMENT & PLAN NOTE
BP acceptable on med changes with lowered amlodipine 5mg daily, pindolol 5mg bid and lisinopril 20mg daily  LE edema is improved

## 2019-08-08 ENCOUNTER — TELEPHONE (OUTPATIENT)
Dept: SLEEP CENTER | Facility: CLINIC | Age: 69
End: 2019-08-08

## 2019-08-08 NOTE — TELEPHONE ENCOUNTER
----- Message from Chaya Khanna DO sent at 8/7/2019  1:50 PM EDT -----  Chart reviewed  Study approved  Schedule HST   ----- Message -----  From: Monda Olszewski, MA  Sent: 8/7/2019   8:49 AM EDT  To: Sleep Medicine [place] Provider    This sleep study needs approval      If approved please sign and return to clerical pool  If denied please include reasons why  Also provide alternative testing if warranted  Please sign and return to clerical pool

## 2019-08-12 ENCOUNTER — HOSPITAL ENCOUNTER (OUTPATIENT)
Dept: NEUROLOGY | Facility: CLINIC | Age: 69
Discharge: HOME | End: 2019-08-12
Payer: MEDICARE

## 2019-08-12 DIAGNOSIS — R20.2 PARESTHESIA OF BOTH HANDS: ICD-10-CM

## 2019-08-12 PROCEDURE — 95912 NRV CNDJ TEST 11-12 STUDIES: CPT | Performed by: PHYSICAL MEDICINE & REHABILITATION

## 2019-08-12 PROCEDURE — 95886 MUSC TEST DONE W/N TEST COMP: CPT | Performed by: PHYSICAL MEDICINE & REHABILITATION

## 2019-09-04 ENCOUNTER — TELEPHONE (OUTPATIENT)
Dept: INTERNAL MEDICINE CLINIC | Facility: CLINIC | Age: 69
End: 2019-09-04

## 2019-09-04 ENCOUNTER — OFFICE VISIT (OUTPATIENT)
Dept: AUDIOLOGY | Age: 69
End: 2019-09-04
Payer: MEDICARE

## 2019-09-04 DIAGNOSIS — H91.90 HEARING LOSS, UNSPECIFIED HEARING LOSS TYPE, UNSPECIFIED LATERALITY: ICD-10-CM

## 2019-09-04 DIAGNOSIS — H90.3 SENSORY HEARING LOSS, BILATERAL: Primary | ICD-10-CM

## 2019-09-04 DIAGNOSIS — H91.90 DECREASED HEARING, UNSPECIFIED LATERALITY: Primary | ICD-10-CM

## 2019-09-04 PROCEDURE — 92567 TYMPANOMETRY: CPT | Performed by: AUDIOLOGIST-HEARING AID FITTER

## 2019-09-04 PROCEDURE — 92557 COMPREHENSIVE HEARING TEST: CPT | Performed by: AUDIOLOGIST-HEARING AID FITTER

## 2019-09-04 NOTE — PROGRESS NOTES
HEARING EVALUATION    Name:  Karen Hdz  :  1950  Age:  68 y o  Date of Evaluation: 19     History: Difficulty Understanding  Reason for visit: Karen Hdz is being seen today at the request of Dr Noman Lawler for an evaluation of hearing  Patient reports occasional need for repetition when communicating  Denies tinnitus, dizziness, recent colds or ear infections  EVALUATION:    Otoscopic Evaluation:   Right Ear: Clear and healthy ear canal and tympanic membrane   Left Ear: Clear and healthy ear canal and tympanic membrane    Tympanometry:   Right: Type A - normal middle ear pressure and compliance   Left: Type A - normal middle ear pressure and compliance    Audiogram Results:  Pure tone testing revealed a mild sloping to moderate high frequency sensorineural hearing loss bilaterally  SRT and PTA are in agreement indicating good test reliability  Word recognition scores were excellent bilaterally  *see attached audiogram      RECOMMENDATIONS:  Annual hearing eval, Return to [facility]  for F/U and Copy to Patient/Caregiver    PATIENT EDUCATION:   Discussed results and recommendations with patient  Questions were addressed and the patient was encouraged to contact our department should concerns arise        Olivia Lindo   Clinical Audiologist

## 2019-09-09 ENCOUNTER — HOSPITAL ENCOUNTER (OUTPATIENT)
Dept: SLEEP CENTER | Facility: CLINIC | Age: 69
Discharge: HOME | End: 2019-09-09
Payer: MEDICARE

## 2019-09-09 DIAGNOSIS — E66.9 OBESITY (BMI 30.0-34.9): ICD-10-CM

## 2019-09-09 DIAGNOSIS — G47.00 INSOMNIA, UNSPECIFIED TYPE: ICD-10-CM

## 2019-09-09 DIAGNOSIS — I48.0 PAROXYSMAL ATRIAL FIBRILLATION (HCC): ICD-10-CM

## 2019-09-09 DIAGNOSIS — R40.0 DAYTIME SLEEPINESS: ICD-10-CM

## 2019-09-09 DIAGNOSIS — R06.83 SNORING: ICD-10-CM

## 2019-09-09 PROCEDURE — G0399 HOME SLEEP TEST/TYPE 3 PORTA: HCPCS

## 2019-09-10 ENCOUNTER — TRANSCRIBE ORDERS (OUTPATIENT)
Dept: SLEEP CENTER | Facility: CLINIC | Age: 69
End: 2019-09-10

## 2019-09-12 ENCOUNTER — TELEPHONE (OUTPATIENT)
Dept: SLEEP CENTER | Facility: CLINIC | Age: 69
End: 2019-09-12

## 2019-10-22 ENCOUNTER — APPOINTMENT (OUTPATIENT)
Dept: LAB | Facility: CLINIC | Age: 69
End: 2019-10-22
Payer: MEDICARE

## 2019-10-22 ENCOUNTER — OFFICE VISIT (OUTPATIENT)
Dept: SLEEP CENTER | Facility: CLINIC | Age: 69
End: 2019-10-22
Payer: MEDICARE

## 2019-10-22 ENCOUNTER — TRANSCRIBE ORDERS (OUTPATIENT)
Dept: ADMINISTRATIVE | Facility: HOSPITAL | Age: 69
End: 2019-10-22

## 2019-10-22 VITALS
HEIGHT: 64 IN | DIASTOLIC BLOOD PRESSURE: 68 MMHG | WEIGHT: 186.2 LBS | BODY MASS INDEX: 31.79 KG/M2 | HEART RATE: 72 BPM | SYSTOLIC BLOOD PRESSURE: 118 MMHG

## 2019-10-22 DIAGNOSIS — G47.33 OSA (OBSTRUCTIVE SLEEP APNEA): Primary | ICD-10-CM

## 2019-10-22 DIAGNOSIS — G40.909 SEIZURE DISORDER (HCC): ICD-10-CM

## 2019-10-22 DIAGNOSIS — R40.0 DAYTIME SLEEPINESS: ICD-10-CM

## 2019-10-22 DIAGNOSIS — I48.0 PAROXYSMAL ATRIAL FIBRILLATION (HCC): ICD-10-CM

## 2019-10-22 DIAGNOSIS — F45.8 BRUXISM: ICD-10-CM

## 2019-10-22 DIAGNOSIS — E66.9 OBESITY (BMI 30.0-34.9): ICD-10-CM

## 2019-10-22 DIAGNOSIS — I10 ESSENTIAL HYPERTENSION: ICD-10-CM

## 2019-10-22 DIAGNOSIS — R60.9 EDEMA, UNSPECIFIED TYPE: ICD-10-CM

## 2019-10-22 DIAGNOSIS — R60.9 EDEMA, UNSPECIFIED TYPE: Primary | ICD-10-CM

## 2019-10-22 LAB
ALBUMIN SERPL BCP-MCNC: 3.4 G/DL (ref 3.5–5)
ALP SERPL-CCNC: 50 U/L (ref 46–116)
ALT SERPL W P-5'-P-CCNC: 18 U/L (ref 12–78)
ANION GAP SERPL CALCULATED.3IONS-SCNC: 5 MMOL/L (ref 4–13)
AST SERPL W P-5'-P-CCNC: 8 U/L (ref 5–45)
BASOPHILS # BLD AUTO: 0.04 THOUSANDS/ΜL (ref 0–0.1)
BASOPHILS NFR BLD AUTO: 1 % (ref 0–1)
BILIRUB SERPL-MCNC: 0.25 MG/DL (ref 0.2–1)
BUN SERPL-MCNC: 27 MG/DL (ref 5–25)
CALCIUM SERPL-MCNC: 9.5 MG/DL (ref 8.3–10.1)
CHLORIDE SERPL-SCNC: 106 MMOL/L (ref 100–108)
CO2 SERPL-SCNC: 29 MMOL/L (ref 21–32)
CREAT SERPL-MCNC: 0.78 MG/DL (ref 0.6–1.3)
EOSINOPHIL # BLD AUTO: 0.04 THOUSAND/ΜL (ref 0–0.61)
EOSINOPHIL NFR BLD AUTO: 1 % (ref 0–6)
ERYTHROCYTE [DISTWIDTH] IN BLOOD BY AUTOMATED COUNT: 12.9 % (ref 11.6–15.1)
GFR SERPL CREATININE-BSD FRML MDRD: 77 ML/MIN/1.73SQ M
GLUCOSE P FAST SERPL-MCNC: 82 MG/DL (ref 65–99)
HCT VFR BLD AUTO: 41.3 % (ref 34.8–46.1)
HGB BLD-MCNC: 12.9 G/DL (ref 11.5–15.4)
IMM GRANULOCYTES # BLD AUTO: 0.07 THOUSAND/UL (ref 0–0.2)
IMM GRANULOCYTES NFR BLD AUTO: 1 % (ref 0–2)
LYMPHOCYTES # BLD AUTO: 2.03 THOUSANDS/ΜL (ref 0.6–4.47)
LYMPHOCYTES NFR BLD AUTO: 29 % (ref 14–44)
MCH RBC QN AUTO: 30.7 PG (ref 26.8–34.3)
MCHC RBC AUTO-ENTMCNC: 31.2 G/DL (ref 31.4–37.4)
MCV RBC AUTO: 98 FL (ref 82–98)
MONOCYTES # BLD AUTO: 0.59 THOUSAND/ΜL (ref 0.17–1.22)
MONOCYTES NFR BLD AUTO: 8 % (ref 4–12)
NEUTROPHILS # BLD AUTO: 4.32 THOUSANDS/ΜL (ref 1.85–7.62)
NEUTS SEG NFR BLD AUTO: 60 % (ref 43–75)
NRBC BLD AUTO-RTO: 0 /100 WBCS
PLATELET # BLD AUTO: 301 THOUSANDS/UL (ref 149–390)
PMV BLD AUTO: 11.3 FL (ref 8.9–12.7)
POTASSIUM SERPL-SCNC: 4.1 MMOL/L (ref 3.5–5.3)
PROT SERPL-MCNC: 6.8 G/DL (ref 6.4–8.2)
RBC # BLD AUTO: 4.2 MILLION/UL (ref 3.81–5.12)
SODIUM SERPL-SCNC: 140 MMOL/L (ref 136–145)
TSH SERPL DL<=0.05 MIU/L-ACNC: 1.9 UIU/ML (ref 0.36–3.74)
WBC # BLD AUTO: 7.09 THOUSAND/UL (ref 4.31–10.16)

## 2019-10-22 PROCEDURE — 84443 ASSAY THYROID STIM HORMONE: CPT

## 2019-10-22 PROCEDURE — 85025 COMPLETE CBC W/AUTO DIFF WBC: CPT

## 2019-10-22 PROCEDURE — 80053 COMPREHEN METABOLIC PANEL: CPT

## 2019-10-22 PROCEDURE — 36415 COLL VENOUS BLD VENIPUNCTURE: CPT

## 2019-10-22 PROCEDURE — 99214 OFFICE O/P EST MOD 30 MIN: CPT | Performed by: INTERNAL MEDICINE

## 2019-10-22 NOTE — PROGRESS NOTES
Review of Systems      Genitourinary need to urinate more than twice a night   Cardiology ankle/leg swelling   Gastrointestinal frequent heartburn/acid reflux   Neurology frequent headaches and awaken with headache   Constitutional fatigue and weight change   Integumentary itching   Psychiatry anxiety and depression   Musculoskeletal muscle aches   Pulmonary shortness of breath with activity and snoring   ENT throat clearing   Endocrine none   Hematological none

## 2019-10-22 NOTE — PATIENT INSTRUCTIONS

## 2019-10-22 NOTE — PROGRESS NOTES
Consultation - [facility]  68 y o  female  SWX:[id number]  FLP:[id number]    Physician Requesting Consult: Matthew Enriquez DO     Reason for Consult : At your kind request I saw this patient for initial sleep evaluation today  A home sleep study was undertaken to evaluate for sleep disordered breathing and   patient is here to review results and further options  The study demonstrated : KERLINE (respiratory event index of) in 15 6 /hour  Minimum oxygen saturation was 82% and 17 7% of the study was spent with saturations less than 90%  The snore index was 5 3%  PFSH, Problem List, Medications & Allergies were reviewed in EMR  She  has a past medical history of Anxiety, Arthralgia, Atrial fibrillation ([facility] [address] ), Chronic interstitial cystitis, Depression, Hyperlipidemia, Hypertension, Lichen sclerosus, Lipoma, Osteopenia, Simple partial seizures ([facility] [address] ), and Tachycardia  She has a current medication list which includes the following prescription(s): amlodipine, atorvastatin, buspirone, calcium citrate-vitamin d, cholecalciferol, conjugated estrogens, divalproex sodium, eliquis, escitalopram, esomeprazole, estradiol, fexofenadine, ipratropium, lisinopril, melatonin, methocarbamol, osphena, pindolol, clobetasol, and nystatin-triamcinolone  HPI:  Study was undertaken for a complaint of excessive daytime sleepiness of several years duration  In spite of getting sufficient sleep   reports snoring of at least a year's duration  At times she awakens herself with strained sound  She is not aware of breathing difficulties during sleep or modifying factors  Other Complaints: none  Restless Leg Syndrome: reports no suggestive symptoms    Parasomnia: reports teeth grinding during sleep for which she uses a dental guard, but no other features of parasomnia   Sleep Routine:   Typical Bedtime: Midnight Gets OOB:  8:00 a m  TIB:8 hrs   Sleep latency:< 15 minutes Sleep Interruptions:3-4/nite is unsure of the cause but is able to fall back asleep  She is using melatonin around 3 times a week as a sleep aid  Awakens: with aid of an alarm  Upon awakening: is not always refreshed  She estimates getting 6- 7 hrs sleep  She has Excessive Daytime Sleepiness  [place] Sleepiness Scale rated at Total score: 9 /24  Habits: reports that she has never smoked  She has never used smokeless tobacco  , reports that she drinks about 10 0 standard drinks of alcohol per week  (but except on weekends, not close to bedtime) ,  reports that she does not use drugs  ,Caffeine use:limited , Exercise routine: regular  Family History:  Sister has sleep difficulties  ROS: reviewed & as attached  Significant for approximately 20 lb weight gain in the past year  She has nasal symptoms due to allergies  Presently she reports no cardiac or respiratory symptoms  She reports acid reflux  Seizures are controlled on Depakote  Mood is stable on current medication  She awakens with neck pain and headache several times a week due to cervical spondylosis  EXAM:  /68   Pulse 72   Ht 5' 4" (1 626 m)   Wt 84 5 kg (186 lb 3 2 oz)   BMI 31 96 kg/m²    General: Well groomed female, well appearing, in no apparent distress  Psychiatric: Alert and orientated; Cooperative; Speech:clear and coherent; Normal mood, affect & thought   HEENT:  Craniofacial anatomy: normal Sinuses: non- tender  TMJ: Normal    Eyes: EOM's intact;  conjunctiva/corneas clear   Ears: Externallyappear normal     Nasal Airway: narrow nares Septum:intact; Mucosa: normal; Turbinates: normal; Rhinorrhea: None   Mouth: Lips: normal posture; Dentition: normal   Mucosa:moist  ; Hard Palate:normal    Oropharryx: crowded and AP narrowing Tongue: Mallampati:Class IV and MobileSoft Palate:  redundant  Tonsils: no hypertrophy  Neck: Neck Circumference: 14 5 "; Supple; no abnormal masses;  Thyroid:normal  Trachea:central     Lymph: No Cervical or Submandibular Lymhadenopathy  Heart: S1,S2 normal; RRR; no gallop; nomurmurs   Lungs: Respiratory Effort:normal  Air entry good bilaterally  No wheezes  No rales  Abdomen: Obese, Soft & non-tender    Extremities: 1+pedal edema  No clubbing or cyanosis  Skin: warm and dry; Color& Hydration good; no facial rashes or lesions   Neurological: CNII-XII intact; Motor normal; Sensation normal  Musculoskeletal: Muscle bulk, tone and power WNL Gait:normal        IMPRESSION: Primary, Secondary Sleep Diagnoses (to Medical or Psych conditions) & Comorbidities   1  JORDANA (obstructive sleep apnea)  CPAP Study   2  Daytime sleepiness     3  Bruxism     4  Seizure disorder ([address] )     5  Obesity (BMI 30 0-34 9)     6  Paroxysmal atrial fibrillation (HCC)     7  Essential hypertension       PLAN:  1  I reviewed results of the Sleep study with the patient  2  With respect to above conditions, I counseled on pathophysiology, diagnosis, treatment options, risks and benefits; inter-relationship and effects on symptoms and comorbidities; risks of no treatment; costs and insurance aspects  3  Patient elected positive airway pressure therapy and is to be scheduled for a titration study  4  I also advised on weight reduction  5  Follow-up to be scheduled after the study to review results and to initiate therapy           Sincerely,     Authenticated electronically by Rodriguez Mcpherson MD   on 70/54/26   Board Certified Specialist

## 2019-11-01 ENCOUNTER — HOSPITAL ENCOUNTER (OUTPATIENT)
Dept: SLEEP CENTER | Facility: CLINIC | Age: 69
Discharge: HOME | End: 2019-11-01
Payer: MEDICARE

## 2019-11-01 DIAGNOSIS — G47.33 OSA (OBSTRUCTIVE SLEEP APNEA): ICD-10-CM

## 2019-11-01 PROCEDURE — 95811 POLYSOM 6/>YRS CPAP 4/> PARM: CPT

## 2019-11-02 DIAGNOSIS — G47.33 OSA (OBSTRUCTIVE SLEEP APNEA): Primary | ICD-10-CM

## 2019-11-05 ENCOUNTER — TELEPHONE (OUTPATIENT)
Dept: SLEEP CENTER | Facility: CLINIC | Age: 69
End: 2019-11-05

## 2019-11-05 NOTE — TELEPHONE ENCOUNTER
Unsuccessful titration study- poor sleep efficiency  Has DME set-up 11/14 but no follow-up that day  Will require formal desensitization  Will offer patient a follow-up with DME set-up    Left message to call for study results

## 2019-11-11 NOTE — TELEPHONE ENCOUNTER
Patient wishes to talk with Dr Tia Kathleen prior to getting CPAP  Appointment scheduled      Appointment information for set up emailed to kanu

## 2019-12-03 ENCOUNTER — TELEPHONE (OUTPATIENT)
Dept: NEUROLOGY | Facility: CLINIC | Age: 69
End: 2019-12-03

## 2019-12-03 ENCOUNTER — OFFICE VISIT (OUTPATIENT)
Dept: SLEEP CENTER | Facility: CLINIC | Age: 69
End: 2019-12-03
Payer: MEDICARE

## 2019-12-03 VITALS
SYSTOLIC BLOOD PRESSURE: 120 MMHG | HEIGHT: 64 IN | DIASTOLIC BLOOD PRESSURE: 68 MMHG | BODY MASS INDEX: 30.22 KG/M2 | WEIGHT: 177 LBS

## 2019-12-03 DIAGNOSIS — N95.2 POST-MENOPAUSE ATROPHIC VAGINITIS: ICD-10-CM

## 2019-12-03 DIAGNOSIS — N95.1 SYMPTOMATIC MENOPAUSAL OR FEMALE CLIMACTERIC STATES: ICD-10-CM

## 2019-12-03 DIAGNOSIS — G47.33 OSA (OBSTRUCTIVE SLEEP APNEA): Primary | ICD-10-CM

## 2019-12-03 PROCEDURE — 99213 OFFICE O/P EST LOW 20 MIN: CPT | Performed by: INTERNAL MEDICINE

## 2019-12-03 NOTE — PROGRESS NOTES
Consultation - [address] [facility] : 1950  MRN: [id number]      Assessment:  The patient has a history of new onset atrial fibrillation and obstructive sleep apnea  She also complains of worsening daytime sleepiness  Treatment is necessary, but the patient had an unpleasant experience during her titration study due to mask discomfort  I suspect that she will do better over time with APAP  Plan:  Start APAP 4 to 10 cm    Follow up:  Compliance check    History of Present Illness:   68 y  o female with history of new onset atrial fibrillation and excessive daytime sleepiness  She also has a history of vasospasm  Her primary physician, Dr Edna Gasca urged the patient to have sleep testing  The patient agreed after understanding the possible relationship between her cardiac disease and JORDANA  Her home sleep apnea test demonstrated KERLINE = 15 6  She reports frequent napping  She denies falling asleep when she is engaged in activities that require her attention  She reports awakening with a choking sensation  A titration study was unsuccessful  The patient slept for just over 1 hour  She was on 4 cm water pressure throughout the night, with no apneas seen  Results were limited by insomnia        Review of Systems      Genitourinary need to urinate more than twice a night   Cardiology palpitations/fluttering feeling in the chest   Gastrointestinal frequent heartburn/acid reflux   Neurology none   Constitutional weight change   Integumentary itching   Psychiatry anxiety and depression   Musculoskeletal muscle aches   Pulmonary frequent cough and snoring   ENT throat clearing   Endocrine none   Hematological none         I have reviewed and updated the review of systems as necessary    Historical Information    Past Medical History:  Atrial fibrillation, vasospasm, hypertension, migraine headaches, seizures    Family History: non-contributory    Social History     Socioeconomic History    Marital status: /Civil Union     Spouse name: Not on file    Number of children: Not on file    Years of education: Not on file    Highest education level: Not on file   Occupational History    Occupation: retired school superintendant   Social Needs    Financial resource strain: Not on file    Food insecurity:     Worry: Not on file     Inability: Not on file   [place] needs:     Medical: Not on file     Non-medical: Not on file   Tobacco Use    Smoking status: Never Smoker    Smokeless tobacco: Never Used   Substance and Sexual Activity    Alcohol use:  Yes     Alcohol/week: 10 0 standard drinks     Types: 10 Glasses of wine per week     Comment: no per [employer]    Drug use: No    Sexual activity: Yes     Comment: occasionally   Lifestyle    Physical activity:     Days per week: Not on file     Minutes per session: Not on file    Stress: Not on file   Relationships    Social connections:     Talks on phone: Not on file     Gets together: Not on file     Attends Mandaen service: Not on file     Active member of club or organization: Not on file     Attends meetings of clubs or organizations: Not on file     Relationship status: Not on file    Intimate partner violence:     Fear of current or ex partner: Not on file     Emotionally abused: Not on file     Physically abused: Not on file     Forced sexual activity: Not on file   Other Topics Concern    Not on file   Social History Narrative    Exercising regularly         Sleep Schedule: unremarkable    Snoring:  Yes    Witnessed Apnea:  No    Medications/Allergies:    Current Outpatient Medications:     amLODIPine (NORVASC) 10 mg tablet, Take 1 tablet (10 mg total) by mouth daily (Patient taking differently: Take 5 mg by mouth daily ), Disp: 90 tablet, Rfl: 3    atorvastatin (LIPITOR) 10 mg tablet, Take 1 tablet (10 mg total) by mouth daily (Patient taking differently: Take 20 mg by mouth daily ), Disp: 90 tablet, Rfl: 3   busPIRone (BUSPAR) 15 mg tablet, Take 15 mg by mouth 2 (two) times a day, Disp: , Rfl:     calcium citrate-Vitamin D (CALCIUM CITRATE + D3) 200 mg-250 units, Take by mouth, Disp: , Rfl:     cholecalciferol (VITAMIN D3) 1,000 units tablet, Take 5 tablets (5,000 Units total) by mouth daily, Disp: 150 tablet, Rfl: 0    clobetasol (TEMOVATE) 0 05 % ointment, Apply topically daily (Patient not taking: Reported on 8/6/2019), Disp: 60 g, Rfl: 3    conjugated estrogens (PREMARIN) vaginal cream, Insert 0 5 g into the vagina daily, Disp: 42 5 g, Rfl: 0    divalproex sodium (DEPAKOTE ER) 500 mg 24 hr tablet, Take 1 tablet by mouth 2 (two) times a day, Disp: , Rfl:     ELIQUIS 5 MG, TAKE 1 TABLET TWICE A DAY, Disp: 180 tablet, Rfl: 1    escitalopram (LEXAPRO) 20 mg tablet, Take 1 tablet by mouth daily, Disp: , Rfl:     esomeprazole (NexIUM) 20 mg capsule, Take 20 mg by mouth every morning before breakfast, Disp: , Rfl:     Estradiol (ELESTRIN) 0 52 MG/0 87 GM (0 06%) GEL, Place 0 87 g on the skin daily, Disp: 2 Bottle, Rfl: 6    fexofenadine (ALLEGRA) 180 MG tablet, Take 1 tablet by mouth daily as needed, Disp: , Rfl:     ipratropium (ATROVENT) 0 06 % nasal spray, into each nostril Daily, Disp: , Rfl:     lisinopril (ZESTRIL) 20 mg tablet, Take 1 tablet (20 mg total) by mouth daily, Disp: 90 tablet, Rfl: 3    MELATONIN PO, Take 10 mg by mouth daily at bedtime, Disp: , Rfl:     methocarbamol (ROBAXIN) 500 mg tablet, TAKE 2 TABLETS 4 TIMES A DAY AS NEEDED, Disp: 40 tablet, Rfl: 0    nystatin-triamcinolone (MYCOLOG-II) ointment, Apply topically 2 (two) times a day (Patient not taking: Reported on 8/6/2019), Disp: 60 g, Rfl: 0    Ospemifene (OSPHENA) 60 MG TABS, Take 1 tablet (60 mg total) by mouth daily, Disp: 90 tablet, Rfl: 3    pindolol (VISKEN) 5 mg tablet, Take 1 tablet (5 mg total) by mouth daily, Disp: 90 tablet, Rfl: 3        No notes on file                  Objective:    Vital Signs:   Vitals: 12/03/19 1600   BP: 120/68   Weight: 80 3 kg (177 lb)   Height: 5' 4" (1 626 m)     Neck Circumference: 15 5      [place] Sleepiness Scale: Total score: 9    Physical Exam:    General: Alert, appropriate, cooperative, normal build    Head: NC/AT, mild retrognathia    Nose: No septal deviation, nares partially obstructed, mucosa normal    Throat: Airway diminished, tongue base thickened, no tonsils visualized    Neck: Normal, no thyromegaly or lymphadenopathy, no JVD    Heart: RR, normal S1 and S2, no murmurs    Chest: Clear bilaterally    Extremity: No clubbing, cyanosis, no edema    Skin: Warm, dry    Neuro: No motor abnormalities, cranial nerves appear intact    Sleep Study Results:   KERLINE = 15 5  PAP Pressure: Auto PAP: Will start on 4 to 10 cm  DME Provider: [employer] [facility]    Counseling / Coordination of Care  A description of the counseling / coordination of care: We discussed the pathophysiology of obstructive sleep apnea as well as the possible treatment options  We also discussed the rationale for positive airway pressure therapy  30 minutes were spent in consultation reviewing the patient's sleep studies and addressing treatment options  Board Certified Sleep Specialist    Portions of the record may have been created with voice recognition software  Occasional wrong word or "sound a like" substitutions may have occurred due to the inherent limitations of voice recognition software  Read the chart carefully and recognize, using context, where substitutions have occurred

## 2019-12-04 ENCOUNTER — TELEPHONE (OUTPATIENT)
Dept: SLEEP CENTER | Facility: CLINIC | Age: 69
End: 2019-12-04

## 2020-01-06 ENCOUNTER — TELEPHONE (OUTPATIENT)
Dept: SLEEP CENTER | Facility: CLINIC | Age: 70
End: 2020-01-06

## 2020-01-07 DIAGNOSIS — G47.33 OSA (OBSTRUCTIVE SLEEP APNEA): Primary | ICD-10-CM

## 2020-01-08 ENCOUNTER — TELEPHONE (OUTPATIENT)
Dept: SLEEP CENTER | Facility: CLINIC | Age: 70
End: 2020-01-08

## 2020-01-15 DIAGNOSIS — I48.91 ATRIAL FIBRILLATION, UNSPECIFIED TYPE (HCC): ICD-10-CM

## 2020-01-15 RX ORDER — APIXABAN 5 MG/1
TABLET, FILM COATED ORAL
Qty: 180 TABLET | Refills: 0 | Status: SHIPPED | OUTPATIENT
Start: 2020-01-15 | End: 2020-01-20 | Stop reason: SDUPTHER

## 2020-01-17 DIAGNOSIS — I10 BENIGN ESSENTIAL HTN: ICD-10-CM

## 2020-01-17 RX ORDER — AMLODIPINE BESYLATE 5 MG/1
5 TABLET ORAL DAILY
Qty: 90 TABLET | Refills: 1 | Status: SHIPPED | OUTPATIENT
Start: 2020-01-17 | End: 2020-10-28 | Stop reason: SDUPTHER

## 2020-01-20 DIAGNOSIS — I48.91 ATRIAL FIBRILLATION, UNSPECIFIED TYPE (HCC): ICD-10-CM

## 2020-01-27 DIAGNOSIS — I48.91 ATRIAL FIBRILLATION, UNSPECIFIED TYPE (HCC): ICD-10-CM

## 2020-01-27 RX ORDER — APIXABAN 5 MG/1
TABLET, FILM COATED ORAL
Qty: 180 TABLET | Refills: 0 | Status: SHIPPED | OUTPATIENT
Start: 2020-01-27 | End: 2020-05-26

## 2020-02-03 NOTE — PROGRESS NOTES
Assessment/Plan:    Problem List Items Addressed This Visit        Respiratory    JORDANA (obstructive sleep apnea)     Unfortunately she was not able to tolerate CPAP, she has been advised trial of dental appliance  Encouraged weight loss  Nervous and Auditory    Carpal tunnel syndrome, bilateral     Mild on EMG  Recommend wearing wrist splints in the evening, consider Hand Ortho referral if worsens  Other    Hyperlipidemia    Relevant Orders    Lipid panel    Comprehensive metabolic panel    Current use of long term anticoagulation    Status post fall - Primary     Mechanical fall  Patient is on anticoagulation  No sign of bruising and has no neuro deficit on exam                Subjective:      Patient ID: Jem Gallegos is a 68 y o  female  HPI  77yo female with HTN, afib, HLD, insomnia, c-spine foraminal stenosis, BL CTS, seizure d/o, migraines, PVCs, MDD, CIC, sleep apnea here for follow up care  She slipped on black ice two days ago and hit back of her head  Has had HA, no dizziness or nausea  She is on eliquis  No LOC  She was able to go to her exercise class that day  She was diagnosed with sleep apnea but did not tolerate the mask  Dental appliance was discussed and will consider it  Had EMG of BUE that showed mild CTS  She is R handed  Denies dropping items from her hands or hand weakness      The following portions of the patient's history were reviewed and updated as appropriate: allergies, current medications, past family history, past medical history, past social history, past surgical history and problem list     Current Outpatient Medications:     amLODIPine (NORVASC) 5 mg tablet, Take 1 tablet (5 mg total) by mouth daily (Patient taking differently: Take 2 5 mg by mouth daily ), Disp: 90 tablet, Rfl: 1    atorvastatin (LIPITOR) 10 mg tablet, Take 1 tablet (10 mg total) by mouth daily (Patient taking differently: Take 20 mg by mouth daily ), Disp: 90 tablet, Rfl: 3    busPIRone (BUSPAR) 15 mg tablet, Take 15 mg by mouth 2 (two) times a day, Disp: , Rfl:     calcium citrate-Vitamin D (CALCIUM CITRATE + D3) 200 mg-250 units, Take by mouth, Disp: , Rfl:     cholecalciferol (VITAMIN D3) 1,000 units tablet, Take 5 tablets (5,000 Units total) by mouth daily, Disp: 150 tablet, Rfl: 0    clobetasol (TEMOVATE) 0 05 % ointment, Apply topically daily, Disp: 60 g, Rfl: 3    conjugated estrogens (PREMARIN) vaginal cream, Insert 0 5 g into the vagina daily, Disp: 42 5 g, Rfl: 0    divalproex sodium (DEPAKOTE ER) 500 mg 24 hr tablet, Take 1 tablet by mouth 2 (two) times a day, Disp: , Rfl:     ELIQUIS 5 MG, TAKE 1 TABLET BY MOUTH TWO  TIMES DAILY, Disp: 180 tablet, Rfl: 0    escitalopram (LEXAPRO) 20 mg tablet, Take 1 tablet by mouth daily, Disp: , Rfl:     esomeprazole (NexIUM) 20 mg capsule, Take 20 mg by mouth every morning before breakfast, Disp: , Rfl:     Estradiol (ELESTRIN) 0 52 MG/0 87 GM (0 06%) GEL, Place 0 87 g on the skin daily, Disp: 2 Bottle, Rfl: 6    fexofenadine (ALLEGRA) 180 MG tablet, Take 1 tablet by mouth daily as needed, Disp: , Rfl:     ipratropium (ATROVENT) 0 06 % nasal spray, into each nostril Daily, Disp: , Rfl:     lisinopril (ZESTRIL) 20 mg tablet, Take 1 tablet (20 mg total) by mouth daily, Disp: 90 tablet, Rfl: 3    MELATONIN PO, Take 10 mg by mouth daily at bedtime, Disp: , Rfl:     methocarbamol (ROBAXIN) 500 mg tablet, TAKE 2 TABLETS 4 TIMES A DAY AS NEEDED, Disp: 40 tablet, Rfl: 0    nystatin-triamcinolone (MYCOLOG-II) ointment, Apply topically 2 (two) times a day, Disp: 60 g, Rfl: 0    Ospemifene (OSPHENA) 60 MG TABS, Take 1 tablet (60 mg total) by mouth daily, Disp: 90 tablet, Rfl: 3    pindolol (VISKEN) 5 mg tablet, Take 1 tablet (5 mg total) by mouth daily, Disp: 90 tablet, Rfl: 3    Review of Systems   Constitutional:        +weight loss   HENT: Negative  Respiratory: Negative      Cardiovascular: Positive for palpitations (rare)  Negative for chest pain and leg swelling  Gastrointestinal: Negative for abdominal pain and nausea  Heartburn   Musculoskeletal: Positive for joint swelling  Wrist pain   Neurological: Positive for numbness  Negative for weakness  Objective:    /80 (BP Location: Left arm, Patient Position: Sitting, Cuff Size: Adult)   Pulse 74   Temp 98 2 °F (36 8 °C) (Tympanic)   Ht 5' 4" (1 626 m)   Wt 79 8 kg (176 lb)   SpO2 94%   BMI 30 21 kg/m²      Physical Exam   Constitutional: She appears well-developed and well-nourished  No distress  HENT:   Head: Normocephalic and atraumatic  Right Ear: External ear normal    Left Ear: External ear normal    Nose: Nose normal    Mouth/Throat: Oropharynx is clear and moist  No oropharyngeal exudate  Eyes: Pupils are equal, round, and reactive to light  Conjunctivae and EOM are normal    Neck: Neck supple  Cardiovascular: Normal rate, regular rhythm, normal heart sounds and intact distal pulses  Pulmonary/Chest: Effort normal and breath sounds normal  No stridor  No respiratory distress  Neurological: She is alert  She has normal strength  No cranial nerve deficit  She displays a negative Romberg sign  Coordination and gait normal    Psychiatric: She has a normal mood and affect  Her behavior is normal  Judgment and thought content normal    Vitals reviewed

## 2020-02-04 ENCOUNTER — OFFICE VISIT (OUTPATIENT)
Dept: INTERNAL MEDICINE CLINIC | Facility: CLINIC | Age: 70
End: 2020-02-04
Payer: MEDICARE

## 2020-02-04 VITALS
BODY MASS INDEX: 30.05 KG/M2 | OXYGEN SATURATION: 94 % | HEART RATE: 74 BPM | WEIGHT: 176 LBS | HEIGHT: 64 IN | TEMPERATURE: 98.2 F | SYSTOLIC BLOOD PRESSURE: 144 MMHG | DIASTOLIC BLOOD PRESSURE: 80 MMHG

## 2020-02-04 DIAGNOSIS — Z79.01 CURRENT USE OF LONG TERM ANTICOAGULATION: ICD-10-CM

## 2020-02-04 DIAGNOSIS — E78.2 MIXED HYPERLIPIDEMIA: ICD-10-CM

## 2020-02-04 DIAGNOSIS — G47.33 OSA (OBSTRUCTIVE SLEEP APNEA): ICD-10-CM

## 2020-02-04 DIAGNOSIS — Z91.81 STATUS POST FALL: Primary | ICD-10-CM

## 2020-02-04 DIAGNOSIS — G56.03 CARPAL TUNNEL SYNDROME, BILATERAL: ICD-10-CM

## 2020-02-04 PROBLEM — R40.0 DAYTIME SLEEPINESS: Status: RESOLVED | Noted: 2018-08-02 | Resolved: 2020-02-04

## 2020-02-04 PROBLEM — R06.83 SNORING: Status: RESOLVED | Noted: 2018-08-02 | Resolved: 2020-02-04

## 2020-02-04 PROBLEM — R07.89 CHEST PAIN, ATYPICAL: Status: RESOLVED | Noted: 2018-12-04 | Resolved: 2020-02-04

## 2020-02-04 PROCEDURE — 1036F TOBACCO NON-USER: CPT | Performed by: INTERNAL MEDICINE

## 2020-02-04 PROCEDURE — 3079F DIAST BP 80-89 MM HG: CPT | Performed by: INTERNAL MEDICINE

## 2020-02-04 PROCEDURE — 4040F PNEUMOC VAC/ADMIN/RCVD: CPT | Performed by: INTERNAL MEDICINE

## 2020-02-04 PROCEDURE — 99214 OFFICE O/P EST MOD 30 MIN: CPT | Performed by: INTERNAL MEDICINE

## 2020-02-04 PROCEDURE — 1160F RVW MEDS BY RX/DR IN RCRD: CPT | Performed by: INTERNAL MEDICINE

## 2020-02-04 PROCEDURE — 3008F BODY MASS INDEX DOCD: CPT | Performed by: INTERNAL MEDICINE

## 2020-02-04 PROCEDURE — 3077F SYST BP >= 140 MM HG: CPT | Performed by: INTERNAL MEDICINE

## 2020-02-04 NOTE — ASSESSMENT & PLAN NOTE
Mild on EMG  Recommend wearing wrist splints in the evening, consider Hand Ortho referral if worsens

## 2020-02-04 NOTE — ASSESSMENT & PLAN NOTE
Unfortunately she was not able to tolerate CPAP, she has been advised trial of dental appliance  Encouraged weight loss

## 2020-02-04 NOTE — ASSESSMENT & PLAN NOTE
Mechanical fall  Patient is on anticoagulation    No sign of bruising and has no neuro deficit on exam

## 2020-02-18 ENCOUNTER — OFFICE VISIT (OUTPATIENT)
Dept: SLEEP CENTER | Facility: CLINIC | Age: 70
End: 2020-02-18
Payer: MEDICARE

## 2020-02-18 VITALS
BODY MASS INDEX: 30.32 KG/M2 | HEIGHT: 64 IN | DIASTOLIC BLOOD PRESSURE: 64 MMHG | SYSTOLIC BLOOD PRESSURE: 122 MMHG | WEIGHT: 177.6 LBS | HEART RATE: 72 BPM

## 2020-02-18 DIAGNOSIS — G47.33 OSA (OBSTRUCTIVE SLEEP APNEA): Primary | ICD-10-CM

## 2020-02-18 PROCEDURE — 1160F RVW MEDS BY RX/DR IN RCRD: CPT | Performed by: INTERNAL MEDICINE

## 2020-02-18 PROCEDURE — 4040F PNEUMOC VAC/ADMIN/RCVD: CPT | Performed by: INTERNAL MEDICINE

## 2020-02-18 PROCEDURE — 1036F TOBACCO NON-USER: CPT | Performed by: INTERNAL MEDICINE

## 2020-02-18 PROCEDURE — 3078F DIAST BP <80 MM HG: CPT | Performed by: INTERNAL MEDICINE

## 2020-02-18 PROCEDURE — 99214 OFFICE O/P EST MOD 30 MIN: CPT | Performed by: INTERNAL MEDICINE

## 2020-02-18 PROCEDURE — 3074F SYST BP LT 130 MM HG: CPT | Performed by: INTERNAL MEDICINE

## 2020-02-18 PROCEDURE — 3008F BODY MASS INDEX DOCD: CPT | Performed by: INTERNAL MEDICINE

## 2020-02-18 NOTE — PROGRESS NOTES
Progress Note - [facility]   Juan Diego Newby KTT:[birth year] MRN: [id number]      Reason for Visit:  68 y  o female here for PAP compliance check    Assessment:  Having difficulty with new PAP device  Her primary complaint is discomfort from the air pressure in her throat  She tried for two nights, but could not tolerate it  Sleep quality is unimproved and she is feeling more drowsy the never  Compliance data show utilization for less than 70% of nights, for greater than or equal to 4 hours per night  In addition to JORDANA causing the patient's drowsiness, she also has a history of atrial fibrillation, which may be exacerbated by JORDANA    Plan:  The patient wishes to stop treatment  I recommended that she explore using a mandibular advancement device  We also discussed the Inspire device    Follow up: One year    History of Present Illness:  History of JORDANA on PAP therapy  Difficulty with compliance    Cannot tolerate CPAP or APAP      Review of Systems      Genitourinary need to urinate more than twice a night   Cardiology palpitations/fluttering feeling in the chest   Gastrointestinal frequent heartburn/acid reflux   Neurology frequent headaches   Constitutional fatigue   Integumentary itching   Psychiatry anxiety and depression   Musculoskeletal joint pain and muscle aches   Pulmonary snoring   ENT throat clearing   Endocrine none   Hematological none           I have reviewed and updated the review of systems as necessary    Historical Information    Past Medical History:   Past Medical History:   Diagnosis Date    Anxiety     Arthralgia     Atrial fibrillation (HCC)     Chronic interstitial cystitis     Depression     Hyperlipidemia     Hypertension     Lichen sclerosus     Lipoma     Osteopenia     Simple partial seizures ([address] )     last assessed 3/30/17    Tachycardia     last assessed 6/24/15         Past Surgical History:   Past Surgical History:   Procedure Laterality Date    COSMETIC SURGERY  HYSTERECTOMY      OOPHORECTOMY      SHOULDER SURGERY           Social History:   Social History     Socioeconomic History    Marital status: /Civil Union     Spouse name: None    Number of children: None    Years of education: None    Highest education level: None   Occupational History    Occupation: retired school superintendant   Social Needs    Financial resource strain: None    Food insecurity:     Worry: None     Inability: None    Transportation needs:     Medical: None     Non-medical: None   Tobacco Use    Smoking status: Never Smoker    Smokeless tobacco: Never Used   Substance and Sexual Activity    Alcohol use:  Yes     Alcohol/week: 10 0 standard drinks     Types: 10 Glasses of wine per week     Comment: socially    Drug use: No    Sexual activity: Yes     Comment: occasionally   Lifestyle    Physical activity:     Days per week: None     Minutes per session: None    Stress: None   Relationships    Social connections:     Talks on phone: None     Gets together: None     Attends Gnosticism service: None     Active member of club or organization: None     Attends meetings of clubs or organizations: None     Relationship status: None    Intimate partner violence:     Fear of current or ex partner: None     Emotionally abused: None     Physically abused: None     Forced sexual activity: None   Other Topics Concern    None   Social History Narrative    Exercising regularly         Family History:   Family History   Problem Relation Age of Onset    Stroke Mother         CVA    Hypertension Mother     Hypertension Father     Cancer Father         lung cancer    Cancer Brother         lung cancer    Alcohol abuse Family        Medications/Allergies:      Current Outpatient Medications:     amLODIPine (NORVASC) 5 mg tablet, Take 1 tablet (5 mg total) by mouth daily, Disp: 90 tablet, Rfl: 1    atorvastatin (LIPITOR) 10 mg tablet, Take 1 tablet (10 mg total) by mouth daily (Patient taking differently: Take 20 mg by mouth daily ), Disp: 90 tablet, Rfl: 3    busPIRone (BUSPAR) 15 mg tablet, Take 15 mg by mouth 2 (two) times a day, Disp: , Rfl:     calcium citrate-Vitamin D (CALCIUM CITRATE + D3) 200 mg-250 units, Take by mouth, Disp: , Rfl:     cholecalciferol (VITAMIN D3) 1,000 units tablet, Take 5 tablets (5,000 Units total) by mouth daily, Disp: 150 tablet, Rfl: 0    clobetasol (TEMOVATE) 0 05 % ointment, Apply topically daily, Disp: 60 g, Rfl: 3    conjugated estrogens (PREMARIN) vaginal cream, Insert 0 5 g into the vagina daily, Disp: 42 5 g, Rfl: 0    divalproex sodium (DEPAKOTE ER) 500 mg 24 hr tablet, Take 1 tablet by mouth 2 (two) times a day, Disp: , Rfl:     ELIQUIS 5 MG, TAKE 1 TABLET BY MOUTH TWO  TIMES DAILY, Disp: 180 tablet, Rfl: 0    escitalopram (LEXAPRO) 20 mg tablet, Take 1 tablet by mouth daily, Disp: , Rfl:     esomeprazole (NexIUM) 20 mg capsule, Take 20 mg by mouth every morning before breakfast, Disp: , Rfl:     Estradiol (ELESTRIN) 0 52 MG/0 87 GM (0 06%) GEL, Place 0 87 g on the skin daily, Disp: 2 Bottle, Rfl: 6    fexofenadine (ALLEGRA) 180 MG tablet, Take 1 tablet by mouth daily as needed, Disp: , Rfl:     ipratropium (ATROVENT) 0 06 % nasal spray, into each nostril Daily, Disp: , Rfl:     lisinopril (ZESTRIL) 20 mg tablet, Take 1 tablet (20 mg total) by mouth daily, Disp: 90 tablet, Rfl: 3    MELATONIN PO, Take 10 mg by mouth daily at bedtime, Disp: , Rfl:     methocarbamol (ROBAXIN) 500 mg tablet, TAKE 2 TABLETS 4 TIMES A DAY AS NEEDED, Disp: 40 tablet, Rfl: 0    nystatin-triamcinolone (MYCOLOG-II) ointment, Apply topically 2 (two) times a day, Disp: 60 g, Rfl: 0    Ospemifene (OSPHENA) 60 MG TABS, Take 1 tablet (60 mg total) by mouth daily, Disp: 90 tablet, Rfl: 3    pindolol (VISKEN) 5 mg tablet, Take 1 tablet (5 mg total) by mouth daily, Disp: 90 tablet, Rfl: 3      Objective    Vital Signs:   Vitals:    02/18/20 1100   BP: 122/64 Pulse: 72     [place] Sleepiness Scale: Total score: 6        Physical Exam:    General: Alert, appropriate, cooperative, overweight    Head: NC/AT    Skin: Warm, dry    Neuro: No motor abnormalities, cranial nerves appear intact    Extremity: No clubbing, cyanosis    PAP setting:   APAP 4 to 10 cm  DME Provider: [email or web address]  Test results:  KERLINE = 15 6    Counseling / Coordination of Care  Total clinic time spent today 15 minutes  A description of the counseling / coordination of care: Discussed means to improve compliance  IGOR Prater    Board Certified Sleep Specialist

## 2020-02-19 ENCOUNTER — TELEPHONE (OUTPATIENT)
Dept: SLEEP CENTER | Facility: CLINIC | Age: 70
End: 2020-02-19

## 2020-02-21 ENCOUNTER — TELEPHONE (OUTPATIENT)
Dept: SLEEP CENTER | Facility: CLINIC | Age: 70
End: 2020-02-21

## 2020-02-25 ENCOUNTER — LAB (OUTPATIENT)
Dept: LAB | Facility: CLINIC | Age: 70
End: 2020-02-25
Payer: MEDICARE

## 2020-02-25 ENCOUNTER — TRANSCRIBE ORDERS (OUTPATIENT)
Dept: URGENT CARE | Facility: CLINIC | Age: 70
End: 2020-02-25

## 2020-02-25 DIAGNOSIS — R07.89 OTHER CHEST PAIN: Primary | ICD-10-CM

## 2020-02-25 DIAGNOSIS — R07.89 OTHER CHEST PAIN: ICD-10-CM

## 2020-02-25 DIAGNOSIS — I10 ESSENTIAL (PRIMARY) HYPERTENSION: ICD-10-CM

## 2020-02-25 DIAGNOSIS — Z79.899 ENCOUNTER FOR LONG-TERM (CURRENT) USE OF MEDICATIONS: ICD-10-CM

## 2020-02-25 DIAGNOSIS — E78.2 MIXED HYPERLIPIDEMIA: ICD-10-CM

## 2020-02-25 LAB
ALBUMIN SERPL BCP-MCNC: 3.4 G/DL (ref 3.5–5)
ALP SERPL-CCNC: 57 U/L (ref 46–116)
ALT SERPL W P-5'-P-CCNC: 16 U/L (ref 12–78)
ANION GAP SERPL CALCULATED.3IONS-SCNC: 6 MMOL/L (ref 4–13)
AST SERPL W P-5'-P-CCNC: 8 U/L (ref 5–45)
BASOPHILS # BLD AUTO: 0.03 THOUSANDS/ΜL (ref 0–0.1)
BASOPHILS NFR BLD AUTO: 1 % (ref 0–1)
BILIRUB DIRECT SERPL-MCNC: 0.1 MG/DL (ref 0–0.2)
BILIRUB SERPL-MCNC: 0.33 MG/DL (ref 0.2–1)
BUN SERPL-MCNC: 22 MG/DL (ref 5–25)
CALCIUM SERPL-MCNC: 9.2 MG/DL (ref 8.3–10.1)
CHLORIDE SERPL-SCNC: 107 MMOL/L (ref 100–108)
CO2 SERPL-SCNC: 27 MMOL/L (ref 21–32)
CREAT SERPL-MCNC: 0.75 MG/DL (ref 0.6–1.3)
EOSINOPHIL # BLD AUTO: 0.08 THOUSAND/ΜL (ref 0–0.61)
EOSINOPHIL NFR BLD AUTO: 2 % (ref 0–6)
ERYTHROCYTE [DISTWIDTH] IN BLOOD BY AUTOMATED COUNT: 12.6 % (ref 11.6–15.1)
GFR SERPL CREATININE-BSD FRML MDRD: 80 ML/MIN/1.73SQ M
GLUCOSE P FAST SERPL-MCNC: 94 MG/DL (ref 65–99)
HCT VFR BLD AUTO: 41.4 % (ref 34.8–46.1)
HGB BLD-MCNC: 13.3 G/DL (ref 11.5–15.4)
IMM GRANULOCYTES # BLD AUTO: 0.03 THOUSAND/UL (ref 0–0.2)
IMM GRANULOCYTES NFR BLD AUTO: 1 % (ref 0–2)
LYMPHOCYTES # BLD AUTO: 1.27 THOUSANDS/ΜL (ref 0.6–4.47)
LYMPHOCYTES NFR BLD AUTO: 27 % (ref 14–44)
MCH RBC QN AUTO: 30.8 PG (ref 26.8–34.3)
MCHC RBC AUTO-ENTMCNC: 32.1 G/DL (ref 31.4–37.4)
MCV RBC AUTO: 96 FL (ref 82–98)
MONOCYTES # BLD AUTO: 0.47 THOUSAND/ΜL (ref 0.17–1.22)
MONOCYTES NFR BLD AUTO: 10 % (ref 4–12)
NEUTROPHILS # BLD AUTO: 2.76 THOUSANDS/ΜL (ref 1.85–7.62)
NEUTS SEG NFR BLD AUTO: 59 % (ref 43–75)
NRBC BLD AUTO-RTO: 0 /100 WBCS
PLATELET # BLD AUTO: 309 THOUSANDS/UL (ref 149–390)
PMV BLD AUTO: 11.2 FL (ref 8.9–12.7)
POTASSIUM SERPL-SCNC: 4.9 MMOL/L (ref 3.5–5.3)
PROT SERPL-MCNC: 6.7 G/DL (ref 6.4–8.2)
RBC # BLD AUTO: 4.32 MILLION/UL (ref 3.81–5.12)
SODIUM SERPL-SCNC: 140 MMOL/L (ref 136–145)
VALPROATE SERPL-MCNC: 51 UG/ML (ref 50–100)
WBC # BLD AUTO: 4.64 THOUSAND/UL (ref 4.31–10.16)

## 2020-02-25 PROCEDURE — 36415 COLL VENOUS BLD VENIPUNCTURE: CPT

## 2020-02-25 PROCEDURE — 80164 ASSAY DIPROPYLACETIC ACD TOT: CPT

## 2020-02-25 PROCEDURE — 85025 COMPLETE CBC W/AUTO DIFF WBC: CPT

## 2020-02-25 PROCEDURE — 80053 COMPREHEN METABOLIC PANEL: CPT

## 2020-02-25 PROCEDURE — 82248 BILIRUBIN DIRECT: CPT

## 2020-02-29 DIAGNOSIS — I10 BENIGN ESSENTIAL HYPERTENSION: ICD-10-CM

## 2020-03-01 RX ORDER — LISINOPRIL 20 MG/1
TABLET ORAL
Qty: 90 TABLET | Refills: 1 | Status: SHIPPED | OUTPATIENT
Start: 2020-03-01 | End: 2020-03-18 | Stop reason: SDUPTHER

## 2020-03-02 ENCOUNTER — ANNUAL EXAM (OUTPATIENT)
Dept: OBGYN CLINIC | Facility: CLINIC | Age: 70
End: 2020-03-02
Payer: MEDICARE

## 2020-03-02 VITALS
DIASTOLIC BLOOD PRESSURE: 64 MMHG | WEIGHT: 177 LBS | SYSTOLIC BLOOD PRESSURE: 118 MMHG | HEIGHT: 64 IN | BODY MASS INDEX: 30.22 KG/M2

## 2020-03-02 DIAGNOSIS — Z12.11 ENCOUNTER FOR SCREENING COLONOSCOPY: ICD-10-CM

## 2020-03-02 DIAGNOSIS — N95.2 POST-MENOPAUSE ATROPHIC VAGINITIS: ICD-10-CM

## 2020-03-02 DIAGNOSIS — N95.1 SYMPTOMATIC MENOPAUSAL OR FEMALE CLIMACTERIC STATES: ICD-10-CM

## 2020-03-02 DIAGNOSIS — Z01.419 WOMEN'S ANNUAL ROUTINE GYNECOLOGICAL EXAMINATION: Primary | ICD-10-CM

## 2020-03-02 PROCEDURE — G0101 CA SCREEN;PELVIC/BREAST EXAM: HCPCS | Performed by: OBSTETRICS & GYNECOLOGY

## 2020-03-02 NOTE — PROGRESS NOTES
ASSESSMENT & PLAN:   Diagnoses and all orders for this visit:    Women's annual routine gynecological examination    Post-menopause atrophic vaginitis  -     Ospemifene (Osphena) 60 MG TABS; Take 1 tablet (60 mg total) by mouth daily    Symptomatic menopausal or female climacteric states   -  elestrin - no refills needed  Encounter for screening colonoscopy  -     Ambulatory referral to Gastroenterology; Future        The following were reviewed in today's visit: Current ASCCP Guidelines, breast self exam, menopause, adequate intake of calcium and vitamin D, exercise and healthy diet  Patient to return to office yearly for annual exam      All questions have been answered to her satisfaction  CC:  Annual Gynecologic Examination    HPI: Glendy Liao is a 68 y o  Cletis Serene who presents for annual gynecologic examination  She has the following concerns:  Refills  Health Maintenance:    She exercises 3 days per week  She wears her seatbelt routinely  She does not perform regular monthly self breast exams  She feels safe at home  Patient does follow a balanced diet  Last mammogram: 2019  Last colonoscopy: due, referral placed       Past Medical History:   Diagnosis Date    Anxiety     Arthralgia     Atrial fibrillation (HCC)     Chronic interstitial cystitis     Depression     Hyperlipidemia     Hypertension     Lichen sclerosus     Lipoma     Osteopenia     Simple partial seizures ([address] )     last assessed 3/30/17    Tachycardia     last assessed 6/24/15       Past Surgical History:   Procedure Laterality Date    COSMETIC SURGERY      HYSTERECTOMY      OOPHORECTOMY      SHOULDER SURGERY         Past OB/Gyn History:  OB History        0    Para   0    Term   0       0    AB   0    Living   0       SAB   0    TAB   0    Ectopic   0    Multiple   0    Live Births   0               Menstrual history: Patient is post menopausal      Patient is not currently sexually active  Family History   Problem Relation Age of Onset    Stroke Mother         CVA    Hypertension Mother     Hypertension Father     Cancer Father         lung cancer    Cancer Brother         lung cancer    Alcohol abuse Family        Social History:  Social History     Socioeconomic History    Marital status: /Civil Union     Spouse name: Not on file    Number of children: Not on file    Years of education: Not on file    Highest education level: Not on file   Occupational History    Occupation: retired school superintendant   Social Needs    Financial resource strain: Not on file    Food insecurity:     Worry: Not on file     Inability: Not on file   [place] needs:     Medical: Not on file     Non-medical: Not on file   Tobacco Use    Smoking status: Never Smoker    Smokeless tobacco: Never Used   Substance and Sexual Activity    Alcohol use: Yes     Alcohol/week: 10 0 standard drinks     Types: 10 Glasses of wine per week     Comment: socially    Drug use: No    Sexual activity: Yes     Partners: Male     Birth control/protection: Post-menopausal     Comment: occasionally   Lifestyle    Physical activity:     Days per week: Not on file     Minutes per session: Not on file    Stress: Not on file   Relationships    Social connections:     Talks on phone: Not on file     Gets together: Not on file     Attends [employer] service: Not on file     Active member of club or organization: Not on file     Attends meetings of clubs or organizations: Not on file     Relationship status: Not on file    Intimate partner violence:     Fear of current or ex partner: Not on file     Emotionally abused: Not on file     Physically abused: Not on file     Forced sexual activity: Not on file   Other Topics Concern    Not on file   Social History Narrative    Exercising regularly     Presently lives with   Patient is         Allergies   Allergen Reactions    Codeine Hives    Erythromycin Base GI Intolerance    Hydromorphone Itching    Morphine GI Intolerance    Oxycodone-Acetaminophen Hives    Penicillins Rash         Current Outpatient Medications:     amLODIPine (NORVASC) 5 mg tablet, Take 1 tablet (5 mg total) by mouth daily, Disp: 90 tablet, Rfl: 1    atorvastatin (LIPITOR) 10 mg tablet, Take 1 tablet (10 mg total) by mouth daily (Patient taking differently: Take 20 mg by mouth daily ), Disp: 90 tablet, Rfl: 3    busPIRone (BUSPAR) 15 mg tablet, Take 15 mg by mouth 2 (two) times a day, Disp: , Rfl:     calcium citrate-Vitamin D (CALCIUM CITRATE + D3) 200 mg-250 units, Take by mouth, Disp: , Rfl:     conjugated estrogens (PREMARIN) vaginal cream, Insert 0 5 g into the vagina daily, Disp: 42 5 g, Rfl: 0    divalproex sodium (DEPAKOTE ER) 500 mg 24 hr tablet, Take 1 tablet by mouth 2 (two) times a day, Disp: , Rfl:     ELIQUIS 5 MG, TAKE 1 TABLET BY MOUTH TWO  TIMES DAILY, Disp: 180 tablet, Rfl: 0    escitalopram (LEXAPRO) 20 mg tablet, Take 1 tablet by mouth daily, Disp: , Rfl:     esomeprazole (NexIUM) 20 mg capsule, Take 20 mg by mouth every morning before breakfast, Disp: , Rfl:     Estradiol (ELESTRIN) 0 52 MG/0 87 GM (0 06%) GEL, Place 0 87 g on the skin daily, Disp: 2 Bottle, Rfl: 6    fexofenadine (ALLEGRA) 180 MG tablet, Take 1 tablet by mouth daily as needed, Disp: , Rfl:     ipratropium (ATROVENT) 0 06 % nasal spray, into each nostril Daily, Disp: , Rfl:     lisinopril (ZESTRIL) 20 mg tablet, TAKE 1 TABLET DAILY, Disp: 90 tablet, Rfl: 1    MELATONIN PO, Take 10 mg by mouth daily at bedtime, Disp: , Rfl:     methocarbamol (ROBAXIN) 500 mg tablet, TAKE 2 TABLETS 4 TIMES A DAY AS NEEDED, Disp: 40 tablet, Rfl: 0    Ospemifene (OSPHENA) 60 MG TABS, Take 1 tablet (60 mg total) by mouth daily, Disp: 90 tablet, Rfl: 3    pindolol (VISKEN) 5 mg tablet, Take 1 tablet (5 mg total) by mouth daily, Disp: 90 tablet, Rfl: 3    Review of Systems:  Review of Systems   Constitutional: Negative for unexpected weight change  Respiratory: Negative for shortness of breath  Cardiovascular: Negative for chest pain  Gastrointestinal: Positive for abdominal distention  Negative for abdominal pain, blood in stool, constipation, nausea and vomiting  Genitourinary: Negative for difficulty urinating, dysuria, frequency, urgency, vaginal bleeding and vaginal discharge  Neurological: Positive for headaches (related to neck arthritis)  Physical Exam:  /64   Ht 5' 4" (1 626 m)   Wt 80 3 kg (177 lb)   BMI 30 38 kg/m²    Physical Exam   Constitutional: She is oriented to person, place, and time  Vital signs are normal  She appears well-developed and well-nourished  Genitourinary: Vagina normal  Pelvic exam was performed with patient supine  There is no rash, tenderness or lesion on the right labia  There is no rash, tenderness or lesion on the left labia  No tenderness or bleeding in the vagina  No vaginal discharge found  Right adnexum does not display mass, does not display tenderness and does not display fullness  Left adnexum does not display mass, does not display tenderness and does not display fullness  Genitourinary Comments: No bladder tenderness  Urethral meatus midline, no masses  Absent uterus and cervix  Cuff intact  No palpable masses  No lesions visualized  No bladder tenderness  Moderate atrophy, especially at introitus  HENT:   Head: Normocephalic  Neck: No thyromegaly present  Cardiovascular: Normal rate, regular rhythm and normal heart sounds  Pulmonary/Chest: Effort normal and breath sounds normal  No respiratory distress  Right breast exhibits no inverted nipple, no mass, no nipple discharge, no skin change and no tenderness  Left breast exhibits no nipple discharge, no skin change and no tenderness  Abdominal: Soft  She exhibits no distension  There is no tenderness     Neurological: She is alert and oriented to person, place, and time  Psychiatric: She has a normal mood and affect  Her behavior is normal    Vitals reviewed

## 2020-03-04 ENCOUNTER — TELEPHONE (OUTPATIENT)
Dept: OBGYN CLINIC | Facility: CLINIC | Age: 70
End: 2020-03-04

## 2020-03-04 NOTE — TELEPHONE ENCOUNTER
Received fax from [employer] order regarding pt Rx for Osphena 60mg     Requires prior auth     Prior auth submitted on cover my meds through [employer] and sent to plan     PA currently pending

## 2020-03-06 ENCOUNTER — TELEPHONE (OUTPATIENT)
Dept: SLEEP CENTER | Facility: CLINIC | Age: 70
End: 2020-03-06

## 2020-03-06 NOTE — TELEPHONE ENCOUNTER
Patient called 3/5/2020, unable to send due to Epic being down  Patient states she did see Dr Jeannie Washburn for dental appliance  They advised Medicare does not recognize a Home study for dental appliance  Patient states Medicare requires in lab study    Can you please order?

## 2020-03-10 DIAGNOSIS — G47.33 OSA (OBSTRUCTIVE SLEEP APNEA): Primary | ICD-10-CM

## 2020-03-18 DIAGNOSIS — I10 BENIGN ESSENTIAL HYPERTENSION: ICD-10-CM

## 2020-03-18 RX ORDER — LISINOPRIL 20 MG/1
20 TABLET ORAL DAILY
Qty: 90 TABLET | Refills: 1 | Status: SHIPPED | OUTPATIENT
Start: 2020-03-18 | End: 2020-07-15

## 2020-03-26 DIAGNOSIS — E78.2 MIXED HYPERLIPIDEMIA: ICD-10-CM

## 2020-03-26 RX ORDER — ATORVASTATIN CALCIUM 10 MG/1
20 TABLET, FILM COATED ORAL DAILY
Qty: 90 TABLET | Refills: 1 | Status: SHIPPED | OUTPATIENT
Start: 2020-03-26 | End: 2021-12-13 | Stop reason: SDUPTHER

## 2020-05-14 DIAGNOSIS — I10 ESSENTIAL HYPERTENSION: ICD-10-CM

## 2020-05-14 RX ORDER — PINDOLOL 5 MG/1
5 TABLET ORAL DAILY
Qty: 90 TABLET | Refills: 3 | Status: SHIPPED | OUTPATIENT
Start: 2020-05-14

## 2020-05-26 DIAGNOSIS — I48.91 ATRIAL FIBRILLATION, UNSPECIFIED TYPE (HCC): ICD-10-CM

## 2020-05-26 RX ORDER — APIXABAN 5 MG/1
TABLET, FILM COATED ORAL
Qty: 180 TABLET | Refills: 0 | Status: SHIPPED | OUTPATIENT
Start: 2020-05-26 | End: 2020-08-05 | Stop reason: SDUPTHER

## 2020-07-14 DIAGNOSIS — I10 BENIGN ESSENTIAL HYPERTENSION: ICD-10-CM

## 2020-07-15 RX ORDER — LISINOPRIL 20 MG/1
TABLET ORAL
Qty: 90 TABLET | Refills: 1 | Status: SHIPPED | OUTPATIENT
Start: 2020-07-15 | End: 2021-03-19

## 2020-07-24 DIAGNOSIS — I10 ESSENTIAL HYPERTENSION: ICD-10-CM

## 2020-07-24 RX ORDER — PINDOLOL 5 MG/1
5 TABLET ORAL DAILY
Qty: 90 TABLET | Refills: 3 | Status: CANCELLED | OUTPATIENT
Start: 2020-07-24

## 2020-07-31 ENCOUNTER — APPOINTMENT (OUTPATIENT)
Dept: LAB | Facility: HOSPITAL | Age: 70
End: 2020-07-31
Attending: PSYCHIATRY & NEUROLOGY
Payer: MEDICARE

## 2020-07-31 ENCOUNTER — TRANSCRIBE ORDERS (OUTPATIENT)
Dept: LAB | Facility: HOSPITAL | Age: 70
End: 2020-07-31

## 2020-07-31 DIAGNOSIS — Z79.899 ENCOUNTER FOR LONG-TERM (CURRENT) USE OF OTHER MEDICATIONS: ICD-10-CM

## 2020-07-31 DIAGNOSIS — Z79.899 ENCOUNTER FOR LONG-TERM (CURRENT) USE OF OTHER MEDICATIONS: Primary | ICD-10-CM

## 2020-07-31 LAB
ALBUMIN SERPL BCP-MCNC: 3.4 G/DL (ref 3.5–5)
ALP SERPL-CCNC: 49 U/L (ref 46–116)
ALT SERPL W P-5'-P-CCNC: 21 U/L (ref 12–78)
ANION GAP SERPL CALCULATED.3IONS-SCNC: 7 MMOL/L (ref 4–13)
AST SERPL W P-5'-P-CCNC: 11 U/L (ref 5–45)
BASOPHILS # BLD AUTO: 0.04 THOUSANDS/ΜL (ref 0–0.1)
BASOPHILS NFR BLD AUTO: 1 % (ref 0–1)
BILIRUB SERPL-MCNC: 0.32 MG/DL (ref 0.2–1)
BUN SERPL-MCNC: 26 MG/DL (ref 5–25)
CALCIUM SERPL-MCNC: 9.6 MG/DL (ref 8.3–10.1)
CHLORIDE SERPL-SCNC: 107 MMOL/L (ref 100–108)
CO2 SERPL-SCNC: 26 MMOL/L (ref 21–32)
CREAT SERPL-MCNC: 0.76 MG/DL (ref 0.6–1.3)
EOSINOPHIL # BLD AUTO: 0.07 THOUSAND/ΜL (ref 0–0.61)
EOSINOPHIL NFR BLD AUTO: 2 % (ref 0–6)
ERYTHROCYTE [DISTWIDTH] IN BLOOD BY AUTOMATED COUNT: 13 % (ref 11.6–15.1)
GFR SERPL CREATININE-BSD FRML MDRD: 79 ML/MIN/1.73SQ M
GLUCOSE SERPL-MCNC: 96 MG/DL (ref 65–140)
HCT VFR BLD AUTO: 41.4 % (ref 34.8–46.1)
HGB BLD-MCNC: 13.3 G/DL (ref 11.5–15.4)
IMM GRANULOCYTES # BLD AUTO: 0.02 THOUSAND/UL (ref 0–0.2)
IMM GRANULOCYTES NFR BLD AUTO: 0 % (ref 0–2)
LYMPHOCYTES # BLD AUTO: 1.34 THOUSANDS/ΜL (ref 0.6–4.47)
LYMPHOCYTES NFR BLD AUTO: 30 % (ref 14–44)
MCH RBC QN AUTO: 30.9 PG (ref 26.8–34.3)
MCHC RBC AUTO-ENTMCNC: 32.1 G/DL (ref 31.4–37.4)
MCV RBC AUTO: 96 FL (ref 82–98)
MONOCYTES # BLD AUTO: 0.56 THOUSAND/ΜL (ref 0.17–1.22)
MONOCYTES NFR BLD AUTO: 13 % (ref 4–12)
NEUTROPHILS # BLD AUTO: 2.45 THOUSANDS/ΜL (ref 1.85–7.62)
NEUTS SEG NFR BLD AUTO: 54 % (ref 43–75)
NRBC BLD AUTO-RTO: 0 /100 WBCS
PLATELET # BLD AUTO: 299 THOUSANDS/UL (ref 149–390)
PMV BLD AUTO: 10.8 FL (ref 8.9–12.7)
POTASSIUM SERPL-SCNC: 4.9 MMOL/L (ref 3.5–5.3)
PROT SERPL-MCNC: 6.5 G/DL (ref 6.4–8.2)
RBC # BLD AUTO: 4.31 MILLION/UL (ref 3.81–5.12)
SODIUM SERPL-SCNC: 140 MMOL/L (ref 136–145)
VALPROATE SERPL-MCNC: 67 UG/ML (ref 50–100)
WBC # BLD AUTO: 4.48 THOUSAND/UL (ref 4.31–10.16)

## 2020-07-31 PROCEDURE — 80053 COMPREHEN METABOLIC PANEL: CPT

## 2020-07-31 PROCEDURE — 80164 ASSAY DIPROPYLACETIC ACD TOT: CPT

## 2020-07-31 PROCEDURE — 85025 COMPLETE CBC W/AUTO DIFF WBC: CPT

## 2020-07-31 PROCEDURE — 36415 COLL VENOUS BLD VENIPUNCTURE: CPT

## 2020-08-04 NOTE — PROGRESS NOTES
Assessment/Plan:    Problem List Items Addressed This Visit        Cardiovascular and Mediastinum    Hypertension     -normotensive on amlodipine 5mg daily, lisinopril 20mg daily and pindolol 5mg daily  -reinforced low sodium diet and weight loss         Atrial fibrillation (HCC) - Primary     -paroxysmal  -currently in sinus rhythm, rate controlled on pindolol  -stroke ppx on eliquis, med renewed  -patient followed by [facility]         Relevant Medications    apixaban (Eliquis) 5 mg       Nervous and Auditory    Seizure disorder (HCC)     -asymptomatic  -valproic acid level WNL on depakote 500mg twice daily  -continue follow up with Neurologist Dr Melonie Avalos  Other    Episode of recurrent major depressive disorder ([facility] [address] )     -controlled  -continue on escitalopram 20mg daily         Hyperlipidemia    Relevant Orders    Lipid panel      Other Visit Diagnoses     Asymptomatic postmenopausal state        Relevant Orders    DXA bone density spine hip and pelvis    Medicare annual wellness visit, subsequent        Encounter for screening mammogram for breast cancer        Relevant Orders    Mammo screening bilateral w 3d & cad          Subjective:      Patient ID: Jacqueline Lopez is a 65 y o  female  65yo female with JORDANA, BL CTS, HLD, insomnia, C-spine foraminal stenosis, HTN, afib, seizure d/o, migraines, PVCs, MDD, CIC here for follow up care  Atrial Fibrillation   Presents for follow-up visit  Symptoms include palpitations  Symptoms are negative for chest pain, dizziness, shortness of breath, syncope and weakness  The symptoms have been stable  Past medical history includes atrial fibrillation  Hypertension   This is a chronic problem  The current episode started more than 1 year ago  The problem is controlled  Associated symptoms include headaches, neck pain and palpitations  Pertinent negatives include no chest pain or shortness of breath   Risk factors for coronary artery disease include obesity and dyslipidemia  Past treatments include beta blockers, ACE inhibitors and calcium channel blockers  Identifiable causes of hypertension include sleep apnea  She denies breakthrough seizure on depakote  She continues with depakote  PHQ-9 Depression Screening    PHQ-9:    Frequency of the following problems over the past two weeks:       Little interest or pleasure in doing things:  0 - not at all  Feeling down, depressed, or hopeless:  0 - not at all  Trouble falling or staying asleep, or sleeping too much:  2 - more than half the days  Feeling tired or having little energy:  1 - several days  Poor appetite or overeatin - several days  Feeling bad about yourself - or that you are a failure or have let yourself or your family down:  0 - not at all  Trouble concentrating on things, such as reading the newspaper or watching television:  0 - not at all  Moving or speaking so slowly that other people could have noticed   Or the opposite - being so fidgety or restless that you have been moving around a lot more than usual:  0 - not at all  Thoughts that you would be better off dead, or of hurting yourself in some way:  0 - not at all  PHQ-2 Score:  0  PHQ-9 Score:  4           The following portions of the patient's history were reviewed and updated as appropriate: allergies, current medications, past family history, past medical history, past social history, past surgical history and problem list       Current Outpatient Medications:     amLODIPine (NORVASC) 5 mg tablet, Take 1 tablet (5 mg total) by mouth daily, Disp: 90 tablet, Rfl: 1    apixaban (Eliquis) 5 mg, Take 1 tablet (5 mg total) by mouth 2 (two) times a day, Disp: 180 tablet, Rfl: 3    atorvastatin (LIPITOR) 10 mg tablet, Take 2 tablets (20 mg total) by mouth daily, Disp: 90 tablet, Rfl: 1    busPIRone (BUSPAR) 15 mg tablet, Take 15 mg by mouth 2 (two) times a day, Disp: , Rfl:     calcium citrate-Vitamin D (CALCIUM CITRATE + D3) 200 mg-250 units, Take by mouth, Disp: , Rfl:     divalproex sodium (DEPAKOTE ER) 500 mg 24 hr tablet, Take 1 tablet by mouth 2 (two) times a day, Disp: , Rfl:     escitalopram (LEXAPRO) 20 mg tablet, Take 1 tablet by mouth daily, Disp: , Rfl:     esomeprazole (NexIUM) 20 mg capsule, Take 20 mg by mouth every morning before breakfast, Disp: , Rfl:     Estradiol (ELESTRIN) 0 52 MG/0 87 GM (0 06%) GEL, Place 0 87 g on the skin daily, Disp: 2 Bottle, Rfl: 6    fexofenadine (ALLEGRA) 180 MG tablet, Take 1 tablet by mouth daily as needed, Disp: , Rfl:     ipratropium (ATROVENT) 0 06 % nasal spray, into each nostril Daily, Disp: , Rfl:     lisinopril (ZESTRIL) 20 mg tablet, TAKE 1 TABLET BY MOUTH  DAILY, Disp: 90 tablet, Rfl: 1    MELATONIN PO, Take 10 mg by mouth daily at bedtime, Disp: , Rfl:     methocarbamol (ROBAXIN) 500 mg tablet, TAKE 2 TABLETS 4 TIMES A DAY AS NEEDED, Disp: 40 tablet, Rfl: 0    Ospemifene (Osphena) 60 MG TABS, Take 1 tablet (60 mg total) by mouth daily, Disp: 90 tablet, Rfl: 3    pindolol (VISKEN) 5 mg tablet, Take 1 tablet (5 mg total) by mouth daily, Disp: 90 tablet, Rfl: 3    Review of Systems   Constitutional: Positive for activity change, appetite change, fatigue and unexpected weight change  HENT: Positive for congestion, postnasal drip, rhinorrhea and sore throat  Respiratory: Negative for cough, shortness of breath, wheezing and stridor  Cardiovascular: Positive for palpitations  Negative for chest pain, leg swelling and syncope  Gastrointestinal: Negative for abdominal pain, constipation, diarrhea and vomiting  Reflux   Genitourinary: Positive for frequency  Musculoskeletal: Positive for arthralgias, back pain, joint swelling, neck pain and neck stiffness  Neurological: Positive for headaches  Negative for dizziness, weakness and numbness  Hematological: Bruises/bleeds easily     Psychiatric/Behavioral: Positive for decreased concentration, dysphoric mood and sleep disturbance  The patient is nervous/anxious  Objective:    /70 (BP Location: Left arm, Patient Position: Sitting)   Pulse 64   Temp 98 4 °F (36 9 °C)   Resp 16   Ht 5' 4" (1 626 m)   Wt 82 7 kg (182 lb 6 4 oz)   SpO2 98%   BMI 31 31 kg/m²      Physical Exam   Constitutional: She is oriented to person, place, and time  She is cooperative  No distress  HENT:   Head: Normocephalic  Right Ear: Tympanic membrane, external ear and ear canal normal    Left Ear: Tympanic membrane, external ear and ear canal normal    Nose: Nose normal  No rhinorrhea or congestion  Mouth/Throat: Mucous membranes are moist  No oropharyngeal exudate or posterior oropharyngeal erythema  Oropharynx is clear  Eyes: Pupils are equal, round, and reactive to light  Conjunctivae are normal    Neck: Normal range of motion  Neck supple  Carotid bruit is not present  Cardiovascular: Normal rate, regular rhythm, normal heart sounds and normal pulses  Pulmonary/Chest: Effort normal and breath sounds normal  No respiratory distress  She has no wheezes  Abdominal: Soft  Bowel sounds are normal  She exhibits no distension  There is no abdominal tenderness  Musculoskeletal:      Cervical back: She exhibits decreased range of motion  Right lower leg: No edema  Left lower leg: No edema  Comments: Decreased hyperabduction of BL shoulders   Lymphadenopathy:     She has no cervical adenopathy  Neurological: She is alert and oriented to person, place, and time  Psychiatric: Her speech is normal and behavior is normal  Mood and affect normal  She is attentive  Vitals reviewed        Recent Results (from the past 672 hour(s))   CBC and differential    Collection Time: 07/31/20 10:31 AM   Result Value Ref Range    WBC 4 48 4 31 - 10 16 Thousand/uL    RBC 4 31 3 81 - 5 12 Million/uL    Hemoglobin 13 3 11 5 - 15 4 g/dL    Hematocrit 41 4 34 8 - 46 1 %    MCV 96 82 - 98 fL    MCH 30 9 26 8 - 34 3 pg    MCHC 32 1 31 4 - 37 4 g/dL    RDW 13 0 11 6 - 15 1 %    MPV 10 8 8 9 - 12 7 fL    Platelets 800 359 - 245 Thousands/uL    nRBC 0 /100 WBCs    Neutrophils Relative 54 43 - 75 %    Immat GRANS % 0 0 - 2 %    Lymphocytes Relative 30 14 - 44 %    Monocytes Relative 13 (H) 4 - 12 %    Eosinophils Relative 2 0 - 6 %    Basophils Relative 1 0 - 1 %    Neutrophils Absolute 2 45 1 85 - 7 62 Thousands/µL    Immature Grans Absolute 0 02 0 00 - 0 20 Thousand/uL    Lymphocytes Absolute 1 34 0 60 - 4 47 Thousands/µL    Monocytes Absolute 0 56 0 17 - 1 22 Thousand/µL    Eosinophils Absolute 0 07 0 00 - 0 61 Thousand/µL    Basophils Absolute 0 04 0 00 - 0 10 Thousands/µL   Comprehensive metabolic panel    Collection Time: 07/31/20 10:31 AM   Result Value Ref Range    Sodium 140 136 - 145 mmol/L    Potassium 4 9 3 5 - 5 3 mmol/L    Chloride 107 100 - 108 mmol/L    CO2 26 21 - 32 mmol/L    ANION GAP 7 4 - 13 mmol/L    BUN 26 (H) 5 - 25 mg/dL    Creatinine 0 76 0 60 - 1 30 mg/dL    Glucose 96 65 - 140 mg/dL    Calcium 9 6 8 3 - 10 1 mg/dL    AST 11 5 - 45 U/L    ALT 21 12 - 78 U/L    Alkaline Phosphatase 49 46 - 116 U/L    Total Protein 6 5 6 4 - 8 2 g/dL    Albumin 3 4 (L) 3 5 - 5 0 g/dL    Total Bilirubin 0 32 0 20 - 1 00 mg/dL    eGFR 79 ml/min/1 73sq m   Valproic acid level, total    Collection Time: 07/31/20 10:31 AM   Result Value Ref Range    Valproic Acid, Total 67 50 - 100 ug/mL

## 2020-08-05 ENCOUNTER — OFFICE VISIT (OUTPATIENT)
Dept: INTERNAL MEDICINE CLINIC | Facility: CLINIC | Age: 70
End: 2020-08-05
Payer: MEDICARE

## 2020-08-05 VITALS
SYSTOLIC BLOOD PRESSURE: 116 MMHG | DIASTOLIC BLOOD PRESSURE: 70 MMHG | TEMPERATURE: 98.4 F | OXYGEN SATURATION: 98 % | BODY MASS INDEX: 31.14 KG/M2 | WEIGHT: 182.4 LBS | HEIGHT: 64 IN | HEART RATE: 64 BPM | RESPIRATION RATE: 16 BRPM

## 2020-08-05 DIAGNOSIS — F33.9 EPISODE OF RECURRENT MAJOR DEPRESSIVE DISORDER, UNSPECIFIED DEPRESSION EPISODE SEVERITY (HCC): ICD-10-CM

## 2020-08-05 DIAGNOSIS — Z12.31 ENCOUNTER FOR SCREENING MAMMOGRAM FOR BREAST CANCER: ICD-10-CM

## 2020-08-05 DIAGNOSIS — I48.91 ATRIAL FIBRILLATION, UNSPECIFIED TYPE (HCC): ICD-10-CM

## 2020-08-05 DIAGNOSIS — E78.2 MIXED HYPERLIPIDEMIA: ICD-10-CM

## 2020-08-05 DIAGNOSIS — I10 ESSENTIAL HYPERTENSION: ICD-10-CM

## 2020-08-05 DIAGNOSIS — G40.909 SEIZURE DISORDER (HCC): ICD-10-CM

## 2020-08-05 DIAGNOSIS — Z00.00 MEDICARE ANNUAL WELLNESS VISIT, SUBSEQUENT: ICD-10-CM

## 2020-08-05 DIAGNOSIS — I48.0 PAROXYSMAL ATRIAL FIBRILLATION (HCC): Primary | ICD-10-CM

## 2020-08-05 DIAGNOSIS — Z78.0 ASYMPTOMATIC POSTMENOPAUSAL STATE: ICD-10-CM

## 2020-08-05 PROBLEM — K29.00 ACUTE GASTRITIS WITHOUT HEMORRHAGE: Status: RESOLVED | Noted: 2018-07-24 | Resolved: 2020-08-05

## 2020-08-05 PROBLEM — Z91.81 STATUS POST FALL: Status: RESOLVED | Noted: 2020-02-04 | Resolved: 2020-08-05

## 2020-08-05 PROCEDURE — 1036F TOBACCO NON-USER: CPT | Performed by: INTERNAL MEDICINE

## 2020-08-05 PROCEDURE — 3074F SYST BP LT 130 MM HG: CPT | Performed by: INTERNAL MEDICINE

## 2020-08-05 PROCEDURE — 1125F AMNT PAIN NOTED PAIN PRSNT: CPT | Performed by: INTERNAL MEDICINE

## 2020-08-05 PROCEDURE — G0439 PPPS, SUBSEQ VISIT: HCPCS | Performed by: INTERNAL MEDICINE

## 2020-08-05 PROCEDURE — 1123F ACP DISCUSS/DSCN MKR DOCD: CPT | Performed by: INTERNAL MEDICINE

## 2020-08-05 PROCEDURE — 3078F DIAST BP <80 MM HG: CPT | Performed by: INTERNAL MEDICINE

## 2020-08-05 PROCEDURE — 4040F PNEUMOC VAC/ADMIN/RCVD: CPT | Performed by: INTERNAL MEDICINE

## 2020-08-05 PROCEDURE — 1170F FXNL STATUS ASSESSED: CPT | Performed by: INTERNAL MEDICINE

## 2020-08-05 PROCEDURE — 3008F BODY MASS INDEX DOCD: CPT | Performed by: INTERNAL MEDICINE

## 2020-08-05 PROCEDURE — 99214 OFFICE O/P EST MOD 30 MIN: CPT | Performed by: INTERNAL MEDICINE

## 2020-08-05 PROCEDURE — 1160F RVW MEDS BY RX/DR IN RCRD: CPT | Performed by: INTERNAL MEDICINE

## 2020-08-05 NOTE — ASSESSMENT & PLAN NOTE
-paroxysmal  -currently in sinus rhythm, rate controlled on pindolol  -stroke ppx on eliquis, med renewed  -patient followed by S[facility]

## 2020-08-05 NOTE — ASSESSMENT & PLAN NOTE
-normotensive on amlodipine 5mg daily, lisinopril 20mg daily and pindolol 5mg daily  -reinforced low sodium diet and weight loss

## 2020-08-05 NOTE — PATIENT INSTRUCTIONS
Medicare Preventive Visit Patient Instructions  Thank you for completing your Welcome to Medicare Visit or Medicare Annual Wellness Visit today  Your next wellness visit will be due in one year (8/5/2021)  The screening/preventive services that you may require over the next 5-10 years are detailed below  Some tests may not apply to you based off risk factors and/or age  Screening tests ordered at today's visit but not completed yet may show as past due  Also, please note that scanned in results may not display below  Preventive Screenings:  Service Recommendations Previous Testing/Comments   Colorectal Cancer Screening  * Colonoscopy    * Fecal Occult Blood Test (FOBT)/Fecal Immunochemical Test (FIT)  * Fecal DNA/Cologuard Test  * Flexible Sigmoidoscopy Age: 54-65 years old   Colonoscopy: every 10 years (may be performed more frequently if at higher risk)  OR  FOBT/FIT: every 1 year  OR  Cologuard: every 3 years  OR  Sigmoidoscopy: every 5 years  Screening may be recommended earlier than age 48 if at higher risk for colorectal cancer  Also, an individualized decision between you and your healthcare provider will decide whether screening between the ages of 74-80 would be appropriate  Colonoscopy: 02/27/2015  FOBT/FIT: Not on file  Cologuard: Not on file  Sigmoidoscopy: Not on file         Breast Cancer Screening Age: 36 years old  Frequency: every 1-2 years  Not required if history of left and right mastectomy Mammogram: 01/24/2019       Cervical Cancer Screening Between the ages of 21-29, pap smear recommended once every 3 years  Between the ages of 33-67, can perform pap smear with HPV co-testing every 5 years     Recommendations may differ for women with a history of total hysterectomy, cervical cancer, or abnormal pap smears in past  Pap Smear: 03/02/2020       Hepatitis C Screening Once for adults born between 1945 and 1965  More frequently in patients at high risk for Hepatitis C Hep C Antibody: 11/11/2018       Diabetes Screening 1-2 times per year if you're at risk for diabetes or have pre-diabetes Fasting glucose: 94 mg/dL   A1C: No results in last 5 years       Cholesterol Screening Once every 5 years if you don't have a lipid disorder  May order more often based on risk factors  Lipid panel: 07/27/2019         Other Preventive Screenings Covered by Medicare:  1  Abdominal Aortic Aneurysm (AAA) Screening: covered once if your at risk  You're considered to be at risk if you have a family history of AAA  2  Lung Cancer Screening: covers low dose CT scan once per year if you meet all of the following conditions: (1) Age 50-69; (2) No signs or symptoms of lung cancer; (3) Current smoker or have quit smoking within the last 15 years; (4) You have a tobacco smoking history of at least 30 pack years (packs per day multiplied by number of years you smoked); (5) You get a written order from a healthcare provider  3  Glaucoma Screening: covered annually if you're considered high risk: (1) You have diabetes OR (2) Family history of glaucoma OR (3)  aged 48 and older OR (3)  American aged 72 and older  3  Osteoporosis Screening: covered every 2 years if you meet one of the following conditions: (1) You're estrogen deficient and at risk for osteoporosis based off medical history and other findings; (2) Have a vertebral abnormality; (3) On glucocorticoid therapy for more than 3 months; (4) Have primary hyperparathyroidism; (5) On osteoporosis medications and need to assess response to drug therapy  · Last bone density test (DXA Scan): 02/03/2018  5  HIV Screening: covered annually if you're between the age of 12-76  Also covered annually if you are younger than 13 and older than 72 with risk factors for HIV infection  For pregnant patients, it is covered up to 3 times per pregnancy      Immunizations:  Immunization Recommendations   Influenza Vaccine Annual influenza vaccination during flu season is recommended for all persons aged >= 6 months who do not have contraindications   Pneumococcal Vaccine (Prevnar and Pneumovax)  * Prevnar = PCV13  * Pneumovax = PPSV23   Adults 25-60 years old: 1-3 doses may be recommended based on certain risk factors  Adults 72 years old: Prevnar (PCV13) vaccine recommended followed by Pneumovax (PPSV23) vaccine  If already received PPSV23 since turning 65, then PCV13 recommended at least one year after PPSV23 dose  Hepatitis B Vaccine 3 dose series if at intermediate or high risk (ex: diabetes, end stage renal disease, liver disease)   Tetanus (Td) Vaccine - COST NOT COVERED BY MEDICARE PART B Following completion of primary series, a booster dose should be given every 10 years to maintain immunity against tetanus  Td may also be given as tetanus wound prophylaxis  Tdap Vaccine - COST NOT COVERED BY MEDICARE PART B Recommended at least once for all adults  For pregnant patients, recommended with each pregnancy  Shingles Vaccine (Shingrix) - COST NOT COVERED BY MEDICARE PART B  2 shot series recommended in those aged 48 and above     Health Maintenance Due:      Topic Date Due    MAMMOGRAM  01/24/2021    Hepatitis C Screening  Completed     Immunizations Due:      Topic Date Due    Pneumococcal Vaccine: 65+ Years (2 of 2 - PPSV23) 09/28/2017    Influenza Vaccine  07/01/2020     Advance Directives   What are advance directives? Advance directives are legal documents that state your wishes and plans for medical care  These plans are made ahead of time in case you lose your ability to make decisions for yourself  Advance directives can apply to any medical decision, such as the treatments you want, and if you want to donate organs  What are the types of advance directives? There are many types of advance directives, and each state has rules about how to use them  You may choose a combination of any of the following:  · Living will:   This is a written record of the treatment you want  You can also choose which treatments you do not want, which to limit, and which to stop at a certain time  This includes surgery, medicine, IV fluid, and tube feedings  · Durable power of  for healthcare [place] [facility]): This is a written record that states who you want to make healthcare choices for you when you are unable to make them for yourself  This person, called a proxy, is usually a family member or a friend  You may choose more than 1 proxy  · Do not resuscitate (DNR) order:  A DNR order is used in case your heart stops beating or you stop breathing  It is a request not to have certain forms of treatment, such as CPR  A DNR order may be included in other types of advance directives  · Medical directive: This covers the care that you want if you are in a coma, near death, or unable to make decisions for yourself  You can list the treatments you want for each condition  Treatment may include pain medicine, surgery, blood transfusions, dialysis, IV or tube feedings, and a ventilator (breathing machine)  · Values history: This document has questions about your views, beliefs, and how you feel and think about life  This information can help others choose the care that you would choose  Why are advance directives important? An advance directive helps you control your care  Although spoken wishes may be used, it is better to have your wishes written down  Spoken wishes can be misunderstood, or not followed  Treatments may be given even if you do not want them  An advance directive may make it easier for your family to make difficult choices about your care  Weight Management   Why it is important to manage your weight:  Being overweight increases your risk of health conditions such as heart disease, high blood pressure, type 2 diabetes, and certain types of cancer  It can also increase your risk for osteoarthritis, sleep apnea, and other respiratory problems   Aim for a slow, steady weight loss  Even a small amount of weight loss can lower your risk of health problems  How to lose weight safely:  A safe and healthy way to lose weight is to eat fewer calories and get regular exercise  You can lose up about 1 pound a week by decreasing the number of calories you eat by 500 calories each day  Healthy meal plan for weight management:  A healthy meal plan includes a variety of foods, contains fewer calories, and helps you stay healthy  A healthy meal plan includes the following:  · Eat whole-grain foods more often  A healthy meal plan should contain fiber  Fiber is the part of grains, fruits, and vegetables that is not broken down by your body  Whole-grain foods are healthy and provide extra fiber in your diet  Some examples of whole-grain foods are whole-wheat breads and pastas, oatmeal, brown rice, and bulgur  · Eat a variety of vegetables every day  Include dark, leafy greens such as spinach, kale, lor greens, and mustard greens  Eat yellow and orange vegetables such as carrots, sweet potatoes, and winter squash  · Eat a variety of fruits every day  Choose fresh or canned fruit (canned in its own juice or light syrup) instead of juice  Fruit juice has very little or no fiber  · Eat low-fat dairy foods  Drink fat-free (skim) milk or 1% milk  Eat fat-free yogurt and low-fat cottage cheese  Try low-fat cheeses such as mozzarella and other reduced-fat cheeses  · Choose meat and other protein foods that are low in fat  Choose beans or other legumes such as split peas or lentils  Choose fish, skinless poultry (chicken or turkey), or lean cuts of red meat (beef or pork)  Before you cook meat or poultry, cut off any visible fat  · Use less fat and oil  Try baking foods instead of frying them  Add less fat, such as margarine, sour cream, regular salad dressing and mayonnaise to foods  Eat fewer high-fat foods   Some examples of high-fat foods include french fries, doughnuts, ice cream, and cakes  · Eat fewer sweets  Limit foods and drinks that are high in sugar  This includes candy, cookies, regular soda, and sweetened drinks  Exercise:  Exercise at least 30 minutes per day on most days of the week  Some examples of exercise include walking, biking, dancing, and swimming  You can also fit in more physical activity by taking the stairs instead of the elevator or parking farther away from stores  Ask your healthcare provider about the best exercise plan for you  © Copyright [employer] 2018 Information is for End User's use only and may not be sold, redistributed or otherwise used for commercial purposes  All illustrations and images included in CareNotes® are the copyrighted property of [employer]  or [facility] Preventive Visit Patient Instructions  Thank you for completing your Welcome to Medicare Visit or Medicare Annual Wellness Visit today  Your next wellness visit will be due in one year (8/5/2021)  The screening/preventive services that you may require over the next 5-10 years are detailed below  Some tests may not apply to you based off risk factors and/or age  Screening tests ordered at today's visit but not completed yet may show as past due  Also, please note that scanned in results may not display below  Preventive Screenings:  Service Recommendations Previous Testing/Comments   Colorectal Cancer Screening  * Colonoscopy    * Fecal Occult Blood Test (FOBT)/Fecal Immunochemical Test (FIT)  * Fecal DNA/Cologuard Test  * Flexible Sigmoidoscopy Age: 54-65 years old   Colonoscopy: every 10 years (may be performed more frequently if at higher risk)  OR  FOBT/FIT: every 1 year  OR  Cologuard: every 3 years  OR  Sigmoidoscopy: every 5 years  Screening may be recommended earlier than age 48 if at higher risk for colorectal cancer   Also, an individualized decision between you and your healthcare provider will decide whether screening between the ages of 76-85 would be appropriate  Colonoscopy: 02/27/2015  FOBT/FIT: Not on file  Cologuard: Not on file  Sigmoidoscopy: Not on file    Screening Current     Breast Cancer Screening Age: 36 years old  Frequency: every 1-2 years  Not required if history of left and right mastectomy Mammogram: 01/24/2019    Screening Current   Cervical Cancer Screening Between the ages of 21-29, pap smear recommended once every 3 years  Between the ages of 33-67, can perform pap smear with HPV co-testing every 5 years  Recommendations may differ for women with a history of total hysterectomy, cervical cancer, or abnormal pap smears in past  Pap Smear: 03/02/2020    Screening Not Indicated   Hepatitis C Screening Once for adults born between 1945 and 1965  More frequently in patients at high risk for Hepatitis C Hep C Antibody: 11/11/2018    Screening Current   Diabetes Screening 1-2 times per year if you're at risk for diabetes or have pre-diabetes Fasting glucose: 94 mg/dL   A1C: No results in last 5 years    Screening Current   Cholesterol Screening Once every 5 years if you don't have a lipid disorder  May order more often based on risk factors  Lipid panel: 07/27/2019    Screening Not Indicated  History Lipid Disorder     Other Preventive Screenings Covered by Medicare:  6  Abdominal Aortic Aneurysm (AAA) Screening: covered once if your at risk  You're considered to be at risk if you have a family history of AAA  7  Lung Cancer Screening: covers low dose CT scan once per year if you meet all of the following conditions: (1) Age 50-69; (2) No signs or symptoms of lung cancer; (3) Current smoker or have quit smoking within the last 15 years; (4) You have a tobacco smoking history of at least 30 pack years (packs per day multiplied by number of years you smoked); (5) You get a written order from a healthcare provider    8  Glaucoma Screening: covered annually if you're considered high risk: (1) You have diabetes OR (2) Family history of glaucoma OR (3)  aged 48 and older OR (3)  American aged 72 and older  5  Osteoporosis Screening: covered every 2 years if you meet one of the following conditions: (1) You're estrogen deficient and at risk for osteoporosis based off medical history and other findings; (2) Have a vertebral abnormality; (3) On glucocorticoid therapy for more than 3 months; (4) Have primary hyperparathyroidism; (5) On osteoporosis medications and need to assess response to drug therapy  · Last bone density test (DXA Scan): 02/03/2018  10  HIV Screening: covered annually if you're between the age of 12-76  Also covered annually if you are younger than 13 and older than 72 with risk factors for HIV infection  For pregnant patients, it is covered up to 3 times per pregnancy  Immunizations:  Immunization Recommendations   Influenza Vaccine Annual influenza vaccination during flu season is recommended for all persons aged >= 6 months who do not have contraindications   Pneumococcal Vaccine (Prevnar and Pneumovax)  * Prevnar = PCV13  * Pneumovax = PPSV23   Adults 25-60 years old: 1-3 doses may be recommended based on certain risk factors  Adults 72 years old: Prevnar (PCV13) vaccine recommended followed by Pneumovax (PPSV23) vaccine  If already received PPSV23 since turning 65, then PCV13 recommended at least one year after PPSV23 dose  Hepatitis B Vaccine 3 dose series if at intermediate or high risk (ex: diabetes, end stage renal disease, liver disease)   Tetanus (Td) Vaccine - COST NOT COVERED BY MEDICARE PART B Following completion of primary series, a booster dose should be given every 10 years to maintain immunity against tetanus  Td may also be given as tetanus wound prophylaxis  Tdap Vaccine - COST NOT COVERED BY MEDICARE PART B Recommended at least once for all adults  For pregnant patients, recommended with each pregnancy     Shingles Vaccine (Shingrix) - COST NOT COVERED BY MEDICARE PART B  2 shot series recommended in those aged 48 and above     Health Maintenance Due:      Topic Date Due    MAMMOGRAM  01/24/2021    Hepatitis C Screening  Completed     Immunizations Due:      Topic Date Due    Pneumococcal Vaccine: 65+ Years (2 of 2 - PPSV23) 09/28/2017    Influenza Vaccine  07/01/2020     Advance Directives   What are advance directives? Advance directives are legal documents that state your wishes and plans for medical care  These plans are made ahead of time in case you lose your ability to make decisions for yourself  Advance directives can apply to any medical decision, such as the treatments you want, and if you want to donate organs  What are the types of advance directives? There are many types of advance directives, and each state has rules about how to use them  You may choose a combination of any of the following:  · Living will: This is a written record of the treatment you want  You can also choose which treatments you do not want, which to limit, and which to stop at a certain time  This includes surgery, medicine, IV fluid, and tube feedings  · Durable power of  for healthcare [place] [facility]): This is a written record that states who you want to make healthcare choices for you when you are unable to make them for yourself  This person, called a proxy, is usually a family member or a friend  You may choose more than 1 proxy  · Do not resuscitate (DNR) order:  A DNR order is used in case your heart stops beating or you stop breathing  It is a request not to have certain forms of treatment, such as CPR  A DNR order may be included in other types of advance directives  · Medical directive: This covers the care that you want if you are in a coma, near death, or unable to make decisions for yourself  You can list the treatments you want for each condition   Treatment may include pain medicine, surgery, blood transfusions, dialysis, IV or tube feedings, and a ventilator (breathing machine)  · Values history: This document has questions about your views, beliefs, and how you feel and think about life  This information can help others choose the care that you would choose  Why are advance directives important? An advance directive helps you control your care  Although spoken wishes may be used, it is better to have your wishes written down  Spoken wishes can be misunderstood, or not followed  Treatments may be given even if you do not want them  An advance directive may make it easier for your family to make difficult choices about your care  Urinary Incontinence   Urinary incontinence (UI)  is when you lose control of your bladder  UI develops because your bladder cannot store or empty urine properly  The 3 most common types of UI are stress incontinence, urge incontinence, or both  Medicines:   · May be given to help strengthen your bladder control  Report any side effects of medication to your healthcare provider  Do pelvic muscle exercises often:  Your pelvic muscles help you stop urinating  Squeeze these muscles tight for 5 seconds, then relax for 5 seconds  Gradually work up to squeezing for 10 seconds  Do 3 sets of 15 repetitions a day, or as directed  This will help strengthen your pelvic muscles and improve bladder control  Train your bladder:  Go to the bathroom at set times, such as every 2 hours, even if you do not feel the urge to go  You can also try to hold your urine when you feel the urge to go  For example, hold your urine for 5 minutes when you feel the urge to go  As that becomes easier, hold your urine for 10 minutes  Self-care:   · Keep a UI record  Write down how often you leak urine and how much you leak  Make a note of what you were doing when you leaked urine  · Drink liquids as directed  You may need to limit the amount of liquid you drink to help control your urine leakage  Do not drink any liquid right before you go to bed   Limit or do not have drinks that contain caffeine or alcohol  · Prevent constipation  Eat a variety of high-fiber foods  Good examples are high-fiber cereals, beans, vegetables, and whole-grain breads  Walking is the best way to trigger your intestines to have a bowel movement  · Exercise regularly and maintain a healthy weight  Weight loss and exercise will decrease pressure on your bladder and help you control your leakage  · Use a catheter as directed  to help empty your bladder  A catheter is a tiny, plastic tube that is put into your bladder to drain your urine  · Go to behavior therapy as directed  Behavior therapy may be used to help you learn to control your urge to urinate  Weight Management   Why it is important to manage your weight:  Being overweight increases your risk of health conditions such as heart disease, high blood pressure, type 2 diabetes, and certain types of cancer  It can also increase your risk for osteoarthritis, sleep apnea, and other respiratory problems  Aim for a slow, steady weight loss  Even a small amount of weight loss can lower your risk of health problems  How to lose weight safely:  A safe and healthy way to lose weight is to eat fewer calories and get regular exercise  You can lose up about 1 pound a week by decreasing the number of calories you eat by 500 calories each day  Healthy meal plan for weight management:  A healthy meal plan includes a variety of foods, contains fewer calories, and helps you stay healthy  A healthy meal plan includes the following:  · Eat whole-grain foods more often  A healthy meal plan should contain fiber  Fiber is the part of grains, fruits, and vegetables that is not broken down by your body  Whole-grain foods are healthy and provide extra fiber in your diet  Some examples of whole-grain foods are whole-wheat breads and pastas, oatmeal, brown rice, and bulgur  · Eat a variety of vegetables every day    Include dark, leafy greens such as spinach, kale, lor greens, and mustard greens  Eat yellow and orange vegetables such as carrots, sweet potatoes, and winter squash  · Eat a variety of fruits every day  Choose fresh or canned fruit (canned in its own juice or light syrup) instead of juice  Fruit juice has very little or no fiber  · Eat low-fat dairy foods  Drink fat-free (skim) milk or 1% milk  Eat fat-free yogurt and low-fat cottage cheese  Try low-fat cheeses such as mozzarella and other reduced-fat cheeses  · Choose meat and other protein foods that are low in fat  Choose beans or other legumes such as split peas or lentils  Choose fish, skinless poultry (chicken or turkey), or lean cuts of red meat (beef or pork)  Before you cook meat or poultry, cut off any visible fat  · Use less fat and oil  Try baking foods instead of frying them  Add less fat, such as margarine, sour cream, regular salad dressing and mayonnaise to foods  Eat fewer high-fat foods  Some examples of high-fat foods include french fries, doughnuts, ice cream, and cakes  · Eat fewer sweets  Limit foods and drinks that are high in sugar  This includes candy, cookies, regular soda, and sweetened drinks  Exercise:  Exercise at least 30 minutes per day on most days of the week  Some examples of exercise include walking, biking, dancing, and swimming  You can also fit in more physical activity by taking the stairs instead of the elevator or parking farther away from stores  Ask your healthcare provider about the best exercise plan for you  © Copyright [employer] 2018 Information is for End User's use only and may not be sold, redistributed or otherwise used for commercial purposes   All illustrations and images included in CareNotes® are the copyrighted property of [employer]  or [address] [place] [address] BFKW

## 2020-08-05 NOTE — ASSESSMENT & PLAN NOTE
-asymptomatic  -valproic acid level WNL on depakote 500mg twice daily  -continue follow up with Neurologist Dr Guido Pickering

## 2020-08-05 NOTE — PROGRESS NOTES
Assessment and Plan:     Problem List Items Addressed This Visit     None      Visit Diagnoses     Medicare annual wellness visit, subsequent        Encounter for screening mammogram for breast cancer        Relevant Orders    Mammo screening bilateral w 3d & cad    Asymptomatic postmenopausal state        Relevant Orders    DXA bone density spine hip and pelvis        BMI Counseling: Body mass index is 27 26 kg/m²  The BMI is above normal  Nutrition recommendations include decreasing portion sizes, consuming healthier snacks, limiting drinks that contain sugar, moderation in carbohydrate intake and reducing intake of cholesterol  Exercise recommendations include moderate physical activity 150 minutes/week and exercising 3-5 times per week  No pharmacotherapy was ordered  Preventive health issues were discussed with patient, and age appropriate screening tests were ordered as noted in patient's After Visit Summary  Personalized health advice and appropriate referrals for health education or preventive services given if needed, as noted in patient's After Visit Summary       History of Present Illness:     Patient presents for Medicare Annual Wellness visit    Patient Care Team:  Ash Arias DO as PCP - DO Lillaim York MD (Sleep Medicine)  Shant Sosa MD (Neurology)  MD Jonathan Warren MD (Family Medicine)  Ke Wong OD (Optometry)     Problem List:     Patient Active Problem List   Diagnosis    Arthralgia    Bilateral hip pain    Depression    Disorder of bone and cartilage    Hyperlipidemia    Hypertension    Lichen sclerosus    Lipoma    Myalgia    Post-menopausal atrophic vaginitis    Symptomatic menopausal or female climacteric states    Arthritis    Acute gastritis without hemorrhage    Insomnia    Cervical spondylosis    Cervicocranial syndrome    Intractable chronic migraine without aura    Complex partial seizure with impairment of consciousness (HCC)    Hip pain    Injury of tendon of rotator cuff    Trochanteric bursitis    Seizure disorder (HCC)    Anxiety    Gastritis    Atrial fibrillation (HCC)    Current use of long term anticoagulation    Epilepsia partialis continua with intractable epilepsy [facility] [address] [facility]    Alcohol dependence (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 [address] )     last assessed 3/30/17    Tachycardia     last assessed 6/24/15     Past Surgical History:   Procedure Laterality Date    COSMETIC SURGERY      HYSTERECTOMY      OOPHORECTOMY      SHOULDER SURGERY        Family History:     Family History   Problem Relation Age of Onset    Stroke Mother         CVA    Hypertension Mother     Hypertension Father     Cancer Father         lung cancer    Cancer Brother         lung cancer    Alcohol abuse Family       Social History:     E-Cigarette/Vaping    E-Cigarette Use Never User      E-Cigarette/Vaping Substances    Nicotine No     THC No     CBD No     Flavoring No     Other No     Unknown No      Social History     Socioeconomic History    Marital status: /Civil Union     Spouse name: None    Number of children: None    Years of education: None    Highest education level: None   Occupational History    Occupation: retired school superintendant   Social Needs    Financial resource strain: None    Food insecurity     Worry: None Inability: None    Transportation needs     Medical: None     Non-medical: None   Tobacco Use    Smoking status: Never Smoker    Smokeless tobacco: Never Used   Substance and Sexual Activity    Alcohol use:  Yes     Alcohol/week: 10 0 standard drinks     Types: 10 Glasses of wine per week     Comment: socially    Drug use: No    Sexual activity: Yes     Partners: Male     Birth control/protection: Post-menopausal     Comment: occasionally   Lifestyle    Physical activity     Days per week: None     Minutes per session: None    Stress: None   Relationships    Social connections     Talks on phone: None     Gets together: None     Attends Jewish service: None     Active member of club or organization: None     Attends meetings of clubs or organizations: None     Relationship status: None    Intimate partner violence     Fear of current or ex partner: None     Emotionally abused: None     Physically abused: None     Forced sexual activity: None   Other Topics Concern    None   Social History Narrative    Exercising regularly      Medications and Allergies:     Current Outpatient Medications   Medication Sig Dispense Refill    amLODIPine (NORVASC) 5 mg tablet Take 1 tablet (5 mg total) by mouth daily 90 tablet 1    atorvastatin (LIPITOR) 10 mg tablet Take 2 tablets (20 mg total) by mouth daily 90 tablet 1    busPIRone (BUSPAR) 15 mg tablet Take 15 mg by mouth 2 (two) times a day      calcium citrate-Vitamin D (CALCIUM CITRATE + D3) 200 mg-250 units Take by mouth      divalproex sodium (DEPAKOTE ER) 500 mg 24 hr tablet Take 1 tablet by mouth 2 (two) times a day      ELIQUIS 5 MG TAKE 1 TABLET BY MOUTH TWO  TIMES DAILY 180 tablet 0    escitalopram (LEXAPRO) 20 mg tablet Take 1 tablet by mouth daily      esomeprazole (NexIUM) 20 mg capsule Take 20 mg by mouth every morning before breakfast      Estradiol (ELESTRIN) 0 52 MG/0 87 GM (0 06%) GEL Place 0 87 g on the skin daily 2 Bottle 6    fexofenadine (ALLEGRA) 180 MG tablet Take 1 tablet by mouth daily as needed      ipratropium (ATROVENT) 0 06 % nasal spray into each nostril Daily      lisinopril (ZESTRIL) 20 mg tablet TAKE 1 TABLET BY MOUTH  DAILY 90 tablet 1    MELATONIN PO Take 10 mg by mouth daily at bedtime      methocarbamol (ROBAXIN) 500 mg tablet TAKE 2 TABLETS 4 TIMES A DAY AS NEEDED 40 tablet 0    Ospemifene (Osphena) 60 MG TABS Take 1 tablet (60 mg total) by mouth daily 90 tablet 3    pindolol (VISKEN) 5 mg tablet Take 1 tablet (5 mg total) by mouth daily 90 tablet 3     No current facility-administered medications for this visit  Allergies   Allergen Reactions    Codeine Hives    Erythromycin Base GI Intolerance    Hydromorphone Itching    Morphine GI Intolerance    Oxycodone-Acetaminophen Hives    Penicillins Rash      Immunizations:     Immunization History   Administered Date(s) Administered    H1N1, All Formulations 11/10/2009    INFLUENZA 09/20/2018, 10/01/2019    Influenza Split High Dose Preservative Free IM 09/30/2014, 09/28/2016    Influenza TIV (IM) 09/19/2012, 09/25/2013, 10/01/2015, 09/20/2017    Influenza, high dose seasonal 0 5 mL 10/01/2019    Pneumococcal Conjugate 13-Valent 09/28/2016    Pneumococcal Polysaccharide PPV23 01/01/2012    Tdap 09/25/2013      Health Maintenance:         Topic Date Due    MAMMOGRAM  01/24/2021    Hepatitis C Screening  Completed         Topic Date Due    Pneumococcal Vaccine: 65+ Years (2 of 2 - PPSV23) 09/28/2017    Influenza Vaccine  07/01/2020      Medicare Health Risk Assessment:     /70 (BP Location: Left arm, Patient Position: Sitting)   Pulse 64   Temp 98 4 °F (36 9 °C)   Resp 16   Ht 5' 4"   Wt 72 kg (158 lb 12 8 oz)   SpO2 98%   BMI 27 26 kg/m²      Baljinder Hayward is here for her Subsequent Wellness visit  Last Medicare Wellness visit information reviewed, patient interviewed and updates made to the record today        Health Risk Assessment: Patient rates overall health as fair  Patient feels that their physical health rating is same  Eyesight was rated as same  Hearing was rated as same  Patient feels that their emotional and mental health rating is same  Pain experienced in the last 7 days has been some  Patient's pain rating has been 3/10  Patient states that she has experienced no weight loss or gain in last 6 months  Depression Screening:   PHQ-2 Score: 0  PHQ-9 Score: 4      Fall Risk Screening: In the past year, patient has experienced: no history of falling in past year      Urinary Incontinence Screening:   Patient has leaked urine accidently in the last six months  Home Safety:  Patient has trouble with stairs inside or outside of their home  Patient has working smoke alarms and has working carbon monoxide detector  Home safety hazards include: none  Nutrition:   Current diet is Regular  Medications:   Patient is currently taking over-the-counter supplements  OTC medications include: see medication list  Patient is able to manage medications  Activities of Daily Living (ADLs)/Instrumental Activities of Daily Living (IADLs):   Walk and transfer into and out of bed and chair?: Yes  Dress and groom yourself?: Yes    Bathe or shower yourself?: Yes    Feed yourself? Yes  Do your laundry/housekeeping?: Yes  Manage your money, pay your bills and track your expenses?: Yes  Make your own meals?: Yes    Do your own shopping?: Yes    Durable Medical Equipment Suppliers  patient does not know    Previous Hospitalizations:   Any hospitalizations or ED visits within the last 12 months?: No      Advance Care Planning:   Living will: Yes    Durable POA for healthcare:  Yes    Advanced directive: Yes    Advanced directive counseling given: No      Cognitive Screening:   Provider or family/friend/caregiver concerned regarding cognition?: No    PREVENTIVE SCREENINGS      Cardiovascular Screening:    General: Screening Not Indicated and History Lipid Disorder      Diabetes Screening:     General: Screening Current      Colorectal Cancer Screening:     General: Screening Current      Breast Cancer Screening:     General: Screening Current and Risks and Benefits Discussed    Due for: Mammogram        Cervical Cancer Screening:    General: Screening Not Indicated      Osteoporosis Screening:    General: Risks and Benefits Discussed    Due for: DXA Axial      Abdominal Aortic Aneurysm (AAA) Screening:        General: Screening Not Indicated      Lung Cancer Screening:     General: Screening Not Indicated      Hepatitis C Screening:    General: Screening Current    Other Counseling Topics:   Alcohol use counseling, car/seat belt/driving safety, sunscreen and regular weightbearing exercise and calcium and vitamin D intake   Immunizations discussed      Cass Carcamo DO

## 2020-08-17 ENCOUNTER — HOSPITAL ENCOUNTER (OUTPATIENT)
Dept: MAMMOGRAPHY | Facility: MEDICAL CENTER | Age: 70
Discharge: HOME | End: 2020-08-17
Payer: MEDICARE

## 2020-08-17 VITALS — BODY MASS INDEX: 31.07 KG/M2 | HEIGHT: 64 IN | WEIGHT: 182 LBS

## 2020-08-17 DIAGNOSIS — Z12.31 ENCOUNTER FOR SCREENING MAMMOGRAM FOR BREAST CANCER: ICD-10-CM

## 2020-08-17 PROCEDURE — 77063 BREAST TOMOSYNTHESIS BI: CPT

## 2020-08-17 PROCEDURE — 77067 SCR MAMMO BI INCL CAD: CPT

## 2020-09-14 ENCOUNTER — HOSPITAL ENCOUNTER (OUTPATIENT)
Dept: RADIOLOGY | Age: 70
Discharge: HOME | End: 2020-09-14
Payer: MEDICARE

## 2020-09-14 DIAGNOSIS — Z78.0 ASYMPTOMATIC POSTMENOPAUSAL STATE: ICD-10-CM

## 2020-09-14 PROCEDURE — 77080 DXA BONE DENSITY AXIAL: CPT

## 2020-09-24 ENCOUNTER — CONSULT (OUTPATIENT)
Dept: GASTROENTEROLOGY | Facility: AMBULARY SURGERY CENTER | Age: 70
End: 2020-09-24
Payer: MEDICARE

## 2020-09-24 VITALS
BODY MASS INDEX: 30.46 KG/M2 | HEART RATE: 80 BPM | DIASTOLIC BLOOD PRESSURE: 60 MMHG | SYSTOLIC BLOOD PRESSURE: 126 MMHG | RESPIRATION RATE: 18 BRPM | TEMPERATURE: 97.8 F | WEIGHT: 178.4 LBS | HEIGHT: 64 IN

## 2020-09-24 DIAGNOSIS — R10.13 DYSPEPSIA: Primary | ICD-10-CM

## 2020-09-24 DIAGNOSIS — Z12.11 ENCOUNTER FOR SCREENING COLONOSCOPY: ICD-10-CM

## 2020-09-24 PROCEDURE — 99204 OFFICE O/P NEW MOD 45 MIN: CPT | Performed by: INTERNAL MEDICINE

## 2020-09-24 NOTE — LETTER
September 24, 0477     [facility] DO  [address]  [address]  [facility]    Patient: Bel Garnica   YOB: 1950   Date of Visit: 9/24/2020       Dear Dr Libby Jacobson:    Thank you for referring Nikhil Estrada to me for evaluation  Below are my notes for this consultation  If you have questions, please do not hesitate to call me  I look forward to following your patient along with you  Sincerely,        Michael Thayer MD        CC: Chay Fierro MD  9/24/2020 10:58 AM  Sign when Signing Visit  Consultation - [facility]  eBl Garnica 65 y o  female MRN: [id number]  Unit/Bed#:  Encounter: [id number]        Consults    ASSESSMENT/PLAN:       1  Colon cancer screening-average risk, previous colonoscopy was 6 years ago at outside facility  Patient does not recall history of polyps  No change in bowel habits, hematochezia or abdominal pain  She is overdue for colonoscopy at this time based on previous recommendations  -will schedule average risk screening colonoscopy at the hospital, will need clearance from Cardiology in regards to holding Eliquis 2 days prior to the procedure  - Patient was explained about  the risks and benefits of the procedure  Risks including but not limited to bleeding, infection, perforation were explained in detail  Also explained about less than 100% sensitivity with the exam and other alternatives  2  Epigastric pain/dyspepsia-symptoms are on and off, well controlled with Nexium 20 mg which she takes intermittently  No other alarm symptoms including dysphagia, odynophagia, nausea, vomiting, loss of appetite or early satiety    Symptoms ongoing for the past 1 year, suspect may be secondary to gastritis versus peptic ulcer disease   -given new symptoms of dyspepsia over the past 1 year in a patient over the age of 61, would recommend EGD to assess for peptic ulcer disease versus malignancy versus gastritis   -avoid NSAIDs  -will plan for EGD at the same time as colonoscopy for further evaluation   -continue Nexium 20 mg on daily basis  ______________________________________________________________________    Reason for Consult / Principal Problem: [unfilled]    HPI: Tia Narvaez is a 65y o  year old female with history of obstructive sleep apnea, hypertension, AFib, on Eliquis 5 mg, gastritis, on Nexium 20 mg daily, seizure disorder, presents for colon cancer screening evaluation  Patient reports that her last colonoscopy was 6 years ago, does not recall having had polyps  Patient reports that she was recommended a 5 year follow-up which she has not done yet  She denies any change in bowel habits, hematochezia, abdominal pain or unintentional weight loss  Patient does report that she has had symptoms of intermittent epigastric pain over the past 1 year at which time she was started on Nexium 20 mg by her PCP  Patient reports that she takes this on as-needed basis only  Denies any dysphagia, odynophagia, nausea, vomiting, loss of appetite or early satiety  Denies history of peptic ulcer disease  She has never had an EGD  Does not report any other NSAID use  Review of Systems: The remainder of the review of systems was negative except for the pertinent positives noted in HPI       Historical Information   Past Medical History:   Diagnosis Date    Anxiety     Arthralgia     Atrial fibrillation (HCC)     Chronic interstitial cystitis     Depression     Hyperlipidemia     Hypertension     Lichen sclerosus     Lipoma     Osteopenia     Simple partial seizures ([address] )     last assessed 3/30/17    Tachycardia     last assessed 6/24/15     Past Surgical History:   Procedure Laterality Date    COSMETIC SURGERY      HYSTERECTOMY      OOPHORECTOMY      REDUCTION MAMMAPLASTY      SHOULDER SURGERY       Social History   Social History     Substance and Sexual Activity Alcohol Use Yes    Alcohol/week: 10 0 standard drinks    Types: 10 Glasses of wine per week    Comment: socially     Social History     Substance and Sexual Activity   Drug Use No     Social History     Tobacco Use   Smoking Status Never Smoker   Smokeless Tobacco Never Used     Family History   Problem Relation Age of Onset    Stroke Mother         CVA    Hypertension Mother     Hypertension Father     Lung cancer Father     Lung cancer Brother         AGE UNKNOWN    Alcohol abuse Family         AGE UNKNOWN    No Known Problems Sister     No Known Problems Maternal Grandmother     No Known Problems Maternal Grandfather     No Known Problems Paternal Grandmother     No Known Problems Paternal Grandfather     No Known Problems Maternal Aunt     No Known Problems Maternal Aunt     No Known Problems Maternal Aunt     No Known Problems Paternal Aunt     No Known Problems Paternal Aunt     No Known Problems Paternal Aunt        Meds/Allergies     (Not in a hospital admission)    No current facility-administered medications for this visit  Allergies   Allergen Reactions    Codeine Hives    Erythromycin Base GI Intolerance    Hydromorphone Itching    Morphine GI Intolerance    Oxycodone-Acetaminophen Hives    Penicillins Rash       Objective     Blood pressure 126/60, pulse 80, temperature 97 8 °F (36 6 °C), temperature source Tympanic, resp  rate 18, height 5' 4" (1 626 m), weight 80 9 kg (178 lb 6 4 oz)  [unfilled]    PHYSICAL EXAM     GEN: well nourished, well developed, no acute distress  HEENT: anicteric, MMM, no cervical or supraclavicular lymphadenopathy  CV: RRR, no m/r/g  CHEST: CTA b/l, no WRR  ABD: +BS, soft, NT/ND, no hepatosplenomegaly  EXT: no c/c/e  SKIN: no rashes,  NEURO: aaox3    Lab Results:   No visits with results within 1 Day(s) from this visit     Latest known visit with results is:   Appointment on 07/31/2020   Component Date Value    WBC 07/31/2020 4 48     RBC 07/31/2020 4 31     Hemoglobin 07/31/2020 13 3     Hematocrit 07/31/2020 41 4     MCV 07/31/2020 96     MCH 07/31/2020 30 9     MCHC 07/31/2020 32 1     RDW 07/31/2020 13 0     MPV 07/31/2020 10 8     Platelets 36/66/4470 299     nRBC 07/31/2020 0     Neutrophils Relative 07/31/2020 54     Immat GRANS % 07/31/2020 0     Lymphocytes Relative 07/31/2020 30     Monocytes Relative 07/31/2020 13*    Eosinophils Relative 07/31/2020 2     Basophils Relative 07/31/2020 1     Neutrophils Absolute 07/31/2020 2 45     Immature Grans Absolute 07/31/2020 0 02     Lymphocytes Absolute 07/31/2020 1 34     Monocytes Absolute 07/31/2020 0 56     Eosinophils Absolute 07/31/2020 0 07     Basophils Absolute 07/31/2020 0 04     Sodium 07/31/2020 140     Potassium 07/31/2020 4 9     Chloride 07/31/2020 107     CO2 07/31/2020 26     ANION GAP 07/31/2020 7     BUN 07/31/2020 26*    Creatinine 07/31/2020 0 76     Glucose 07/31/2020 96     Calcium 07/31/2020 9 6     AST 07/31/2020 11     ALT 07/31/2020 21     Alkaline Phosphatase 07/31/2020 49     Total Protein 07/31/2020 6 5     Albumin 07/31/2020 3 4*    Total Bilirubin 07/31/2020 0 32     eGFR 07/31/2020 79     Valproic Acid, Total 07/31/2020 67      Imaging Studies: I have personally reviewed pertinent films in PACS

## 2020-09-24 NOTE — PROGRESS NOTES
Consultation - [facility]  Han Chowdhury 65 y o  female MRN: [id number]  Unit/Bed#:  Encounter: [id number]        Consults    ASSESSMENT/PLAN:       1  Colon cancer screening-average risk, previous colonoscopy was 6 years ago at outside facility  Patient does not recall history of polyps  No change in bowel habits, hematochezia or abdominal pain  She is overdue for colonoscopy at this time based on previous recommendations  -will schedule average risk screening colonoscopy at the hospital, will need clearance from Cardiology in regards to holding Eliquis 2 days prior to the procedure  - Patient was explained about  the risks and benefits of the procedure  Risks including but not limited to bleeding, infection, perforation were explained in detail  Also explained about less than 100% sensitivity with the exam and other alternatives  2  Epigastric pain/dyspepsia-symptoms are on and off, well controlled with Nexium 20 mg which she takes intermittently  No other alarm symptoms including dysphagia, odynophagia, nausea, vomiting, loss of appetite or early satiety  Symptoms ongoing for the past 1 year, suspect may be secondary to gastritis versus peptic ulcer disease   -given new symptoms of dyspepsia over the past 1 year in a patient over the age of 61, would recommend EGD to assess for peptic ulcer disease versus malignancy versus gastritis   -avoid NSAIDs  -will plan for EGD at the same time as colonoscopy for further evaluation   -continue Nexium 20 mg on daily basis  ______________________________________________________________________    Reason for Consult / Principal Problem: [unfilled]    HPI: Han Chowdhury is a 65y o  year old female with history of obstructive sleep apnea, hypertension, AFib, on Eliquis 5 mg, gastritis, on Nexium 20 mg daily, seizure disorder, presents for colon cancer screening evaluation    Patient reports that her last colonoscopy was 6 years ago, does not recall having had polyps  Patient reports that she was recommended a 5 year follow-up which she has not done yet  She denies any change in bowel habits, hematochezia, abdominal pain or unintentional weight loss  Patient does report that she has had symptoms of intermittent epigastric pain over the past 1 year at which time she was started on Nexium 20 mg by her PCP  Patient reports that she takes this on as-needed basis only  Denies any dysphagia, odynophagia, nausea, vomiting, loss of appetite or early satiety  Denies history of peptic ulcer disease  She has never had an EGD  Does not report any other NSAID use  Review of Systems: The remainder of the review of systems was negative except for the pertinent positives noted in HPI       Historical Information   Past Medical History:   Diagnosis Date    Anxiety     Arthralgia     Atrial fibrillation (HCC)     Chronic interstitial cystitis     Depression     Hyperlipidemia     Hypertension     Lichen sclerosus     Lipoma     Osteopenia     Simple partial seizures ([address] )     last assessed 3/30/17    Tachycardia     last assessed 6/24/15     Past Surgical History:   Procedure Laterality Date    COSMETIC SURGERY      HYSTERECTOMY      OOPHORECTOMY      REDUCTION MAMMAPLASTY      SHOULDER SURGERY       Social History   Social History     Substance and Sexual Activity   Alcohol Use Yes    Alcohol/week: 10 0 standard drinks    Types: 10 Glasses of wine per week    Comment: socially     Social History     Substance and Sexual Activity   Drug Use No     Social History     Tobacco Use   Smoking Status Never Smoker   Smokeless Tobacco Never Used     Family History   Problem Relation Age of Onset    Stroke Mother         CVA    Hypertension Mother     Hypertension Father     Lung cancer Father     Lung cancer Brother         AGE UNKNOWN    Alcohol abuse Family         AGE UNKNOWN    No Known Problems Sister     No Known Problems Maternal Grandmother     No Known Problems Maternal Grandfather     No Known Problems Paternal Grandmother     No Known Problems Paternal Grandfather     No Known Problems Maternal Aunt     No Known Problems Maternal Aunt     No Known Problems Maternal Aunt     No Known Problems Paternal Aunt     No Known Problems Paternal Aunt     No Known Problems Paternal Aunt        Meds/Allergies     (Not in a hospital admission)    No current facility-administered medications for this visit  Allergies   Allergen Reactions    Codeine Hives    Erythromycin Base GI Intolerance    Hydromorphone Itching    Morphine GI Intolerance    Oxycodone-Acetaminophen Hives    Penicillins Rash       Objective     Blood pressure 126/60, pulse 80, temperature 97 8 °F (36 6 °C), temperature source Tympanic, resp  rate 18, height 5' 4" (1 626 m), weight 80 9 kg (178 lb 6 4 oz)  [unfilled]    PHYSICAL EXAM     GEN: well nourished, well developed, no acute distress  HEENT: anicteric, MMM, no cervical or supraclavicular lymphadenopathy  CV: RRR, no m/r/g  CHEST: CTA b/l, no WRR  ABD: +BS, soft, NT/ND, no hepatosplenomegaly  EXT: no c/c/e  SKIN: no rashes,  NEURO: aaox3    Lab Results:   No visits with results within 1 Day(s) from this visit     Latest known visit with results is:   Appointment on 07/31/2020   Component Date Value    WBC 07/31/2020 4 48     RBC 07/31/2020 4 31     Hemoglobin 07/31/2020 13 3     Hematocrit 07/31/2020 41 4     MCV 07/31/2020 96     MCH 07/31/2020 30 9     MCHC 07/31/2020 32 1     RDW 07/31/2020 13 0     MPV 07/31/2020 10 8     Platelets 98/89/3958 299     nRBC 07/31/2020 0     Neutrophils Relative 07/31/2020 54     Immat GRANS % 07/31/2020 0     Lymphocytes Relative 07/31/2020 30     Monocytes Relative 07/31/2020 13*    Eosinophils Relative 07/31/2020 2     Basophils Relative 07/31/2020 1     Neutrophils Absolute 07/31/2020 2 45     Immature Grans Absolute 07/31/2020 0 02     Lymphocytes Absolute 07/31/2020 1 34     Monocytes Absolute 07/31/2020 0 56     Eosinophils Absolute 07/31/2020 0 07     Basophils Absolute 07/31/2020 0 04     Sodium 07/31/2020 140     Potassium 07/31/2020 4 9     Chloride 07/31/2020 107     CO2 07/31/2020 26     ANION GAP 07/31/2020 7     BUN 07/31/2020 26*    Creatinine 07/31/2020 0 76     Glucose 07/31/2020 96     Calcium 07/31/2020 9 6     AST 07/31/2020 11     ALT 07/31/2020 21     Alkaline Phosphatase 07/31/2020 49     Total Protein 07/31/2020 6 5     Albumin 07/31/2020 3 4*    Total Bilirubin 07/31/2020 0 32     eGFR 07/31/2020 79     Valproic Acid, Total 07/31/2020 67      Imaging Studies: I have personally reviewed pertinent films in PACS

## 2020-09-28 ENCOUNTER — TELEPHONE (OUTPATIENT)
Dept: GASTROENTEROLOGY | Facility: CLINIC | Age: 70
End: 2020-09-28

## 2020-09-28 NOTE — TELEPHONE ENCOUNTER
Our mutual patient is scheduled for procedure:  EGD/COLONOSCOPY     On: 10/26/2020       With: Dr Jostin Barrientos        She is taking the following blood thinner:  ELIQUIS       Can this be stopped _2_ days prior to the procedure?          Physician Approving clearance:     ______________________      Ezio Stanford BACK [phone] ATTN: 800 [facility],  [address] [place] [address]  Surgical Coordinator

## 2020-10-22 DIAGNOSIS — Z12.11 COLON CANCER SCREENING: Primary | ICD-10-CM

## 2020-10-22 RX ORDER — SODIUM, POTASSIUM,MAG SULFATES 17.5-3.13G
2 SOLUTION, RECONSTITUTED, ORAL ORAL SEE ADMIN INSTRUCTIONS
Qty: 2 BOTTLE | Refills: 0 | Status: SHIPPED | OUTPATIENT
Start: 2020-10-22 | End: 2020-10-26

## 2020-10-23 NOTE — TELEPHONE ENCOUNTER
Verbal consent received from Cardiologist office  Spoke With [employer]  No contraindications  Eliquis can be held for 2 days      Pt notified, expressed understanding

## 2020-10-26 ENCOUNTER — ANESTHESIA EVENT (OUTPATIENT)
Dept: GASTROENTEROLOGY | Facility: HOSPITAL | Age: 70
End: 2020-10-26

## 2020-10-26 ENCOUNTER — ANESTHESIA (OUTPATIENT)
Dept: GASTROENTEROLOGY | Facility: HOSPITAL | Age: 70
End: 2020-10-26

## 2020-10-26 ENCOUNTER — HOSPITAL ENCOUNTER (OUTPATIENT)
Dept: GASTROENTEROLOGY | Facility: HOSPITAL | Age: 70
Setting detail: OUTPATIENT SURGERY
Discharge: HOME | End: 2020-10-26
Attending: INTERNAL MEDICINE | Admitting: INTERNAL MEDICINE
Payer: MEDICARE

## 2020-10-26 VITALS
WEIGHT: 170 LBS | TEMPERATURE: 97.1 F | HEIGHT: 64 IN | SYSTOLIC BLOOD PRESSURE: 159 MMHG | HEART RATE: 65 BPM | RESPIRATION RATE: 16 BRPM | OXYGEN SATURATION: 97 % | BODY MASS INDEX: 29.02 KG/M2 | DIASTOLIC BLOOD PRESSURE: 71 MMHG

## 2020-10-26 VITALS — HEART RATE: 73 BPM

## 2020-10-26 DIAGNOSIS — R10.13 DYSPEPSIA: ICD-10-CM

## 2020-10-26 DIAGNOSIS — K20.90 ESOPHAGITIS: Primary | ICD-10-CM

## 2020-10-26 DIAGNOSIS — Z12.11 ENCOUNTER FOR SCREENING COLONOSCOPY: ICD-10-CM

## 2020-10-26 PROCEDURE — 88305 TISSUE EXAM BY PATHOLOGIST: CPT | Performed by: PATHOLOGY

## 2020-10-26 PROCEDURE — 45380 COLONOSCOPY AND BIOPSY: CPT | Performed by: INTERNAL MEDICINE

## 2020-10-26 PROCEDURE — 43239 EGD BIOPSY SINGLE/MULTIPLE: CPT | Performed by: INTERNAL MEDICINE

## 2020-10-26 RX ORDER — SODIUM CHLORIDE, SODIUM LACTATE, POTASSIUM CHLORIDE, CALCIUM CHLORIDE 600; 310; 30; 20 MG/100ML; MG/100ML; MG/100ML; MG/100ML
125 INJECTION, SOLUTION INTRAVENOUS CONTINUOUS
Status: DISCONTINUED | OUTPATIENT
Start: 2020-10-26 | End: 2020-10-30 | Stop reason: HOSPADM

## 2020-10-26 RX ORDER — PROPOFOL 10 MG/ML
INJECTION, EMULSION INTRAVENOUS AS NEEDED
Status: DISCONTINUED | OUTPATIENT
Start: 2020-10-26 | End: 2020-10-26

## 2020-10-26 RX ORDER — PROPOFOL 10 MG/ML
INJECTION, EMULSION INTRAVENOUS CONTINUOUS PRN
Status: DISCONTINUED | OUTPATIENT
Start: 2020-10-26 | End: 2020-10-26

## 2020-10-26 RX ORDER — LIDOCAINE HYDROCHLORIDE 10 MG/ML
INJECTION, SOLUTION EPIDURAL; INFILTRATION; INTRACAUDAL; PERINEURAL AS NEEDED
Status: DISCONTINUED | OUTPATIENT
Start: 2020-10-26 | End: 2020-10-26

## 2020-10-26 RX ORDER — SODIUM CHLORIDE, SODIUM LACTATE, POTASSIUM CHLORIDE, CALCIUM CHLORIDE 600; 310; 30; 20 MG/100ML; MG/100ML; MG/100ML; MG/100ML
INJECTION, SOLUTION INTRAVENOUS CONTINUOUS PRN
Status: DISCONTINUED | OUTPATIENT
Start: 2020-10-26 | End: 2020-10-26

## 2020-10-26 RX ORDER — PANTOPRAZOLE SODIUM 40 MG/1
40 TABLET, DELAYED RELEASE ORAL
Qty: 180 TABLET | Refills: 1 | Status: SHIPPED | OUTPATIENT
Start: 2020-10-26 | End: 2021-04-21

## 2020-10-26 RX ADMIN — SODIUM CHLORIDE, SODIUM LACTATE, POTASSIUM CHLORIDE, AND CALCIUM CHLORIDE: .6; .31; .03; .02 INJECTION, SOLUTION INTRAVENOUS at 11:18

## 2020-10-26 RX ADMIN — PROPOFOL 120 MCG/KG/MIN: 10 INJECTION, EMULSION INTRAVENOUS at 11:23

## 2020-10-26 RX ADMIN — PROPOFOL 30 MG: 10 INJECTION, EMULSION INTRAVENOUS at 11:43

## 2020-10-26 RX ADMIN — PROPOFOL 20 MG: 10 INJECTION, EMULSION INTRAVENOUS at 11:35

## 2020-10-26 RX ADMIN — PROPOFOL 50 MG: 10 INJECTION, EMULSION INTRAVENOUS at 11:48

## 2020-10-26 RX ADMIN — PROPOFOL 20 MG: 10 INJECTION, EMULSION INTRAVENOUS at 11:46

## 2020-10-26 RX ADMIN — LIDOCAINE HYDROCHLORIDE 50 MG: 10 INJECTION, SOLUTION EPIDURAL; INFILTRATION; INTRACAUDAL at 11:22

## 2020-10-26 RX ADMIN — PROPOFOL 50 MG: 10 INJECTION, EMULSION INTRAVENOUS at 11:23

## 2020-10-26 RX ADMIN — PROPOFOL 30 MG: 10 INJECTION, EMULSION INTRAVENOUS at 11:26

## 2020-10-26 RX ADMIN — PROPOFOL 20 MG: 10 INJECTION, EMULSION INTRAVENOUS at 11:39

## 2020-10-28 DIAGNOSIS — I10 BENIGN ESSENTIAL HTN: ICD-10-CM

## 2020-10-28 RX ORDER — AMLODIPINE BESYLATE 5 MG/1
5 TABLET ORAL DAILY
Qty: 90 TABLET | Refills: 1 | Status: SHIPPED | OUTPATIENT
Start: 2020-10-28 | End: 2021-03-22 | Stop reason: SDUPTHER

## 2020-10-29 DIAGNOSIS — N95.1 SYMPTOMATIC MENOPAUSAL OR FEMALE CLIMACTERIC STATES: ICD-10-CM

## 2020-10-30 RX ORDER — ESTRADIOL 0.52 MG/.87G
0.87 GEL, METERED TOPICAL DAILY
Qty: 2 BOTTLE | Refills: 6 | Status: SHIPPED | OUTPATIENT
Start: 2020-10-30

## 2020-11-02 DIAGNOSIS — I48.91 ATRIAL FIBRILLATION, UNSPECIFIED TYPE (HCC): ICD-10-CM

## 2020-11-13 ENCOUNTER — TELEPHONE (OUTPATIENT)
Dept: GASTROENTEROLOGY | Facility: AMBULARY SURGERY CENTER | Age: 70
End: 2020-11-13

## 2020-11-18 ENCOUNTER — TRANSCRIBE ORDERS (OUTPATIENT)
Dept: LAB | Facility: HOSPITAL | Age: 70
End: 2020-11-18

## 2020-11-18 ENCOUNTER — TRANSCRIBE ORDERS (OUTPATIENT)
Dept: ADMINISTRATIVE | Facility: HOSPITAL | Age: 70
End: 2020-11-18

## 2020-11-18 ENCOUNTER — LAB (OUTPATIENT)
Dept: LAB | Facility: HOSPITAL | Age: 70
End: 2020-11-18
Attending: PSYCHIATRY & NEUROLOGY
Payer: MEDICARE

## 2020-11-18 DIAGNOSIS — Z79.899 ENCOUNTER FOR LONG-TERM (CURRENT) USE OF OTHER MEDICATIONS: ICD-10-CM

## 2020-11-18 DIAGNOSIS — Z79.899 ENCOUNTER FOR LONG-TERM (CURRENT) USE OF OTHER MEDICATIONS: Primary | ICD-10-CM

## 2020-11-18 DIAGNOSIS — G40.119 LOCALIZATION-RELATED (FOCAL) (PARTIAL) SYMPTOMATIC EPILEPSY AND EPILEPTIC SYNDROMES WITH SIMPLE PARTIAL SEIZURES, INTRACTABLE, WITHOUT STATUS EPILEPTICUS (HCC): Primary | ICD-10-CM

## 2020-11-18 LAB
ALBUMIN SERPL BCP-MCNC: 3.4 G/DL (ref 3.5–5)
ALP SERPL-CCNC: 54 U/L (ref 46–116)
ALT SERPL W P-5'-P-CCNC: 18 U/L (ref 12–78)
ANION GAP SERPL CALCULATED.3IONS-SCNC: 8 MMOL/L (ref 4–13)
AST SERPL W P-5'-P-CCNC: 8 U/L (ref 5–45)
BASOPHILS # BLD AUTO: 0.03 THOUSANDS/ΜL (ref 0–0.1)
BASOPHILS NFR BLD AUTO: 1 % (ref 0–1)
BILIRUB SERPL-MCNC: 0.25 MG/DL (ref 0.2–1)
BUN SERPL-MCNC: 14 MG/DL (ref 5–25)
CALCIUM ALBUM COR SERPL-MCNC: 9.5 MG/DL (ref 8.3–10.1)
CALCIUM SERPL-MCNC: 9 MG/DL (ref 8.3–10.1)
CHLORIDE SERPL-SCNC: 109 MMOL/L (ref 100–108)
CHOLEST SERPL-MCNC: 162 MG/DL (ref 50–200)
CO2 SERPL-SCNC: 26 MMOL/L (ref 21–32)
CREAT SERPL-MCNC: 0.77 MG/DL (ref 0.6–1.3)
EOSINOPHIL # BLD AUTO: 0.04 THOUSAND/ΜL (ref 0–0.61)
EOSINOPHIL NFR BLD AUTO: 1 % (ref 0–6)
ERYTHROCYTE [DISTWIDTH] IN BLOOD BY AUTOMATED COUNT: 12.8 % (ref 11.6–15.1)
GFR SERPL CREATININE-BSD FRML MDRD: 77 ML/MIN/1.73SQ M
GLUCOSE P FAST SERPL-MCNC: 102 MG/DL (ref 65–99)
HCT VFR BLD AUTO: 41.5 % (ref 34.8–46.1)
HDLC SERPL-MCNC: 57 MG/DL
HGB BLD-MCNC: 13.4 G/DL (ref 11.5–15.4)
IMM GRANULOCYTES # BLD AUTO: 0.02 THOUSAND/UL (ref 0–0.2)
IMM GRANULOCYTES NFR BLD AUTO: 1 % (ref 0–2)
LDLC SERPL CALC-MCNC: 77 MG/DL (ref 0–100)
LYMPHOCYTES # BLD AUTO: 1.36 THOUSANDS/ΜL (ref 0.6–4.47)
LYMPHOCYTES NFR BLD AUTO: 35 % (ref 14–44)
MCH RBC QN AUTO: 31.3 PG (ref 26.8–34.3)
MCHC RBC AUTO-ENTMCNC: 32.3 G/DL (ref 31.4–37.4)
MCV RBC AUTO: 97 FL (ref 82–98)
MONOCYTES # BLD AUTO: 0.36 THOUSAND/ΜL (ref 0.17–1.22)
MONOCYTES NFR BLD AUTO: 9 % (ref 4–12)
NEUTROPHILS # BLD AUTO: 2.1 THOUSANDS/ΜL (ref 1.85–7.62)
NEUTS SEG NFR BLD AUTO: 53 % (ref 43–75)
NONHDLC SERPL-MCNC: 105 MG/DL
NRBC BLD AUTO-RTO: 0 /100 WBCS
PLATELET # BLD AUTO: 293 THOUSANDS/UL (ref 149–390)
PMV BLD AUTO: 10.8 FL (ref 8.9–12.7)
POTASSIUM SERPL-SCNC: 4.6 MMOL/L (ref 3.5–5.3)
PROT SERPL-MCNC: 6.4 G/DL (ref 6.4–8.2)
RBC # BLD AUTO: 4.28 MILLION/UL (ref 3.81–5.12)
SODIUM SERPL-SCNC: 143 MMOL/L (ref 136–145)
TRIGL SERPL-MCNC: 139 MG/DL
VALPROATE SERPL-MCNC: 44 UG/ML (ref 50–100)
WBC # BLD AUTO: 3.91 THOUSAND/UL (ref 4.31–10.16)

## 2020-11-18 PROCEDURE — 80164 ASSAY DIPROPYLACETIC ACD TOT: CPT

## 2020-11-18 PROCEDURE — 85025 COMPLETE CBC W/AUTO DIFF WBC: CPT

## 2020-11-18 PROCEDURE — 36415 COLL VENOUS BLD VENIPUNCTURE: CPT

## 2020-11-18 PROCEDURE — 80053 COMPREHEN METABOLIC PANEL: CPT

## 2020-11-18 PROCEDURE — 80061 LIPID PANEL: CPT

## 2020-12-08 ENCOUNTER — OFFICE VISIT (OUTPATIENT)
Dept: INTERNAL MEDICINE CLINIC | Facility: CLINIC | Age: 70
End: 2020-12-08
Payer: MEDICARE

## 2020-12-08 VITALS
WEIGHT: 176.6 LBS | BODY MASS INDEX: 30.15 KG/M2 | HEART RATE: 65 BPM | HEIGHT: 64 IN | OXYGEN SATURATION: 98 % | TEMPERATURE: 98.1 F | SYSTOLIC BLOOD PRESSURE: 128 MMHG | DIASTOLIC BLOOD PRESSURE: 70 MMHG | RESPIRATION RATE: 16 BRPM

## 2020-12-08 DIAGNOSIS — E78.2 MIXED HYPERLIPIDEMIA: ICD-10-CM

## 2020-12-08 DIAGNOSIS — M25.551 RIGHT HIP PAIN: Primary | ICD-10-CM

## 2020-12-08 DIAGNOSIS — D72.819 LEUKOPENIA, UNSPECIFIED TYPE: ICD-10-CM

## 2020-12-08 DIAGNOSIS — G40.909 SEIZURE DISORDER (HCC): ICD-10-CM

## 2020-12-08 DIAGNOSIS — K20.90 ESOPHAGITIS: ICD-10-CM

## 2020-12-08 PROBLEM — F10.20 ALCOHOL DEPENDENCE (HCC): Status: RESOLVED | Noted: 2019-04-15 | Resolved: 2020-12-08

## 2020-12-08 PROBLEM — I20.1 CORONARY VASOSPASM (HCC): Status: ACTIVE | Noted: 2020-05-05

## 2020-12-08 PROBLEM — F10.11 HISTORY OF ALCOHOL ABUSE: Status: RESOLVED | Noted: 2019-03-04 | Resolved: 2020-12-08

## 2020-12-08 PROBLEM — Z12.11 ENCOUNTER FOR SCREENING COLONOSCOPY: Status: RESOLVED | Noted: 2020-09-24 | Resolved: 2020-12-08

## 2020-12-08 PROBLEM — G47.33 OBSTRUCTIVE SLEEP APNEA SYNDROME: Status: ACTIVE | Noted: 2019-11-26

## 2020-12-08 PROBLEM — I48.0 PAROXYSMAL ATRIAL FIBRILLATION (HCC): Status: ACTIVE | Noted: 2018-12-04

## 2020-12-08 PROCEDURE — 99214 OFFICE O/P EST MOD 30 MIN: CPT | Performed by: INTERNAL MEDICINE

## 2020-12-08 RX ORDER — VALACYCLOVIR HYDROCHLORIDE 1 G/1
TABLET, FILM COATED ORAL
COMMUNITY

## 2021-02-17 ENCOUNTER — OFFICE VISIT (OUTPATIENT)
Dept: SLEEP CENTER | Facility: CLINIC | Age: 71
End: 2021-02-17
Payer: MEDICARE

## 2021-02-17 VITALS
DIASTOLIC BLOOD PRESSURE: 80 MMHG | SYSTOLIC BLOOD PRESSURE: 120 MMHG | HEIGHT: 64 IN | BODY MASS INDEX: 31.1 KG/M2 | WEIGHT: 182.2 LBS

## 2021-02-17 DIAGNOSIS — G47.33 OSA (OBSTRUCTIVE SLEEP APNEA): Primary | ICD-10-CM

## 2021-02-17 PROCEDURE — 99213 OFFICE O/P EST LOW 20 MIN: CPT | Performed by: INTERNAL MEDICINE

## 2021-02-17 NOTE — PROGRESS NOTES
Progress Note - Sleep Center   Glendy Liao PNW:[id number] MRN: [id number]      Reason for Visit:    65 y  o female with mild to moderate obstructive sleep apnea diagnosed on home sleep apnea testing    Assessment:  The patient was unable to tolerate positive airway pressure therapy  She was not interested in the inspire device and did not follow through with a mandibular advancement device  We discussed the potential risks of not treating her sleep apnea and she wishes to do just that  According to her , she is snoring less and she does not feel drowsy during the day  Weight loss may help  Plan:  Weight loss    Follow up:  As needed    History of Present Illness:  Mild to moderate obstructive sleep apnea with KERLINE = 15 6  The patient cannot tolerate treatment      Review of Systems      Genitourinary need to urinate more than twice a night   Cardiology none   Gastrointestinal frequent heartburn/acid reflux   Neurology none   Constitutional none   Integumentary none   Psychiatry anxiety and depression   Musculoskeletal none   Pulmonary none   ENT throat clearing   Endocrine none   Hematological none           I have reviewed and updated the review of systems as necessary     Historical Information    Past Medical History:   Diagnosis Date    Anxiety     Arthralgia     Atrial fibrillation (HCC)     Chronic interstitial cystitis     Depression     Hyperlipidemia     Hypertension     Lichen sclerosus     Lipoma     Osteopenia     Simple partial seizures ([address] )     last assessed 3/30/17    Tachycardia     last assessed 6/24/15         Past Surgical History:   Procedure Laterality Date    COSMETIC SURGERY      HYSTERECTOMY      OOPHORECTOMY      REDUCTION MAMMAPLASTY      SHOULDER SURGERY           Social History     Socioeconomic History    Marital status: /Civil Union     Spouse name: None    Number of children: None    Years of education: None    Highest education level: None   Occupational History    Occupation: retired school superintendant   Social Needs    Financial resource strain: None    Food insecurity     Worry: None     Inability: None    Transportation needs     Medical: None     Non-medical: None   Tobacco Use    Smoking status: Never Smoker    Smokeless tobacco: Never Used   Substance and Sexual Activity    Alcohol use:  Yes     Alcohol/week: 10 0 standard drinks     Types: 10 Glasses of wine per week     Comment: socially    Drug use: No    Sexual activity: Yes     Partners: Male     Birth control/protection: Post-menopausal     Comment: occasionally   Lifestyle    Physical activity     Days per week: None     Minutes per session: None    Stress: None   Relationships    Social connections     Talks on phone: None     Gets together: None     Attends [employer] service: None     Active member of club or organization: None     Attends meetings of clubs or organizations: None     Relationship status: None    Intimate partner violence     Fear of current or ex partner: None     Emotionally abused: None     Physically abused: None     Forced sexual activity: None   Other Topics Concern    None   Social History Narrative    Exercising regularly           History   Alcohol use: Not on file       History   Smoking Status    Not on file   Smokeless Tobacco    Not on file       Family History:   Family History   Problem Relation Age of Onset    Stroke Mother         CVA    Hypertension Mother     Hypertension Father     Lung cancer Father     Lung cancer Brother         AGE UNKNOWN    Alcohol abuse Family         AGE UNKNOWN    No Known Problems Sister     No Known Problems Maternal Grandmother     No Known Problems Maternal Grandfather     No Known Problems Paternal Grandmother     No Known Problems Paternal Grandfather     No Known Problems Maternal Aunt     No Known Problems Maternal Aunt     No Known Problems Maternal Aunt     No Known Problems Paternal Aunt     No Known Problems Paternal Aunt     No Known Problems Paternal Aunt        Medications/Allergies:      Current Outpatient Medications:     amLODIPine (NORVASC) 5 mg tablet, Take 1 tablet (5 mg total) by mouth daily, Disp: 90 tablet, Rfl: 1    apixaban (Eliquis) 5 mg, Take 1 tablet (5 mg total) by mouth 2 (two) times a day, Disp: 180 tablet, Rfl: 3    atorvastatin (LIPITOR) 10 mg tablet, Take 2 tablets (20 mg total) by mouth daily (Patient taking differently: Take 40 mg by mouth daily ), Disp: 90 tablet, Rfl: 1    busPIRone (BUSPAR) 15 mg tablet, Take 15 mg by mouth 2 (two) times a day, Disp: , Rfl:     calcium citrate-Vitamin D (CALCIUM CITRATE + D3) 200 mg-250 units, Take by mouth, Disp: , Rfl:     divalproex sodium (DEPAKOTE ER) 500 mg 24 hr tablet, Take 1 tablet by mouth 2 (two) times a day, Disp: , Rfl:     escitalopram (LEXAPRO) 20 mg tablet, Take 1 tablet by mouth daily, Disp: , Rfl:     esomeprazole (NexIUM) 20 mg capsule, Take 20 mg by mouth every morning before breakfast, Disp: , Rfl:     Estradiol (Elestrin) 0 52 MG/0 87 GM (0 06%) GEL, Place 0 87 g on the skin daily, Disp: 2 Bottle, Rfl: 6    fexofenadine (ALLEGRA) 180 MG tablet, Take 1 tablet by mouth daily as needed, Disp: , Rfl:     lisinopril (ZESTRIL) 20 mg tablet, TAKE 1 TABLET BY MOUTH  DAILY, Disp: 90 tablet, Rfl: 1    MELATONIN PO, Take 10 mg by mouth daily at bedtime, Disp: , Rfl:     Ospemifene (Osphena) 60 MG TABS, Take 1 tablet (60 mg total) by mouth daily, Disp: 90 tablet, Rfl: 3    pindolol (VISKEN) 5 mg tablet, Take 1 tablet (5 mg total) by mouth daily, Disp: 90 tablet, Rfl: 3    valACYclovir (VALTREX) 1,000 mg tablet, valacyclovir 1 gram tablet  TAKE 2 TABS BY MOUTH THE MORNING OF PROCEDURE AND 2 TABS 12 HOURS LATER , Disp: , Rfl:     ipratropium (ATROVENT) 0 06 % nasal spray, into each nostril Daily, Disp: , Rfl:     methocarbamol (ROBAXIN) 500 mg tablet, TAKE 2 TABLETS 4 TIMES A DAY AS NEEDED (Patient not taking: Reported on 12/8/2020), Disp: 40 tablet, Rfl: 0    pantoprazole (PROTONIX) 40 mg tablet, Take 1 tablet (40 mg total) by mouth 2 (two) times a day before meals, Disp: 180 tablet, Rfl: 1      Objective    Vital Signs:   Vitals:    02/17/21 1107   BP: 120/80   Weight: 82 6 kg (182 lb 3 2 oz)   Height: 5' 4" (1 626 m)     [place] Sleepiness Scale: Total score: 5    Physical Exam:    General: Alert, appropriate, cooperative, overweight    Head: NC/AT    Skin: Warm, dry    Neuro: No motor abnormalities, cranial nerves appear intact    Psych: Normal affect            IGOR Alcocer    Board Certified Sleep Specialist

## 2021-03-02 DIAGNOSIS — Z23 ENCOUNTER FOR IMMUNIZATION: ICD-10-CM

## 2021-03-05 ENCOUNTER — IMMUNIZATIONS (OUTPATIENT)
Dept: FAMILY MEDICINE CLINIC | Facility: HOSPITAL | Age: 71
End: 2021-03-05

## 2021-03-05 DIAGNOSIS — Z23 ENCOUNTER FOR IMMUNIZATION: Primary | ICD-10-CM

## 2021-03-05 PROCEDURE — 91300 SARS-COV-2 / COVID-19 MRNA VACCINE (PFIZER-BIONTECH) 30 MCG: CPT

## 2021-03-05 PROCEDURE — 0001A SARS-COV-2 / COVID-19 MRNA VACCINE (PFIZER-BIONTECH) 30 MCG: CPT

## 2021-03-08 ENCOUNTER — HOSPITAL ENCOUNTER (OUTPATIENT)
Dept: NEUROLOGY | Facility: CLINIC | Age: 71
Discharge: HOME | End: 2021-03-08
Payer: MEDICARE

## 2021-03-08 DIAGNOSIS — G40.119 LOCALIZATION-RELATED (FOCAL) (PARTIAL) SYMPTOMATIC EPILEPSY AND EPILEPTIC SYNDROMES WITH SIMPLE PARTIAL SEIZURES, INTRACTABLE, WITHOUT STATUS EPILEPTICUS (HCC): ICD-10-CM

## 2021-03-08 PROCEDURE — 95819 EEG AWAKE AND ASLEEP: CPT

## 2021-03-18 DIAGNOSIS — I10 BENIGN ESSENTIAL HYPERTENSION: ICD-10-CM

## 2021-03-19 ENCOUNTER — HOSPITAL ENCOUNTER (EMERGENCY)
Facility: HOSPITAL | Age: 71
Discharge: HOME | End: 2021-03-19
Attending: EMERGENCY MEDICINE | Admitting: EMERGENCY MEDICINE
Payer: MEDICARE

## 2021-03-19 ENCOUNTER — APPOINTMENT (EMERGENCY)
Dept: RADIOLOGY | Facility: HOSPITAL | Age: 71
End: 2021-03-19
Payer: MEDICARE

## 2021-03-19 ENCOUNTER — OFFICE VISIT (OUTPATIENT)
Dept: URGENT CARE | Age: 71
End: 2021-03-19
Payer: MEDICARE

## 2021-03-19 VITALS
TEMPERATURE: 97.6 F | OXYGEN SATURATION: 97 % | SYSTOLIC BLOOD PRESSURE: 195 MMHG | DIASTOLIC BLOOD PRESSURE: 77 MMHG | RESPIRATION RATE: 20 BRPM | HEART RATE: 72 BPM

## 2021-03-19 VITALS
TEMPERATURE: 97.5 F | RESPIRATION RATE: 18 BRPM | BODY MASS INDEX: 31.07 KG/M2 | OXYGEN SATURATION: 97 % | SYSTOLIC BLOOD PRESSURE: 187 MMHG | DIASTOLIC BLOOD PRESSURE: 80 MMHG | WEIGHT: 182 LBS | HEART RATE: 64 BPM | HEIGHT: 64 IN

## 2021-03-19 DIAGNOSIS — R51.9 ACUTE NONINTRACTABLE HEADACHE, UNSPECIFIED HEADACHE TYPE: Primary | ICD-10-CM

## 2021-03-19 DIAGNOSIS — R51.9 HEADACHE: Primary | ICD-10-CM
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
## 2021-03-19 DIAGNOSIS — M54.2 NECK PAIN: ICD-10-CM

## 2021-03-19 LAB
ANION GAP SERPL CALCULATED.3IONS-SCNC: 4 MMOL/L (ref 4–13)
BASOPHILS # BLD AUTO: 0.02 THOUSANDS/ΜL (ref 0–0.1)
BASOPHILS NFR BLD AUTO: 0 % (ref 0–1)
BUN SERPL-MCNC: 20 MG/DL (ref 5–25)
CALCIUM SERPL-MCNC: 9.1 MG/DL (ref 8.3–10.1)
CHLORIDE SERPL-SCNC: 109 MMOL/L (ref 100–108)
CO2 SERPL-SCNC: 28 MMOL/L (ref 21–32)
CREAT SERPL-MCNC: 0.81 MG/DL (ref 0.6–1.3)
EOSINOPHIL # BLD AUTO: 0.05 THOUSAND/ΜL (ref 0–0.61)
EOSINOPHIL NFR BLD AUTO: 1 % (ref 0–6)
ERYTHROCYTE [DISTWIDTH] IN BLOOD BY AUTOMATED COUNT: 12.4 % (ref 11.6–15.1)
GFR SERPL CREATININE-BSD FRML MDRD: 73 ML/MIN/1.73SQ M
GLUCOSE SERPL-MCNC: 91 MG/DL (ref 65–140)
HCT VFR BLD AUTO: 41.6 % (ref 34.8–46.1)
HGB BLD-MCNC: 13.3 G/DL (ref 11.5–15.4)
IMM GRANULOCYTES # BLD AUTO: 0.02 THOUSAND/UL (ref 0–0.2)
IMM GRANULOCYTES NFR BLD AUTO: 0 % (ref 0–2)
LYMPHOCYTES # BLD AUTO: 1.44 THOUSANDS/ΜL (ref 0.6–4.47)
LYMPHOCYTES NFR BLD AUTO: 28 % (ref 14–44)
MCH RBC QN AUTO: 30.6 PG (ref 26.8–34.3)
MCHC RBC AUTO-ENTMCNC: 32 G/DL (ref 31.4–37.4)
MCV RBC AUTO: 96 FL (ref 82–98)
MONOCYTES # BLD AUTO: 0.62 THOUSAND/ΜL (ref 0.17–1.22)
MONOCYTES NFR BLD AUTO: 12 % (ref 4–12)
NEUTROPHILS # BLD AUTO: 2.98 THOUSANDS/ΜL (ref 1.85–7.62)
NEUTS SEG NFR BLD AUTO: 59 % (ref 43–75)
NRBC BLD AUTO-RTO: 0 /100 WBCS
PLATELET # BLD AUTO: 269 THOUSANDS/UL (ref 149–390)
PMV BLD AUTO: 10.9 FL (ref 8.9–12.7)
POTASSIUM SERPL-SCNC: 4.6 MMOL/L (ref 3.5–5.3)
RBC # BLD AUTO: 4.35 MILLION/UL (ref 3.81–5.12)
SODIUM SERPL-SCNC: 141 MMOL/L (ref 136–145)
WBC # BLD AUTO: 5.13 THOUSAND/UL (ref 4.31–10.16)

## 2021-03-19 PROCEDURE — G1004 CDSM NDSC: HCPCS

## 2021-03-19 PROCEDURE — 72125 CT NECK SPINE W/O DYE: CPT

## 2021-03-19 PROCEDURE — 80048 BASIC METABOLIC PNL TOTAL CA: CPT | Performed by: EMERGENCY MEDICINE

## 2021-03-19 PROCEDURE — 99284 EMERGENCY DEPT VISIT MOD MDM: CPT

## 2021-03-19 PROCEDURE — 99284 EMERGENCY DEPT VISIT MOD MDM: CPT | Performed by: EMERGENCY MEDICINE

## 2021-03-19 PROCEDURE — 70450 CT HEAD/BRAIN W/O DYE: CPT

## 2021-03-19 PROCEDURE — 99213 OFFICE O/P EST LOW 20 MIN: CPT | Performed by: PHYSICIAN ASSISTANT

## 2021-03-19 PROCEDURE — 36415 COLL VENOUS BLD VENIPUNCTURE: CPT | Performed by: EMERGENCY MEDICINE

## 2021-03-19 PROCEDURE — 85025 COMPLETE CBC W/AUTO DIFF WBC: CPT | Performed by: EMERGENCY MEDICINE

## 2021-03-19 PROCEDURE — G0463 HOSPITAL OUTPT CLINIC VISIT: HCPCS | Performed by: PHYSICIAN ASSISTANT

## 2021-03-19 RX ORDER — AMLODIPINE BESYLATE 5 MG/1
5 TABLET ORAL ONCE
Status: COMPLETED | OUTPATIENT
Start: 2021-03-19 | End: 2021-03-19

## 2021-03-19 RX ORDER — ACETAMINOPHEN 325 MG/1
975 TABLET ORAL ONCE
Status: COMPLETED | OUTPATIENT
Start: 2021-03-19 | End: 2021-03-19

## 2021-03-19 RX ORDER — LISINOPRIL 20 MG/1
TABLET ORAL
Qty: 90 TABLET | Refills: 3 | Status: SHIPPED | OUTPATIENT
Start: 2021-03-19 | End: 2021-04-14 | Stop reason: SDUPTHER

## 2021-03-19 RX ORDER — LISINOPRIL 20 MG/1
20 TABLET ORAL ONCE
Status: COMPLETED | OUTPATIENT
Start: 2021-03-19 | End: 2021-03-19

## 2021-03-19 RX ADMIN — LISINOPRIL 20 MG: 20 TABLET ORAL at 19:51

## 2021-03-19 RX ADMIN — AMLODIPINE BESYLATE 5 MG: 5 TABLET ORAL at 19:51

## 2021-03-19 RX ADMIN — ACETAMINOPHEN 975 MG: 325 TABLET, FILM COATED ORAL at 18:02
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
## 2021-03-20 NOTE — DISCHARGE INSTRUCTIONS
You can continue to treat your headache and neck pain with Tylenol, ibuprofen, and lidocaine patches  These medications are all available over the counter  Heat and/or ice may help as well

## 2021-03-22 ENCOUNTER — ANNUAL EXAM (OUTPATIENT)
Dept: OBGYN CLINIC | Facility: CLINIC | Age: 71
End: 2021-03-22
Payer: MEDICARE

## 2021-03-22 VITALS
WEIGHT: 181 LBS | HEIGHT: 64 IN | DIASTOLIC BLOOD PRESSURE: 72 MMHG | BODY MASS INDEX: 30.9 KG/M2 | SYSTOLIC BLOOD PRESSURE: 124 MMHG

## 2021-03-22 DIAGNOSIS — Z01.419 WOMEN'S ANNUAL ROUTINE GYNECOLOGICAL EXAMINATION: Primary | ICD-10-CM

## 2021-03-22 DIAGNOSIS — N95.2 POST-MENOPAUSE ATROPHIC VAGINITIS: ICD-10-CM

## 2021-03-22 DIAGNOSIS — I10 BENIGN ESSENTIAL HTN: ICD-10-CM

## 2021-03-22 DIAGNOSIS — Z12.31 ENCOUNTER FOR SCREENING MAMMOGRAM FOR MALIGNANT NEOPLASM OF BREAST: ICD-10-CM

## 2021-03-22 PROCEDURE — G0101 CA SCREEN;PELVIC/BREAST EXAM: HCPCS | Performed by: OBSTETRICS & GYNECOLOGY

## 2021-03-22 RX ORDER — AMLODIPINE BESYLATE 5 MG/1
5 TABLET ORAL DAILY
Qty: 90 TABLET | Refills: 1 | Status: SHIPPED | OUTPATIENT
Start: 2021-03-22 | End: 2021-03-23 | Stop reason: SDUPTHER

## 2021-03-22 RX ORDER — PANTOPRAZOLE SODIUM 40 MG/1
40 TABLET, DELAYED RELEASE ORAL 2 TIMES DAILY
COMMUNITY
End: 2022-02-14 | Stop reason: SDUPTHER

## 2021-03-22 RX ORDER — OSPEMIFENE 60 MG/1
1 TABLET, FILM COATED ORAL DAILY
Qty: 90 TABLET | Refills: 3 | Status: SHIPPED | OUTPATIENT
Start: 2021-03-22 | End: 2022-02-14 | Stop reason: ALTCHOICE

## 2021-03-22 NOTE — PROGRESS NOTES
ASSESSMENT & PLAN:   Diagnoses and all orders for this visit:    Women's annual routine gynecological examination    Post-menopause atrophic vaginitis  -     Ospemifene (Osphena) 60 MG TABS; Take 1 tablet (60 mg total) by mouth daily    Encounter for screening mammogram for malignant neoplasm of breast  -     Mammo screening bilateral w 3d & cad; Future    Other orders  -     pantoprazole (Protonix) 40 mg tablet; Take 40 mg by mouth 2 (two) times a day      The following were reviewed in today's visit: Current ASCCP Guidelines, breast self exam, menopause, exercise and healthy diet  Patient to return to office yearly for annual exam    All questions have been answered to her satisfaction  CC:  Annual Gynecologic Examination    HPI: Migue Angela is a 65 y o  Tessie Barthel who presents for annual gynecologic examination  She has the following concerns:     Yellow to clear discharge, sometimes with an odor, ongoing for a few months  No itch or irritation  Health Maintenance:    She exercises 3 days per week  She wears her seatbelt routinely  She does not perform regular monthly self breast exams  She feels safe at home  Patients does follow a balanced diet      Last mammogram: 2020   Last colonoscopy:  year f/u / Dr Deyvi Allan  Last dexa     Past Medical History:   Diagnosis Date    Anxiety     Arthralgia     Atrial fibrillation (HCC)     Chronic interstitial cystitis     Depression     Hyperlipidemia     Hypertension     Lichen sclerosus     Lipoma     Osteopenia     Simple partial seizures ([address] )     last assessed 3/30/17    Tachycardia     last assessed 6/24/15       Past Surgical History:   Procedure Laterality Date    COSMETIC SURGERY      HYSTERECTOMY      OOPHORECTOMY      REDUCTION MAMMAPLASTY      SHOULDER SURGERY         Past OB/Gyn History:  OB History        0    Para   0    Term   0       0    AB   0    Living   0       SAB   0    TAB   0 Ectopic   0    Multiple   0    Live Births   0           Obstetric Comments   Menarche: 11           Menstrual history: Patient is post menopausal    History of abnormal pap smears:  No    Patient is not currently sexually active  Family History   Problem Relation Age of Onset    Stroke Mother         CVA    Hypertension Mother     Hypertension Father     Lung cancer Father     Lung cancer Brother         AGE UNKNOWN    No Known Problems Sister     No Known Problems Maternal Grandmother     No Known Problems Maternal Grandfather     No Known Problems Paternal Grandmother     No Known Problems Paternal Grandfather     No Known Problems Maternal Aunt     No Known Problems Maternal Aunt     No Known Problems Maternal Aunt     No Known Problems Paternal Aunt     No Known Problems Paternal Aunt     No Known Problems Paternal Aunt        Social History:  Social History     Socioeconomic History    Marital status: /Civil Union     Spouse name: Not on file    Number of children: Not on file    Years of education: Not on file    Highest education level: Not on file   Occupational History    Occupation: retired school superintendant   Social Needs    Financial resource strain: Not on file    Food insecurity     Worry: Not on file     Inability: Not on file   [employer] needs     Medical: Not on file     Non-medical: Not on file   Tobacco Use    Smoking status: Never Smoker    Smokeless tobacco: Never Used   Substance and Sexual Activity    Alcohol use:  Yes     Alcohol/week: 10 0 standard drinks     Types: 10 Glasses of wine per week     Comment: socially    Drug use: No    Sexual activity: Yes     Partners: Male     Birth control/protection: Post-menopausal     Comment: occasionally   Lifestyle    Physical activity     Days per week: Not on file     Minutes per session: Not on file    Stress: Not on file   Relationships    Social connections     Talks on phone: Not on file Gets together: Not on file     Attends Jew service: Not on file     Active member of club or organization: Not on file     Attends meetings of clubs or organizations: Not on file     Relationship status: Not on file    Intimate partner violence     Fear of current or ex partner: Not on file     Emotionally abused: Not on file     Physically abused: Not on file     Forced sexual activity: Not on file   Other Topics Concern    Not on file   Social History Narrative    Exercising regularly     Presently lives with   Patient is     Patient is currently     Allergies   Allergen Reactions    Codeine Hives    Erythromycin Base GI Intolerance    Hydromorphone Itching    Morphine GI Intolerance    Oxycodone-Acetaminophen Hives    Penicillins Rash         Current Outpatient Medications:     amLODIPine (NORVASC) 5 mg tablet, Take 1 tablet (5 mg total) by mouth daily, Disp: 90 tablet, Rfl: 1    apixaban (Eliquis) 5 mg, Take 1 tablet (5 mg total) by mouth 2 (two) times a day, Disp: 180 tablet, Rfl: 3    atorvastatin (LIPITOR) 10 mg tablet, Take 2 tablets (20 mg total) by mouth daily (Patient taking differently: Take 40 mg by mouth daily ), Disp: 90 tablet, Rfl: 1    busPIRone (BUSPAR) 15 mg tablet, Take 15 mg by mouth 2 (two) times a day, Disp: , Rfl:     calcium citrate-Vitamin D (CALCIUM CITRATE + D3) 200 mg-250 units, Take by mouth, Disp: , Rfl:     divalproex sodium (DEPAKOTE ER) 500 mg 24 hr tablet, Take 1 tablet by mouth 2 (two) times a day, Disp: , Rfl:     escitalopram (LEXAPRO) 20 mg tablet, Take 1 tablet by mouth daily, Disp: , Rfl:     Estradiol (Elestrin) 0 52 MG/0 87 GM (0 06%) GEL, Place 0 87 g on the skin daily, Disp: 2 Bottle, Rfl: 6    fexofenadine (ALLEGRA) 180 MG tablet, Take 1 tablet by mouth daily as needed, Disp: , Rfl:     ipratropium (ATROVENT) 0 06 % nasal spray, into each nostril Daily, Disp: , Rfl:     lisinopril (ZESTRIL) 20 mg tablet, TAKE 1 TABLET BY MOUTH DAILY, Disp: 90 tablet, Rfl: 3    MELATONIN PO, Take 10 mg by mouth daily at bedtime, Disp: , Rfl:     Ospemifene (Osphena) 60 MG TABS, Take 1 tablet (60 mg total) by mouth daily, Disp: 90 tablet, Rfl: 3    pantoprazole (Protonix) 40 mg tablet, Take 40 mg by mouth 2 (two) times a day, Disp: , Rfl:     pindolol (VISKEN) 5 mg tablet, Take 1 tablet (5 mg total) by mouth daily, Disp: 90 tablet, Rfl: 3    valACYclovir (VALTREX) 1,000 mg tablet, valacyclovir 1 gram tablet  TAKE 2 TABS BY MOUTH THE MORNING OF PROCEDURE AND 2 TABS 12 HOURS LATER , Disp: , Rfl:     esomeprazole (NexIUM) 20 mg capsule, Take 20 mg by mouth every morning before breakfast, Disp: , Rfl:     methocarbamol (ROBAXIN) 500 mg tablet, TAKE 2 TABLETS 4 TIMES A DAY AS NEEDED (Patient not taking: Reported on 3/22/2021), Disp: 40 tablet, Rfl: 0    pantoprazole (PROTONIX) 40 mg tablet, Take 1 tablet (40 mg total) by mouth 2 (two) times a day before meals, Disp: 180 tablet, Rfl: 1    Review of Systems:  Review of Systems   Constitutional: Negative for chills, fever and unexpected weight change  Respiratory: Negative for cough and shortness of breath  Cardiovascular: Negative for chest pain and palpitations  Gastrointestinal: Negative for abdominal distention, abdominal pain, blood in stool, constipation, nausea and vomiting  Genitourinary: Positive for vaginal bleeding  Negative for difficulty urinating, dyspareunia, dysuria, frequency, genital sores, pelvic pain, urgency, vaginal discharge and vaginal pain  Neurological: Negative for headaches  Physical Exam:  /72 (BP Location: Right arm, Patient Position: Sitting, Cuff Size: Standard)   Ht 5' 4" (1 626 m)   Wt 82 1 kg (181 lb)   BMI 31 07 kg/m²    Physical Exam  Constitutional:       General: She is awake  Appearance: Normal appearance  She is well-developed  Genitourinary:      Pelvic exam was performed with patient in the lithotomy position        Vulva, urethra, bladder and vagina normal       No vulval lesion, ulcerations or rash noted  No urethral prolapse present  Bladder is not distended or tender  No vaginal discharge, erythema, tenderness, bleeding, atrophy or prolapse  Cervix is absent  Uterus is absent  No right or left adnexal mass present  Right adnexa not tender or full  Left adnexa not tender or full  Genitourinary Comments: Absent uterus and cervix  Cuff intact  No palpable masses  No lesions visualized  No bladder tenderness  HENT:      Head: Normocephalic and atraumatic  Neck:      Musculoskeletal: Neck supple  Cardiovascular:      Rate and Rhythm: Normal rate and regular rhythm  Heart sounds: Normal heart sounds  Pulmonary:      Effort: Pulmonary effort is normal  No tachypnea or respiratory distress  Breath sounds: Normal breath sounds  Chest:      Breasts:         Right: Normal  No inverted nipple, mass, nipple discharge, skin change or tenderness  Left: Normal  No inverted nipple, mass, nipple discharge, skin change or tenderness  Abdominal:      General: There is no distension  Palpations: Abdomen is soft  Tenderness: There is no abdominal tenderness  There is no guarding  Lymphadenopathy:      Upper Body:      Right upper body: No supraclavicular or axillary adenopathy  Left upper body: No supraclavicular or axillary adenopathy  Neurological:      General: No focal deficit present  Mental Status: She is alert and oriented to person, place, and time  Psychiatric:         Mood and Affect: Mood normal          Behavior: Behavior normal          Thought Content: Thought content normal          Judgment: Judgment normal    Vitals signs reviewed

## 2021-03-23 DIAGNOSIS — I10 BENIGN ESSENTIAL HTN: ICD-10-CM

## 2021-03-23 RX ORDER — AMLODIPINE BESYLATE 5 MG/1
5 TABLET ORAL DAILY
Qty: 14 TABLET | Refills: 0 | Status: SHIPPED | OUTPATIENT
Start: 2021-03-23 | End: 2021-06-02 | Stop reason: SDUPTHER

## 2021-03-26 ENCOUNTER — IMMUNIZATIONS (OUTPATIENT)
Dept: FAMILY MEDICINE CLINIC | Facility: HOSPITAL | Age: 71
End: 2021-03-26
Payer: MEDICARE

## 2021-03-26 ENCOUNTER — APPOINTMENT (OUTPATIENT)
Dept: LAB | Facility: HOSPITAL | Age: 71
End: 2021-03-26
Attending: PSYCHIATRY & NEUROLOGY
Payer: MEDICARE

## 2021-03-26 ENCOUNTER — TRANSCRIBE ORDERS (OUTPATIENT)
Dept: LAB | Facility: HOSPITAL | Age: 71
End: 2021-03-26

## 2021-03-26 DIAGNOSIS — Z79.899 ENCOUNTER FOR LONG-TERM (CURRENT) USE OF OTHER MEDICATIONS: ICD-10-CM

## 2021-03-26 DIAGNOSIS — Z23 ENCOUNTER FOR IMMUNIZATION: Primary | ICD-10-CM

## 2021-03-26 DIAGNOSIS — Z79.899 ENCOUNTER FOR LONG-TERM (CURRENT) USE OF OTHER MEDICATIONS: Primary | ICD-10-CM

## 2021-03-26 LAB — VALPROATE SERPL-MCNC: 51 UG/ML (ref 50–100)

## 2021-03-26 PROCEDURE — 91300 SARS-COV-2 / COVID-19 MRNA VACCINE (PFIZER-BIONTECH) 30 MCG: CPT

## 2021-03-26 PROCEDURE — 0002A SARS-COV-2 / COVID-19 MRNA VACCINE (PFIZER-BIONTECH) 30 MCG: CPT

## 2021-03-26 PROCEDURE — 80164 ASSAY DIPROPYLACETIC ACD TOT: CPT

## 2021-03-26 PROCEDURE — 36415 COLL VENOUS BLD VENIPUNCTURE: CPT

## 2021-04-14 DIAGNOSIS — I10 BENIGN ESSENTIAL HYPERTENSION: ICD-10-CM

## 2021-04-14 RX ORDER — LISINOPRIL 20 MG/1
20 TABLET ORAL DAILY
Qty: 90 TABLET | Refills: 3 | Status: SHIPPED | OUTPATIENT
Start: 2021-04-14 | End: 2022-03-29 | Stop reason: SDUPTHER

## 2021-04-20 DIAGNOSIS — K20.90 ESOPHAGITIS: ICD-10-CM

## 2021-04-21 RX ORDER — PANTOPRAZOLE SODIUM 40 MG/1
TABLET, DELAYED RELEASE ORAL
Qty: 180 TABLET | Refills: 1 | Status: SHIPPED | OUTPATIENT
Start: 2021-04-21 | End: 2022-01-04 | Stop reason: SDUPTHER

## 2021-06-01 NOTE — PROGRESS NOTES
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, intractable, without status epilepticus (HCC)     -stable  -continue depakote ER 500mg twice daily  -follows with Neuro Dr Shavon Mcarthur            Musculoskeletal and Integument    Cervical spondylosis     -with foraminal stenosis on previous MRI  -continue with PT            Other    Hyperlipidemia    Relevant Orders    Lipid panel      Other Visit Diagnoses     Benign essential HTN        Relevant Medications    amLODIPine (NORVASC) 5 mg tablet          Subjective:      Patient ID: Keyshawn Sanches is a 65 y o  female  63yo female with PAF, seizure d/o, HLD, HTN, MDD, JORDANA, BL CTS, insomnia, CIC, migraines ,PVCs and c-spine foraminal stenosis here for follow up care  She was not able to tolerate PAP therapy and has not been interested inspire device and mandibular advancement device  She is going to try to lose weight  She feels fatigued during the day and sometimes she sleeps too much and is not restful  She remains on same dose of depakote  Had surveillance EEG with findings consistent with localization-related partial onset epilepsy  Reports allergy symptoms are controlled with allegra    Takes atrovent nasal spray infrequently and ayre nasal     She continues to go to PT once weekly and massage her R neck area and ultrasound on her R shoulder  Hypertension  This is a chronic problem  The current episode started more than 1 year ago  The problem is controlled  Associated symptoms include headaches (very seldom) and neck pain  Pertinent negatives include no chest pain, palpitations, peripheral edema or shortness of breath  Risk factors for coronary artery disease include obesity, dyslipidemia and post-menopausal state  Past treatments include calcium channel blockers, ACE inhibitors and beta blockers  Identifiable causes of hypertension include sleep apnea         The following portions of the patient's history were reviewed and updated as appropriate: allergies, current medications, past family history, past medical history, past social history, past surgical history and problem list       Current Outpatient Medications:     amLODIPine (NORVASC) 5 mg tablet, Take 1 tablet (5 mg total) by mouth daily, Disp: 90 tablet, Rfl: 1    apixaban (Eliquis) 5 mg, Take 1 tablet (5 mg total) by mouth 2 (two) times a day, Disp: 180 tablet, Rfl: 3    atorvastatin (LIPITOR) 10 mg tablet, Take 2 tablets (20 mg total) by mouth daily (Patient taking differently: Take 40 mg by mouth daily ), Disp: 90 tablet, Rfl: 1    busPIRone (BUSPAR) 15 mg tablet, Take 15 mg by mouth 2 (two) times a day, Disp: , Rfl:     calcium citrate-Vitamin D (CALCIUM CITRATE + D3) 200 mg-250 units, Take by mouth, Disp: , Rfl:     divalproex sodium (DEPAKOTE ER) 500 mg 24 hr tablet, Take 1 tablet by mouth 2 (two) times a day, Disp: , Rfl:     escitalopram (LEXAPRO) 20 mg tablet, Take 1 tablet by mouth daily, Disp: , Rfl:     Estradiol (Elestrin) 0 52 MG/0 87 GM (0 06%) GEL, Place 0 87 g on the skin daily, Disp: 2 Bottle, Rfl: 6    fexofenadine (ALLEGRA) 180 MG tablet, Take 1 tablet by mouth daily as needed, Disp: , Rfl:     ipratropium (ATROVENT) 0 06 % nasal spray, into each nostril Daily, Disp: , Rfl:     lisinopril (ZESTRIL) 20 mg tablet, Take 1 tablet (20 mg total) by mouth daily, Disp: 90 tablet, Rfl: 3    MELATONIN PO, Take 10 mg by mouth daily at bedtime, Disp: , Rfl:     Ospemifene (Osphena) 60 MG TABS, Take 1 tablet (60 mg total) by mouth daily, Disp: 90 tablet, Rfl: 3    pantoprazole (PROTONIX) 40 mg tablet, TAKE 1 TABLET BY MOUTH TWICE A DAY BEFORE MEALS, Disp: 180 tablet, Rfl: 1    pindolol (VISKEN) 5 mg tablet, Take 1 tablet (5 mg total) by mouth daily, Disp: 90 tablet, Rfl: 3    esomeprazole (NexIUM) 20 mg capsule, Take 20 mg by mouth every morning before breakfast, Disp: , Rfl:     pantoprazole (Protonix) 40 mg tablet, Take 40 mg by mouth 2 (two) times a day, Disp: , Rfl:     valACYclovir (VALTREX) 1,000 mg tablet, valacyclovir 1 gram tablet  TAKE 2 TABS BY MOUTH THE MORNING OF PROCEDURE AND 2 TABS 12 HOURS LATER , Disp: , Rfl:     Review of Systems   HENT: Positive for congestion, postnasal drip and rhinorrhea  Respiratory: Positive for apnea  Negative for cough, shortness of breath and wheezing  Cardiovascular: Negative for chest pain, palpitations and leg swelling  Gastrointestinal: Negative for abdominal pain, constipation, diarrhea, nausea and vomiting  Genitourinary: Negative for difficulty urinating  Musculoskeletal: Positive for neck pain and neck stiffness  Neurological: Positive for headaches (very seldom)  Negative for dizziness  Psychiatric/Behavioral: Positive for sleep disturbance  Objective:    /72 (BP Location: Left arm, Patient Position: Sitting)   Pulse 75   Temp 97 6 °F (36 4 °C) (Skin)   Resp 16   Ht 5' 4" (1 626 m)   Wt 82 4 kg (181 lb 9 6 oz)   SpO2 98%   BMI 31 17 kg/m²      Physical Exam  Vitals signs reviewed  Constitutional:       General: She is not in acute distress  Appearance: Normal appearance  She is obese  She is not diaphoretic  Cardiovascular:      Rate and Rhythm: Normal rate and regular rhythm  Pulses: Normal pulses  Heart sounds: Normal heart sounds  Pulmonary:      Effort: Pulmonary effort is normal       Breath sounds: Normal breath sounds  Musculoskeletal:      Right lower leg: No edema  Left lower leg: No edema  Neurological:      Mental Status: She is alert and oriented to person, place, and time  Psychiatric:         Attention and Perception: Attention normal          Mood and Affect: Mood normal          Speech: Speech normal          Behavior: Behavior is cooperative

## 2021-06-02 ENCOUNTER — OFFICE VISIT (OUTPATIENT)
Dept: INTERNAL MEDICINE CLINIC | Facility: CLINIC | Age: 71
End: 2021-06-02
Payer: MEDICARE

## 2021-06-02 VITALS
WEIGHT: 181.6 LBS | HEIGHT: 64 IN | OXYGEN SATURATION: 98 % | TEMPERATURE: 97.6 F | SYSTOLIC BLOOD PRESSURE: 122 MMHG | RESPIRATION RATE: 16 BRPM | BODY MASS INDEX: 31 KG/M2 | HEART RATE: 75 BPM | DIASTOLIC BLOOD PRESSURE: 72 MMHG

## 2021-06-02 DIAGNOSIS — G47.33 OBSTRUCTIVE SLEEP APNEA SYNDROME: ICD-10-CM

## 2021-06-02 DIAGNOSIS — I10 BENIGN ESSENTIAL HTN: ICD-10-CM

## 2021-06-02 DIAGNOSIS — M47.812 CERVICAL SPONDYLOSIS: ICD-10-CM

## 2021-06-02 DIAGNOSIS — G40.119 LOCALIZATION-RELATED (FOCAL) (PARTIAL) SYMPTOMATIC EPILEPSY AND EPILEPTIC SYNDROMES WITH SIMPLE PARTIAL SEIZURES, INTRACTABLE, WITHOUT STATUS EPILEPTICUS (HCC): ICD-10-CM

## 2021-06-02 DIAGNOSIS — I10 ESSENTIAL HYPERTENSION: Primary | ICD-10-CM

## 2021-06-02 DIAGNOSIS — E78.2 MIXED HYPERLIPIDEMIA: ICD-10-CM

## 2021-06-02 PROCEDURE — 99214 OFFICE O/P EST MOD 30 MIN: CPT | Performed by: INTERNAL MEDICINE

## 2021-06-02 RX ORDER — AMLODIPINE BESYLATE 5 MG/1
5 TABLET ORAL DAILY
Qty: 90 TABLET | Refills: 1 | Status: SHIPPED | OUTPATIENT
Start: 2021-06-02 | End: 2021-09-27

## 2021-06-02 NOTE — ASSESSMENT & PLAN NOTE
-did not tolerated PAP therapy and not interested in dental device or inspire device  -motivated to lose weight  -pt aware of consequences of untreated sleep apnea

## 2021-06-02 NOTE — ASSESSMENT & PLAN NOTE
-benign, controlled  -amlodipine 5mg daily renewed    Continue lisinopril 20mg daily and pindolol 5mg daily  -reinforced low sodium diet

## 2021-06-26 ENCOUNTER — APPOINTMENT (OUTPATIENT)
Dept: LAB | Facility: HOSPITAL | Age: 71
End: 2021-06-26
Attending: PSYCHIATRY & NEUROLOGY
Payer: MEDICARE

## 2021-06-26 DIAGNOSIS — E78.2 MIXED HYPERLIPIDEMIA: ICD-10-CM

## 2021-06-26 DIAGNOSIS — Z79.899 ENCOUNTER FOR LONG-TERM (CURRENT) USE OF OTHER MEDICATIONS: ICD-10-CM

## 2021-06-26 LAB
BASOPHILS # BLD AUTO: 0.02 THOUSANDS/ΜL (ref 0–0.1)
BASOPHILS NFR BLD AUTO: 1 % (ref 0–1)
EOSINOPHIL # BLD AUTO: 0.06 THOUSAND/ΜL (ref 0–0.61)
EOSINOPHIL NFR BLD AUTO: 2 % (ref 0–6)
ERYTHROCYTE [DISTWIDTH] IN BLOOD BY AUTOMATED COUNT: 12.6 % (ref 11.6–15.1)
HCT VFR BLD AUTO: 40.8 % (ref 34.8–46.1)
HGB BLD-MCNC: 13.2 G/DL (ref 11.5–15.4)
IMM GRANULOCYTES # BLD AUTO: 0.01 THOUSAND/UL (ref 0–0.2)
IMM GRANULOCYTES NFR BLD AUTO: 0 % (ref 0–2)
LYMPHOCYTES # BLD AUTO: 1.09 THOUSANDS/ΜL (ref 0.6–4.47)
LYMPHOCYTES NFR BLD AUTO: 28 % (ref 14–44)
MCH RBC QN AUTO: 31.1 PG (ref 26.8–34.3)
MCHC RBC AUTO-ENTMCNC: 32.4 G/DL (ref 31.4–37.4)
MCV RBC AUTO: 96 FL (ref 82–98)
MONOCYTES # BLD AUTO: 0.45 THOUSAND/ΜL (ref 0.17–1.22)
MONOCYTES NFR BLD AUTO: 11 % (ref 4–12)
NEUTROPHILS # BLD AUTO: 2.31 THOUSANDS/ΜL (ref 1.85–7.62)
NEUTS SEG NFR BLD AUTO: 58 % (ref 43–75)
NRBC BLD AUTO-RTO: 0 /100 WBCS
PLATELET # BLD AUTO: 281 THOUSANDS/UL (ref 149–390)
PMV BLD AUTO: 10.4 FL (ref 8.9–12.7)
RBC # BLD AUTO: 4.24 MILLION/UL (ref 3.81–5.12)
VALPROATE SERPL-MCNC: 60 UG/ML (ref 50–100)
WBC # BLD AUTO: 3.94 THOUSAND/UL (ref 4.31–10.16)

## 2021-06-26 PROCEDURE — 85025 COMPLETE CBC W/AUTO DIFF WBC: CPT

## 2021-06-26 PROCEDURE — 80164 ASSAY DIPROPYLACETIC ACD TOT: CPT

## 2021-06-26 PROCEDURE — 36415 COLL VENOUS BLD VENIPUNCTURE: CPT

## 2021-09-09 ENCOUNTER — TELEPHONE (OUTPATIENT)
Dept: INTERNAL MEDICINE CLINIC | Facility: CLINIC | Age: 71
End: 2021-09-09

## 2021-09-09 NOTE — TELEPHONE ENCOUNTER
A facility called and they faxed us paperwork and they would need progress note and plan of care faxed back to [phone]  If there are any questions she can be reached at [phone]    Thank you

## 2021-09-24 DIAGNOSIS — I10 BENIGN ESSENTIAL HTN: ICD-10-CM

## 2021-09-27 RX ORDER — AMLODIPINE BESYLATE 5 MG/1
TABLET ORAL
Qty: 90 TABLET | Refills: 3 | Status: SHIPPED | OUTPATIENT
Start: 2021-09-27

## 2021-10-23 ENCOUNTER — APPOINTMENT (OUTPATIENT)
Dept: LAB | Facility: HOSPITAL | Age: 71
End: 2021-10-23
Attending: PSYCHIATRY & NEUROLOGY
Payer: MEDICARE

## 2021-10-23 DIAGNOSIS — Z79.899 ENCOUNTER FOR LONG-TERM (CURRENT) USE OF MEDICATIONS: ICD-10-CM

## 2021-10-23 LAB
ALBUMIN SERPL BCP-MCNC: 3.1 G/DL (ref 3.5–5)
ALP SERPL-CCNC: 53 U/L (ref 46–116)
ALT SERPL W P-5'-P-CCNC: 24 U/L (ref 12–78)
ANION GAP SERPL CALCULATED.3IONS-SCNC: 4 MMOL/L (ref 4–13)
AST SERPL W P-5'-P-CCNC: 11 U/L (ref 5–45)
BASOPHILS # BLD AUTO: 0.03 THOUSANDS/ΜL (ref 0–0.1)
BASOPHILS NFR BLD AUTO: 1 % (ref 0–1)
BILIRUB SERPL-MCNC: 0.24 MG/DL (ref 0.2–1)
BUN SERPL-MCNC: 21 MG/DL (ref 5–25)
CALCIUM ALBUM COR SERPL-MCNC: 9.7 MG/DL (ref 8.3–10.1)
CALCIUM SERPL-MCNC: 9 MG/DL (ref 8.3–10.1)
CHLORIDE SERPL-SCNC: 108 MMOL/L (ref 100–108)
CO2 SERPL-SCNC: 27 MMOL/L (ref 21–32)
CREAT SERPL-MCNC: 0.76 MG/DL (ref 0.6–1.3)
EOSINOPHIL # BLD AUTO: 0.07 THOUSAND/ΜL (ref 0–0.61)
EOSINOPHIL NFR BLD AUTO: 2 % (ref 0–6)
ERYTHROCYTE [DISTWIDTH] IN BLOOD BY AUTOMATED COUNT: 12.9 % (ref 11.6–15.1)
GFR SERPL CREATININE-BSD FRML MDRD: 78 ML/MIN/1.73SQ M
GLUCOSE SERPL-MCNC: 109 MG/DL (ref 65–140)
HCT VFR BLD AUTO: 41.2 % (ref 34.8–46.1)
HGB BLD-MCNC: 13 G/DL (ref 11.5–15.4)
IMM GRANULOCYTES # BLD AUTO: 0.03 THOUSAND/UL (ref 0–0.2)
IMM GRANULOCYTES NFR BLD AUTO: 1 % (ref 0–2)
LYMPHOCYTES # BLD AUTO: 1.29 THOUSANDS/ΜL (ref 0.6–4.47)
LYMPHOCYTES NFR BLD AUTO: 35 % (ref 14–44)
MCH RBC QN AUTO: 30.7 PG (ref 26.8–34.3)
MCHC RBC AUTO-ENTMCNC: 31.6 G/DL (ref 31.4–37.4)
MCV RBC AUTO: 97 FL (ref 82–98)
MONOCYTES # BLD AUTO: 0.43 THOUSAND/ΜL (ref 0.17–1.22)
MONOCYTES NFR BLD AUTO: 12 % (ref 4–12)
NEUTROPHILS # BLD AUTO: 1.87 THOUSANDS/ΜL (ref 1.85–7.62)
NEUTS SEG NFR BLD AUTO: 49 % (ref 43–75)
NRBC BLD AUTO-RTO: 0 /100 WBCS
PLATELET # BLD AUTO: 302 THOUSANDS/UL (ref 149–390)
PMV BLD AUTO: 10.5 FL (ref 8.9–12.7)
POTASSIUM SERPL-SCNC: 4.5 MMOL/L (ref 3.5–5.3)
PROT SERPL-MCNC: 6.5 G/DL (ref 6.4–8.2)
RBC # BLD AUTO: 4.24 MILLION/UL (ref 3.81–5.12)
SODIUM SERPL-SCNC: 139 MMOL/L (ref 136–145)
VALPROATE SERPL-MCNC: 41 UG/ML (ref 50–100)
WBC # BLD AUTO: 3.72 THOUSAND/UL (ref 4.31–10.16)

## 2021-10-23 PROCEDURE — 85025 COMPLETE CBC W/AUTO DIFF WBC: CPT

## 2021-10-23 PROCEDURE — 80164 ASSAY DIPROPYLACETIC ACD TOT: CPT

## 2021-10-23 PROCEDURE — 36415 COLL VENOUS BLD VENIPUNCTURE: CPT

## 2021-10-23 PROCEDURE — 80053 COMPREHEN METABOLIC PANEL: CPT

## 2021-10-28 DIAGNOSIS — I48.91 ATRIAL FIBRILLATION, UNSPECIFIED TYPE (HCC): ICD-10-CM

## 2021-10-28 RX ORDER — APIXABAN 5 MG/1
TABLET, FILM COATED ORAL
Qty: 180 TABLET | Refills: 3 | Status: SHIPPED | OUTPATIENT
Start: 2021-10-28 | End: 2022-08-05

## 2021-12-03 ENCOUNTER — TELEPHONE (OUTPATIENT)
Dept: GASTROENTEROLOGY | Facility: AMBULARY SURGERY CENTER | Age: 71
End: 2021-12-03

## 2021-12-11 ENCOUNTER — APPOINTMENT (OUTPATIENT)
Dept: LAB | Facility: HOSPITAL | Age: 71
End: 2021-12-11
Payer: MEDICARE

## 2021-12-11 DIAGNOSIS — E78.2 MIXED HYPERLIPIDEMIA: ICD-10-CM

## 2021-12-11 LAB
CHOLEST SERPL-MCNC: 168 MG/DL
HDLC SERPL-MCNC: 49 MG/DL
LDLC SERPL CALC-MCNC: 85 MG/DL (ref 0–100)
NONHDLC SERPL-MCNC: 119 MG/DL
TRIGL SERPL-MCNC: 168 MG/DL

## 2021-12-11 PROCEDURE — 36415 COLL VENOUS BLD VENIPUNCTURE: CPT

## 2021-12-11 PROCEDURE — 80061 LIPID PANEL: CPT

## 2021-12-13 ENCOUNTER — OFFICE VISIT (OUTPATIENT)
Dept: INTERNAL MEDICINE CLINIC | Facility: CLINIC | Age: 71
End: 2021-12-13
Payer: MEDICARE

## 2021-12-13 VITALS
SYSTOLIC BLOOD PRESSURE: 126 MMHG | OXYGEN SATURATION: 95 % | WEIGHT: 185.4 LBS | BODY MASS INDEX: 31.65 KG/M2 | RESPIRATION RATE: 16 BRPM | HEIGHT: 64 IN | HEART RATE: 73 BPM | TEMPERATURE: 97.7 F | DIASTOLIC BLOOD PRESSURE: 70 MMHG

## 2021-12-13 DIAGNOSIS — G40.909 SEIZURE DISORDER (HCC): ICD-10-CM

## 2021-12-13 DIAGNOSIS — E78.2 MIXED HYPERLIPIDEMIA: Primary | ICD-10-CM

## 2021-12-13 DIAGNOSIS — Z00.00 MEDICARE ANNUAL WELLNESS VISIT, SUBSEQUENT: ICD-10-CM

## 2021-12-13 DIAGNOSIS — F33.0 MILD EPISODE OF RECURRENT MAJOR DEPRESSIVE DISORDER (HCC): ICD-10-CM

## 2021-12-13 DIAGNOSIS — G56.03 CARPAL TUNNEL SYNDROME, BILATERAL: ICD-10-CM

## 2021-12-13 DIAGNOSIS — K20.90 ESOPHAGITIS: ICD-10-CM

## 2021-12-13 DIAGNOSIS — Z23 ENCOUNTER FOR IMMUNIZATION: ICD-10-CM

## 2021-12-13 PROBLEM — R20.2 PARESTHESIA OF BOTH HANDS: Status: RESOLVED | Noted: 2019-08-06 | Resolved: 2021-12-13

## 2021-12-13 PROCEDURE — 1123F ACP DISCUSS/DSCN MKR DOCD: CPT | Performed by: INTERNAL MEDICINE

## 2021-12-13 PROCEDURE — 99214 OFFICE O/P EST MOD 30 MIN: CPT | Performed by: INTERNAL MEDICINE

## 2021-12-13 PROCEDURE — G0439 PPPS, SUBSEQ VISIT: HCPCS | Performed by: INTERNAL MEDICINE

## 2021-12-13 PROCEDURE — 90732 PPSV23 VACC 2 YRS+ SUBQ/IM: CPT

## 2021-12-13 PROCEDURE — G0009 ADMIN PNEUMOCOCCAL VACCINE: HCPCS

## 2021-12-13 RX ORDER — SODIUM FLUORIDE AND POTASSIUM NITRATE 5.8; 57.5 MG/ML; MG/ML
GEL, DENTIFRICE DENTAL
COMMUNITY
End: 2022-02-14 | Stop reason: ALTCHOICE

## 2021-12-13 RX ORDER — ATORVASTATIN CALCIUM 40 MG/1
40 TABLET, FILM COATED ORAL DAILY
Qty: 90 TABLET | Refills: 0
Start: 2021-12-13

## 2022-01-04 DIAGNOSIS — K20.90 ESOPHAGITIS: ICD-10-CM

## 2022-01-04 RX ORDER — PANTOPRAZOLE SODIUM 40 MG/1
40 TABLET, DELAYED RELEASE ORAL
Qty: 180 TABLET | Refills: 1 | Status: SHIPPED | OUTPATIENT
Start: 2022-01-04

## 2022-01-14 ENCOUNTER — HOSPITAL ENCOUNTER (OUTPATIENT)
Dept: MAMMOGRAPHY | Facility: MEDICAL CENTER | Age: 72
Discharge: HOME | End: 2022-01-14
Payer: MEDICARE

## 2022-01-14 VITALS — WEIGHT: 185 LBS | HEIGHT: 64 IN | BODY MASS INDEX: 31.58 KG/M2

## 2022-01-14 DIAGNOSIS — Z12.31 ENCOUNTER FOR SCREENING MAMMOGRAM FOR MALIGNANT NEOPLASM OF BREAST: ICD-10-CM

## 2022-01-14 PROCEDURE — 77067 SCR MAMMO BI INCL CAD: CPT

## 2022-01-14 PROCEDURE — 77063 BREAST TOMOSYNTHESIS BI: CPT

## 2022-02-03 ENCOUNTER — TELEPHONE (OUTPATIENT)
Dept: GASTROENTEROLOGY | Facility: AMBULARY SURGERY CENTER | Age: 72
End: 2022-02-03

## 2022-02-03 NOTE — TELEPHONE ENCOUNTER
LVM for pt to confirm: OK to hold eliquis 2 days prior to egd 2/14/2022 w/ dr villa per dr brunner/provided direct phone # in scheduling on pt's phone message

## 2022-02-11 ENCOUNTER — TELEPHONE (OUTPATIENT)
Dept: GASTROENTEROLOGY | Facility: HOSPITAL | Age: 72
End: 2022-02-11

## 2022-02-14 ENCOUNTER — ANESTHESIA (OUTPATIENT)
Dept: GASTROENTEROLOGY | Facility: HOSPITAL | Age: 72
End: 2022-02-14

## 2022-02-14 ENCOUNTER — HOSPITAL ENCOUNTER (OUTPATIENT)
Dept: GASTROENTEROLOGY | Facility: HOSPITAL | Age: 72
Setting detail: OUTPATIENT SURGERY
Discharge: HOME | End: 2022-02-14
Attending: INTERNAL MEDICINE | Admitting: INTERNAL MEDICINE
Payer: MEDICARE

## 2022-02-14 ENCOUNTER — ANESTHESIA EVENT (OUTPATIENT)
Dept: GASTROENTEROLOGY | Facility: HOSPITAL | Age: 72
End: 2022-02-14

## 2022-02-14 VITALS
TEMPERATURE: 97.8 F | RESPIRATION RATE: 18 BRPM | DIASTOLIC BLOOD PRESSURE: 64 MMHG | SYSTOLIC BLOOD PRESSURE: 148 MMHG | OXYGEN SATURATION: 97 % | HEART RATE: 64 BPM

## 2022-02-14 DIAGNOSIS — R19.5 LOOSE STOOLS: Primary | ICD-10-CM

## 2022-02-14 DIAGNOSIS — R19.7 DIARRHEA, UNSPECIFIED TYPE: ICD-10-CM

## 2022-02-14 DIAGNOSIS — K22.70 BARRETT'S ESOPHAGUS WITHOUT DYSPLASIA: ICD-10-CM

## 2022-02-14 DIAGNOSIS — K21.9 GASTROESOPHAGEAL REFLUX DISEASE WITHOUT ESOPHAGITIS: ICD-10-CM

## 2022-02-14 DIAGNOSIS — K20.90 ESOPHAGITIS: ICD-10-CM

## 2022-02-14 DIAGNOSIS — R10.13 DYSPEPSIA: ICD-10-CM

## 2022-02-14 PROBLEM — Z90.710 HISTORY OF HYSTERECTOMY: Status: ACTIVE | Noted: 2022-02-14

## 2022-02-14 PROCEDURE — 88305 TISSUE EXAM BY PATHOLOGIST: CPT | Performed by: PATHOLOGY

## 2022-02-14 PROCEDURE — 43239 EGD BIOPSY SINGLE/MULTIPLE: CPT | Performed by: INTERNAL MEDICINE

## 2022-02-14 RX ORDER — FAMOTIDINE 20 MG/1
20 TABLET, FILM COATED ORAL 2 TIMES DAILY
Qty: 30 TABLET | Refills: 3 | Status: SHIPPED | OUTPATIENT
Start: 2022-02-14 | End: 2022-02-28

## 2022-02-14 RX ORDER — SODIUM CHLORIDE, SODIUM LACTATE, POTASSIUM CHLORIDE, CALCIUM CHLORIDE 600; 310; 30; 20 MG/100ML; MG/100ML; MG/100ML; MG/100ML
INJECTION, SOLUTION INTRAVENOUS CONTINUOUS PRN
Status: DISCONTINUED | OUTPATIENT
Start: 2022-02-14 | End: 2022-02-14

## 2022-02-14 RX ORDER — LIDOCAINE HYDROCHLORIDE 10 MG/ML
INJECTION, SOLUTION EPIDURAL; INFILTRATION; INTRACAUDAL; PERINEURAL AS NEEDED
Status: DISCONTINUED | OUTPATIENT
Start: 2022-02-14 | End: 2022-02-14

## 2022-02-14 RX ORDER — PROPOFOL 10 MG/ML
INJECTION, EMULSION INTRAVENOUS AS NEEDED
Status: DISCONTINUED | OUTPATIENT
Start: 2022-02-14 | End: 2022-02-14

## 2022-02-14 RX ADMIN — LIDOCAINE HYDROCHLORIDE 50 MG: 10 INJECTION, SOLUTION EPIDURAL; INFILTRATION; INTRACAUDAL at 12:23

## 2022-02-14 RX ADMIN — PROPOFOL 100 MG: 10 INJECTION, EMULSION INTRAVENOUS at 12:23

## 2022-02-14 RX ADMIN — PROPOFOL 20 MG: 10 INJECTION, EMULSION INTRAVENOUS at 12:29

## 2022-02-14 RX ADMIN — PROPOFOL 40 MG: 10 INJECTION, EMULSION INTRAVENOUS at 12:25

## 2022-02-14 RX ADMIN — SODIUM CHLORIDE, SODIUM LACTATE, POTASSIUM CHLORIDE, AND CALCIUM CHLORIDE: .6; .31; .03; .02 INJECTION, SOLUTION INTRAVENOUS at 12:10

## 2022-02-14 NOTE — H&P
History and Physical - [facility]  Karen Lee 66 y o  female MRN: [id number]    HPI: Karen Lee is a 66y o  year old female who presents presents for evaluation of Santiago's esophagus, previously was noted to have LA grade C esophagitis  Previous EGD was in 2020, was recommended repeat at 3 month interval however this has not been done yet  Additionally, patient reports that she has been having occasional loose stools a postprandially for the past 1 month  She reports that this is only intermittent  She has try to avoid spicy food which has helped  No hematochezia, abdominal pain or unintentional weight loss    Review of Systems    Historical Information   Past Medical History:   Diagnosis Date    Anxiety     Arthralgia     Atrial fibrillation (HCC)     Chronic interstitial cystitis     Colon polyp     Depression     Hyperlipidemia     Hypertension     Lichen sclerosus     Lipoma     Osteopenia     Paresthesia of both hands 8/6/2019    Simple partial seizures ([address] )     last assessed 3/30/17    Tachycardia     last assessed 6/24/15     Past Surgical History:   Procedure Laterality Date    COLONOSCOPY  2020    Dr Alexa Perez, 5 year f/u     COSMETIC SURGERY      HYSTERECTOMY      OOPHORECTOMY      REDUCTION MAMMAPLASTY      SHOULDER SURGERY       Social History   Social History     Substance and Sexual Activity   Alcohol Use Yes    Alcohol/week: 10 0 standard drinks    Types: 10 Glasses of wine per week    Comment: socially 1-2 drinks/week     Social History     Substance and Sexual Activity   Drug Use No     Social History     Tobacco Use   Smoking Status Never Smoker   Smokeless Tobacco Never Used     Family History   Problem Relation Age of Onset    Stroke Mother         CVA    Hypertension Mother     Hypertension Father     Lung cancer Father     Lung cancer Brother         AGE UNKNOWN    No Known Problems Sister     No Known Problems Maternal Grandmother     No Known Problems Maternal Grandfather     No Known Problems Paternal Grandmother     No Known Problems Paternal Grandfather     No Known Problems Maternal Aunt     No Known Problems Maternal Aunt     No Known Problems Maternal Aunt     No Known Problems Paternal Aunt     No Known Problems Paternal Aunt     No Known Problems Paternal Aunt        Meds/Allergies     (Not in a hospital admission)      Allergies   Allergen Reactions    Codeine Hives    Erythromycin Base GI Intolerance    Hydromorphone Itching    Morphine GI Intolerance    Oxycodone-Acetaminophen Hives    Penicillins Rash       Objective     BP (!) 193/77   Pulse 66   Temp 98 °F (36 7 °C) (Temporal)   Resp 16   SpO2 95%       PHYSICAL EXAM    Gen: NAD  CV: RRR  CHEST: Clear  ABD: soft, NT/ND  EXT: no edema  Neuro: AAO      ASSESSMENT/PLAN:  This is a 66y o  year old female here for evaluation of esophagitis and to assess for any underlying Santiago's esophagus  Additionally, patient reports of some loose stools over the past 1 month, suspect symptoms may be post infectious IBS however will order stool studies to rule out any infectious etiology  Also recommended to start on a daily probiotic and avoid dairy products for the next 2-3 weeks and recommend low residue diet  PLAN:   Procedure:  EGD

## 2022-02-14 NOTE — ANESTHESIA PREPROCEDURE EVALUATION
Procedure:  EGD    Relevant Problems   ANESTHESIA (within normal limits)      CARDIO   (+) Coronary vasospasm (HCC)   (+) Hyperlipidemia   (+) Hypertension   (+) Intractable chronic migraine without aura   (+) Paroxysmal atrial fibrillation (HCC)      ENDO   (-) Diabetes mellitus, type 2 (HCC)   (-) Hyperthyroidism   (-) Hypothyroidism      GI/HEPATIC   (+) Gastroesophageal reflux disease      /RENAL (within normal limits)      GYN   (+) History of hysterectomy      HEMATOLOGY (within normal limits)      MUSCULOSKELETAL   (+) Arthritis   (+) Cervical spondylosis   (+) Coronary vasospasm (HCC)      NEURO/PSYCH   (+) Anxiety   (+) Complex partial seizure with impairment of consciousness (HCC)   (+) Epilepsia partialis continua with intractable epilepsy ([facility] [address] )   (+) Episode of recurrent major depressive disorder (HCC)   (+) Intractable chronic migraine without aura   (+) 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, intractable, without status epilepticus (HCC)   (+) Seizure disorder (HCC)   (+) Simple partial seizure with somatosensory or special sensory dysfunction (HCC)      PULMONARY   (+) Obstructive sleep apnea syndrome   (-) Asthma        Physical Exam    Airway    Mallampati score: III  TM Distance: >3 FB  Neck ROM: limited     Dental   No notable dental hx     Cardiovascular  Rhythm: regular, Rate: normal, No murmur,     Pulmonary  Breath sounds clear to auscultation,     Other Findings        Anesthesia Plan  ASA Score- 3     Anesthesia Type- IV sedation with anesthesia with ASA Monitors  Additional Monitors:   Airway Plan:           Plan Factors-Exercise tolerance (METS): >4 METS  Chart reviewed  Patient summary reviewed  Patient is not a current smoker  Induction- intravenous  Postoperative Plan-     Informed Consent- Anesthetic plan and risks discussed with patient and spouse    I personally reviewed this patient with the CRNA  Discussed and agreed on the Anesthesia Plan with the CRNA  Kevin Carpio

## 2022-02-28 DIAGNOSIS — K21.9 GASTROESOPHAGEAL REFLUX DISEASE WITHOUT ESOPHAGITIS: ICD-10-CM

## 2022-02-28 RX ORDER — FAMOTIDINE 20 MG/1
TABLET, FILM COATED ORAL
Qty: 30 TABLET | Refills: 3 | Status: SHIPPED | OUTPATIENT
Start: 2022-02-28 | End: 2022-03-14

## 2022-03-02 ENCOUNTER — APPOINTMENT (OUTPATIENT)
Dept: LAB | Facility: HOSPITAL | Age: 72
End: 2022-03-02
Attending: INTERNAL MEDICINE
Payer: MEDICARE

## 2022-03-02 DIAGNOSIS — R19.5 LOOSE STOOLS: ICD-10-CM

## 2022-03-02 DIAGNOSIS — R19.7 DIARRHEA, UNSPECIFIED TYPE: ICD-10-CM

## 2022-03-02 PROCEDURE — 87505 NFCT AGENT DETECTION GI: CPT

## 2022-03-03 LAB
CAMPYLOBACTER DNA SPEC NAA+PROBE: NORMAL
SALMONELLA DNA SPEC QL NAA+PROBE: NORMAL
SHIGA TOXIN STX GENE SPEC NAA+PROBE: NORMAL
SHIGELLA DNA SPEC QL NAA+PROBE: NORMAL

## 2022-03-05 ENCOUNTER — APPOINTMENT (OUTPATIENT)
Dept: LAB | Facility: HOSPITAL | Age: 72
End: 2022-03-05
Attending: PSYCHIATRY & NEUROLOGY
Payer: MEDICARE

## 2022-03-05 DIAGNOSIS — Z79.899 ENCOUNTER FOR LONG-TERM (CURRENT) USE OF OTHER MEDICATIONS: ICD-10-CM

## 2022-03-05 LAB
ALBUMIN SERPL BCP-MCNC: 3.3 G/DL (ref 3.5–5)
ALP SERPL-CCNC: 55 U/L (ref 46–116)
ALT SERPL W P-5'-P-CCNC: 20 U/L (ref 12–78)
ANION GAP SERPL CALCULATED.3IONS-SCNC: 2 MMOL/L (ref 4–13)
AST SERPL W P-5'-P-CCNC: 8 U/L (ref 5–45)
BASOPHILS # BLD AUTO: 0.03 THOUSANDS/ΜL (ref 0–0.1)
BASOPHILS NFR BLD AUTO: 1 % (ref 0–1)
BILIRUB SERPL-MCNC: 0.28 MG/DL (ref 0.2–1)
BUN SERPL-MCNC: 19 MG/DL (ref 5–25)
CALCIUM ALBUM COR SERPL-MCNC: 9.6 MG/DL (ref 8.3–10.1)
CALCIUM SERPL-MCNC: 9 MG/DL (ref 8.3–10.1)
CHLORIDE SERPL-SCNC: 107 MMOL/L (ref 100–108)
CO2 SERPL-SCNC: 28 MMOL/L (ref 21–32)
CREAT SERPL-MCNC: 0.79 MG/DL (ref 0.6–1.3)
EOSINOPHIL # BLD AUTO: 0.05 THOUSAND/ΜL (ref 0–0.61)
EOSINOPHIL NFR BLD AUTO: 1 % (ref 0–6)
ERYTHROCYTE [DISTWIDTH] IN BLOOD BY AUTOMATED COUNT: 12.6 % (ref 11.6–15.1)
GFR SERPL CREATININE-BSD FRML MDRD: 74 ML/MIN/1.73SQ M
GLUCOSE P FAST SERPL-MCNC: 99 MG/DL (ref 65–99)
HCT VFR BLD AUTO: 40 % (ref 34.8–46.1)
HGB BLD-MCNC: 12.7 G/DL (ref 11.5–15.4)
IMM GRANULOCYTES # BLD AUTO: 0.02 THOUSAND/UL (ref 0–0.2)
IMM GRANULOCYTES NFR BLD AUTO: 1 % (ref 0–2)
LYMPHOCYTES # BLD AUTO: 1.31 THOUSANDS/ΜL (ref 0.6–4.47)
LYMPHOCYTES NFR BLD AUTO: 33 % (ref 14–44)
MCH RBC QN AUTO: 30 PG (ref 26.8–34.3)
MCHC RBC AUTO-ENTMCNC: 31.8 G/DL (ref 31.4–37.4)
MCV RBC AUTO: 95 FL (ref 82–98)
MONOCYTES # BLD AUTO: 0.43 THOUSAND/ΜL (ref 0.17–1.22)
MONOCYTES NFR BLD AUTO: 11 % (ref 4–12)
NEUTROPHILS # BLD AUTO: 2.17 THOUSANDS/ΜL (ref 1.85–7.62)
NEUTS SEG NFR BLD AUTO: 53 % (ref 43–75)
NRBC BLD AUTO-RTO: 0 /100 WBCS
PLATELET # BLD AUTO: 323 THOUSANDS/UL (ref 149–390)
PMV BLD AUTO: 10.4 FL (ref 8.9–12.7)
POTASSIUM SERPL-SCNC: 4.4 MMOL/L (ref 3.5–5.3)
PROT SERPL-MCNC: 6.8 G/DL (ref 6.4–8.2)
RBC # BLD AUTO: 4.23 MILLION/UL (ref 3.81–5.12)
SODIUM SERPL-SCNC: 137 MMOL/L (ref 136–145)
VALPROATE SERPL-MCNC: 57 UG/ML (ref 50–100)
WBC # BLD AUTO: 4.01 THOUSAND/UL (ref 4.31–10.16)

## 2022-03-05 PROCEDURE — 36415 COLL VENOUS BLD VENIPUNCTURE: CPT

## 2022-03-05 PROCEDURE — 80164 ASSAY DIPROPYLACETIC ACD TOT: CPT

## 2022-03-05 PROCEDURE — 85025 COMPLETE CBC W/AUTO DIFF WBC: CPT

## 2022-03-05 PROCEDURE — 80053 COMPREHEN METABOLIC PANEL: CPT

## 2022-03-14 DIAGNOSIS — K21.9 GASTROESOPHAGEAL REFLUX DISEASE WITHOUT ESOPHAGITIS: ICD-10-CM

## 2022-03-14 RX ORDER — FAMOTIDINE 20 MG/1
TABLET, FILM COATED ORAL
Qty: 180 TABLET | Refills: 1 | Status: SHIPPED | OUTPATIENT
Start: 2022-03-14 | End: 2022-07-17

## 2022-03-16 ENCOUNTER — APPOINTMENT (OUTPATIENT)
Dept: LAB | Facility: HOSPITAL | Age: 72
End: 2022-03-16
Attending: INTERNAL MEDICINE
Payer: MEDICARE

## 2022-03-16 DIAGNOSIS — R19.5 LOOSE STOOLS: ICD-10-CM

## 2022-03-16 PROCEDURE — 87177 OVA AND PARASITES SMEARS: CPT

## 2022-03-16 PROCEDURE — 87209 SMEAR COMPLEX STAIN: CPT

## 2022-03-29 DIAGNOSIS — I10 BENIGN ESSENTIAL HYPERTENSION: ICD-10-CM

## 2022-03-29 RX ORDER — LISINOPRIL 20 MG/1
20 TABLET ORAL DAILY
Qty: 90 TABLET | Refills: 3 | Status: SHIPPED | OUTPATIENT
Start: 2022-03-29

## 2022-04-01 ENCOUNTER — OFFICE VISIT (OUTPATIENT)
Dept: GASTROENTEROLOGY | Facility: AMBULARY SURGERY CENTER | Age: 72
End: 2022-04-01
Payer: MEDICARE

## 2022-04-01 VITALS
DIASTOLIC BLOOD PRESSURE: 78 MMHG | OXYGEN SATURATION: 98 % | SYSTOLIC BLOOD PRESSURE: 118 MMHG | BODY MASS INDEX: 30.77 KG/M2 | HEART RATE: 91 BPM | WEIGHT: 180.2 LBS | HEIGHT: 64 IN

## 2022-04-01 DIAGNOSIS — K92.89 GAS BLOAT SYNDROME: ICD-10-CM

## 2022-04-01 DIAGNOSIS — K44.9 HIATAL HERNIA: ICD-10-CM

## 2022-04-01 DIAGNOSIS — K21.9 GASTROESOPHAGEAL REFLUX DISEASE, UNSPECIFIED WHETHER ESOPHAGITIS PRESENT: ICD-10-CM

## 2022-04-01 DIAGNOSIS — R11.0 NAUSEA: Primary | ICD-10-CM

## 2022-04-01 DIAGNOSIS — K59.1 FUNCTIONAL DIARRHEA: ICD-10-CM

## 2022-04-01 PROCEDURE — 99214 OFFICE O/P EST MOD 30 MIN: CPT | Performed by: INTERNAL MEDICINE

## 2022-04-01 RX ORDER — SACCHAROMYCES BOULARDII 250 MG
250 CAPSULE ORAL 2 TIMES DAILY
COMMUNITY

## 2022-04-01 RX ORDER — DIVALPROEX SODIUM 500 MG/1
TABLET, DELAYED RELEASE ORAL
COMMUNITY
Start: 2022-01-20

## 2022-04-01 NOTE — PROGRESS NOTES
[facility]  Progress Note - Lisa Hunt 66 y o  female MRN: [id number]    Unit/Bed#:  Encounter: [id number]    Assessment/Plan:    1  GERD-recent EGD was notable for medium-sized hiatal hernia and irregular Z-line along with mild gastritis  Biopsies without any evidence of Barretts esophagus, celiac disease or H pylori   -patient did have recurrent symptoms of reflux and regurgitation with decreasing the dose of pantoprazole to 40 mg on daily basis  I suspect symptoms may be aggravated due to the medium-sized hiatal hernia there for may benefit from hiatal hernia repair, patient would like consultation with surgery and for TIF procedure as well  Will place both the referrals  -meanwhile, would recommend obtaining gastric emptying study to assess for gastroparesis given regurgitation of food hours after eating  2  Change in bowel habits with diarrhea for the past several months-stool studies were negative for infectious etiology-no leukocytosis on recent labs, electrolytes were normal  Was recommend low-fat and lactose-free diet at the time of endoscopy-reports improvement in diarrhea with this  No leukocytosis  -would recommend daily probiotic   -recommend Gas-X or Beano for gas  -no need for repeat colonoscopy given diarrhea has resolved with dietary modifications  3  Colon cancer screening-history of tubular adenoma on colonoscopy in 2020, would recommend repeat colonoscopy in 2025  Subjective: This is a 68-year-old female with history of GERD, medium-sized hiatal hernia, esophagitis, AFib, on Eliquis who presents for follow-up  Patient recently underwent EGD for evaluation of Barretts given prior history of esophagitis  EGD was notable for medium-sized hiatal hernia and gastritis  Squamocolumnar junction did appear irregular, biopsies did not show any evidence of Santiago's esophagus    Patient was recommended to decrease the dose of pantoprazole to 40 mg daily from 40 mg b i d  However she reports that upon doing so she had recurrence of regurgitation and acid reflux  She also takes famotidine and at bedtime  Patient reports that when she bends down or does yoga, she has regurgitation of food and reflux  She is currently working on weight loss  Patient is interested in learning about noninvasive options for repair of hiatal hernia  At the time of the endoscopy, patient had reported that she had diarrhea for almost 1 month  I performed stool studies which were negative for C diff, ova and parasite and stool PCR  She also underwent a CBC and CMP which were essentially unremarkable, there was no evidence of leukocytosis  Patient was recommended a lactose-free and low residue diet  Patient reports that with doing so she has had normalization of her bowel habits  She does still report significant amount of gas  She has started taking a probiotic a  She denies any unintentional weight loss or hematochezia  No prior history of pancreatitis  Small bowel biopsies were negative for celiac disease  Objective:     Vitals: Blood pressure 118/78, pulse 91, height 5' 4" (1 626 m), weight 81 7 kg (180 lb 3 2 oz), SpO2 98 %  ,Body mass index is 30 93 kg/m²  [unfilled]    Physical Exam:    GEN: wn/wd, NAD  HEENT: MMM, no cervical or supraclavicular LAD, anciteric  CV: RRR, no m/r/g  CHEST: CTA b/l, no w/r/r  ABD: +BS, soft, NT/ND, no hepatosplenomegaly  EXT: no c/c/e  SKIN: no rashes  NEURO: aaox3      Invasive Devices  Report    None                         Lab, Imaging and other studies:     No visits with results within 1 Day(s) from this visit     Latest known visit with results is:   Appointment on 03/05/2022   Component Date Value    WBC 03/05/2022 4 01*    RBC 03/05/2022 4 23     Hemoglobin 03/05/2022 12 7     Hematocrit 03/05/2022 40 0     MCV 03/05/2022 95     MCH 03/05/2022 30 0     MCHC 03/05/2022 31 8     RDW 03/05/2022 12 6     MPV 03/05/2022 10 4     Platelets 84/67/3800 323     nRBC 03/05/2022 0     Neutrophils Relative 03/05/2022 53     Immat GRANS % 03/05/2022 1     Lymphocytes Relative 03/05/2022 33     Monocytes Relative 03/05/2022 11     Eosinophils Relative 03/05/2022 1     Basophils Relative 03/05/2022 1     Neutrophils Absolute 03/05/2022 2 17     Immature Grans Absolute 03/05/2022 0 02     Lymphocytes Absolute 03/05/2022 1 31     Monocytes Absolute 03/05/2022 0 43     Eosinophils Absolute 03/05/2022 0 05     Basophils Absolute 03/05/2022 0 03     Sodium 03/05/2022 137     Potassium 03/05/2022 4 4     Chloride 03/05/2022 107     CO2 03/05/2022 28     ANION GAP 03/05/2022 2*    BUN 03/05/2022 19     Creatinine 03/05/2022 0 79     Glucose, Fasting 03/05/2022 99     Calcium 03/05/2022 9 0     Corrected Calcium 03/05/2022 9 6     AST 03/05/2022 8     ALT 03/05/2022 20     Alkaline Phosphatase 03/05/2022 55     Total Protein 03/05/2022 6 8     Albumin 03/05/2022 3 3*    Total Bilirubin 03/05/2022 0 28     eGFR 03/05/2022 74     Valproic Acid, Total 03/05/2022 57          I have personally reviewed pertinent films in PACS    No current facility-administered medications for this visit  Answers for HPI/ROS submitted by the patient on 3/27/2022  Chronicity: recurrent  Onset: more than 1 month ago  Onset quality: gradual  Frequency: intermittently  Episode duration: 1 hours  Progression since onset: gradually improving  Pain location: left flank  Pain - numeric: 7/10  Pain quality: sharp  Radiates to: does not radiate  anorexia: No  arthralgias: Yes  belching: Yes  constipation: No  diarrhea: Yes  dysuria: No  fever: No  flatus: Yes  frequency: Yes  headaches: No  hematochezia: No  hematuria: No  melena: No  myalgias: Yes  nausea:  No  weight loss: No  vomiting: No  Aggravated by: certain positions  Relieved by: belching, bowel movements, palpation, passing flatus  Diagnostic workup: lower endoscopy, upper endoscopy

## 2022-04-01 NOTE — LETTER
April 4, 2022     Referral [address] [facility] [address]    Patient: Oscar Zafar   YOB: 1950   Date of Visit: 4/1/2022       Dear Dr Ann Hall:    Thank you for referring Corrine Jacinto to me for evaluation  Below are my notes for this consultation  If you have questions, please do not hesitate to call me  I look forward to following your patient along with you  Sincerely,        Graciela Tinajero MD        CC: Keyshawn Foley MD  4/1/2022 11:44 AM  Sign when Signing Visit  [facility]  Progress Note - Oscar Zafar 66 y o  female MRN: [id number]    Unit/Bed#:  Encounter: [id number]    Assessment/Plan:    1  GERD-recent EGD was notable for medium-sized hiatal hernia and irregular Z-line along with mild gastritis  Biopsies without any evidence of Barretts esophagus, celiac disease or H pylori   -patient did have recurrent symptoms of reflux and regurgitation with decreasing the dose of pantoprazole to 40 mg on daily basis  I suspect symptoms may be aggravated due to the medium-sized hiatal hernia there for may benefit from hiatal hernia repair, patient would like consultation with surgery and for TIF procedure as well  Will place both the referrals  -meanwhile, would recommend obtaining gastric emptying study to assess for gastroparesis given regurgitation of food hours after eating  2  Change in bowel habits with diarrhea for the past several months-stool studies were negative for infectious etiology-no leukocytosis on recent labs, electrolytes were normal  Was recommend low-fat and lactose-free diet at the time of endoscopy-reports improvement in diarrhea with this  No leukocytosis  -would recommend daily probiotic   -recommend Gas-X or Beano for gas  -no need for repeat colonoscopy given diarrhea has resolved with dietary modifications      3  Colon cancer screening-history of tubular adenoma on colonoscopy in 2020, would recommend repeat colonoscopy in 2025  Subjective: This is a 58-year-old female with history of GERD, medium-sized hiatal hernia, esophagitis, AFib, on Eliquis who presents for follow-up  Patient recently underwent EGD for evaluation of Barretts given prior history of esophagitis  EGD was notable for medium-sized hiatal hernia and gastritis  Squamocolumnar junction did appear irregular, biopsies did not show any evidence of Santiago's esophagus  Patient was recommended to decrease the dose of pantoprazole to 40 mg daily from 40 mg b i d  However she reports that upon doing so she had recurrence of regurgitation and acid reflux  She also takes famotidine and at bedtime  Patient reports that when she bends down or does yoga, she has regurgitation of food and reflux  She is currently working on weight loss  Patient is interested in learning about noninvasive options for repair of hiatal hernia  At the time of the endoscopy, patient had reported that she had diarrhea for almost 1 month  I performed stool studies which were negative for C diff, ova and parasite and stool PCR  She also underwent a CBC and CMP which were essentially unremarkable, there was no evidence of leukocytosis  Patient was recommended a lactose-free and low residue diet  Patient reports that with doing so she has had normalization of her bowel habits  She does still report significant amount of gas  She has started taking a probiotic a  She denies any unintentional weight loss or hematochezia  No prior history of pancreatitis  Small bowel biopsies were negative for celiac disease  Objective:     Vitals: Blood pressure 118/78, pulse 91, height 5' 4" (1 626 m), weight 81 7 kg (180 lb 3 2 oz), SpO2 98 %  ,Body mass index is 30 93 kg/m²      [unfilled]    Physical Exam:    GEN: wn/wd, NAD  HEENT: MMM, no cervical or supraclavicular LAD, anciteric  CV: RRR, no m/r/g  CHEST: CTA b/l, no w/r/r  ABD: +BS, soft, NT/ND, no hepatosplenomegaly  EXT: no c/c/e  SKIN: no rashes  NEURO: aaox3      Invasive Devices  Report    None                         Lab, Imaging and other studies:     No visits with results within 1 Day(s) from this visit  Latest known visit with results is:   Appointment on 03/05/2022   Component Date Value    WBC 03/05/2022 4 01*    RBC 03/05/2022 4 23     Hemoglobin 03/05/2022 12 7     Hematocrit 03/05/2022 40 0     MCV 03/05/2022 95     MCH 03/05/2022 30 0     MCHC 03/05/2022 31 8     RDW 03/05/2022 12 6     MPV 03/05/2022 10 4     Platelets 97/75/5718 323     nRBC 03/05/2022 0     Neutrophils Relative 03/05/2022 53     Immat GRANS % 03/05/2022 1     Lymphocytes Relative 03/05/2022 33     Monocytes Relative 03/05/2022 11     Eosinophils Relative 03/05/2022 1     Basophils Relative 03/05/2022 1     Neutrophils Absolute 03/05/2022 2 17     Immature Grans Absolute 03/05/2022 0 02     Lymphocytes Absolute 03/05/2022 1 31     Monocytes Absolute 03/05/2022 0 43     Eosinophils Absolute 03/05/2022 0 05     Basophils Absolute 03/05/2022 0 03     Sodium 03/05/2022 137     Potassium 03/05/2022 4 4     Chloride 03/05/2022 107     CO2 03/05/2022 28     ANION GAP 03/05/2022 2*    BUN 03/05/2022 19     Creatinine 03/05/2022 0 79     Glucose, Fasting 03/05/2022 99     Calcium 03/05/2022 9 0     Corrected Calcium 03/05/2022 9 6     AST 03/05/2022 8     ALT 03/05/2022 20     Alkaline Phosphatase 03/05/2022 55     Total Protein 03/05/2022 6 8     Albumin 03/05/2022 3 3*    Total Bilirubin 03/05/2022 0 28     eGFR 03/05/2022 74     Valproic Acid, Total 03/05/2022 57          I have personally reviewed pertinent films in PACS    No current facility-administered medications for this visit               Answers for HPI/ROS submitted by the patient on 3/27/2022  Chronicity: recurrent  Onset: more than 1 month ago  Onset quality: gradual  Frequency: intermittently  Episode duration: 1 hours  Progression since onset: gradually improving  Pain location: left flank  Pain - numeric: 7/10  Pain quality: sharp  Radiates to: does not radiate  anorexia: No  arthralgias: Yes  belching: Yes  constipation: No  diarrhea: Yes  dysuria: No  fever: No  flatus: Yes  frequency: Yes  headaches: No  hematochezia: No  hematuria: No  melena: No  myalgias: Yes  nausea:  No  weight loss: No  vomiting: No  Aggravated by: certain positions  Relieved by: belching, bowel movements, palpation, passing flatus  Diagnostic workup: lower endoscopy, upper endoscopy

## 2022-04-04 PROBLEM — R11.0 NAUSEA: Status: ACTIVE | Noted: 2022-04-04

## 2022-04-04 PROBLEM — K44.9 HIATAL HERNIA: Status: ACTIVE | Noted: 2022-04-04

## 2022-04-04 PROBLEM — K59.1 FUNCTIONAL DIARRHEA: Status: ACTIVE | Noted: 2022-04-04

## 2022-05-10 ENCOUNTER — HOSPITAL ENCOUNTER (OUTPATIENT)
Dept: NON INVASIVE DIAGNOSTICS | Facility: CLINIC | Age: 72
Discharge: HOME | End: 2022-05-10
Payer: MEDICARE

## 2022-05-10 DIAGNOSIS — R11.0 NAUSEA: ICD-10-CM

## 2022-05-10 PROCEDURE — G1004 CDSM NDSC: HCPCS

## 2022-05-10 PROCEDURE — A9541 TC99M SULFUR COLLOID: HCPCS

## 2022-05-10 PROCEDURE — 78264 GASTRIC EMPTYING IMG STUDY: CPT

## 2022-05-23 ENCOUNTER — RA CDI HCC (OUTPATIENT)
Dept: OTHER | Facility: HOSPITAL | Age: 72
End: 2022-05-23

## 2022-05-23 NOTE — PROGRESS NOTES
F10 20  [facility]  coding opportunities          Chart Reviewed number of suggestions sent to Provider: 1     Patients Insurance     Medicare Insurance: [employer]

## 2022-06-03 ENCOUNTER — CONSULT (OUTPATIENT)
Dept: BARIATRICS | Facility: CLINIC | Age: 72
End: 2022-06-03
Payer: MEDICARE

## 2022-06-03 VITALS
TEMPERATURE: 97.7 F | SYSTOLIC BLOOD PRESSURE: 130 MMHG | WEIGHT: 188 LBS | DIASTOLIC BLOOD PRESSURE: 84 MMHG | HEART RATE: 63 BPM | BODY MASS INDEX: 32.1 KG/M2 | HEIGHT: 64 IN

## 2022-06-03 DIAGNOSIS — K44.9 HIATAL HERNIA: ICD-10-CM

## 2022-06-03 DIAGNOSIS — I48.91 ATRIAL FIBRILLATION, UNSPECIFIED TYPE (HCC): ICD-10-CM

## 2022-06-03 PROCEDURE — 99203 OFFICE O/P NEW LOW 30 MIN: CPT | Performed by: SURGERY

## 2022-06-03 NOTE — PROGRESS NOTES
Consultation - Bariatric Surgery   Cynthia Donohue 66 y o  female MRN: [id number]  Unit/Bed#:  Encounter: [id number]    Assessment/Plan     Assessment:  66 y o  female Body mass index is 32 27 kg/m²  history  With symptoms of belching and history of esophagitis, AFib on Eliquis, found to have a medium sized hiatal hernia  She has a normal gastric emptying study  Plan:  UGI  Manometry/ 24h pH study  Will evaluate for hiatal hernia repair with LINX versus fundoplication  Patient interested in weight loss  After surgical repair of hiatal hernia, will consider referral for medical weight management  History of Present Illness     HPI:  Cynthia Donohue is a 66 y o  female Body mass index is 32 27 kg/m²  history of GERD, medium-sized hiatal hernia, esophagitis, AFib, on Eliquis who presents for surgical evaluation  For years, she has noted belching, and regurgitation of solid foods, particularly with bending  Patient had an endoscopy in 2020 which revealed Grade B to Grade C esophagitis, now resolved on most recent endoscopy in 2/2022  During this period she was started on pantoprazole which she takes BID  She was started on famotidine qhs, recently as well  She continues to have breakthrough regurgitation in certain positions after eating, however, her symptoms are fairly well controlled  When she was attempted to titrate off these medications, her symptoms return  Patient has started avoiding spicy foods, and avoids reclining for 3 hours after eating  PMHx- Afib on eloquis, HTN, HLD  PSHx- Open appendectomy, open hysterectomy, panniculectomy  Social- denies smoking, social ETOH, no recreational drug usage    Review of Systems   Constitutional: Negative  HENT: Negative  Eyes: Negative  Respiratory: Negative  Cardiovascular: Negative  Gastrointestinal: Negative  Endocrine: Negative  Genitourinary: Negative  Musculoskeletal: Negative  Skin: Negative      Allergic/Immunologic: Negative  Neurological: Negative  Hematological: Negative  Psychiatric/Behavioral: Negative  All other systems reviewed and are negative        Historical Information   Past Medical History:   Diagnosis Date    Anxiety     Arthralgia     Atrial fibrillation (HCC)     Chronic interstitial cystitis     Colon polyp     Depression     Hyperlipidemia     Hypertension     Lichen sclerosus     Lipoma     Osteopenia     Paresthesia of both hands 8/6/2019    Simple partial seizures ([address] )     last assessed 3/30/17    Tachycardia     last assessed 6/24/15     Past Surgical History:   Procedure Laterality Date    COLONOSCOPY  2020    Dr Price Urban, 5 year f/u     COSMETIC SURGERY      HYSTERECTOMY      OOPHORECTOMY      REDUCTION MAMMAPLASTY      SHOULDER SURGERY       Social History   Social History     Substance and Sexual Activity   Alcohol Use Yes    Alcohol/week: 10 0 standard drinks    Types: 10 Glasses of wine per week    Comment: socially 1-2 drinks/week     Social History     Substance and Sexual Activity   Drug Use No     Social History     Tobacco Use   Smoking Status Never Smoker   Smokeless Tobacco Never Used     Family History:   Family History   Problem Relation Age of Onset    Stroke Mother         CVA    Hypertension Mother     Hypertension Father     Lung cancer Father     Lung cancer Brother         AGE UNKNOWN    No Known Problems Sister     No Known Problems Maternal Grandmother     No Known Problems Maternal Grandfather     No Known Problems Paternal Grandmother     No Known Problems Paternal Grandfather     No Known Problems Maternal Aunt     No Known Problems Maternal Aunt     No Known Problems Maternal Aunt     No Known Problems Paternal Aunt     No Known Problems Paternal Aunt     No Known Problems Paternal Aunt        Meds/Allergies   all current active meds have been reviewed  Allergies   Allergen Reactions    Codeine Hives    Erythromycin Base GI Intolerance    Hydromorphone Itching    Morphine GI Intolerance    Oxycodone-Acetaminophen Hives    Penicillins Rash       Objective   First Vitals:   @VSFIRST2(5,8,6,7,9,11,14,10:FIRST)@    Current Vitals:   Blood Pressure: 130/84 (06/03/22 1011)  Pulse: 63 (06/03/22 1011)  Temperature: 97 7 °F (36 5 °C) (06/03/22 1011)  Temp Source: Tympanic (06/03/22 1011)  Height: 5' 4" (162 6 cm) (06/03/22 1011)  Weight - Scale: 85 3 kg (188 lb) (06/03/22 1011)    [unfilled]    Invasive Devices  Report    None                 Physical Exam  Vitals and nursing note reviewed  Constitutional:       Appearance: Normal appearance  HENT:      Head: Normocephalic and atraumatic  Nose: Nose normal       Mouth/Throat:      Mouth: Mucous membranes are moist       Pharynx: Oropharynx is clear  Eyes:      Extraocular Movements: Extraocular movements intact  Pupils: Pupils are equal, round, and reactive to light  Cardiovascular:      Rate and Rhythm: Normal rate and regular rhythm  Pulses: Normal pulses  Pulmonary:      Effort: Pulmonary effort is normal    Abdominal:      General: Abdomen is flat  Bowel sounds are normal       Palpations: Abdomen is soft  Musculoskeletal:         General: Normal range of motion  Cervical back: Normal range of motion and neck supple  Skin:     General: Skin is warm and dry  Neurological:      General: No focal deficit present  Mental Status: She is alert and oriented to person, place, and time  Mental status is at baseline  Psychiatric:         Mood and Affect: Mood normal          Behavior: Behavior normal          Thought Content: Thought content normal          Judgment: Judgment normal          Lab Results: I have personally reviewed pertinent lab results  Imaging: I have personally reviewed pertinent reports  EKG, Pathology, and Other Studies: I have personally reviewed pertinent reports       DATE OF SERVICE:  2/14/22     PHYSICIAN(S):  Alvino BUNDY Asher Mcelroy MD Proceduralist         INDICATION:  Dyspepsia, Esophagitis, Santiago's esophagus without dysplasia     POST-OP DIAGNOSIS:  See the impression below      PREPROCEDURE:  Informed consent was obtained for the procedure, including sedation  Risks of perforation, hemorrhage, adverse drug reaction and aspiration were discussed  The patient was placed in the left lateral decubitus position      Patient was explained about the risks and benefits of the procedure  Risks including but not limited to bleeding, infection, and perforation were explained in detail  Also explained about less than 100% sensitivity with the exam and other alternatives      DETAILS OF PROCEDURE:  Patient was taken to the procedure room where a time out was performed to confirm correct patient and correct procedure  The patient underwent monitored anesthesia care, which was administered by an anesthesia professional  The patient's blood pressure, heart rate, level of consciousness, respirations and oxygen were monitored throughout the procedure  The scope was advanced to the second part of the duodenum  Retroflexion was performed in the fundus  The patient experienced no blood loss  The procedure was not difficult  The patient tolerated the procedure well  There were no apparent complications       ANESTHESIA INFORMATION:  ASA: III  Anesthesia Type: IV Sedation with Anesthesia     MEDICATIONS:  No administrations occurring from 1219 to 1229 on 02/14/22         FINDINGS:  · Irregular Z-line 35 cm from the incisors  · Medium sliding hiatal hernia (type I hiatal hernia) without Michaelyn Mooring lesions present - GE junction 35 cm from the incisors, diaphragmatic impression 40 cm from the incisors  · Mild, localized erythematous mucosa in the prepyloric region; performed cold forceps biopsy to rule out H  pylori  Retroflexed view was notable for sliding hiatal hernia  Pylorus was patent    · The duodenal bulb, 1st part of the duodenum and 2nd part of the duodenum appeared normal  Performed random biopsy         SPECIMENS:  ID Type Source Tests Collected by Time Destination   1 : duodenal bx Tissue Small Bowel, NOS TISSUE EXAM Stef Mckinney MD 2/14/2022 12:29 PM              IMPRESSION:  1  Slightly irregular Z-line  2  Medium-sized hiatal hernia  3  Mild gastritis      RECOMMENDATION:  Await pathology results  Follow-up biopsy results in 2 weeks  Avoid NSAIDs  Advised to avoid fatty foods, chocolates, caffeine, alcohol and any other triggering foods  Avoid eating for at least 3 hours before going to bed  Recommend to continue pantoprazole 40 mg twice daily  Recommend Pepcid at bedtime  Patient reported loose stools for the past 1 month intermittently, would recommend stool studies to rule out infection  Would also recommend low residue, lactose-free diet  Follow-up in office in 6-8 weeks  GASTRIC EMPTYING STUDY     INDICATION: R11 0: Nausea     COMPARISON:  None available     TECHNIQUE:   The study was performed following the oral administration of 1 1 mCi Tc-99m sulfur colloid combined with scrambled eggs, as part of a standard meal   Following the meal, one minute anterior and posterior images were obtained immediately and   at 0 25 hours, 0 5 hour, 1 hour, 1 5 hour, 2 hour, 3 hour and 4 hour intervals from the time of ingestion  Geometric mean analyses were then performed        FINDINGS:     Gastric emptying at 0 5 hours = 11% (N < 30%)   Gastric emptying at 1 hour = 35 % (N = 10 - 70%)  Gastric emptying at 2 hours = 61 % (N = > 40%)  Gastric emptying at 3 hours = 85 % (N = > 70%)  Gastric emptying at 4 hours = 94 % (N = >90%)     Linear T-1/2 = 115 minutes     Note is made of reflux into the distal esophagus at approximately 90 minutes         IMPRESSION:     1  Normal rate of gastric emptying  2   Reflux of tracer activity into the distal esophagus at 90 minutes

## 2022-06-07 ENCOUNTER — TELEPHONE (OUTPATIENT)
Dept: GASTROENTEROLOGY | Facility: HOSPITAL | Age: 72
End: 2022-06-07

## 2022-06-13 ENCOUNTER — TELEPHONE (OUTPATIENT)
Dept: GASTROENTEROLOGY | Facility: HOSPITAL | Age: 72
End: 2022-06-13

## 2022-06-14 ENCOUNTER — HOSPITAL ENCOUNTER (OUTPATIENT)
Dept: RADIOLOGY | Facility: HOSPITAL | Age: 72
Discharge: HOME | End: 2022-06-14
Payer: MEDICARE

## 2022-06-14 DIAGNOSIS — K44.9 HIATAL HERNIA: ICD-10-CM

## 2022-06-14 PROCEDURE — 74240 X-RAY XM UPR GI TRC 1CNTRST: CPT

## 2022-06-22 ENCOUNTER — TELEPHONE (OUTPATIENT)
Dept: BARIATRICS | Facility: CLINIC | Age: 72
End: 2022-06-22

## 2022-06-22 ENCOUNTER — HOSPITAL ENCOUNTER (OUTPATIENT)
Dept: GASTROENTEROLOGY | Facility: HOSPITAL | Age: 72
Discharge: HOME | End: 2022-06-22
Payer: MEDICARE

## 2022-06-22 VITALS
DIASTOLIC BLOOD PRESSURE: 77 MMHG | HEART RATE: 62 BPM | SYSTOLIC BLOOD PRESSURE: 193 MMHG | TEMPERATURE: 98 F | RESPIRATION RATE: 16 BRPM | OXYGEN SATURATION: 95 %

## 2022-06-22 DIAGNOSIS — K44.9 HIATAL HERNIA: ICD-10-CM

## 2022-06-22 PROCEDURE — 91020 GASTRIC MOTILITY STUDIES: CPT

## 2022-06-22 NOTE — PERIOPERATIVE NURSING NOTE
Patient brought in the room and educated on procedure  Orders written to do the esophageal manometry with no instructions to stop the PPI for 7 days prior  Patient informs me she was instructed to stop the medication per Dr Jenni Rasmussen office  Patient did not stop it because she forgot to stop the PPI 7 days prior  I called Dr Dl thapa's office to clarify if I can do the procedure today  I  spoke with a nurse who related the message to Dr Tomasa Feliciano   Per Md ok to do the esophageal manometry today with PPI on board  Lidocaine 2% topical solution inserted via nostrils  Manometry catheter inserted via right  nostril and positioned and secured  10 liquid swallow, 10 viscous swallow,  1 rapid swallow  and 1 rapid drink challenge with 200 cc water performed  Patient tolerated procedure  Catheter removed intact  Discharge instructions given to patient and patient left the room in stable condition

## 2022-06-28 PROCEDURE — 91010 ESOPHAGUS MOTILITY STUDY: CPT | Performed by: INTERNAL MEDICINE

## 2022-07-17 DIAGNOSIS — K21.9 GASTROESOPHAGEAL REFLUX DISEASE WITHOUT ESOPHAGITIS: ICD-10-CM

## 2022-07-17 RX ORDER — FAMOTIDINE 20 MG/1
TABLET, FILM COATED ORAL
Qty: 180 TABLET | Refills: 3 | Status: SHIPPED | OUTPATIENT
Start: 2022-07-17

## 2022-07-18 ENCOUNTER — OFFICE VISIT (OUTPATIENT)
Dept: INTERNAL MEDICINE CLINIC | Facility: CLINIC | Age: 72
End: 2022-07-18
Payer: MEDICARE

## 2022-07-18 VITALS
SYSTOLIC BLOOD PRESSURE: 138 MMHG | BODY MASS INDEX: 32.1 KG/M2 | WEIGHT: 188 LBS | DIASTOLIC BLOOD PRESSURE: 70 MMHG | TEMPERATURE: 98.4 F | OXYGEN SATURATION: 98 % | HEART RATE: 69 BPM | HEIGHT: 64 IN

## 2022-07-18 DIAGNOSIS — E78.2 MIXED HYPERLIPIDEMIA: ICD-10-CM

## 2022-07-18 DIAGNOSIS — G40.909 SEIZURE DISORDER (HCC): ICD-10-CM

## 2022-07-18 DIAGNOSIS — K21.9 GASTROESOPHAGEAL REFLUX DISEASE, UNSPECIFIED WHETHER ESOPHAGITIS PRESENT: ICD-10-CM

## 2022-07-18 DIAGNOSIS — K44.9 HIATAL HERNIA: ICD-10-CM

## 2022-07-18 DIAGNOSIS — G43.709 CHRONIC MIGRAINE WITHOUT AURA WITHOUT STATUS MIGRAINOSUS, NOT INTRACTABLE: ICD-10-CM

## 2022-07-18 DIAGNOSIS — I10 PRIMARY HYPERTENSION: Primary | ICD-10-CM

## 2022-07-18 PROCEDURE — 99214 OFFICE O/P EST MOD 30 MIN: CPT | Performed by: INTERNAL MEDICINE

## 2022-07-18 NOTE — ASSESSMENT & PLAN NOTE
-with underlying moderate hiatal hernia  -improved control with pantoprazole and pepcid qhs  -follow up with GI

## 2022-07-18 NOTE — ASSESSMENT & PLAN NOTE
-benign  -continue amlodipine 5mg daily and lisinopril 20mg daily and pindolol 5mg daily  -start monitoring home pb with log for review

## 2022-07-18 NOTE — PROGRESS NOTES
Assessment/Plan:    Problem List Items Addressed This Visit        Digestive    Gastroesophageal reflux disease     -with underlying moderate hiatal hernia  -improved control with pantoprazole and pepcid qhs  -follow up with GI            Cardiovascular and Mediastinum    Hypertension - Primary     -benign  -continue amlodipine 5mg daily and lisinopril 20mg daily and pindolol 5mg daily  -start monitoring home pb with log for review         Chronic migraine without aura without status migrainosus, not intractable     -controlled on depakote  -follow up with Neuro Dr Curt Dwyer and Auditory    Seizure disorder ([facility] [address] )     -on depakote  -she has been asymptomatic  -follow up with Neuro Dr Flaco Elena            Other    Hyperlipidemia    Relevant Orders    Lipid panel    Hiatal hernia     -moderate in severity  -she is being evaluated for possible hernia repair               Subjective:      Patient ID: Berta Ayala is a 74 y o  female  HPI  66yo female with esophagitis, seizure d/o, BL CTS, MDD, HLD, JORDANA, cervical spondylosis with foraminal stenosis, PAF, insomnia, CIC, migraines, PCVCs here for follow up care  She is considering hiatal hernia surgery, gets a lot of reflux  She was switched from nexium to pantoprazole and pepcid at bedtime  Generally HA are under control with depakote  Exacerbated by positions when she works in the garden  He has not been monitoring her home bp      The following portions of the patient's history were reviewed and updated as appropriate: allergies, current medications, past family history, past medical history, past social history, past surgical history and problem list       Current Outpatient Medications:     amLODIPine (NORVASC) 5 mg tablet, TAKE 1 TABLET BY MOUTH  DAILY, Disp: 90 tablet, Rfl: 3    atorvastatin (LIPITOR) 40 mg tablet, Take 1 tablet (40 mg total) by mouth daily, Disp: 90 tablet, Rfl: 0    busPIRone (BUSPAR) 15 mg tablet, Take 15 mg by mouth 2 (two) times a day, Disp: , Rfl:     calcium citrate-Vitamin D 200 mg-250 units, Take by mouth, Disp: , Rfl:     divalproex sodium (DEPAKOTE ER) 500 mg 24 hr tablet, Take 1 tablet by mouth 2 (two) times a day, Disp: , Rfl:     Eliquis 5 MG, TAKE 1 TABLET BY MOUTH  TWICE DAILY, Disp: 180 tablet, Rfl: 3    escitalopram (LEXAPRO) 20 mg tablet, Take 1 tablet by mouth daily, Disp: , Rfl:     Estradiol (Elestrin) 0 52 MG/0 87 GM (0 06%) GEL, Place 0 87 g on the skin daily, Disp: 2 Bottle, Rfl: 6    famotidine (PEPCID) 20 mg tablet, TAKE 1 TABLET BY MOUTH  TWICE DAILY, Disp: 180 tablet, Rfl: 3    fexofenadine (ALLEGRA) 180 MG tablet, Take 1 tablet by mouth daily as needed, Disp: , Rfl:     ipratropium (ATROVENT) 0 06 % nasal spray, into each nostril Daily, Disp: , Rfl:     lisinopril (ZESTRIL) 20 mg tablet, Take 1 tablet (20 mg total) by mouth daily, Disp: 90 tablet, Rfl: 3    MELATONIN PO, Take 10 mg by mouth daily at bedtime, Disp: , Rfl:     pantoprazole (PROTONIX) 40 mg tablet, Take 1 tablet (40 mg total) by mouth 2 (two) times a day before meals, Disp: 180 tablet, Rfl: 1    pindolol (VISKEN) 5 mg tablet, Take 1 tablet (5 mg total) by mouth daily, Disp: 90 tablet, Rfl: 3    saccharomyces boulardii (FLORASTOR) 250 mg capsule, Take 250 mg by mouth 2 (two) times a day, Disp: , Rfl:     valACYclovir (VALTREX) 1,000 mg tablet, valacyclovir 1 gram tablet  TAKE 2 TABS BY MOUTH THE MORNING OF PROCEDURE AND 2 TABS 12 HOURS LATER , Disp: , Rfl:     divalproex sodium (DEPAKOTE) 500 mg EC tablet, , Disp: , Rfl:     Review of Systems   Constitutional: Negative for activity change, appetite change and unexpected weight change  Respiratory: Negative for cough, chest tightness, shortness of breath and wheezing  Cardiovascular: Negative for chest pain, palpitations and leg swelling  Gastrointestinal: Negative for diarrhea  Heartburn   Musculoskeletal: Positive for neck stiffness  Neurological: Positive for dizziness (infrequent, occurs after working out) and headaches  Negative for seizures  Objective:    /70 (BP Location: Left arm, Patient Position: Sitting)   Pulse 69   Temp 98 4 °F (36 9 °C) (Tympanic)   Ht 5' 4" (1 626 m)   Wt 85 3 kg (188 lb)   SpO2 98%   BMI 32 27 kg/m²      Physical Exam  Vitals reviewed  Constitutional:       General: She is not in acute distress  Appearance: She is not diaphoretic  Cardiovascular:      Rate and Rhythm: Normal rate and regular rhythm  Pulses: Normal pulses  Heart sounds: Normal heart sounds  Pulmonary:      Effort: Pulmonary effort is normal  No respiratory distress  Breath sounds: Normal breath sounds  No wheezing  Musculoskeletal:      Right lower leg: No edema  Left lower leg: No edema  Neurological:      Mental Status: She is alert             Recent Results (from the past 3360 hour(s))   Stool Enteric Bacterial Panel by PCR    Collection Time: 03/02/22  4:13 PM    Specimen: Stool   Result Value Ref Range    Salmonella sp PCR None Detected None Detected    Shigella sp/Enteroinvasive E  coli (EIEC) PCR None Detected None Detected    Campylobacter sp (jejuni and coli) PCR None Detected None Detected    Shiga toxin 1/Shiga toxin 2 genes PCR None Detected None Detected   CBC and differential    Collection Time: 03/05/22 11:10 AM   Result Value Ref Range    WBC 4 01 (L) 4 31 - 10 16 Thousand/uL    RBC 4 23 3 81 - 5 12 Million/uL    Hemoglobin 12 7 11 5 - 15 4 g/dL    Hematocrit 40 0 34 8 - 46 1 %    MCV 95 82 - 98 fL    MCH 30 0 26 8 - 34 3 pg    MCHC 31 8 31 4 - 37 4 g/dL    RDW 12 6 11 6 - 15 1 %    MPV 10 4 8 9 - 12 7 fL    Platelets 285 312 - 200 Thousands/uL    nRBC 0 /100 WBCs    Neutrophils Relative 53 43 - 75 %    Immat GRANS % 1 0 - 2 %    Lymphocytes Relative 33 14 - 44 %    Monocytes Relative 11 4 - 12 %    Eosinophils Relative 1 0 - 6 %    Basophils Relative 1 0 - 1 %    Neutrophils Absolute 2 17 1 85 - 7 62 Thousands/µL    Immature Grans Absolute 0 02 0 00 - 0 20 Thousand/uL    Lymphocytes Absolute 1 31 0 60 - 4 47 Thousands/µL    Monocytes Absolute 0 43 0 17 - 1 22 Thousand/µL    Eosinophils Absolute 0 05 0 00 - 0 61 Thousand/µL    Basophils Absolute 0 03 0 00 - 0 10 Thousands/µL   Comprehensive metabolic panel    Collection Time: 03/05/22 11:10 AM   Result Value Ref Range    Sodium 137 136 - 145 mmol/L    Potassium 4 4 3 5 - 5 3 mmol/L    Chloride 107 100 - 108 mmol/L    CO2 28 21 - 32 mmol/L    ANION GAP 2 (L) 4 - 13 mmol/L    BUN 19 5 - 25 mg/dL    Creatinine 0 79 0 60 - 1 30 mg/dL    Glucose, Fasting 99 65 - 99 mg/dL    Calcium 9 0 8 3 - 10 1 mg/dL    Corrected Calcium 9 6 8 3 - 10 1 mg/dL    AST 8 5 - 45 U/L    ALT 20 12 - 78 U/L    Alkaline Phosphatase 55 46 - 116 U/L    Total Protein 6 8 6 4 - 8 2 g/dL    Albumin 3 3 (L) 3 5 - 5 0 g/dL    Total Bilirubin 0 28 0 20 - 1 00 mg/dL    eGFR 74 ml/min/1 73sq m   Valproic acid level, total    Collection Time: 03/05/22 11:10 AM   Result Value Ref Range    Valproic Acid, Total 57 50 - 100 ug/mL   Clostridium difficile toxin by PCR with EIA    Collection Time: 03/16/22  5:02 PM    Specimen: Stool   Result Value Ref Range    C difficile toxin by PCR Negative Negative

## 2022-07-19 ENCOUNTER — APPOINTMENT (OUTPATIENT)
Dept: LAB | Facility: CLINIC | Age: 72
End: 2022-07-19
Payer: MEDICARE

## 2022-07-19 DIAGNOSIS — E78.2 MIXED HYPERLIPIDEMIA: ICD-10-CM

## 2022-07-19 DIAGNOSIS — Z79.899 ENCOUNTER FOR LONG-TERM (CURRENT) USE OF OTHER MEDICATIONS: ICD-10-CM

## 2022-07-19 LAB
ALBUMIN SERPL BCP-MCNC: 3.3 G/DL (ref 3.5–5)
ALP SERPL-CCNC: 51 U/L (ref 46–116)
ALT SERPL W P-5'-P-CCNC: 29 U/L (ref 12–78)
ANION GAP SERPL CALCULATED.3IONS-SCNC: 8 MMOL/L (ref 4–13)
AST SERPL W P-5'-P-CCNC: 13 U/L (ref 5–45)
BILIRUB SERPL-MCNC: 0.35 MG/DL (ref 0.2–1)
BUN SERPL-MCNC: 22 MG/DL (ref 5–25)
CALCIUM ALBUM COR SERPL-MCNC: 9.7 MG/DL (ref 8.3–10.1)
CALCIUM SERPL-MCNC: 9.1 MG/DL (ref 8.3–10.1)
CHLORIDE SERPL-SCNC: 106 MMOL/L (ref 96–108)
CHOLEST SERPL-MCNC: 143 MG/DL
CO2 SERPL-SCNC: 26 MMOL/L (ref 21–32)
CREAT SERPL-MCNC: 0.83 MG/DL (ref 0.6–1.3)
GFR SERPL CREATININE-BSD FRML MDRD: 69 ML/MIN/1.73SQ M
GLUCOSE P FAST SERPL-MCNC: 104 MG/DL (ref 65–99)
HDLC SERPL-MCNC: 46 MG/DL
LDLC SERPL CALC-MCNC: 66 MG/DL (ref 0–100)
NONHDLC SERPL-MCNC: 97 MG/DL
POTASSIUM SERPL-SCNC: 4.7 MMOL/L (ref 3.5–5.3)
PROT SERPL-MCNC: 6.7 G/DL (ref 6.4–8.4)
SODIUM SERPL-SCNC: 140 MMOL/L (ref 135–147)
TRIGL SERPL-MCNC: 155 MG/DL
VALPROATE SERPL-MCNC: 54 UG/ML (ref 50–100)

## 2022-07-19 PROCEDURE — 80053 COMPREHEN METABOLIC PANEL: CPT

## 2022-07-19 PROCEDURE — 80061 LIPID PANEL: CPT

## 2022-07-19 PROCEDURE — 80164 ASSAY DIPROPYLACETIC ACD TOT: CPT

## 2022-07-21 ENCOUNTER — OFFICE VISIT (OUTPATIENT)
Dept: BARIATRICS | Facility: CLINIC | Age: 72
End: 2022-07-21
Payer: MEDICARE

## 2022-07-21 VITALS
WEIGHT: 189 LBS | HEIGHT: 64 IN | BODY MASS INDEX: 32.27 KG/M2 | TEMPERATURE: 96.1 F | OXYGEN SATURATION: 95 % | SYSTOLIC BLOOD PRESSURE: 142 MMHG | DIASTOLIC BLOOD PRESSURE: 70 MMHG | HEART RATE: 61 BPM

## 2022-07-21 DIAGNOSIS — K21.9 GASTROESOPHAGEAL REFLUX DISEASE WITHOUT ESOPHAGITIS: Primary | ICD-10-CM

## 2022-07-21 DIAGNOSIS — K44.9 HIATAL HERNIA: ICD-10-CM

## 2022-07-21 DIAGNOSIS — K21.9 GASTROESOPHAGEAL REFLUX DISEASE, UNSPECIFIED WHETHER ESOPHAGITIS PRESENT: Primary | ICD-10-CM

## 2022-07-21 DIAGNOSIS — K20.90 ESOPHAGITIS: ICD-10-CM

## 2022-07-21 PROCEDURE — 99213 OFFICE O/P EST LOW 20 MIN: CPT | Performed by: SURGERY

## 2022-07-21 NOTE — PROGRESS NOTES
Subjective:      74 y o  female with history ofGERD, medium-sized hiatal hernia, esophagitis, AFib, on Eliquis who presents for surgical evaluation  For years, she has noted belching, and regurgitation of solid foods, particularly with bending   Patient had an endoscopy in 2020 which revealed Grade B to Grade C esophagitis, now resolved on most recent endoscopy in 2/2022  During this period she was started on pantoprazole which she takes BID  She was started on famotidine qhs, recently as well  She continues to have breakthrough regurgitation in certain positions after eating, however, her symptoms are fairly well controlled   When she was attempted to titrate off these medications, her symptoms return       Patient has started avoiding spicy foods, and avoids reclining for 3 hours after eating      PMHx- Afib on eloquis, HTN, HLD  PSHx- Open appendectomy, open hysterectomy, panniculectomy  Social- denies smoking, social ETOH, no recreational drug usage     Historical Information   Past Medical History:   Diagnosis Date    Anxiety     Arthralgia     Atrial fibrillation (HCC)     Chronic interstitial cystitis     Colon polyp     Depression     Hyperlipidemia     Hypertension     Lichen sclerosus     Lipoma     Osteopenia     Paresthesia of both hands 8/6/2019    Simple partial seizures ([address] )     last assessed 3/30/17    Tachycardia     last assessed 6/24/15     Past Surgical History:   Procedure Laterality Date    COLONOSCOPY  2020    Dr Osbaldo Manning, 5 year f/u     COSMETIC SURGERY      HYSTERECTOMY      OOPHORECTOMY      REDUCTION MAMMAPLASTY      SHOULDER SURGERY       Social History   Social History     Substance and Sexual Activity   Alcohol Use Yes    Alcohol/week: 10 0 standard drinks    Types: 10 Glasses of wine per week    Comment: socially 1-2 drinks/week     Social History     Substance and Sexual Activity   Drug Use No     Social History     Tobacco Use   Smoking Status Never Smoker Smokeless Tobacco Never Used     Family History:   Family History   Problem Relation Age of Onset    Stroke Mother         CVA    Hypertension Mother     Hypertension Father     Lung cancer Father     Lung cancer Brother         AGE UNKNOWN    No Known Problems Sister     No Known Problems Maternal Grandmother     No Known Problems Maternal Grandfather     No Known Problems Paternal Grandmother     No Known Problems Paternal Grandfather     No Known Problems Maternal Aunt     No Known Problems Maternal Aunt     No Known Problems Maternal Aunt     No Known Problems Paternal Aunt     No Known Problems Paternal Aunt     No Known Problems Paternal Aunt        Meds/Allergies   PTA meds:   Cannot display prior to admission medications because the patient has not been admitted in this contact  Allergies   Allergen Reactions    Codeine Hives    Erythromycin Base GI Intolerance    Hydromorphone Itching    Morphine GI Intolerance    Oxycodone-Acetaminophen Hives    Penicillins Rash       Objective   Review of Systems   Constitutional: Negative  HENT: Negative  Eyes: Negative  Respiratory: Negative  Cardiovascular: Negative  Gastrointestinal: Negative  Endocrine: Negative  Genitourinary: Negative  Musculoskeletal: Negative  Skin: Negative  Allergic/Immunologic: Negative  Neurological: Negative  Hematological: Negative  Psychiatric/Behavioral: Negative  All other systems reviewed and are negative  Objective:    /70 (BP Location: Left arm, Patient Position: Sitting, Cuff Size: Large)   Pulse 61   Temp (!) 96 1 °F (35 6 °C) (Tympanic)   Ht 5' 4" (1 626 m)   Wt 85 7 kg (189 lb)   SpO2 95%   BMI 32 44 kg/m²       Physical Exam  Vitals and nursing note reviewed  Constitutional:       Appearance: Normal appearance  HENT:      Head: Normocephalic and atraumatic        Nose: Nose normal       Mouth/Throat:      Mouth: Mucous membranes are moist  Pharynx: Oropharynx is clear  Eyes:      Extraocular Movements: Extraocular movements intact  Pupils: Pupils are equal, round, and reactive to light  Cardiovascular:      Rate and Rhythm: Normal rate and regular rhythm  Pulses: Normal pulses  Pulmonary:      Effort: Pulmonary effort is normal    Abdominal:      General: Abdomen is flat  Bowel sounds are normal       Palpations: Abdomen is soft  Comments: Incisions are C/D/I  Healing well, without erythema or drainage  No bulges noted  Musculoskeletal:         General: Normal range of motion  Cervical back: Normal range of motion and neck supple  Skin:     General: Skin is warm and dry  Neurological:      General: No focal deficit present  Mental Status: She is alert and oriented to person, place, and time  Mental status is at baseline  Psychiatric:         Mood and Affect: Mood normal          Behavior: Behavior normal          Thought Content: Thought content normal          Judgment: Judgment normal               Esophageal manometry  Esophageal motility- 10/10 swallows demonstrate normal esophageal contractibility pattern with mean DCI of 1441 mmHg  s cm  LES- median IRP is a normal limits  Impedance- 100% complete clearance of liquid bolus swallows     Gastroesophageal junction relative LES measures 3 6 cm     Rapid swallow index is less than 1     Andreas classification- normal esophageal motility with moderate hiatal hernia  UPPER GI SERIES  DOUBLE CONTRAST     INDICATION:  K44 9: Diaphragmatic hernia without obstruction or gangrene      COMPARISON:  None     IMAGES:  94     FLUOROSCOPY TIME:   1 3 minutes      TECHNIQUE:  The patient was given effervescent granules and barium  by mouth and images of the esophagus, stomach, and small bowel were obtained       FINDINGS:     The esophagus is normal in caliber  Esophageal motility is normal and emptying of contrast from the esophagus is prompt   There is no discrete mucosal mass, ulceration or fold thickening identified      The stomach is unremarkable in size  The gastric mucosa is normal   No discrete penetrating ulcers or masses      Contrast empties promptly into the duodenum  The duodenum is normal in caliber  The ligament of Treitz/duodenojejunal junction lies in a normal position      Trace gastroesophageal reflux was noted during the exam      There is a small to moderately sized hiatal hernia      IMPRESSION:  1  Small to moderately sized hiatal hernia  2  Trace gastroesophageal reflux was noted during the exam     DATE OF SERVICE:  2/14/22     PHYSICIAN(S):  Elias Suh MD Proceduralist         INDICATION:  Dyspepsia, Esophagitis, Santiago's esophagus without dysplasia     POST-OP DIAGNOSIS:  See the impression below      PREPROCEDURE:  Informed consent was obtained for the procedure, including sedation   Risks of perforation, hemorrhage, adverse drug reaction and aspiration were discussed  The patient was placed in the left lateral decubitus position      Patient was explained about the risks and benefits of the procedure  Risks including but not limited to bleeding, infection, and perforation were explained in detail  Also explained about less than 100% sensitivity with the exam and other alternatives      DETAILS OF PROCEDURE:  Patient was taken to the procedure room where a time out was performed to confirm correct patient and correct procedure  The patient underwent monitored anesthesia care, which was administered by an anesthesia professional  The patient's blood pressure, heart rate, level of consciousness, respirations and oxygen were monitored throughout the procedure  The scope was advanced to the second part of the duodenum  Retroflexion was performed in the fundus  The patient experienced no blood loss  The procedure was not difficult  The patient tolerated the procedure well   There were no apparent complications       ANESTHESIA INFORMATION:  ASA: III  Anesthesia Type: IV Sedation with Anesthesia     MEDICATIONS:  No administrations occurring from 1219 to 1229 on 02/14/22         FINDINGS:  · Irregular Z-line 35 cm from the incisors  · Medium sliding hiatal hernia (type I hiatal hernia) without [employer] lesions present - GE junction 35 cm from the incisors, diaphragmatic impression 40 cm from the incisors  · Mild, localized erythematous mucosa in the prepyloric region; performed cold forceps biopsy to rule out H  pylori  Retroflexed view was notable for sliding hiatal hernia   Pylorus was patent  · The duodenal bulb, 1st part of the duodenum and 2nd part of the duodenum appeared normal  Performed random biopsy         SPECIMENS:  ID Type Source Tests Collected by Time Destination   1 : duodenal bx Tissue Small Bowel, NOS TISSUE EXAM Hermelinda Seaman MD 2/14/2022 12:29 PM              IMPRESSION:  1  Slightly irregular Z-line  2  Medium-sized hiatal hernia  3  Mild gastritis      RECOMMENDATION:  Await pathology results  Follow-up biopsy results in 2 weeks  Avoid NSAIDs  Advised to avoid fatty foods, chocolates, caffeine, alcohol and any other triggering foods   Avoid eating for at least 3 hours before going to bed  Recommend to continue pantoprazole 40 mg twice daily  Recommend Pepcid at bedtime  Patient reported loose stools for the past 1 month intermittently, would recommend stool studies to rule out infection   Would also recommend low residue, lactose-free diet    Follow-up in office in 6-8 weeks      GASTRIC EMPTYING STUDY     INDICATION: R11 0: Nausea     COMPARISON:  None available     TECHNIQUE:   The study was performed following the oral administration of 1 1 mCi Tc-99m sulfur colloid combined with scrambled eggs, as part of a standard meal   Following the meal, one minute anterior and posterior images were obtained immediately and   at 0 25 hours, 0 5 hour, 1 hour, 1 5 hour, 2 hour, 3 hour and 4 hour intervals from the time of ingestion   Geometric mean analyses were then performed        FINDINGS:     Gastric emptying at 0 5 hours = 11% (N < 30%)   Gastric emptying at 1 hour = 35 % (N = 10 - 70%)  Gastric emptying at 2 hours = 61 % (N = > 40%)  Gastric emptying at 3 hours = 85 % (N = > 70%)  Gastric emptying at 4 hours = 94 % (N = >90%)     Linear T-1/2 = 115 minutes     Note is made of reflux into the distal esophagus at approximately 90 minutes  IMPRESSION:     1  Normal rate of gastric emptying  2   Reflux of tracer activity into the distal esophagus at 90 minutes  Assessment/Plan:  66 y o  female Body mass index is 32 27 kg/m²  history  With symptoms of belching and history of esophagitis, AFib on Eliquis, found to have a medium sized hiatal hernia  She has a normal gastric emptying study and normal esophageal motility  Will plan for repair of hiatal hernia, fundoplication, and possible sphincter augmentation  Patient interested in weight loss  Will schedule for medical weight management

## 2022-07-25 ENCOUNTER — PREP FOR PROCEDURE (OUTPATIENT)
Dept: BARIATRICS | Facility: CLINIC | Age: 72
End: 2022-07-25

## 2022-07-25 DIAGNOSIS — K21.9 GASTROESOPHAGEAL REFLUX DISEASE: Primary | ICD-10-CM

## 2022-07-25 DIAGNOSIS — K44.9 HIATAL HERNIA: ICD-10-CM

## 2022-08-05 DIAGNOSIS — I48.91 ATRIAL FIBRILLATION, UNSPECIFIED TYPE (HCC): ICD-10-CM

## 2022-08-05 RX ORDER — APIXABAN 5 MG/1
TABLET, FILM COATED ORAL
Qty: 180 TABLET | Refills: 3 | Status: SHIPPED | OUTPATIENT
Start: 2022-08-05

## 2022-08-11 ENCOUNTER — CONSULT (OUTPATIENT)
Dept: BARIATRICS | Facility: CLINIC | Age: 72
End: 2022-08-11
Payer: MEDICARE

## 2022-08-11 ENCOUNTER — APPOINTMENT (OUTPATIENT)
Dept: LAB | Facility: MEDICAL CENTER | Age: 72
End: 2022-08-11
Payer: MEDICARE

## 2022-08-11 VITALS
HEIGHT: 64 IN | OXYGEN SATURATION: 96 % | SYSTOLIC BLOOD PRESSURE: 130 MMHG | HEART RATE: 69 BPM | WEIGHT: 187.9 LBS | TEMPERATURE: 98.7 F | DIASTOLIC BLOOD PRESSURE: 74 MMHG | BODY MASS INDEX: 32.08 KG/M2

## 2022-08-11 DIAGNOSIS — R73.9 HYPERGLYCEMIA: ICD-10-CM

## 2022-08-11 DIAGNOSIS — E66.9 OBESITY, CLASS I, BMI 30-34.9: Primary | ICD-10-CM

## 2022-08-11 LAB
EST. AVERAGE GLUCOSE BLD GHB EST-MCNC: 123 MG/DL
HBA1C MFR BLD: 5.9 %

## 2022-08-11 PROCEDURE — 99203 OFFICE O/P NEW LOW 30 MIN: CPT | Performed by: FAMILY MEDICINE

## 2022-08-11 PROCEDURE — 83036 HEMOGLOBIN GLYCOSYLATED A1C: CPT

## 2022-08-11 PROCEDURE — 36415 COLL VENOUS BLD VENIPUNCTURE: CPT

## 2022-08-11 NOTE — PROGRESS NOTES
Assessment/Plan: Gustabo Sellers was seen today for consult  Diagnoses and all orders for this visit:    Obesity, Class I, BMI 30-34 9  Hyperglycemia  -     HEMOGLOBIN A1C W/ EAG ESTIMATION; Future  Likely secondary to excess caloric intake and in particular excess carbohydrate intake  Patient does have hyperglycemia on recent labs therefore I am going to check a hemoglobin A1c  Patient does take Lexapro which can have some weight gain effects  She may want to discuss changing Lexapro to a different medication with the prescribing provider  Patient is going to pursue healthy core  - Discussed options of HealthyCORE-Intensive Lifestyle Intervention Program and Conservative Program and the role of weight loss medications  - Explained the importance of making lifestyle changes first before starting any anti-obesity medications  Patient should demonstrate lifestyle changes first before anti-obesity medication can be initiated  - Patient is interested in pursuing HealthyCORE-Intensive Lifestyle Intervention Program  - Initial weight loss goal of 5-10% weight loss for improved health  - Weight loss can improve patient's co-morbid conditions and/or prevent weight-related complications  Goals:  Do not skip any meals! Food log (ie ) www myfitnesspal com,sparkpeople  com,loseit com,calorieking  com,etc  baritastic (use skinnytaste  com, dietdoctor  com or smartphone stacey DropMat for recipes)  No sugary beverages  At least 64oz of water daily  Increase physical activity by 10 minutes daily  Gradually increase physical activity to a goal of 5 days per week for 30 minutes of MODERATE intensity PLUS 2 days per week of FULL BODY resistance training (use smartphone apps FitON, Home Workout, etc )    Total time spent: 30 min, with >50% face-to-face time spent counseling patient on nonsurgical interventions for the treatment of excess weight   Discussed in detail nonsurgical options including intensive lifestyle intervention program, very low-calorie diet program and conservative program   Discussed the role of weight loss medications  Counseled patient on diet behavior and exercise modification for weight loss  Follow up in approximately 2 weeks with Non-Surgical Dietician  Subjective:   Chief Complaint   Patient presents with    Consult     MWM patient has sleep apnea,       Patient ID: Dary Scott  is a 74 y o  female with excess weight/obesity here to pursue weight management  Previous notes and records have been reviewed  Past Medical History:   Diagnosis Date    Anxiety     Arthralgia     Atrial fibrillation (HCC)     Chronic interstitial cystitis     Colon polyp     Depression     Hyperlipidemia     Hypertension     Lichen sclerosus     Lipoma     Osteopenia     Paresthesia of both hands 8/6/2019    Simple partial seizures ([address] )     last assessed 3/30/17    Tachycardia     last assessed 6/24/15     Past Surgical History:   Procedure Laterality Date    COLONOSCOPY  2020    Dr Bib Thomas, 5 year f/u     COSMETIC SURGERY      HYSTERECTOMY      OOPHORECTOMY      REDUCTION MAMMAPLASTY      SHOULDER SURGERY         HPI:  Patient presents for medical weight management consultation  She informs me she is interested in participating in healthy core  She has participated in this type of program in the past and has been successful      Wt Readings from Last 20 Encounters:   08/11/22 85 2 kg (187 lb 14 4 oz)   07/21/22 85 7 kg (189 lb)   07/18/22 85 3 kg (188 lb)   06/03/22 85 3 kg (188 lb)   04/01/22 81 7 kg (180 lb 3 2 oz)   01/14/22 83 9 kg (185 lb)   12/13/21 84 1 kg (185 lb 6 4 oz)   06/02/21 82 4 kg (181 lb 9 6 oz)   03/22/21 82 1 kg (181 lb)   03/19/21 82 6 kg (182 lb)   02/17/21 82 6 kg (182 lb 3 2 oz)   12/08/20 80 1 kg (176 lb 9 6 oz)   10/26/20 77 1 kg (170 lb)   09/24/20 80 9 kg (178 lb 6 4 oz)   08/17/20 82 6 kg (182 lb)   08/05/20 82 7 kg (182 lb 6 4 oz)   03/02/20 80 3 kg (177 lb)   02/18/20 80 6 kg (177 lb 9 6 oz)   02/04/20 79 8 kg (176 lb)   12/03/19 80 3 kg (177 lb)     Obesity/Excess Weight:  Severity: Mild  Onset:  Over the years    Modifiers: Diet and Exercise and Physician Supervised Weight Loss Program  Contributing factors: Poor Food Choices and Insufficient Physical Activity  Associated symptoms: comorbid conditions and increased joint pain    B- 2 skinny bagels w/ butter  S- no  L- salad or yogurt or leftovers  S- no  D- salad, protein, veggies and starch (pt admits large portions)  S- cholcolate    Hydration:32-40oz water daily, 20oz coffee w/ almond milk and 1-2tsp sugar daily  Alcohol: 1-2 drinks /wk   Smoking: no  Exercise: pilates 1x/wk, weight training 1x/wk, yoga 1x/wk  Occupation: retired  Sleep: 8-9hrs  STOP bang: JORDANA but not using CPAP (pt did not tolerate mask)    The following portions of the patient's history were reviewed and updated as appropriate: allergies, current medications, past family history, past medical history, past social history, past surgical history, and problem list     Review of Systems   Constitutional: Negative for fever  Respiratory: Negative for shortness of breath  Cardiovascular: Negative for chest pain  Gastrointestinal: Positive for abdominal pain (Due for Hiatal Hernia repair)  Negative for blood in stool  Genitourinary: Negative for dysuria and hematuria  Musculoskeletal: Positive for arthralgias and myalgias  Skin: Negative for rash  Neurological: Positive for headaches (Chronic from neck pain)  Psychiatric/Behavioral: Positive for dysphoric mood (Treated)  Negative for suicidal ideas  The patient is nervous/anxious (Treated)          Objective:  /74 (BP Location: Left arm, Patient Position: Sitting, Cuff Size: Standard)   Pulse 69   Temp 98 7 °F (37 1 °C) (Tympanic)   Ht 5' 4" (1 626 m)   Wt 85 2 kg (187 lb 14 4 oz)   SpO2 96%   BMI 32 25 kg/m²   Constitutional: Well-developed, well-nourished and Obese Body mass index is 32 25 kg/m²  [place] HEENT: No conjunctival injection  Pulmonary: No increased work of breathing or signs of respiratory distress  CV: Well-perfused  GI: Obese  Non-distended  MSK: No edema   Neuro: Oriented to person, place and time  Normal Speech  Normal gait  Psych: Normal affect and mood  Labs and Imaging  Recent labs and imaging have been personally reviewed    Lab Results   Component Value Date    WBC 4 52 07/19/2022    HGB 12 8 07/19/2022    HCT 41 3 07/19/2022    MCV 97 07/19/2022     07/19/2022     Lab Results   Component Value Date     01/05/2016    SODIUM 140 07/19/2022    K 4 7 07/19/2022     07/19/2022    CO2 26 07/19/2022    ANIONGAP 7 01/05/2016    AGAP 8 07/19/2022    BUN 22 07/19/2022    CREATININE 0 83 07/19/2022    GLUC 109 10/23/2021    GLUF 104 (H) 07/19/2022    CALCIUM 9 1 07/19/2022    AST 13 07/19/2022    ALT 29 07/19/2022    ALKPHOS 51 07/19/2022    PROT 6 3 (L) 01/05/2016    TP 6 7 07/19/2022    BILITOT 0 30 01/05/2016    TBILI 0 35 07/19/2022    EGFR 69 07/19/2022     No results found for: HGBA1C  Lab Results   Component Value Date    LDX2ONLHOWPO 1 900 10/22/2019     Lab Results   Component Value Date    CHOLESTEROL 143 07/19/2022     Lab Results   Component Value Date    HDL 46 (L) 07/19/2022     Lab Results   Component Value Date    TRIG 155 (H) 07/19/2022     Lab Results   Component Value Date    LDLCALC 66 07/19/2022

## 2022-08-12 ENCOUNTER — TELEPHONE (OUTPATIENT)
Dept: BARIATRICS | Facility: CLINIC | Age: 72
End: 2022-08-12

## 2022-08-12 NOTE — TELEPHONE ENCOUNTER
----- Message from Jonathan Martinez DO sent at 8/12/2022 11:02 AM EDT -----  Hemoglobin A1c is within the prediabetic range  This should improve with weight loss  Keep upcoming appointment with dietitian

## 2022-08-23 DIAGNOSIS — I10 BENIGN ESSENTIAL HTN: ICD-10-CM

## 2022-08-23 RX ORDER — AMLODIPINE BESYLATE 5 MG/1
TABLET ORAL
Qty: 90 TABLET | Refills: 3 | Status: SHIPPED | OUTPATIENT
Start: 2022-08-23

## 2022-08-31 ENCOUNTER — NURSE TRIAGE (OUTPATIENT)
Dept: OTHER | Facility: OTHER | Age: 72
End: 2022-08-31

## 2022-08-31 NOTE — TELEPHONE ENCOUNTER
Regarding: Covid Sx-[facility]  ----- Message from Leyla Milan RN sent at 8/31/2022  8:16 AM EDT -----  " I have a cough, ST congestion and body aches   I have not tested myself for COVID "

## 2022-09-01 ENCOUNTER — OFFICE VISIT (OUTPATIENT)
Dept: INTERNAL MEDICINE CLINIC | Facility: CLINIC | Age: 72
End: 2022-09-01
Payer: MEDICARE

## 2022-09-01 VITALS
HEIGHT: 64 IN | WEIGHT: 185 LBS | BODY MASS INDEX: 31.58 KG/M2 | TEMPERATURE: 99.4 F | DIASTOLIC BLOOD PRESSURE: 76 MMHG | HEART RATE: 78 BPM | OXYGEN SATURATION: 94 % | SYSTOLIC BLOOD PRESSURE: 130 MMHG

## 2022-09-01 DIAGNOSIS — J06.9 UPPER RESPIRATORY TRACT INFECTION, UNSPECIFIED TYPE: Primary | ICD-10-CM

## 2022-09-01 LAB
SARS-COV-2 AG UPPER RESP QL IA: NEGATIVE
VALID CONTROL: NORMAL

## 2022-09-01 PROCEDURE — 99214 OFFICE O/P EST MOD 30 MIN: CPT | Performed by: INTERNAL MEDICINE

## 2022-09-01 PROCEDURE — 87811 SARS-COV-2 COVID19 W/OPTIC: CPT | Performed by: INTERNAL MEDICINE

## 2022-09-01 RX ORDER — BENZONATATE 100 MG/1
100 CAPSULE ORAL 3 TIMES DAILY PRN
Qty: 20 CAPSULE | Refills: 0 | Status: SHIPPED | OUTPATIENT
Start: 2022-09-01 | End: 2022-10-11

## 2022-09-01 RX ORDER — DOXYCYCLINE HYCLATE 100 MG/1
100 CAPSULE ORAL EVERY 12 HOURS SCHEDULED
Qty: 14 CAPSULE | Refills: 0 | Status: SHIPPED | OUTPATIENT
Start: 2022-09-01 | End: 2022-09-08

## 2022-09-01 NOTE — PROGRESS NOTES
Assessment/Plan:    Problem List Items Addressed This Visit        Respiratory    Upper respiratory tract infection - Primary     -sx onset 8/29  -neg COVID tests x2, suspicion remains high, therefore advised to retest tomorrow  -monitor temp, take fever reducers  Continue delsym, add benzonatate for cough  -can start doxycycline, side effects discussed         Relevant Medications    doxycycline hyclate (VIBRAMYCIN) 100 mg capsule    benzonatate (TESSALON PERLES) 100 mg capsule    Other Relevant Orders    Poct Covid 19 Rapid Antigen Test (Completed)          Subjective:      Patient ID: Toyin Moses is a 74 y o  female  HPI   Patient presents with her   Since Monday, she has had HA, cough, nasal congestion, rhinitis  She has taken Tylenol, delsym  Reports SOB, wheezing, rib pain, chills, sweats  She tested COVID neg yesterday  She has been using Ayre nasal spray  She denies sick contacts    She accdentally used her 's down pillow the night before which she is allergic to      The following portions of the patient's history were reviewed and updated as appropriate: allergies, current medications, past family history, past medical history, past social history, past surgical history and problem list       Current Outpatient Medications:     benzonatate (TESSALON PERLES) 100 mg capsule, Take 1 capsule (100 mg total) by mouth 3 (three) times a day as needed for cough, Disp: 20 capsule, Rfl: 0    doxycycline hyclate (VIBRAMYCIN) 100 mg capsule, Take 1 capsule (100 mg total) by mouth every 12 (twelve) hours for 7 days, Disp: 14 capsule, Rfl: 0    amLODIPine (NORVASC) 5 mg tablet, TAKE 1 TABLET BY MOUTH  DAILY, Disp: 90 tablet, Rfl: 3    atorvastatin (LIPITOR) 40 mg tablet, Take 1 tablet (40 mg total) by mouth daily, Disp: 90 tablet, Rfl: 0    busPIRone (BUSPAR) 15 mg tablet, Take 15 mg by mouth 2 (two) times a day, Disp: , Rfl:     calcium citrate-Vitamin D 200 mg-250 units, Take by mouth, Disp: , Rfl:     divalproex sodium (DEPAKOTE ER) 500 mg 24 hr tablet, Take 1 tablet by mouth 2 (two) times a day, Disp: , Rfl:     divalproex sodium (DEPAKOTE) 500 mg EC tablet, , Disp: , Rfl:     Eliquis 5 MG, TAKE 1 TABLET BY MOUTH  TWICE DAILY, Disp: 180 tablet, Rfl: 3    escitalopram (LEXAPRO) 20 mg tablet, Take 1 tablet by mouth daily, Disp: , Rfl:     Estradiol (Elestrin) 0 52 MG/0 87 GM (0 06%) GEL, Place 0 87 g on the skin daily, Disp: 2 Bottle, Rfl: 6    famotidine (PEPCID) 20 mg tablet, TAKE 1 TABLET BY MOUTH  TWICE DAILY (Patient taking differently: Take 20 mg by mouth daily), Disp: 180 tablet, Rfl: 3    fexofenadine (ALLEGRA) 180 MG tablet, Take 1 tablet by mouth daily as needed, Disp: , Rfl:     ipratropium (ATROVENT) 0 06 % nasal spray, into each nostril Daily, Disp: , Rfl:     lisinopril (ZESTRIL) 20 mg tablet, Take 1 tablet (20 mg total) by mouth daily, Disp: 90 tablet, Rfl: 3    MELATONIN PO, Take 10 mg by mouth daily at bedtime, Disp: , Rfl:     pantoprazole (PROTONIX) 40 mg tablet, Take 1 tablet (40 mg total) by mouth 2 (two) times a day before meals, Disp: 180 tablet, Rfl: 1    pindolol (VISKEN) 5 mg tablet, Take 1 tablet (5 mg total) by mouth daily, Disp: 90 tablet, Rfl: 3    Probiotic Product (PROBIOTIC PO), Take 1 capsule by mouth 2 (two) times a day, Disp: , Rfl:     saccharomyces boulardii (FLORASTOR) 250 mg capsule, Take 250 mg by mouth 2 (two) times a day (Patient not taking: No sig reported), Disp: , Rfl:     valACYclovir (VALTREX) 1,000 mg tablet, valacyclovir 1 gram tablet  TAKE 2 TABS BY MOUTH THE MORNING OF PROCEDURE AND 2 TABS 12 HOURS LATER , Disp: , Rfl:     Review of Systems   Constitutional: Positive for appetite change  HENT: Positive for sinus pain  Respiratory: Positive for shortness of breath  Gastrointestinal: Negative for constipation and diarrhea           Objective:    /76 (BP Location: Right arm, Patient Position: Sitting)   Pulse 78 Temp 99 4 °F (37 4 °C) (Tympanic)   Ht 5' 4" (1 626 m)   Wt 83 9 kg (185 lb)   SpO2 94%   BMI 31 76 kg/m²      Physical Exam  Vitals reviewed  Constitutional:       General: She is not in acute distress  Appearance: She is ill-appearing  She is not diaphoretic  HENT:      Head: Normocephalic  Right Ear: Tympanic membrane and ear canal normal  There is no impacted cerumen  Left Ear: Tympanic membrane and ear canal normal  There is no impacted cerumen  Nose: Rhinorrhea present  No congestion  Mouth/Throat:      Mouth: Mucous membranes are moist       Pharynx: Oropharynx is clear  No oropharyngeal exudate  Cardiovascular:      Rate and Rhythm: Normal rate and regular rhythm  Heart sounds: Normal heart sounds  Pulmonary:      Effort: Pulmonary effort is normal  No respiratory distress  Breath sounds: Normal breath sounds  No wheezing  Musculoskeletal:      Cervical back: Neck supple  Lymphadenopathy:      Cervical: No cervical adenopathy  Neurological:      Mental Status: She is alert and oriented to person, place, and time           Recent Results (from the past 168 hour(s))   Poct Covid 19 Rapid Antigen Test    Collection Time: 09/01/22  1:10 PM   Result Value Ref Range    POCT SARS-CoV-2 Ag Negative Negative    VALID CONTROL Valid

## 2022-09-01 NOTE — ASSESSMENT & PLAN NOTE
-sx onset 8/29  -neg COVID tests x2, suspicion remains high, therefore advised to retest tomorrow  -monitor temp, take fever reducers    Continue delsym, add benzonatate for cough  -can start doxycycline, side effects discussed

## 2022-09-02 ENCOUNTER — TELEMEDICINE (OUTPATIENT)
Dept: INTERNAL MEDICINE CLINIC | Facility: CLINIC | Age: 72
End: 2022-09-02
Payer: MEDICARE

## 2022-09-02 VITALS — BODY MASS INDEX: 31.58 KG/M2 | WEIGHT: 185 LBS | HEIGHT: 64 IN

## 2022-09-02 DIAGNOSIS — J06.9 UPPER RESPIRATORY TRACT INFECTION, UNSPECIFIED TYPE: Primary | ICD-10-CM

## 2022-09-02 PROCEDURE — 99213 OFFICE O/P EST LOW 20 MIN: CPT | Performed by: INTERNAL MEDICINE

## 2022-09-02 NOTE — PROGRESS NOTES
Virtual Regular Visit    Verification of patient location:    Patient is located in the following state in which I hold an active license [place]      Assessment/Plan:    Problem List Items Addressed This Visit        Respiratory    Upper respiratory tract infection - Primary     -sx onset 8/29  -retested COVID, still neg x3  -encouraged to continue with doxycycline, Ayre nasal spray, mucinex and delsym  Can also consider increasing benzonatate to 200mg TID  -monitor pulse ox  -increase fluids                    Reason for visit is   Chief Complaint   Patient presents with    Virtual Regular Visit        Encounter provider Mendel Wall DO    Provider located at [address] [id number]      Recent Visits  Date Type Provider Dept   09/01/22 Office Visit Mendel Wall, [address] [facility]   Showing recent visits within past 7 days and meeting all other requirements  Today's Visits  Date Type Provider Dept   09/02/22 Telemedicine Carrol Ogden, [address] [facility]   Showing today's visits and meeting all other requirements  Future Appointments  No visits were found meeting these conditions  Showing future appointments within next 150 days and meeting all other requirements       The patient was identified by name and date of birth  Dary Scott was informed that this is a telemedicine visit and that the visit is being conducted through [address] [place] [address] Now and patient was informed that this is a secure, HIPAA-compliant platform  She agrees to proceed     My office door was closed  No one else was in the room  She acknowledged consent and understanding of privacy and security of the video platform  The patient has agreed to participate and understands they can discontinue the visit at any time  Patient is aware this is a billable service       Subjective  Dary Scott is a 74 y o  female for re-evaluation of cold symptoms   HPI     She started doxycycline and benzonatate x1 yesterday without response  Therefore she restarted delsym and also started mucinex  Reports nausea, vomiting this morning  Since then she has been able to keep her foods down  SOB has lessened, no longer wheezing  She continues use of aAyre nasal spray    Past Medical History:   Diagnosis Date    Anxiety     Arthralgia     Atrial fibrillation (HCC)     Chronic interstitial cystitis     Colon polyp     Depression     Hyperlipidemia     Hypertension     Lichen sclerosus     Lipoma     Osteopenia     Paresthesia of both hands 8/6/2019    Simple partial seizures ([address] )     last assessed 3/30/17    Tachycardia     last assessed 6/24/15       Past Surgical History:   Procedure Laterality Date    COLONOSCOPY  2020    Dr Can De Dios, 5 year f/u     COSMETIC SURGERY      HYSTERECTOMY      OOPHORECTOMY      REDUCTION MAMMAPLASTY      SHOULDER SURGERY         Current Outpatient Medications   Medication Sig Dispense Refill    amLODIPine (NORVASC) 5 mg tablet TAKE 1 TABLET BY MOUTH  DAILY 90 tablet 3    atorvastatin (LIPITOR) 40 mg tablet Take 1 tablet (40 mg total) by mouth daily 90 tablet 0    benzonatate (TESSALON PERLES) 100 mg capsule Take 1 capsule (100 mg total) by mouth 3 (three) times a day as needed for cough 20 capsule 0    busPIRone (BUSPAR) 15 mg tablet Take 15 mg by mouth 2 (two) times a day      calcium citrate-Vitamin D 200 mg-250 units Take by mouth      divalproex sodium (DEPAKOTE ER) 500 mg 24 hr tablet Take 1 tablet by mouth 2 (two) times a day      divalproex sodium (DEPAKOTE) 500 mg EC tablet       doxycycline hyclate (VIBRAMYCIN) 100 mg capsule Take 1 capsule (100 mg total) by mouth every 12 (twelve) hours for 7 days 14 capsule 0    Eliquis 5 MG TAKE 1 TABLET BY MOUTH  TWICE DAILY 180 tablet 3    escitalopram (LEXAPRO) 20 mg tablet Take 1 tablet by mouth daily      Estradiol (Elestrin) 0 52 MG/0 87 GM (0 06%) GEL Place 0 87 g on the skin daily 2 Bottle 6    famotidine (PEPCID) 20 mg tablet TAKE 1 TABLET BY MOUTH  TWICE DAILY (Patient taking differently: Take 20 mg by mouth daily) 180 tablet 3    fexofenadine (ALLEGRA) 180 MG tablet Take 1 tablet by mouth daily as needed      ipratropium (ATROVENT) 0 06 % nasal spray into each nostril Daily      lisinopril (ZESTRIL) 20 mg tablet Take 1 tablet (20 mg total) by mouth daily 90 tablet 3    MELATONIN PO Take 10 mg by mouth daily at bedtime      pantoprazole (PROTONIX) 40 mg tablet Take 1 tablet (40 mg total) by mouth 2 (two) times a day before meals 180 tablet 1    pindolol (VISKEN) 5 mg tablet Take 1 tablet (5 mg total) by mouth daily 90 tablet 3    Probiotic Product (PROBIOTIC PO) Take 1 capsule by mouth 2 (two) times a day      saccharomyces boulardii (FLORASTOR) 250 mg capsule Take 250 mg by mouth 2 (two) times a day      valACYclovir (VALTREX) 1,000 mg tablet valacyclovir 1 gram tablet   TAKE 2 TABS BY MOUTH THE MORNING OF PROCEDURE AND 2 TABS 12 HOURS LATER  No current facility-administered medications for this visit  Allergies   Allergen Reactions    Codeine Hives    Erythromycin Base GI Intolerance    Hydromorphone Itching    Morphine GI Intolerance    Oxycodone-Acetaminophen Hives    Penicillins Rash       Review of Systems   Constitutional: Positive for appetite change and fatigue  Negative for fever  HENT: Positive for rhinorrhea  Respiratory: Positive for cough and shortness of breath  Negative for wheezing  Video Exam    Vitals:    09/02/22 1107   Weight: 83 9 kg (185 lb)   Height: 5' 4" (1 626 m)       Physical Exam  Vitals reviewed  Constitutional:       General: She is not in acute distress  Appearance: She is ill-appearing  She is not diaphoretic  Pulmonary:      Comments: Mild conversational dyspnea  Moist cough  Skin:     Coloration: Skin is not pale     Neurological:      Mental Status: She is alert and oriented to person, place, and time  I spent 20 minutes with patient today in which greater than 50% of the time was spent in counseling/coordination of care regarding assessment of symptoms, medication counseling and plan

## 2022-09-02 NOTE — ASSESSMENT & PLAN NOTE
-sx onset 8/29  -retested COVID, still neg x3  -encouraged to continue with doxycycline, Ayre nasal spray, mucinex and delsym    Can also consider increasing benzonatate to 200mg TID  -monitor pulse ox  -increase fluids

## 2022-09-07 ENCOUNTER — OFFICE VISIT (OUTPATIENT)
Dept: INTERNAL MEDICINE CLINIC | Facility: CLINIC | Age: 72
End: 2022-09-07
Payer: MEDICARE

## 2022-09-07 VITALS
TEMPERATURE: 97.7 F | WEIGHT: 182.2 LBS | OXYGEN SATURATION: 96 % | SYSTOLIC BLOOD PRESSURE: 142 MMHG | HEIGHT: 64 IN | HEART RATE: 115 BPM | DIASTOLIC BLOOD PRESSURE: 92 MMHG | BODY MASS INDEX: 31.1 KG/M2

## 2022-09-07 DIAGNOSIS — K20.90 ESOPHAGITIS: ICD-10-CM

## 2022-09-07 DIAGNOSIS — I48.0 PAROXYSMAL ATRIAL FIBRILLATION (HCC): ICD-10-CM

## 2022-09-07 DIAGNOSIS — J20.9 ACUTE BRONCHITIS, UNSPECIFIED ORGANISM: Primary | ICD-10-CM

## 2022-09-07 PROCEDURE — 99213 OFFICE O/P EST LOW 20 MIN: CPT | Performed by: INTERNAL MEDICINE

## 2022-09-07 RX ORDER — PANTOPRAZOLE SODIUM 40 MG/1
TABLET, DELAYED RELEASE ORAL
Qty: 180 TABLET | Refills: 3 | Status: SHIPPED | OUTPATIENT
Start: 2022-09-07

## 2022-09-07 RX ORDER — ALBUTEROL SULFATE 90 UG/1
2 AEROSOL, METERED RESPIRATORY (INHALATION) EVERY 6 HOURS PRN
Qty: 18 G | Refills: 0 | Status: SHIPPED | OUTPATIENT
Start: 2022-09-07

## 2022-09-07 NOTE — ASSESSMENT & PLAN NOTE
-currently in afib  -remain hydrated  -pt on pindolol for rate control and eliquis for stroke ppx  -pt to follow up with [facility]

## 2022-09-07 NOTE — ASSESSMENT & PLAN NOTE
-following URI sx, neg for COVID  -complete doxycycline course  -start albuterol inhaler, side effects discussed  -continue supportive treatment with delsym, benzonatate, mucinex, fluids

## 2022-09-07 NOTE — PROGRESS NOTES
Assessment/Plan:    Problem List Items Addressed This Visit        Respiratory    Acute bronchitis - Primary     -following URI sx, neg for COVID  -complete doxycycline course  -start albuterol inhaler, side effects discussed  -continue supportive treatment with delsym, benzonatate, mucinex, fluids         Relevant Medications    albuterol (Ventolin HFA) 90 mcg/act inhaler       Cardiovascular and Mediastinum    Paroxysmal atrial fibrillation (HCC)     -currently in afib  -remain hydrated  -pt on pindolol for rate control and eliquis for stroke ppx  -pt to follow up with [facility]               Subjective:      Patient ID: Jewel Lua is a 74 y o  female  HPI  She is accompanied by her     Generally she got better gradually  Then last night she was not draining mucus as much and was feeling pretty good but had dry heaves  Her ribs began to hurt  She stood up, tried vicks vaporub with some expectorant  She eventually fell asleep  Since being awake, has had some coughing  She has one dose left of doxycycline  Sp02 has been between 89-93%  She has been taking benzonatate, delsym      The following portions of the patient's history were reviewed and updated as appropriate: allergies, current medications, past family history, past medical history, past social history, past surgical history and problem list       Current Outpatient Medications:     albuterol (Ventolin HFA) 90 mcg/act inhaler, Inhale 2 puffs every 6 (six) hours as needed for wheezing, Disp: 18 g, Rfl: 0    amLODIPine (NORVASC) 5 mg tablet, TAKE 1 TABLET BY MOUTH  DAILY, Disp: 90 tablet, Rfl: 3    atorvastatin (LIPITOR) 40 mg tablet, Take 1 tablet (40 mg total) by mouth daily, Disp: 90 tablet, Rfl: 0    benzonatate (TESSALON PERLES) 100 mg capsule, Take 1 capsule (100 mg total) by mouth 3 (three) times a day as needed for cough, Disp: 20 capsule, Rfl: 0    divalproex sodium (DEPAKOTE) 500 mg EC tablet, , Disp: , Rfl:    doxycycline hyclate (VIBRAMYCIN) 100 mg capsule, Take 1 capsule (100 mg total) by mouth every 12 (twelve) hours for 7 days, Disp: 14 capsule, Rfl: 0    Eliquis 5 MG, TAKE 1 TABLET BY MOUTH  TWICE DAILY, Disp: 180 tablet, Rfl: 3    Estradiol (Elestrin) 0 52 MG/0 87 GM (0 06%) GEL, Place 0 87 g on the skin daily, Disp: 2 Bottle, Rfl: 6    famotidine (PEPCID) 20 mg tablet, TAKE 1 TABLET BY MOUTH  TWICE DAILY (Patient taking differently: Take 20 mg by mouth daily), Disp: 180 tablet, Rfl: 3    lisinopril (ZESTRIL) 20 mg tablet, Take 1 tablet (20 mg total) by mouth daily, Disp: 90 tablet, Rfl: 3    pantoprazole (PROTONIX) 40 mg tablet, TAKE 1 TABLET BY MOUTH  TWICE DAILY BEFORE MEALS, Disp: 180 tablet, Rfl: 3    pindolol (VISKEN) 5 mg tablet, Take 1 tablet (5 mg total) by mouth daily, Disp: 90 tablet, Rfl: 3    Probiotic Product (PROBIOTIC PO), Take 1 capsule by mouth 2 (two) times a day, Disp: , Rfl:     saccharomyces boulardii (FLORASTOR) 250 mg capsule, Take 250 mg by mouth 2 (two) times a day, Disp: , Rfl:     valACYclovir (VALTREX) 1,000 mg tablet, valacyclovir 1 gram tablet  TAKE 2 TABS BY MOUTH THE MORNING OF PROCEDURE AND 2 TABS 12 HOURS LATER , Disp: , Rfl:     amLODIPine (NORVASC) 5 mg tablet, TAKE 1 TABLET BY MOUTH  DAILY, Disp: 90 tablet, Rfl: 3    atorvastatin (LIPITOR) 40 mg tablet, Take 1 tablet (40 mg total) by mouth daily, Disp: 90 tablet, Rfl: 0    busPIRone (BUSPAR) 15 mg tablet, Take 15 mg by mouth 2 (two) times a day, Disp: , Rfl:     calcium citrate-Vitamin D 200 mg-250 units, Take by mouth, Disp: , Rfl:     divalproex sodium (DEPAKOTE ER) 500 mg 24 hr tablet, Take 1 tablet by mouth 2 (two) times a day, Disp: , Rfl:     divalproex sodium (DEPAKOTE) 500 mg EC tablet, , Disp: , Rfl:     Eliquis 5 MG, TAKE 1 TABLET BY MOUTH  TWICE DAILY, Disp: 180 tablet, Rfl: 3    escitalopram (LEXAPRO) 20 mg tablet, Take 1 tablet by mouth daily, Disp: , Rfl:     Estradiol (Elestrin) 0 52 MG/0 87 GM (0 06%) GEL, Place 0 87 g on the skin daily, Disp: 2 Bottle, Rfl: 6    famotidine (PEPCID) 20 mg tablet, TAKE 1 TABLET BY MOUTH  TWICE DAILY (Patient taking differently: Take 20 mg by mouth daily), Disp: 180 tablet, Rfl: 3    fexofenadine (ALLEGRA) 180 MG tablet, Take 1 tablet by mouth daily as needed, Disp: , Rfl:     ipratropium (ATROVENT) 0 06 % nasal spray, into each nostril Daily, Disp: , Rfl:     lisinopril (ZESTRIL) 20 mg tablet, Take 1 tablet (20 mg total) by mouth daily, Disp: 90 tablet, Rfl: 3    MELATONIN PO, Take 10 mg by mouth daily at bedtime, Disp: , Rfl:     pantoprazole (PROTONIX) 40 mg tablet, TAKE 1 TABLET BY MOUTH  TWICE DAILY BEFORE MEALS, Disp: 180 tablet, Rfl: 3    pindolol (VISKEN) 5 mg tablet, Take 1 tablet (5 mg total) by mouth daily, Disp: 90 tablet, Rfl: 3    valACYclovir (VALTREX) 1,000 mg tablet, valacyclovir 1 gram tablet  TAKE 2 TABS BY MOUTH THE MORNING OF PROCEDURE AND 2 TABS 12 HOURS LATER , Disp: , Rfl:     Review of Systems   Constitutional: Positive for fatigue  Negative for appetite change and fever  HENT: Positive for rhinorrhea  Negative for congestion  Respiratory: Positive for cough, chest tightness, shortness of breath and wheezing  Cardiovascular: Positive for palpitations  Negative for leg swelling  Gastrointestinal: Negative for abdominal pain, constipation, diarrhea, nausea and vomiting  Musculoskeletal: Negative for myalgias  Neurological: Positive for headaches (intermittent)  Objective:    /92 (BP Location: Left arm, Patient Position: Sitting)   Pulse (!) 115   Temp 97 7 °F (36 5 °C) (Tympanic)   Ht 5' 4" (1 626 m)   Wt 82 6 kg (182 lb 3 2 oz)   SpO2 96%   BMI 31 27 kg/m²      Physical Exam  Vitals reviewed  Constitutional:       General: She is not in acute distress  Appearance: She is obese  HENT:      Head: Normocephalic  Right Ear: Tympanic membrane and ear canal normal  There is no impacted cerumen  Left Ear: Tympanic membrane and ear canal normal  There is no impacted cerumen  Nose: Nose normal  No congestion or rhinorrhea  Mouth/Throat:      Mouth: Mucous membranes are moist       Pharynx: Oropharynx is clear  No oropharyngeal exudate or posterior oropharyngeal erythema  Cardiovascular:      Rate and Rhythm: Rhythm irregularly irregular  Heart sounds: Normal heart sounds  Pulmonary:      Effort: Pulmonary effort is normal  No respiratory distress  Breath sounds: Normal breath sounds  No wheezing  Comments: No conversational dyspnea  Dry cough  Musculoskeletal:      Right lower leg: No edema  Left lower leg: No edema  Lymphadenopathy:      Cervical: No cervical adenopathy  Neurological:      Mental Status: She is alert

## 2022-09-19 ENCOUNTER — APPOINTMENT (OUTPATIENT)
Dept: LAB | Age: 72
End: 2022-09-19
Payer: MEDICARE

## 2022-09-19 DIAGNOSIS — I48.0 PAROXYSMAL ATRIAL FIBRILLATION (HCC): ICD-10-CM

## 2022-09-19 DIAGNOSIS — G47.33 OBSTRUCTIVE SLEEP APNEA: ICD-10-CM

## 2022-09-19 DIAGNOSIS — I25.10 ASCVD (ARTERIOSCLEROTIC CARDIOVASCULAR DISEASE): ICD-10-CM

## 2022-09-19 DIAGNOSIS — Z01.818 PRE-OP TESTING: ICD-10-CM

## 2022-09-19 DIAGNOSIS — E78.9 LIPID DISORDER: ICD-10-CM

## 2022-09-19 LAB — TSH SERPL DL<=0.05 MIU/L-ACNC: 2.56 UIU/ML (ref 0.45–4.5)

## 2022-09-19 PROCEDURE — 84443 ASSAY THYROID STIM HORMONE: CPT

## 2022-09-19 PROCEDURE — 36415 COLL VENOUS BLD VENIPUNCTURE: CPT

## 2022-09-20 ENCOUNTER — OFFICE VISIT (OUTPATIENT)
Dept: SLEEP CENTER | Facility: CLINIC | Age: 72
End: 2022-09-20
Payer: MEDICARE

## 2022-09-20 VITALS
OXYGEN SATURATION: 94 % | SYSTOLIC BLOOD PRESSURE: 118 MMHG | HEART RATE: 62 BPM | WEIGHT: 187 LBS | BODY MASS INDEX: 31.92 KG/M2 | DIASTOLIC BLOOD PRESSURE: 64 MMHG | HEIGHT: 64 IN

## 2022-09-20 DIAGNOSIS — G47.33 OSA (OBSTRUCTIVE SLEEP APNEA): Primary | ICD-10-CM

## 2022-09-20 PROCEDURE — 99213 OFFICE O/P EST LOW 20 MIN: CPT | Performed by: INTERNAL MEDICINE

## 2022-09-20 NOTE — PATIENT INSTRUCTIONS
The patient has JORDANA and declines positive airway pressure therapy  I will order home sleep apnea testing and refer for [facility]

## 2022-09-20 NOTE — PROGRESS NOTES
Cardiologist: Dr Ochoa Brought MRN: [id number]      Reason for Visit:     74 y  o female who with a history of mild to moderate obstructive sleep apnea returns to discuss treatment options    Assessment:  I had seen the patient in 2021  Her prior sleep study in 2019 demonstrated AHI = 15 6  The patient reportedly had a sleep study last year, but it is not recorded in the chart  She continues to experience daytime sleepiness and has had 2 episodes of paroxysmal atrial fibrillation since her last visit  Plan:  We discussed using positive airway pressure to treat her JORDANA, however she declines  She is instructed in pursuing the [facility] device  I will refer her accordingly  I have consult to Dr Stephanie Mathur    Follow up: When needed    History of Present Illness: The patient has a history of mild to moderate obstructive sleep apnea diagnosed in 2019  She was unable to tolerate positive airway pressure therapy  I saw her last year and recommended treatment, but she declined  Her study from 2019 is the last one seen in her medical record      Review of Systems      Genitourinary need to urinate more than twice a night   Cardiology palpitations/fluttering feeling in the chest   Gastrointestinal frequent heartburn/acid reflux   Neurology none   Constitutional fatigue   Integumentary none   Psychiatry anxiety and depression   Musculoskeletal none   Pulmonary snoring   ENT throat clearing   Endocrine none   Hematological none             I have reviewed and updated the review of systems as necessary     Historical Information    Past Medical History:   Diagnosis Date    Anxiety     Arthralgia     Atrial fibrillation (HCC)     Chronic interstitial cystitis     Colon polyp     Depression     Hyperlipidemia     Hypertension     Lichen sclerosus     Lipoma     Osteopenia     Paresthesia of both hands 8/6/2019    Simple partial seizures ([facility] [address] )     last assessed 3/30/17    Tachycardia     last assessed 6/24/15         Past Surgical History:   Procedure Laterality Date    COLONOSCOPY  2020    Dr Christine Rodriguez, 5 year f/u     COSMETIC SURGERY      HYSTERECTOMY      OOPHORECTOMY      REDUCTION MAMMAPLASTY      SHOULDER SURGERY           Social History     Socioeconomic History    Marital status: /Civil Union     Spouse name: None    Number of children: None    Years of education: None    Highest education level: None   Occupational History    Occupation: retired school superintendant   Tobacco Use    Smoking status: Never Smoker    Smokeless tobacco: Never Used   Vaping Use    Vaping Use: Never used   Substance and Sexual Activity    Alcohol use:  Yes     Alcohol/week: 10 0 standard drinks     Types: 10 Glasses of wine per week     Comment: socially 1-2 drinks/week    Drug use: No    Sexual activity: Yes     Partners: Male     Birth control/protection: Post-menopausal     Comment: occasionally   Other Topics Concern    None   Social History Narrative    Exercising regularly     Social Determinants of Health     Financial Resource Strain: Not on file   Food Insecurity: Not on file   Transportation Needs: Not on file   Physical Activity: Not on file   Stress: Not on file   Social Connections: Not on file   Intimate Partner Violence: Not on file   Housing Stability: Not on file           History   Alcohol use: Not on file       History   Smoking Status    Not on file   Smokeless Tobacco    Not on file       Family History:   Family History   Problem Relation Age of Onset    Stroke Mother         CVA    Hypertension Mother     Hypertension Father     Lung cancer Father     Lung cancer Brother         AGE UNKNOWN    No Known Problems Sister     No Known Problems Maternal Grandmother     No Known Problems Maternal Grandfather     No Known Problems Paternal Grandmother     No Known Problems Paternal Grandfather     No Known Problems Maternal Aunt     No Known Problems Maternal Aunt     No Known Problems Maternal Aunt     No Known Problems Paternal Aunt     No Known Problems Paternal Aunt     No Known Problems Paternal Aunt        Medications/Allergies:      Current Outpatient Medications:     albuterol (Ventolin HFA) 90 mcg/act inhaler, Inhale 2 puffs every 6 (six) hours as needed for wheezing, Disp: 18 g, Rfl: 0    amLODIPine (NORVASC) 5 mg tablet, TAKE 1 TABLET BY MOUTH  DAILY, Disp: 90 tablet, Rfl: 3    atorvastatin (LIPITOR) 40 mg tablet, Take 1 tablet (40 mg total) by mouth daily, Disp: 90 tablet, Rfl: 0    benzonatate (TESSALON PERLES) 100 mg capsule, Take 1 capsule (100 mg total) by mouth 3 (three) times a day as needed for cough, Disp: 20 capsule, Rfl: 0    busPIRone (BUSPAR) 15 mg tablet, Take 15 mg by mouth 2 (two) times a day, Disp: , Rfl:     calcium citrate-Vitamin D 200 mg-250 units, Take by mouth, Disp: , Rfl:     divalproex sodium (DEPAKOTE ER) 500 mg 24 hr tablet, Take 1 tablet by mouth 2 (two) times a day, Disp: , Rfl:     divalproex sodium (DEPAKOTE) 500 mg EC tablet, , Disp: , Rfl:     Eliquis 5 MG, TAKE 1 TABLET BY MOUTH  TWICE DAILY, Disp: 180 tablet, Rfl: 3    escitalopram (LEXAPRO) 20 mg tablet, Take 1 tablet by mouth daily, Disp: , Rfl:     Estradiol (Elestrin) 0 52 MG/0 87 GM (0 06%) GEL, Place 0 87 g on the skin daily, Disp: 2 Bottle, Rfl: 6    famotidine (PEPCID) 20 mg tablet, TAKE 1 TABLET BY MOUTH  TWICE DAILY (Patient taking differently: Take 20 mg by mouth daily), Disp: 180 tablet, Rfl: 3    fexofenadine (ALLEGRA) 180 MG tablet, Take 1 tablet by mouth daily as needed, Disp: , Rfl:     ipratropium (ATROVENT) 0 06 % nasal spray, into each nostril Daily, Disp: , Rfl:     lisinopril (ZESTRIL) 20 mg tablet, Take 1 tablet (20 mg total) by mouth daily, Disp: 90 tablet, Rfl: 3    MELATONIN PO, Take 10 mg by mouth daily at bedtime, Disp: , Rfl:     pantoprazole (PROTONIX) 40 mg tablet, TAKE 1 TABLET BY MOUTH  TWICE DAILY BEFORE MEALS, Disp: 180 tablet, Rfl: 3    pindolol (VISKEN) 5 mg tablet, Take 1 tablet (5 mg total) by mouth daily, Disp: 90 tablet, Rfl: 3    Probiotic Product (PROBIOTIC PO), Take 1 capsule by mouth 2 (two) times a day, Disp: , Rfl:     saccharomyces boulardii (FLORASTOR) 250 mg capsule, Take 250 mg by mouth 2 (two) times a day, Disp: , Rfl:     valACYclovir (VALTREX) 1,000 mg tablet, valacyclovir 1 gram tablet  TAKE 2 TABS BY MOUTH THE MORNING OF PROCEDURE AND 2 TABS 12 HOURS LATER , Disp: , Rfl:       Objective    Vital Signs:   Vitals:    09/20/22 0823   BP: 118/64   Pulse: 62   SpO2: 94%   Weight: 84 8 kg (187 lb)   Height: 5' 4" (1 626 m)     [place] Sleepiness Scale: Total score: 5    Physical Exam:    General: Alert, appropriate, cooperative, overweight    Head: NC/AT    Skin: Warm, dry    Neuro: No motor abnormalities, cranial nerves appear intact    Psych: Normal affect            Baby Primrose, M D    Board Certified Sleep Specialist

## 2022-09-28 ENCOUNTER — OFFICE VISIT (OUTPATIENT)
Dept: BARIATRICS | Facility: CLINIC | Age: 72
End: 2022-09-28

## 2022-09-28 VITALS — HEIGHT: 64 IN | BODY MASS INDEX: 31.63 KG/M2 | WEIGHT: 185.3 LBS

## 2022-09-28 DIAGNOSIS — R63.5 ABNORMAL WEIGHT GAIN: Primary | ICD-10-CM

## 2022-09-28 PROCEDURE — WMPRO12

## 2022-09-28 PROCEDURE — RECHECK

## 2022-09-30 ENCOUNTER — OFFICE VISIT (OUTPATIENT)
Dept: GASTROENTEROLOGY | Facility: AMBULARY SURGERY CENTER | Age: 72
End: 2022-09-30
Payer: MEDICARE

## 2022-09-30 VITALS
OXYGEN SATURATION: 97 % | BODY MASS INDEX: 32.1 KG/M2 | SYSTOLIC BLOOD PRESSURE: 120 MMHG | HEIGHT: 64 IN | DIASTOLIC BLOOD PRESSURE: 68 MMHG | HEART RATE: 66 BPM | WEIGHT: 188 LBS

## 2022-09-30 DIAGNOSIS — R10.13 DYSPEPSIA: ICD-10-CM

## 2022-09-30 DIAGNOSIS — K21.9 GASTROESOPHAGEAL REFLUX DISEASE, UNSPECIFIED WHETHER ESOPHAGITIS PRESENT: ICD-10-CM

## 2022-09-30 DIAGNOSIS — R11.0 NAUSEA: ICD-10-CM

## 2022-09-30 DIAGNOSIS — K59.1 FUNCTIONAL DIARRHEA: Primary | ICD-10-CM

## 2022-09-30 PROCEDURE — 99214 OFFICE O/P EST MOD 30 MIN: CPT | Performed by: PHYSICIAN ASSISTANT

## 2022-09-30 RX ORDER — SACCHAROMYCES BOULARDII 250 MG
250 CAPSULE ORAL 2 TIMES DAILY
Qty: 180 CAPSULE | Refills: 2 | Status: SHIPPED | OUTPATIENT
Start: 2022-09-30 | End: 2022-10-11

## 2022-09-30 NOTE — PROGRESS NOTES
[facility] - Outpatient Follow-up Note  Brittni Salinas 74 y o  female MRN: [id number]  Encounter: [id number]          ASSESSMENT AND PLAN:      1  GERD  2  Hiatal hernia  Patient was having persistent symptoms of reflux despite PPI at last office visit and was referred to bariatrics  She is now scheduled for possible hiatal hernia repair with LINX next month  Her reflux symptoms are controlled on PPI b i d  and Pepcid and she will not have symptoms if she takes his medications properly     -continue PPI and Pepcid  She does not need refills at this time     -continue follow-up with Bariatrics and weight management     -she is scheduled for procedures next month  We will follow-up with her a few months after to see how she is doing     -continue to avoid food triggers  3  Loose stools  Symptoms resolved since last office visit after avoiding spicy food and starting a probiotic  She now has 2 formed bowel movements daily     -continue probiotic     -continue to avoid food triggers     -if any worsening symptoms would consider starting fiber supplement at that time        Patient will follow-up in 4 months  ______________________________________________________________________    SUBJECTIVE:      Brittni Salinas is a 24-year-old pleasant female with past medical history of GERD, sleep apnea, hypertension, migraines, hyperlipidemia who presents to the office for follow-up  Patient was last seen in the office 04/2022 for persistent symptoms of reflux  She had an EGD around that time with medium-sized hiatal hernia and irregular Z-line with mild gastritis  Biopsies negative for Barretts  She has persistent symptoms despite PPI and was referred to bariatrics  They are planning for possible hiatal hernia repair with LINX which is scheduled for next month  Her reflux is currently well controlled on a regimen of Protonix b i d  and Pepcid at night    She only will have symptoms if she does not take these medications  She had gastric emptying scan which was normal   Manometry normal   Upper GI series with small to moderate size hiatal hernia and reflux  At last office visit she also change in bowel habits with more looser stools  She was able to find food associated trigger with spicy food and has been doing very well now  She has 2 bowel movements a day which are formed  She is also taking probiotic which she believes is helpful  Most recent lab work with normal hemoglobin, TSH and CMP  Last colonoscopy in 2020 with repeat recommended in 5 years due to polyps  REVIEW OF SYSTEMS IS OTHERWISE NEGATIVE        Historical Information   Past Medical History:   Diagnosis Date    Anxiety     Arthralgia     Atrial fibrillation (HCC)     Chronic interstitial cystitis     Colon polyp     Depression     Hyperlipidemia     Hypertension     Lichen sclerosus     Lipoma     Osteopenia     Paresthesia of both hands 8/6/2019    Simple partial seizures ([address] )     last assessed 3/30/17    Tachycardia     last assessed 6/24/15     Past Surgical History:   Procedure Laterality Date    COLONOSCOPY  2020    Dr Hector Garrett, 5 year f/u     COSMETIC SURGERY      HYSTERECTOMY      OOPHORECTOMY      REDUCTION MAMMAPLASTY      SHOULDER SURGERY       Social History   Social History     Substance and Sexual Activity   Alcohol Use Yes    Alcohol/week: 10 0 standard drinks    Types: 10 Glasses of wine per week    Comment: socially 1-2 drinks/week     Social History     Substance and Sexual Activity   Drug Use No     Social History     Tobacco Use   Smoking Status Never Smoker   Smokeless Tobacco Never Used     Family History   Problem Relation Age of Onset    Stroke Mother         CVA    Hypertension Mother     Hypertension Father     Lung cancer Father     Lung cancer Brother         AGE UNKNOWN    No Known Problems Sister     No Known Problems Maternal Grandmother     No Known Problems Maternal Grandfather     No Known Problems Paternal Grandmother     No Known Problems Paternal Grandfather     No Known Problems Maternal Aunt     No Known Problems Maternal Aunt     No Known Problems Maternal Aunt     No Known Problems Paternal Aunt     No Known Problems Paternal Aunt     No Known Problems Paternal Aunt        Meds/Allergies       Current Outpatient Medications:     albuterol (Ventolin HFA) 90 mcg/act inhaler    amLODIPine (NORVASC) 5 mg tablet    atorvastatin (LIPITOR) 40 mg tablet    benzonatate (TESSALON PERLES) 100 mg capsule    busPIRone (BUSPAR) 15 mg tablet    calcium citrate-Vitamin D 200 mg-250 units    divalproex sodium (DEPAKOTE ER) 500 mg 24 hr tablet    divalproex sodium (DEPAKOTE) 500 mg EC tablet    Eliquis 5 MG    escitalopram (LEXAPRO) 20 mg tablet    Estradiol (Elestrin) 0 52 MG/0 87 GM (0 06%) GEL    famotidine (PEPCID) 20 mg tablet    fexofenadine (ALLEGRA) 180 MG tablet    ipratropium (ATROVENT) 0 06 % nasal spray    lisinopril (ZESTRIL) 20 mg tablet    MELATONIN PO    pantoprazole (PROTONIX) 40 mg tablet    pindolol (VISKEN) 5 mg tablet    Probiotic Product (PROBIOTIC PO)    saccharomyces boulardii (FLORASTOR) 250 mg capsule    valACYclovir (VALTREX) 1,000 mg tablet    Allergies   Allergen Reactions    Codeine Hives    Erythromycin Base GI Intolerance    Hydromorphone Itching    Morphine GI Intolerance    Oxycodone-Acetaminophen Hives    Penicillins Rash           Objective     Blood pressure 120/68, pulse 66, height 5' 4" (1 626 m), weight 85 3 kg (188 lb), SpO2 97 %  Body mass index is 32 27 kg/m²        PHYSICAL EXAM:      General Appearance:   Alert, cooperative, no distress   HEENT:   Normocephalic, atraumatic, anicteric      Neck:  Supple, symmetrical, trachea midline   Lungs:   Clear to auscultation bilaterally; no rales, rhonchi or wheezing; respirations unlabored    Heart[de-identified]   Regular rate and rhythm; no murmur, rub, or gallop  Abdomen:   Soft, non-tender, non-distended; normal bowel sounds; no masses, no organomegaly    Genitalia:   Deferred    Rectal:   Deferred    Extremities:  No cyanosis, clubbing or edema    Pulses:  2+ and symmetric    Skin:  No jaundice, rashes, or lesions    Lymph nodes:  No palpable cervical lymphadenopathy        Lab Results:   No visits with results within 1 Day(s) from this visit  Latest known visit with results is:   Appointment on 09/19/2022   Component Date Value    TSH 3RD [facility] 09/19/2022 2 560          Radiology Results:   No results found

## 2022-10-06 ENCOUNTER — OFFICE VISIT (OUTPATIENT)
Dept: BARIATRICS | Facility: CLINIC | Age: 72
End: 2022-10-06
Payer: MEDICARE

## 2022-10-06 VITALS
DIASTOLIC BLOOD PRESSURE: 70 MMHG | BODY MASS INDEX: 31.76 KG/M2 | WEIGHT: 186 LBS | TEMPERATURE: 97.1 F | HEART RATE: 61 BPM | HEIGHT: 64 IN | RESPIRATION RATE: 18 BRPM | SYSTOLIC BLOOD PRESSURE: 128 MMHG

## 2022-10-06 DIAGNOSIS — K44.9 HIATAL HERNIA: ICD-10-CM

## 2022-10-06 DIAGNOSIS — E66.9 OBESITY, CLASS I, BMI 30-34.9: Primary | ICD-10-CM

## 2022-10-06 PROCEDURE — 99213 OFFICE O/P EST LOW 20 MIN: CPT | Performed by: SURGERY

## 2022-10-06 NOTE — PROGRESS NOTES
BARIATRIC HISTORY AND PHYSICAL - BARIATRIC SURGERY  Roderick Miller 74 y o  female MRN: [id number]  Unit/Bed#:  Encounter: [id number]      HPI:  Roderick Miller is a 74 y o  female who presents to review their preoperative workup and see if they are a good candidate to undergo a bariatric procedure  Review of Systems   Constitutional: Negative for chills and fever  HENT: Negative for ear pain and sore throat  Eyes: Negative for pain and visual disturbance  Respiratory: Negative for cough and shortness of breath  Cardiovascular: Negative for chest pain and palpitations  Gastrointestinal: Negative for abdominal pain and vomiting  Genitourinary: Negative for dysuria and hematuria  Musculoskeletal: Negative for arthralgias and back pain  Skin: Negative for color change and rash  Neurological: Negative for seizures and syncope  All other systems reviewed and are negative        Historical Information   Past Medical History:   Diagnosis Date    Anxiety     Arthralgia     Atrial fibrillation (HCC)     Chronic interstitial cystitis     Colon polyp     Depression     Hyperlipidemia     Hypertension     Lichen sclerosus     Lipoma     Osteopenia     Paresthesia of both hands 8/6/2019    Simple partial seizures ([address] )     last assessed 3/30/17    Tachycardia     last assessed 6/24/15     Past Surgical History:   Procedure Laterality Date    COLONOSCOPY  2020    Dr Harriett Claire, 5 year f/u     COSMETIC SURGERY      HYSTERECTOMY      OOPHORECTOMY      REDUCTION MAMMAPLASTY      SHOULDER SURGERY       Social History   Social History     Substance and Sexual Activity   Alcohol Use Yes    Alcohol/week: 10 0 standard drinks    Types: 10 Glasses of wine per week    Comment: socially 1-2 drinks/week     Social History     Substance and Sexual Activity   Drug Use No     Social History     Tobacco Use   Smoking Status Never Smoker   Smokeless Tobacco Never Used     Family History:   Family History   Problem Relation Age of Onset    Stroke Mother         CVA    Hypertension Mother     Hypertension Father     Lung cancer Father     Lung cancer Brother         AGE UNKNOWN    No Known Problems Sister     No Known Problems Maternal Grandmother     No Known Problems Maternal Grandfather     No Known Problems Paternal Grandmother     No Known Problems Paternal Grandfather     No Known Problems Maternal Aunt     No Known Problems Maternal Aunt     No Known Problems Maternal Aunt     No Known Problems Paternal Aunt     No Known Problems Paternal Aunt     No Known Problems Paternal Aunt        Meds/Allergies   all medications and allergies reviewed  Allergies   Allergen Reactions    Codeine Hives    Erythromycin Base GI Intolerance    Hydromorphone Itching    Morphine GI Intolerance    Oxycodone-Acetaminophen Hives    Penicillins Rash       Objective     Current Vitals:   Blood Pressure: 128/70 (10/06/22 1307)  Pulse: 61 (10/06/22 1307)  Temperature: (!) 97 1 °F (36 2 °C) (10/06/22 1307)  Temp Source: Tympanic (10/06/22 1307)  Respirations: 18 (10/06/22 1307)  Height: 5' 4" (162 6 cm) (10/06/22 1307)  Weight - Scale: 84 4 kg (186 lb) (10/06/22 1307)  bowel movement  [unfilled]    Invasive Devices  Report    None                 Physical Exam  Constitutional:       Appearance: Normal appearance  She is obese  HENT:      Head: Normocephalic and atraumatic  Nose: Nose normal       Mouth/Throat:      Mouth: Mucous membranes are moist    Eyes:      Extraocular Movements: Extraocular movements intact  Pupils: Pupils are equal, round, and reactive to light  Cardiovascular:      Rate and Rhythm: Normal rate and regular rhythm  Pulses: Normal pulses  Heart sounds: Normal heart sounds  Pulmonary:      Effort: Pulmonary effort is normal       Breath sounds: Normal breath sounds  Abdominal:      Palpations: Abdomen is soft        Comments: No rashes   Musculoskeletal: Cervical back: Normal range of motion  Skin:     General: Skin is warm  Neurological:      General: No focal deficit present  Mental Status: She is alert and oriented to person, place, and time  Psychiatric:         Mood and Affect: Mood normal          Behavior: Behavior normal          Lab Results: I have personally reviewed pertinent lab results  Imaging: I have personally reviewed pertinent reports  EKG, Pathology, and Other Studies: I have personally reviewed pertinent reports  [address] [place] [address]  [phone]        DATE OF SERVICE:  2/14/22     PHYSICIAN(S):  Kb Corral MD Proceduralist         INDICATION:  Dyspepsia, Esophagitis, Santiago's esophagus without dysplasia     POST-OP DIAGNOSIS:  See the impression below      PREPROCEDURE:  Informed consent was obtained for the procedure, including sedation  Risks of perforation, hemorrhage, adverse drug reaction and aspiration were discussed  The patient was placed in the left lateral decubitus position      Patient was explained about the risks and benefits of the procedure  Risks including but not limited to bleeding, infection, and perforation were explained in detail  Also explained about less than 100% sensitivity with the exam and other alternatives      DETAILS OF PROCEDURE:  Patient was taken to the procedure room where a time out was performed to confirm correct patient and correct procedure  The patient underwent monitored anesthesia care, which was administered by an anesthesia professional  The patient's blood pressure, heart rate, level of consciousness, respirations and oxygen were monitored throughout the procedure  The scope was advanced to the second part of the duodenum  Retroflexion was performed in the fundus  The patient experienced no blood loss  The procedure was not difficult  The patient tolerated the procedure well   There were no apparent complications       ANESTHESIA INFORMATION:  ASA: III  Anesthesia Type: IV Sedation with Anesthesia     MEDICATIONS:  No administrations occurring from 1219 to 1229 on 02/14/22         FINDINGS:  · Irregular Z-line 35 cm from the incisors  · Medium sliding hiatal hernia (type I hiatal hernia) without Karma Hummingbird lesions present - GE junction 35 cm from the incisors, diaphragmatic impression 40 cm from the incisors  · Mild, localized erythematous mucosa in the prepyloric region; performed cold forceps biopsy to rule out H  pylori  Retroflexed view was notable for sliding hiatal hernia  Pylorus was patent  · The duodenal bulb, 1st part of the duodenum and 2nd part of the duodenum appeared normal  Performed random biopsy         SPECIMENS:  ID Type Source Tests Collected by Time Destination   1 : duodenal bx Tissue Small Bowel, NOS TISSUE EXAM Michael Thayer MD 2/14/2022 12:29 PM              IMPRESSION:  1  Slightly irregular Z-line  2  Medium-sized hiatal hernia  3  Mild gastritis      RECOMMENDATION:  Await pathology results  Follow-up biopsy results in 2 weeks  Avoid NSAIDs  Advised to avoid fatty foods, chocolates, caffeine, alcohol and any other triggering foods  Avoid eating for at least 3 hours before going to bed  Recommend to continue pantoprazole 40 mg twice daily  Recommend Pepcid at bedtime  Patient reported loose stools for the past 1 month intermittently, would recommend stool studies to rule out infection  Would also recommend low residue, lactose-free diet  Follow-up in office in 6-8 weeks                                                                Michael Thayer MD          Addended by Michael Thayer MD on 2/14/2022 12:37 PM     Indication- hiatal hernia     Esophageal manometry  Esophageal motility- 10/10 swallows demonstrate normal esophageal contractibility pattern with mean DCI of 1441 mmHg  s cm  LES- median IRP is a normal limits  Impedance- 100% complete clearance of liquid bolus swallows     Gastroesophageal junction relative LES measures 3 6 cm     Rapid swallow index is less than 1     Julian classification- normal esophageal motility with moderate hiatal hernia      Final Diagnosis   A  Duodenal bx:  - Small bowel mucosa with no significant histopathologic abnormality   - Negative for malabsorption pattern  - Negative for malignancy       B  Stomach, bx:  - Gastric mucosa with no significant histopathologic abnormality   - Negative for H  pylori by routine H&E   - Negative for malignancy       C  Esophagus, distal esophageal bx:  - Squamous mucosa with no significant histopathologic abnormality   - No intraepithelial eosinophils identified   - Negative for intestinal metaplasia, dysplasia, and malignancy  Code Status: [unfilled]  Advance Directive and Living Will:      Power of :    POLST:        Assessment/Plan:    The patient presented to review the preoperative workup and see if bariatric surgery is appropriate and indicated following the extensive preoperative workup and the enrollment in our weight loss program   Preoperative workup was complete  Results were reviewed with the patient including the blood work results and the endoscopy findings and the biopsy results  We also reviewed the cardiology evaluation  The patient was determined to be a good candidate for Laparoscopic PEH repair with possible fundoplication with possible LINX with robotic assist    No cardiology recommendations about heart monitor or when to hold Eliquis, no follow up with cardiology until January  Patient has sleep apnea and does not wear CPAP  Discussed with patient about rescheduling surgery until after Cardiology appt and patient agreeable   will call patient and reschedule another H&P and new surgery date for mid January       Will Rx Tramadol post op due to allergy to oxy  ----------------------------------------------------------------------  Smoking: Denies  Blood thinner use: Eliquis BID for afib  Home pain medication use and who manages it: Denies  CPAP use: Patient has hx of sleep apnea but cannot tolerate CPAP, going back for a follow up on 10/11 (If yes, sleep study obtained and reminded pt to bring CPAP to hospital)  History of blood clots: Denies  Previous abdominal surgeries: Abdominoplasty 20 yrs ago  Cardiac clearance obtained: 9/13/22   EKG performed: 9/14/22  EGD prior to surgery: medium [address] Cor[address]ia obtained (CBC, CMP): 7/19/22  H  Pylori status: Negative  Medication allergies: Codeine, erythromycin, hydromorphone, morphine, oxy, pcn  SLIM consult Post-Op: Yes, HTN  Reminded patient about 2 week pre-op liquid diet: N/A  10% body weight loss pre-operatively met/discussed: N/A  Consent signed: Yes  Pre-Op ERAS Orders placed: No due to rescheduling surgery, needs Levoquin  -----------------------------------------------------------------------  Risks and benefits explained one more time to the patient  Alternatives to surgery and alternative forms of surgery were also explained  Post-surgical commitment and after care programs were explained  We also discussed that the [employer] robot may be available to us to use on the day of the patient procedure and that the procedure may be performed robotically  I discussed in details the advantages and disadvantages of this approach including the potential decrease in postoperative pain because of the remote center technology  I also mentioned the lack of strong evidence for the use of robot in bariatric patients and the potential disadvantage to patients because of the prolonged operative time  Consent was signed  Questions were answered and concerns were addressed  Patient will need to start the 2 week liquid diet prior to surgery  Patient wishes to proceed   As per [address] [place] [address] guidelines, I had a discussion with the patient regarding their CODE STATUS in the perioperative period and the patient is level 1 or FULL CODE STATUS      Ravi Eubanks PA-C  Bariatric Surgery   10/6/2022  1:26 PM

## 2022-10-11 DIAGNOSIS — Z12.31 ENCOUNTER FOR SCREENING MAMMOGRAM FOR BREAST CANCER: Primary | ICD-10-CM

## 2022-10-13 ENCOUNTER — CLINICAL SUPPORT (OUTPATIENT)
Dept: BARIATRICS | Facility: CLINIC | Age: 72
End: 2022-10-13

## 2022-10-13 VITALS — WEIGHT: 187.4 LBS | HEIGHT: 64 IN | BODY MASS INDEX: 31.99 KG/M2

## 2022-10-13 DIAGNOSIS — R63.5 ABNORMAL WEIGHT GAIN: Primary | ICD-10-CM

## 2022-10-13 PROCEDURE — RECHECK

## 2022-10-20 ENCOUNTER — CLINICAL SUPPORT (OUTPATIENT)
Dept: BARIATRICS | Facility: CLINIC | Age: 72
End: 2022-10-20

## 2022-10-20 VITALS — HEIGHT: 64 IN | BODY MASS INDEX: 31.92 KG/M2 | WEIGHT: 187 LBS

## 2022-10-20 DIAGNOSIS — R63.5 ABNORMAL WEIGHT GAIN: Primary | ICD-10-CM

## 2022-10-20 PROCEDURE — RECHECK

## 2022-10-27 ENCOUNTER — CLINICAL SUPPORT (OUTPATIENT)
Dept: BARIATRICS | Facility: CLINIC | Age: 72
End: 2022-10-27

## 2022-10-27 VITALS — BODY MASS INDEX: 31.96 KG/M2 | WEIGHT: 187.2 LBS | HEIGHT: 64 IN

## 2022-10-27 DIAGNOSIS — R63.5 ABNORMAL WEIGHT GAIN: Primary | ICD-10-CM

## 2022-10-27 PROCEDURE — RECHECK

## 2022-11-01 NOTE — PROGRESS NOTES
Weight Management Medical Nutrition Assessment  Dyana Greenfield is here today for Healthy Core Month 1 f/u  Current Weight: 186 1#  Patient has gained 0 8# x 5 weeks  Patient feels she is unable to "truly commit" to program  She enjoyed the motivation class and realized she is fearful to lose weight to just gain it back again like she has in the past  The idea of committing to a lifestyle change feels overwhelming at this time  Emphasized moderation over elimination and consistency over perfection  Discussed calorie and protein needs at breakfast  See meal specific recommendations below  Patient has added protein at lunch and reduced portions at dinner  Feels she struggles with PM chocolate portions  Recommend patient find pre-portioned sweet treats to address this  Options discussed  Discussed options for increasing protein and nutrient intake at breakfast and lunch  Discussed options for increasing physical activity within physical limitations  Reviewed body composition      Patient seen by Medical Provider in past 6 months:  yes  Requested to schedule appointment with Medical Provider: No    Anthropometric Measurements  Start Weight (#): 185 3# (22)  Current Weight (#): 186 1#  TBW % Change from start weight: --%  Ideal Body Weight (#): 120# (64")  Goal Weight (#): ST-10% LT#  UBW: 120-130#    Weight Loss History  Previous weight loss attempts:  Exercise  Counseling with  MD  Self Created Diets (Portion Control, Healthy Food Choices, etc )    Food and Nutrition Related History  Wake up: 9 am   Bed Time: 1 am  Struggles with sleep; takes afternoon nap (several hours)     Dietary Recall  Breakfast (11 am/12 pm): coffee (1/2 cup almond milk + 1/2 cup coffee) + yogurt + granola Or egg + toast Or roll (200 kcal) + butter  Snack: --  Lunch (2 pm): has been skipping lunch lately Or ND soup [recommend adding broccoli, or crackers, or other vegetables as she is not feeling full with soup alone] Or salad (dressing + cheese + tuna )   Snack: -- Or fruit [recommend adding protein with fruit such as cheese or peanut butter]  Dinner: salad + protein (chicken/fish/pork) + carb (2-3 Tbsp; rice, grains, sweet potato) + vegetable   Snack: Dark chocolate pieces (4-6 pieces)     Beverages: water, coffee (16 oz w/ almond milk + 1-2 tsp sugar), and alcohol (1-2x/week)  Volume of beverage intake: 48 ounces water    Weekends: Worse, dining out more   Cravings: carbs/sweets  Trouble area of day: evenings     Frequency of Eating out: 1x/week  Food restrictions: no spicy foods   Cooking: self   Food Shopping: self and      Occupation: Retired    Physical Activity  Activity: Pilates and yoga [would like to add more walking + strength training]  Frequency:2x/week for 60 mins  Physical limitations/barriers to exercise: mobility restrictions of shoulder + neck     Estimated Needs  Energy  SECA: BMR: 1,422 X 1 3 -1000 = 849 kcal    [employer] Energy Needs (needs at 187 9#)  BMR: 1,337 kcal  Maintenance calories (sedentary): 1,605 kcal  1-2# loss weekly sedentary: 605-1,105 kcal  1-2# loss weekly lightly active: 839-1,339 kcal    Protein: 65-82 grams (1 2-1 5g/kg IBW)  Fluid: 64 ounces (35mL/kg IBW)    Nutrition Diagnosis  Yes; Overweight/obesity  related to Excess energy intake as evidenced by  BMI more than normative standard for age and sex (obesity-grade I 26-30  9)     Nutrition Intervention    Nutrition Prescription  Calories: 1,000-1,100 kcal  Protein: 63-83 g  Fluid: 64+ ounces    Meal Plan (Rah/Pro)  Breakfast: 200/7-20  Snack: --  Lunch: 300/25  Snack: 100-150/5+  Dinner: 921/78-25  Snack: 100-150/5+    Nutrition Education  Healthy Core Manual  Calorie controlled menu  Lean protein food choices  Healthy snack options  Food journaling tips    Nutrition Counseling  Strategies: meal planning, portion sizes, healthy snack choices, hydration, fiber intake, protein intake, exercise, food logging    Monitoring and Evaluation:    Evaluation criteria  Energy Intake  Meet protein needs  Maintain adequate hydration  Monitor weekly weight  Meal planning/preparation  Food journal   Decreased portions at mealtimes and snacks  Physical activity     Barriers to learning:none  Readiness to change: Preparation:  (Getting ready to change)  and Action:  (Changing behavior)  Comprehension: very good  Expected Compliance: very good

## 2022-11-02 ENCOUNTER — OFFICE VISIT (OUTPATIENT)
Dept: BARIATRICS | Facility: CLINIC | Age: 72
End: 2022-11-02

## 2022-11-02 VITALS — HEIGHT: 64 IN | WEIGHT: 186.1 LBS | BODY MASS INDEX: 31.77 KG/M2

## 2022-11-02 DIAGNOSIS — R63.5 ABNORMAL WEIGHT GAIN: Primary | ICD-10-CM

## 2022-11-03 ENCOUNTER — CLINICAL SUPPORT (OUTPATIENT)
Dept: BARIATRICS | Facility: CLINIC | Age: 72
End: 2022-11-03

## 2022-11-03 VITALS — WEIGHT: 185 LBS | BODY MASS INDEX: 31.58 KG/M2 | HEIGHT: 64 IN

## 2022-11-03 VITALS — BODY MASS INDEX: 31.58 KG/M2 | HEIGHT: 64 IN | WEIGHT: 185 LBS

## 2022-11-03 DIAGNOSIS — R63.5 ABNORMAL WEIGHT GAIN: Primary | ICD-10-CM

## 2022-11-07 ENCOUNTER — TRANSCRIBE ORDERS (OUTPATIENT)
Dept: LAB | Facility: CLINIC | Age: 72
End: 2022-11-07

## 2022-11-07 ENCOUNTER — APPOINTMENT (OUTPATIENT)
Dept: LAB | Facility: CLINIC | Age: 72
End: 2022-11-07

## 2022-11-07 DIAGNOSIS — Z79.899 ENCOUNTER FOR LONG-TERM (CURRENT) USE OF OTHER MEDICATIONS: Primary | ICD-10-CM

## 2022-11-07 DIAGNOSIS — Z79.899 ENCOUNTER FOR LONG-TERM (CURRENT) USE OF OTHER MEDICATIONS: ICD-10-CM

## 2022-11-07 DIAGNOSIS — Z01.818 OTHER SPECIFIED PRE-OPERATIVE EXAMINATION: ICD-10-CM

## 2022-11-07 DIAGNOSIS — N64.4 MASTODYNIA: ICD-10-CM

## 2022-11-07 DIAGNOSIS — Z01.812 PRE-OPERATIVE LABORATORY EXAMINATION: ICD-10-CM

## 2022-11-07 DIAGNOSIS — L98.7 EXCESSIVE AND REDUNDANT SKIN AND SUBCUTANEOUS TISSUE: ICD-10-CM

## 2022-11-07 DIAGNOSIS — L98.7 EXCESSIVE AND REDUNDANT SKIN AND SUBCUTANEOUS TISSUE: Primary | ICD-10-CM

## 2022-11-07 LAB
ALBUMIN SERPL BCP-MCNC: 3.3 G/DL (ref 3.5–5)
ALP SERPL-CCNC: 52 U/L (ref 46–116)
ALT SERPL W P-5'-P-CCNC: 23 U/L (ref 12–78)
ANION GAP SERPL CALCULATED.3IONS-SCNC: 6 MMOL/L (ref 4–13)
AST SERPL W P-5'-P-CCNC: 11 U/L (ref 5–45)
BASOPHILS # BLD AUTO: 0.04 THOUSANDS/ÂΜL (ref 0–0.1)
BASOPHILS NFR BLD AUTO: 1 % (ref 0–1)
BILIRUB SERPL-MCNC: 0.49 MG/DL (ref 0.2–1)
BUN SERPL-MCNC: 23 MG/DL (ref 5–25)
CALCIUM ALBUM COR SERPL-MCNC: 10.1 MG/DL (ref 8.3–10.1)
CALCIUM SERPL-MCNC: 9.5 MG/DL (ref 8.3–10.1)
CHLORIDE SERPL-SCNC: 106 MMOL/L (ref 96–108)
CO2 SERPL-SCNC: 26 MMOL/L (ref 21–32)
CREAT SERPL-MCNC: 0.82 MG/DL (ref 0.6–1.3)
EOSINOPHIL # BLD AUTO: 0.06 THOUSAND/ÂΜL (ref 0–0.61)
EOSINOPHIL NFR BLD AUTO: 2 % (ref 0–6)
ERYTHROCYTE [DISTWIDTH] IN BLOOD BY AUTOMATED COUNT: 13 % (ref 11.6–15.1)
GFR SERPL CREATININE-BSD FRML MDRD: 70 ML/MIN/1.73SQ M
GLUCOSE SERPL-MCNC: 105 MG/DL (ref 65–140)
HCT VFR BLD AUTO: 40.3 % (ref 34.8–46.1)
HGB BLD-MCNC: 12.7 G/DL (ref 11.5–15.4)
IMM GRANULOCYTES # BLD AUTO: 0.02 THOUSAND/UL (ref 0–0.2)
IMM GRANULOCYTES NFR BLD AUTO: 1 % (ref 0–2)
LYMPHOCYTES # BLD AUTO: 1.3 THOUSANDS/ÂΜL (ref 0.6–4.47)
LYMPHOCYTES NFR BLD AUTO: 34 % (ref 14–44)
MCH RBC QN AUTO: 29.9 PG (ref 26.8–34.3)
MCHC RBC AUTO-ENTMCNC: 31.5 G/DL (ref 31.4–37.4)
MCV RBC AUTO: 95 FL (ref 82–98)
MONOCYTES # BLD AUTO: 0.46 THOUSAND/ÂΜL (ref 0.17–1.22)
MONOCYTES NFR BLD AUTO: 12 % (ref 4–12)
NEUTROPHILS # BLD AUTO: 1.98 THOUSANDS/ÂΜL (ref 1.85–7.62)
NEUTS SEG NFR BLD AUTO: 50 % (ref 43–75)
NRBC BLD AUTO-RTO: 0 /100 WBCS
PLATELET # BLD AUTO: 286 THOUSANDS/UL (ref 149–390)
PMV BLD AUTO: 10.8 FL (ref 8.9–12.7)
POTASSIUM SERPL-SCNC: 4.7 MMOL/L (ref 3.5–5.3)
PROT SERPL-MCNC: 7 G/DL (ref 6.4–8.4)
RBC # BLD AUTO: 4.25 MILLION/UL (ref 3.81–5.12)
SODIUM SERPL-SCNC: 138 MMOL/L (ref 135–147)
VALPROATE SERPL-MCNC: 72 UG/ML (ref 50–100)
WBC # BLD AUTO: 3.86 THOUSAND/UL (ref 4.31–10.16)

## 2022-11-10 ENCOUNTER — CLINICAL SUPPORT (OUTPATIENT)
Dept: BARIATRICS | Facility: CLINIC | Age: 72
End: 2022-11-10

## 2022-11-10 ENCOUNTER — OFFICE VISIT (OUTPATIENT)
Dept: BARIATRICS | Facility: CLINIC | Age: 72
End: 2022-11-10

## 2022-11-10 VITALS — WEIGHT: 184.8 LBS | HEIGHT: 64 IN | BODY MASS INDEX: 31.55 KG/M2

## 2022-11-10 DIAGNOSIS — R63.5 ABNORMAL WEIGHT GAIN: Primary | ICD-10-CM

## 2022-11-10 NOTE — PROGRESS NOTES
Healthy Core:  Behavioral Health Support for weight loss  Following Tori    Areas covered: Motivation to change, mindfulness, accountability, Auto-, Support, navigating through change with non judgement  Rosa Coreas stated she is thinking about food in a more positive nature  Motivation to accept a life style change around food has improved  She is exploring her triggers for mindless eating  Following the plan that RD provided for her weight loss goals  Was in the program previously and verbalized that she is connecting to how the classes are presented this time  Discussed focusing on ['choice" vs  Restriction  Provided a mindful eating handout

## 2022-11-22 ENCOUNTER — CLINICAL SUPPORT (OUTPATIENT)
Dept: BARIATRICS | Facility: CLINIC | Age: 72
End: 2022-11-22

## 2022-11-22 VITALS — WEIGHT: 183.2 LBS | HEIGHT: 64 IN | BODY MASS INDEX: 31.28 KG/M2

## 2022-11-22 DIAGNOSIS — R63.5 ABNORMAL WEIGHT GAIN: Primary | ICD-10-CM

## 2022-11-29 ENCOUNTER — ANESTHESIA EVENT (OUTPATIENT)
Dept: PERIOP | Facility: HOSPITAL | Age: 72
End: 2022-11-29
Payer: SELF-PAY

## 2022-11-29 NOTE — PROGRESS NOTES
Weight Management Medical Nutrition Assessment  Maliha Copeland is here today for Healthy Core Month 2 f/u  Current Weight: 185 2#  Patient has lost 0 9# x 1 months and overall has maintained her weight since starting program  Patient feels thanksgiving and cravings contributed to her weight gain from last class  Discussed goal of maintenance over holiday weekends  Encouraged patient to review meal plan and recommended products  Discussed benefits of consistent and controlled carbohydrates at each meal/snack  Patient feels she is struggling with grazing after meals when it is not a true hunger but has increased her mindfulness and is actively incorporating techniques  Patient is having surgery  and will not attend class again until   Explained patient may repeat a topic but patient feels she is benefiting from the information even without the weight loss       Patient seen by Medical Provider in past 6 months:  yes  Requested to schedule appointment with Medical Provider: No    Anthropometric Measurements  Start Weight (#): 185 3# (22)  Current Weight (#): 185 2#  TBW % Change from start weight: --%  Ideal Body Weight (#): 120# (64")  Goal Weight (#): ST-10% LT#  UBW: 120-130#    Weight Loss History  Previous weight loss attempts:Exercise  Counseling with MD  Self Created Diets (Portion Control, Healthy Food Choices, etc )    Food and Nutrition Related History  Wake up: 9 am   Bed Time: 1 am  Struggles with sleep; takes afternoon nap (several hours)     Dietary Recall  Breakfast (11 am/12 pm): coffee (1/2 cup almond milk + 1/2 cup coffee) + 2 kodiak protein waffles + spray butter + 1-2 Tbsp syrup (thought it was SF but it was regular syrup)   Snack: --  Lunch (2 pm): salad (HB egg/tuna + cheese + dressing; unmeasured but lightly drizzles) Or veggie burger + english muffin   Snack: may start eating leftover foods in fridge (rice, vegetables, etc )  Dinner: protein (chicken/fish/pork) + salad + carb/starch+ vegetable   Snack: Dark chocolate pieces (4 pieces)     Beverages: water, sparkling ice (8-16 oz), coffee (16 oz w/ almond milk + 1-2 tsp sugar), and alcohol (1-2x/week)  Volume of beverage intake: 48 ounces water    Weekends: Worse, dining out more   Cravings: carbs/sweets  Trouble area of day: evenings     Frequency of Eating out: 1x/week  Food restrictions: no spicy foods   Cooking: self   Food Shopping: self and      Occupation: Retired    Physical Activity  Activity: Pilates and yoga [less lately; would like to add in walking and stepper machine]  Frequency:2x/week for 60 mins  Physical limitations/barriers to exercise: mobility restrictions of shoulder + neck     Estimated Needs  Energy  SECA: BMR: 1,422 X 1 3 -1000 = 849 kcal    [employer] Energy Needs (needs at 187 9#)  BMR: 1,337 kcal  Maintenance calories (sedentary): 1,605 kcal  1-2# loss weekly sedentary: 605-1,105 kcal  1-2# loss weekly lightly active: 839-1,339 kcal    Protein: 65-82 grams (1 2-1 5g/kg IBW)  Fluid: 64 ounces (35mL/kg IBW)    Nutrition Diagnosis  Yes; Overweight/obesity related to Excess energy intake as evidenced by  BMI more than normative standard for age and sex (obesity-grade I 26-30  9)     Nutrition Intervention    Nutrition Prescription  Calories: 1,000-1,100 kcal  Protein: 63-83 g  Fluid: 64+ ounces    Meal Plan (Rah/Pro)  Breakfast: 200/7-20  Snack: --  Lunch: 300/25  Snack: 100-150/5+  Dinner: 544/73-21  Snack: 100-150/5+    Nutrition Education  Healthy Core Manual  Calorie controlled menu  Lean protein food choices  Healthy snack options  Food journaling tips    Nutrition Counseling  Strategies: meal planning, portion sizes, healthy snack choices, hydration, fiber intake, protein intake, exercise, food logging    Monitoring and Evaluation:    Evaluation criteria  Energy Intake  Meet protein needs  Maintain adequate hydration  Monitor weekly weight  Meal planning/preparation  Food journal Decreased portions at mealtimes and snacks  Physical activity     Barriers to learning:none  Readiness to change: Preparation:  (Getting ready to change)  and Action:  (Changing behavior)  Comprehension: very good  Expected Compliance: very good

## 2022-11-30 ENCOUNTER — OFFICE VISIT (OUTPATIENT)
Dept: BARIATRICS | Facility: CLINIC | Age: 72
End: 2022-11-30

## 2022-11-30 VITALS — WEIGHT: 185.2 LBS | BODY MASS INDEX: 31.62 KG/M2 | HEIGHT: 64 IN

## 2022-11-30 DIAGNOSIS — R63.5 ABNORMAL WEIGHT GAIN: Primary | ICD-10-CM

## 2022-11-30 RX ORDER — FLECAINIDE ACETATE 100 MG/1
100 TABLET ORAL 2 TIMES DAILY
COMMUNITY
Start: 2022-11-28

## 2022-11-30 RX ORDER — DIPHENOXYLATE HYDROCHLORIDE AND ATROPINE SULFATE 2.5; .025 MG/1; MG/1
1 TABLET ORAL DAILY
COMMUNITY

## 2022-12-01 ENCOUNTER — CLINICAL SUPPORT (OUTPATIENT)
Dept: BARIATRICS | Facility: CLINIC | Age: 72
End: 2022-12-01

## 2022-12-01 VITALS — BODY MASS INDEX: 31.69 KG/M2 | WEIGHT: 185.6 LBS | HEIGHT: 64 IN

## 2022-12-01 DIAGNOSIS — R63.5 ABNORMAL WEIGHT GAIN: Primary | ICD-10-CM

## 2022-12-05 ENCOUNTER — APPOINTMENT (EMERGENCY)
Dept: RADIOLOGY | Facility: HOSPITAL | Age: 72
End: 2022-12-05

## 2022-12-05 ENCOUNTER — HOSPITAL ENCOUNTER (EMERGENCY)
Facility: HOSPITAL | Age: 72
Discharge: HOME | End: 2022-12-05
Attending: EMERGENCY MEDICINE

## 2022-12-05 ENCOUNTER — TELEPHONE (OUTPATIENT)
Dept: INTERNAL MEDICINE CLINIC | Facility: CLINIC | Age: 72
End: 2022-12-05

## 2022-12-05 VITALS
BODY MASS INDEX: 31.76 KG/M2 | SYSTOLIC BLOOD PRESSURE: 174 MMHG | TEMPERATURE: 97.6 F | HEART RATE: 80 BPM | OXYGEN SATURATION: 94 % | WEIGHT: 185 LBS | RESPIRATION RATE: 18 BRPM | DIASTOLIC BLOOD PRESSURE: 75 MMHG

## 2022-12-05 DIAGNOSIS — R11.2 NAUSEA AND VOMITING: Primary | ICD-10-CM

## 2022-12-05 LAB
ALBUMIN SERPL BCP-MCNC: 3.8 G/DL (ref 3.5–5)
ALP SERPL-CCNC: 59 U/L (ref 46–116)
ALT SERPL W P-5'-P-CCNC: 23 U/L (ref 12–78)
ANION GAP SERPL CALCULATED.3IONS-SCNC: 5 MMOL/L (ref 4–13)
AST SERPL W P-5'-P-CCNC: 11 U/L (ref 5–45)
BASOPHILS # BLD AUTO: 0.02 THOUSANDS/ÂΜL (ref 0–0.1)
BASOPHILS NFR BLD AUTO: 0 % (ref 0–1)
BILIRUB SERPL-MCNC: 0.48 MG/DL (ref 0.2–1)
BUN SERPL-MCNC: 25 MG/DL (ref 5–25)
CALCIUM SERPL-MCNC: 9.4 MG/DL (ref 8.3–10.1)
CHLORIDE SERPL-SCNC: 105 MMOL/L (ref 96–108)
CO2 SERPL-SCNC: 27 MMOL/L (ref 21–32)
CREAT SERPL-MCNC: 0.82 MG/DL (ref 0.6–1.3)
EOSINOPHIL # BLD AUTO: 0.03 THOUSAND/ÂΜL (ref 0–0.61)
EOSINOPHIL NFR BLD AUTO: 0 % (ref 0–6)
ERYTHROCYTE [DISTWIDTH] IN BLOOD BY AUTOMATED COUNT: 12.8 % (ref 11.6–15.1)
GFR SERPL CREATININE-BSD FRML MDRD: 70 ML/MIN/1.73SQ M
GLUCOSE SERPL-MCNC: 121 MG/DL (ref 65–140)
HCT VFR BLD AUTO: 44.7 % (ref 34.8–46.1)
HGB BLD-MCNC: 14.3 G/DL (ref 11.5–15.4)
IMM GRANULOCYTES # BLD AUTO: 0.03 THOUSAND/UL (ref 0–0.2)
IMM GRANULOCYTES NFR BLD AUTO: 0 % (ref 0–2)
LIPASE SERPL-CCNC: 116 U/L (ref 73–393)
LYMPHOCYTES # BLD AUTO: 0.44 THOUSANDS/ÂΜL (ref 0.6–4.47)
LYMPHOCYTES NFR BLD AUTO: 3 % (ref 14–44)
MCH RBC QN AUTO: 30.4 PG (ref 26.8–34.3)
MCHC RBC AUTO-ENTMCNC: 32 G/DL (ref 31.4–37.4)
MCV RBC AUTO: 95 FL (ref 82–98)
MONOCYTES # BLD AUTO: 0.62 THOUSAND/ÂΜL (ref 0.17–1.22)
MONOCYTES NFR BLD AUTO: 5 % (ref 4–12)
NEUTROPHILS # BLD AUTO: 11.72 THOUSANDS/ÂΜL (ref 1.85–7.62)
NEUTS SEG NFR BLD AUTO: 92 % (ref 43–75)
NRBC BLD AUTO-RTO: 0 /100 WBCS
PLATELET # BLD AUTO: 335 THOUSANDS/UL (ref 149–390)
PMV BLD AUTO: 10.3 FL (ref 8.9–12.7)
POTASSIUM SERPL-SCNC: 5.2 MMOL/L (ref 3.5–5.3)
PROT SERPL-MCNC: 7.5 G/DL (ref 6.4–8.4)
RBC # BLD AUTO: 4.71 MILLION/UL (ref 3.81–5.12)
SODIUM SERPL-SCNC: 137 MMOL/L (ref 135–147)
WBC # BLD AUTO: 12.86 THOUSAND/UL (ref 4.31–10.16)

## 2022-12-05 RX ORDER — ONDANSETRON 4 MG/1
4 TABLET, FILM COATED ORAL EVERY 8 HOURS PRN
Qty: 20 TABLET | Refills: 0 | Status: SHIPPED | OUTPATIENT
Start: 2022-12-05

## 2022-12-05 RX ORDER — ONDANSETRON 2 MG/ML
4 INJECTION INTRAMUSCULAR; INTRAVENOUS ONCE
Status: COMPLETED | OUTPATIENT
Start: 2022-12-05 | End: 2022-12-05

## 2022-12-05 RX ADMIN — ONDANSETRON 4 MG: 2 INJECTION INTRAMUSCULAR; INTRAVENOUS at 19:07

## 2022-12-05 RX ADMIN — IOHEXOL 85 ML: 350 INJECTION, SOLUTION INTRAVENOUS at 19:52

## 2022-12-05 RX ADMIN — SODIUM CHLORIDE 1000 ML: 0.9 INJECTION, SOLUTION INTRAVENOUS at 19:07

## 2022-12-05 NOTE — ED PROVIDER NOTES
History  Chief Complaint   Patient presents with   • Vomiting     Patient states that she ate a breakfast sandwich this morning and felt nauseous right after and around 11 has been vomiting  Complaining of abdominal pain  HPI     Patient is a 75 y/o F with PMH a fib on Eliquis, HTN presenting with acute vomiting  Patient states she ate a frozen breakfast this morning and approximately 90 minutes later had sudden onset nausea and vomiting  Has had multiple episodes of vomiting since although currently feels somewhat better  Mild epigastric pain associated  Denies fever, chills, diarrhea, CP, SOB  Prior to Admission Medications   Prescriptions Last Dose Informant Patient Reported? Taking?    AYR SALINE NASAL NA   Yes No   Sig: into each nostril 2 (two) times a day   Alpha-D-Galactosidase (BEANO PO)   Yes No   Sig: Take by mouth   Boswellia-Glucosamine-Vit D (OSTEO BI-FLEX ONE PER DAY PO)   Yes No   Sig: Take 1 capsule by mouth in the morning With turmeric   Eliquis 5 MG   No No   Sig: TAKE 1 TABLET BY MOUTH  TWICE DAILY   MELATONIN PO   Yes No   Sig: Take 10 mg by mouth daily at bedtime   Probiotic Product (PROBIOTIC PO)   Yes No   Sig: Take 1 capsule by mouth 2 (two) times a day   amLODIPine (NORVASC) 5 mg tablet   No No   Sig: TAKE 1 TABLET BY MOUTH  DAILY   atorvastatin (LIPITOR) 40 mg tablet   No No   Sig: Take 1 tablet (40 mg total) by mouth daily   busPIRone (BUSPAR) 15 mg tablet   Yes No   Sig: Take 15 mg by mouth 2 (two) times a day   calcium citrate-Vitamin D 200 mg-250 units   Yes No   Sig: Take 1 tablet by mouth daily with breakfast   divalproex sodium (DEPAKOTE ER) 500 mg 24 hr tablet   Yes No   Sig: Take 1 tablet by mouth 2 (two) times a day   escitalopram (LEXAPRO) 20 mg tablet   Yes No   Sig: Take 1 tablet by mouth daily   famotidine (PEPCID) 20 mg tablet   No No   Sig: TAKE 1 TABLET BY MOUTH  TWICE DAILY   Patient taking differently: Take 20 mg by mouth daily   fexofenadine (ALLEGRA) 180 MG tablet   Yes No   Sig: Take 1 tablet by mouth daily as needed   flecainide (TAMBOCOR) 100 mg tablet   Yes No   Sig: Take 100 mg by mouth 2 (two) times a day   lisinopril (ZESTRIL) 20 mg tablet   No No   Sig: Take 1 tablet (20 mg total) by mouth daily   multivitamin (THERAGRAN) TABS   Yes No   Sig: Take 1 tablet by mouth daily   pantoprazole (PROTONIX) 40 mg tablet   No No   Sig: TAKE 1 TABLET BY MOUTH  TWICE DAILY BEFORE MEALS   pindolol (VISKEN) 5 mg tablet   No No   Sig: Take 1 tablet (5 mg total) by mouth daily   Patient taking differently: Take 5 mg by mouth 2 (two) times a day   valACYclovir (VALTREX) 1,000 mg tablet   Yes No   Sig: Take 1,000 mg by mouth if needed      Facility-Administered Medications: None       Past Medical History:   Diagnosis Date   • Acid reflux disease    • Allergies    • Anxiety    • Arthralgia    • Arthritis    • Atrial fibrillation (HCC)    • Chronic interstitial cystitis    • Chronic pain disorder     neck/ shldrs   • Colon polyp    • Depression    • GERD (gastroesophageal reflux disease)    • Hyperlipidemia    • Hypertension    • Irregular heart beat     afib   • Lichen sclerosus    • Lipoma    • Osteopenia    • Paresthesia of both hands 08/06/2019   • Seizures (HCC)    • Shortness of breath     ROJAS   • Simple partial seizures (HCC)     last assessed 3/30/17   • Sleep apnea    • Tachycardia     last assessed 6/24/15       Past Surgical History:   Procedure Laterality Date   • ABDOMINOPLASTY     • BLEPHAROPLASTY     • COLONOSCOPY  2020    Dr Christine Rodriguez, 5 year f/u    • COSMETIC SURGERY     • FACELIFT     • HYSTERECTOMY     • OOPHORECTOMY     • REDUCTION MAMMAPLASTY     • RHINOPLASTY     • SHOULDER SURGERY Left        Family History   Problem Relation Age of Onset   • Stroke Mother         CVA   • Hypertension Mother    • Hypertension Father    • Lung cancer Father    • Lung cancer Brother         AGE UNKNOWN   • No Known Problems Sister    • No Known Problems Maternal Grandmother    • No Known Problems Maternal Grandfather    • No Known Problems Paternal Grandmother    • No Known Problems Paternal Grandfather    • No Known Problems Maternal Aunt    • No Known Problems Maternal Aunt    • No Known Problems Maternal Aunt    • No Known Problems Paternal Aunt    • No Known Problems Paternal Aunt    • No Known Problems Paternal Aunt      I have reviewed and agree with the history as documented  E-Cigarette/Vaping   • E-Cigarette Use Never User      E-Cigarette/Vaping Substances   • Nicotine No    • THC No    • CBD No    • Flavoring No    • Other No    • Unknown No      Social History     Tobacco Use   • Smoking status: Never   • Smokeless tobacco: Never   Vaping Use   • Vaping Use: Never used   Substance Use Topics   • Alcohol use: Yes     Alcohol/week: 10 0 standard drinks     Types: 10 Glasses of wine per week     Comment: socially 1-2 drinks/week   • Drug use: No        Review of Systems   Constitutional: Negative for chills and fever  HENT: Negative for ear pain and sore throat  Eyes: Negative for pain and visual disturbance  Respiratory: Negative for cough and shortness of breath  Cardiovascular: Negative for chest pain and palpitations  Gastrointestinal: Positive for abdominal pain, nausea and vomiting  Genitourinary: Negative for dysuria and hematuria  Musculoskeletal: Negative for arthralgias and back pain  Skin: Negative for color change and rash  Neurological: Negative for seizures and syncope  All other systems reviewed and are negative        Physical Exam  ED Triage Vitals [12/05/22 1645]   Temperature Pulse Respirations Blood Pressure SpO2   97 6 °F (36 4 °C) 71 18 128/73 95 %      Temp Source Heart Rate Source Patient Position - Orthostatic VS BP Location FiO2 (%)   Oral Monitor Sitting Right arm --      Pain Score       3             Orthostatic Vital Signs  Vitals:    12/05/22 1645 12/05/22 1845 12/05/22 1930   BP: 128/73 156/72 (!) 174/75   Pulse: 71 76 80 Patient Position - Orthostatic VS: Sitting  Lying       Physical Exam  Vitals and nursing note reviewed  Constitutional:       General: She is not in acute distress  Appearance: She is not ill-appearing or diaphoretic  HENT:      Head: Normocephalic and atraumatic  Right Ear: External ear normal       Left Ear: External ear normal       Nose: Nose normal       Mouth/Throat:      Pharynx: Oropharynx is clear  Eyes:      Extraocular Movements: Extraocular movements intact  Pupils: Pupils are equal, round, and reactive to light  Cardiovascular:      Rate and Rhythm: Normal rate and regular rhythm  Pulses: Normal pulses  Heart sounds: Normal heart sounds  No murmur heard  No friction rub  No gallop  Pulmonary:      Effort: Pulmonary effort is normal  No respiratory distress  Breath sounds: Normal breath sounds  No wheezing, rhonchi or rales  Abdominal:      General: Abdomen is flat  There is no distension  Palpations: Abdomen is soft  Tenderness: There is abdominal tenderness  There is no guarding or rebound  Comments: Mild epigastric and LUQ tenderness   Musculoskeletal:         General: No deformity  Normal range of motion  Cervical back: Normal range of motion  Right lower leg: No edema  Left lower leg: No edema  Skin:     General: Skin is warm and dry  Capillary Refill: Capillary refill takes less than 2 seconds  Findings: No rash  Neurological:      General: No focal deficit present  Mental Status: She is alert and oriented to person, place, and time        Gait: Gait normal    Psychiatric:         Mood and Affect: Mood normal          ED Medications  Medications   sodium chloride 0 9 % bolus 1,000 mL (0 mL Intravenous Stopped 12/5/22 2129)   ondansetron (ZOFRAN) injection 4 mg (4 mg Intravenous Given 12/5/22 1907)   iohexol (OMNIPAQUE) 350 MG/ML injection (SINGLE-DOSE) 85 mL (85 mL Intravenous Given 12/5/22 1952) Diagnostic Studies  Results Reviewed     Procedure Component Value Units Date/Time    CBC and differential [[id number]]  (Abnormal) Collected: 12/05/22 1742    Lab Status: Final result Specimen: Blood from Arm, Left Updated: 12/05/22 1935     WBC 12 86 Thousand/uL      RBC 4 71 Million/uL      Hemoglobin 14 3 g/dL      Hematocrit 44 7 %      MCV 95 fL      MCH 30 4 pg      MCHC 32 0 g/dL      RDW 12 8 %      MPV 10 3 fL      Platelets 045 Thousands/uL      nRBC 0 /100 WBCs      Neutrophils Relative 92 %      Immat GRANS % 0 %      Lymphocytes Relative 3 %      Monocytes Relative 5 %      Eosinophils Relative 0 %      Basophils Relative 0 %      Neutrophils Absolute 11 72 Thousands/µL      Immature Grans Absolute 0 03 Thousand/uL      Lymphocytes Absolute 0 44 Thousands/µL      Monocytes Absolute 0 62 Thousand/µL      Eosinophils Absolute 0 03 Thousand/µL      Basophils Absolute 0 02 Thousands/µL     Narrative: This is an appended report  These results have been appended to a previously verified report      Comprehensive metabolic panel [[id number]] Collected: 12/05/22 1742    Lab Status: Final result Specimen: Blood from Arm, Left Updated: 12/05/22 1813     Sodium 137 mmol/L      Potassium 5 2 mmol/L      Chloride 105 mmol/L      CO2 27 mmol/L      ANION GAP 5 mmol/L      BUN 25 mg/dL      Creatinine 0 82 mg/dL      Glucose 121 mg/dL      Calcium 9 4 mg/dL      AST 11 U/L      ALT 23 U/L      Alkaline Phosphatase 59 U/L      Total Protein 7 5 g/dL      Albumin 3 8 g/dL      Total Bilirubin 0 48 mg/dL      eGFR 70 ml/min/1 73sq m     Narrative:      [facility] guidelines for Chronic Kidney Disease (CKD):   •  Stage 1 with normal or high GFR (GFR > 90 mL/min/1 73 square meters)  •  Stage 2 Mild CKD (GFR = 60-89 mL/min/1 73 square meters)  •  Stage 3A Moderate CKD (GFR = 45-59 mL/min/1 73 square meters)  •  Stage 3B Moderate CKD (GFR = 30-44 mL/min/1 73 square meters)  •  Stage 4 Severe CKD (GFR = 15-29 mL/min/1 73 square meters)  •  Stage 5 End Stage CKD (GFR <15 mL/min/1 73 square meters)  Note: GFR calculation is accurate only with a steady state creatinine    Lipase [[id number]]  (Normal) Collected: 12/05/22 1742    Lab Status: Final result Specimen: Blood from Arm, Left Updated: 12/05/22 1813     Lipase 116 u/L                  CT abdomen pelvis with contrast   Final Result by Fabiola Chandra MD (12/05 2107)      Findings compatible with constipation  No evidence of bowel obstruction, colitis, diverticulitis or appendicitis  Small hiatal hernia  Workstation performed: [id number]               Procedures  Procedures      ED Course               Identification of Seniors at Risk    [employer] Most Recent Value   (ISAR) Identification of Seniors at Risk    Before the illness or injury that brought you to the Emergency, did you need someone to help you on a regular basis? 0 Filed at: 12/05/2022 1646   In the last 24 hours, have you needed more help than usual? 0 Filed at: 12/05/2022 1646   Have you been hospitalized for one or more nights during the past 6 months? 0 Filed at: 12/05/2022 1646   In general, do you see well? 0 Filed at: 12/05/2022 1646   In general, do you have serious problems with your memory? 0 Filed at: 12/05/2022 1646   Do you take more than three different medications every day? 1 Filed at: 12/05/2022 1646   ISAR Score 1 Filed at: 12/05/2022 1646                              MDM  Number of Diagnoses or Management Options  Nausea and vomiting  Diagnosis management comments: 75 y/o F presenting with acute onset nausea, vomiting  VSS  Exam nonfocal, mild abd tenderness  Plan for symptom control, EKG, labs, CT abd  EKG reviewed by me appears sinus rhythm without acute ST or T wave changes  Labs WNL  CT negative  Likely sx 2/2 food related illness vs viral  Pt feeling better, will dc with antiemetic, discussed return precautions         Disposition  Final diagnoses:   Nausea and vomiting     Time reflects when diagnosis was documented in both MDM as applicable and the Disposition within this note     Time User Action Codes Description Comment    12/5/2022  9:12 PM Victoria Boeck Add [R11 2] Nausea and vomiting       ED Disposition     ED Disposition   Discharge    Condition   Stable    Date/Time   Mon Dec 5, 2022  9:12 PM    Comment   Tank Lila discharge to home/self care                 Follow-up Information     Follow up With Specialties Details Why [id number] [address], DO Internal Medicine   [address] [facility]  2nd Floor, [address] [facility],[address]  [phone]            Discharge Medication List as of 12/5/2022  9:48 PM      START taking these medications    Details   ondansetron (ZOFRAN) 4 mg tablet Take 1 tablet (4 mg total) by mouth every 8 (eight) hours as needed for nausea or vomiting, Starting Mon 12/5/2022, Normal         CONTINUE these medications which have NOT CHANGED    Details   Alpha-D-Galactosidase (BEANO PO) Take by mouth, Historical Med      amLODIPine (NORVASC) 5 mg tablet TAKE 1 TABLET BY MOUTH  DAILY, Normal      atorvastatin (LIPITOR) 40 mg tablet Take 1 tablet (40 mg total) by mouth daily, Starting Mon 12/13/2021, No Print      AYR SALINE NASAL NA into each nostril 2 (two) times a day, Historical Med      Boswellia-Glucosamine-Vit D (OSTEO BI-FLEX ONE PER DAY PO) Take 1 capsule by mouth in the morning With turmeric, Historical Med      busPIRone (BUSPAR) 15 mg tablet Take 15 mg by mouth 2 (two) times a day, Historical Med      calcium citrate-Vitamin D 200 mg-250 units Take 1 tablet by mouth daily with breakfast, Historical Med      divalproex sodium (DEPAKOTE ER) 500 mg 24 hr tablet Take 1 tablet by mouth 2 (two) times a day, Historical Med      Eliquis 5 MG TAKE 1 TABLET BY MOUTH  TWICE DAILY, Normal      escitalopram (LEXAPRO) 20 mg tablet Take 1 tablet by mouth daily, Historical Med      famotidine (PEPCID) 20 mg tablet TAKE 1 TABLET BY MOUTH  TWICE DAILY, Normal      fexofenadine (ALLEGRA) 180 MG tablet Take 1 tablet by mouth daily as needed, Historical Med      flecainide (TAMBOCOR) 100 mg tablet Take 100 mg by mouth 2 (two) times a day, Starting Mon 11/28/2022, Historical Med      lisinopril (ZESTRIL) 20 mg tablet Take 1 tablet (20 mg total) by mouth daily, Starting Tue 3/29/2022, Normal      MELATONIN PO Take 10 mg by mouth daily at bedtime, Historical Med      multivitamin (THERAGRAN) TABS Take 1 tablet by mouth daily, Historical Med      pantoprazole (PROTONIX) 40 mg tablet TAKE 1 TABLET BY MOUTH  TWICE DAILY BEFORE MEALS, Normal      pindolol (VISKEN) 5 mg tablet Take 1 tablet (5 mg total) by mouth daily, Starting Thu 5/14/2020, Normal      Probiotic Product (PROBIOTIC PO) Take 1 capsule by mouth 2 (two) times a day, Historical Med      valACYclovir (VALTREX) 1,000 mg tablet Take 1,000 mg by mouth if needed, Historical Med           No discharge procedures on file  PDMP Review     None           ED Provider  Attending physically available and evaluated Neo Flores  ZHANNA managed the patient along with the ED Attending      Electronically Signed by         Susana Smith MD  12/06/22 3151

## 2022-12-05 NOTE — TELEPHONE ENCOUNTER
Recommend rescheduling  Looks like she is having general anesthesia  Would recommend making sure her electrolytes are ok after vomiting  In addition, she may have lingering symptoms tomorrow

## 2022-12-05 NOTE — TELEPHONE ENCOUNTER
Patient called and has surgery tomorrow and has been throwing up and might have food poisoning  They asked for advice at [phone]    Thank you

## 2022-12-05 NOTE — ED ATTENDING ATTESTATION
12/5/2022  ILucia MD, saw and evaluated the patient  I have discussed the patient with the resident/non-physician practitioner and agree with the resident's/non-physician practitioner's findings, Plan of Care, and MDM as documented in the resident's/non-physician practitioner's note, except where noted  All available labs and Radiology studies were reviewed  I was present for key portions of any procedure(s) performed by the resident/non-physician practitioner and I was immediately available to provide assistance  At this point I agree with the current assessment done in the Emergency Department    I have conducted an independent evaluation of this patient a history and physical is as follows:  Patient presents for episodes of nonbilious nonbloody vomiting the initial episode occurred approximately an hour and half after she ate a gym edema breakfast sausage sandwich with egg  She had never had this before  She felt better after the initial episode of vomiting and then later in the afternoon had an additional episode where he had abdominal cramping and additional nonbilious nonbloody vomiting no bowel movement since yesterday past surgical history includes tummy tuck and hysterectomy no fever no shortness of breath no respiratory symptoms no urinary symptoms  Exam the patient is in no acute distress her vital signs are stable she is afebrile sclerae anicteric lungs are clear heart is regular abdomen is soft positive bowel sounds very minimal tenderness in left lower quadrant without rebound or guarding  Impression abdominal pain with vomiting  Likely food related however patient does have tenderness on exam she has had prior abdominal surgery CT abdomen pelvis to rule out bowel obstruction diverticulitis and/or acute abdominal process  Symptomatic treatment IV fluids re-evaluation  ED Course         Critical Care Time  Procedures

## 2022-12-06 ENCOUNTER — ANESTHESIA (OUTPATIENT)
Dept: PERIOP | Facility: HOSPITAL | Age: 72
End: 2022-12-06
Payer: SELF-PAY

## 2022-12-06 LAB
ATRIAL RATE: 74 BPM
P AXIS: 69 DEGREES
PR INTERVAL: 194 MS
QRS AXIS: 92 DEGREES
QRSD INTERVAL: 100 MS
QT INTERVAL: 374 MS
QTC INTERVAL: 415 MS
T WAVE AXIS: 78 DEGREES
VENTRICULAR RATE: 74 BPM

## 2022-12-06 NOTE — DISCHARGE INSTRUCTIONS
Take zofran for nausea, should improve in next 12-24 hours  If you develop new or worsening symptoms, please return to the Emergency Department for further evaluation

## 2022-12-17 NOTE — ANESTHESIA PREPROCEDURE EVALUATION
Procedure:  MINI FACELIFT (Face)    Relevant Problems   CARDIO   (+) Chronic migraine without aura without status migrainosus, not intractable   (+) Coronary vasospasm (HCC)   (+) Hyperlipidemia   (+) Hypertension   (+) Paroxysmal atrial fibrillation (HCC)      GI/HEPATIC   (+) Gastroesophageal reflux disease   (+) Hiatal hernia      GYN   (+) History of hysterectomy      MUSCULOSKELETAL   (+) Arthritis   (+) Cervical spondylosis   (+) Coronary vasospasm (HCC)      NEURO/PSYCH   (+) Anxiety   (+) Chronic migraine without aura without status migrainosus, not intractable   (+) Complex partial seizure with impairment of consciousness ([facility])   (+) 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 )   (+) Episode of recurrent major depressive disorder [facility] )   (+) 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, intractable, without status epilepticus (HCC)   (+) Seizure disorder (HCC)   (+) Simple partial seizure with somatosensory or special sensory dysfunction (HCC)      PULMONARY   (+) Obstructive sleep apnea syndrome   (+) Upper respiratory tract infection      Respiratory   (+) Acute bronchitis      Digestive   (+) Esophagitis      Nervous and Auditory   (+) Carpal tunnel syndrome, bilateral      Musculoskeletal and Integument   (+) Cervicocranial syndrome   (+) Injury of tendon of rotator cuff   (+) Trochanteric bursitis      Genitourinary   (+) Post-menopausal atrophic vaginitis      Other   (+) Arthralgia   (+) Current use of long term anticoagulation   (+) Decreased hearing   (+) Dyspepsia   (+) Insomnia   (+) Lichen sclerosus   (+) Lipoma   (+) Myalgia   (+) Nausea   (+) Obesity (BMI 30 0-34 9)   (+) Right hip pain   (+) Symptomatic menopausal or female climacteric states        Physical Exam    Airway    Mallampati score: II  TM Distance: >3 FB  Neck ROM: full     Dental   No notable dental hx     Cardiovascular      Pulmonary  Pulmonary exam normal     Other Findings        Anesthesia Plan  ASA Score- 2     Anesthesia Type- general with ASA Monitors  Additional Monitors:   Airway Plan: ETT  Plan Factors-Exercise tolerance (METS): >4 METS  Chart reviewed  EKG reviewed  Existing labs reviewed  Patient is not a current smoker  Patient not instructed to abstain from smoking on day of procedure  Patient did not smoke on day of surgery  Induction- intravenous  Postoperative Plan-     Informed Consent- Anesthetic plan and risks discussed with patient  I personally reviewed this patient with the CRNA  Discussed and agreed on the Anesthesia Plan with the ANDREA Barros

## 2022-12-19 ENCOUNTER — HOSPITAL ENCOUNTER (OUTPATIENT)
Facility: HOSPITAL | Age: 72
Setting detail: OUTPATIENT SURGERY
Discharge: HOME | End: 2022-12-19
Attending: SURGERY | Admitting: SURGERY

## 2022-12-19 VITALS
TEMPERATURE: 97.2 F | RESPIRATION RATE: 18 BRPM | HEART RATE: 63 BPM | DIASTOLIC BLOOD PRESSURE: 65 MMHG | SYSTOLIC BLOOD PRESSURE: 160 MMHG | OXYGEN SATURATION: 96 % | HEIGHT: 64 IN | BODY MASS INDEX: 30.73 KG/M2 | WEIGHT: 180 LBS

## 2022-12-19 RX ORDER — EPINEPHRINE 1 MG/ML
INJECTION, SOLUTION, CONCENTRATE INTRAVENOUS AS NEEDED
Status: DISCONTINUED | OUTPATIENT
Start: 2022-12-19 | End: 2022-12-19 | Stop reason: HOSPADM

## 2022-12-19 RX ORDER — SODIUM CHLORIDE, SODIUM LACTATE, POTASSIUM CHLORIDE, CALCIUM CHLORIDE 600; 310; 30; 20 MG/100ML; MG/100ML; MG/100ML; MG/100ML
100 INJECTION, SOLUTION INTRAVENOUS CONTINUOUS
Status: DISCONTINUED | OUTPATIENT
Start: 2022-12-19 | End: 2022-12-19 | Stop reason: HOSPADM

## 2022-12-19 RX ORDER — MAGNESIUM HYDROXIDE 1200 MG/15ML
LIQUID ORAL AS NEEDED
Status: DISCONTINUED | OUTPATIENT
Start: 2022-12-19 | End: 2022-12-19 | Stop reason: HOSPADM

## 2022-12-19 RX ORDER — PROPOFOL 10 MG/ML
INJECTION, EMULSION INTRAVENOUS AS NEEDED
Status: DISCONTINUED | OUTPATIENT
Start: 2022-12-19 | End: 2022-12-19

## 2022-12-19 RX ORDER — FENTANYL CITRATE/PF 50 MCG/ML
25 SYRINGE (ML) INJECTION
Status: DISCONTINUED | OUTPATIENT
Start: 2022-12-19 | End: 2022-12-19 | Stop reason: HOSPADM

## 2022-12-19 RX ORDER — SODIUM CHLORIDE, SODIUM LACTATE, POTASSIUM CHLORIDE, CALCIUM CHLORIDE 600; 310; 30; 20 MG/100ML; MG/100ML; MG/100ML; MG/100ML
125 INJECTION, SOLUTION INTRAVENOUS CONTINUOUS
Status: DISCONTINUED | OUTPATIENT
Start: 2022-12-19 | End: 2022-12-19 | Stop reason: HOSPADM

## 2022-12-19 RX ORDER — FENTANYL CITRATE 50 UG/ML
INJECTION, SOLUTION INTRAMUSCULAR; INTRAVENOUS AS NEEDED
Status: DISCONTINUED | OUTPATIENT
Start: 2022-12-19 | End: 2022-12-19

## 2022-12-19 RX ORDER — NEOSTIGMINE METHYLSULFATE 1 MG/ML
INJECTION INTRAVENOUS AS NEEDED
Status: DISCONTINUED | OUTPATIENT
Start: 2022-12-19 | End: 2022-12-19

## 2022-12-19 RX ORDER — MIDAZOLAM HYDROCHLORIDE 2 MG/2ML
INJECTION, SOLUTION INTRAMUSCULAR; INTRAVENOUS AS NEEDED
Status: DISCONTINUED | OUTPATIENT
Start: 2022-12-19 | End: 2022-12-19

## 2022-12-19 RX ORDER — SODIUM CHLORIDE, SODIUM LACTATE, POTASSIUM CHLORIDE, CALCIUM CHLORIDE 600; 310; 30; 20 MG/100ML; MG/100ML; MG/100ML; MG/100ML
INJECTION, SOLUTION INTRAVENOUS CONTINUOUS PRN
Status: DISCONTINUED | OUTPATIENT
Start: 2022-12-19 | End: 2022-12-19

## 2022-12-19 RX ORDER — HYDROCODONE BITARTRATE AND ACETAMINOPHEN 5; 325 MG/1; MG/1
1 TABLET ORAL EVERY 4 HOURS PRN
Status: DISCONTINUED | OUTPATIENT
Start: 2022-12-19 | End: 2022-12-19 | Stop reason: HOSPADM

## 2022-12-19 RX ORDER — GLYCOPYRROLATE 0.2 MG/ML
INJECTION INTRAMUSCULAR; INTRAVENOUS AS NEEDED
Status: DISCONTINUED | OUTPATIENT
Start: 2022-12-19 | End: 2022-12-19

## 2022-12-19 RX ORDER — DEXAMETHASONE SODIUM PHOSPHATE 10 MG/ML
INJECTION, SOLUTION INTRAMUSCULAR; INTRAVENOUS AS NEEDED
Status: DISCONTINUED | OUTPATIENT
Start: 2022-12-19 | End: 2022-12-19

## 2022-12-19 RX ORDER — ROCURONIUM BROMIDE 10 MG/ML
INJECTION, SOLUTION INTRAVENOUS AS NEEDED
Status: DISCONTINUED | OUTPATIENT
Start: 2022-12-19 | End: 2022-12-19

## 2022-12-19 RX ORDER — ONDANSETRON 2 MG/ML
4 INJECTION INTRAMUSCULAR; INTRAVENOUS ONCE AS NEEDED
Status: DISCONTINUED | OUTPATIENT
Start: 2022-12-19 | End: 2022-12-19 | Stop reason: HOSPADM

## 2022-12-19 RX ORDER — CLINDAMYCIN PHOSPHATE 600 MG/50ML
600 INJECTION INTRAVENOUS ONCE
Status: COMPLETED | OUTPATIENT
Start: 2022-12-19 | End: 2022-12-19

## 2022-12-19 RX ORDER — LIDOCAINE HYDROCHLORIDE 10 MG/ML
INJECTION, SOLUTION EPIDURAL; INFILTRATION; INTRACAUDAL; PERINEURAL AS NEEDED
Status: DISCONTINUED | OUTPATIENT
Start: 2022-12-19 | End: 2022-12-19 | Stop reason: HOSPADM

## 2022-12-19 RX ORDER — ONDANSETRON 2 MG/ML
INJECTION INTRAMUSCULAR; INTRAVENOUS AS NEEDED
Status: DISCONTINUED | OUTPATIENT
Start: 2022-12-19 | End: 2022-12-19

## 2022-12-19 RX ADMIN — DEXAMETHASONE SODIUM PHOSPHATE 10 MG: 10 INJECTION, SOLUTION INTRAMUSCULAR; INTRAVENOUS at 11:54

## 2022-12-19 RX ADMIN — MIDAZOLAM HYDROCHLORIDE 2 MG: 2 INJECTION, SOLUTION INTRAMUSCULAR; INTRAVENOUS at 11:35

## 2022-12-19 RX ADMIN — FENTANYL CITRATE 100 MCG: 50 INJECTION, SOLUTION INTRAMUSCULAR; INTRAVENOUS at 11:42

## 2022-12-19 RX ADMIN — NEOSTIGMINE METHYLSULFATE 4 MG: 1 INJECTION INTRAVENOUS at 13:39

## 2022-12-19 RX ADMIN — ROCURONIUM BROMIDE 10 MG: 10 INJECTION, SOLUTION INTRAVENOUS at 12:50

## 2022-12-19 RX ADMIN — CLINDAMYCIN IN 5 PERCENT DEXTROSE 600 MG: 12 INJECTION, SOLUTION INTRAVENOUS at 11:48

## 2022-12-19 RX ADMIN — SODIUM CHLORIDE, SODIUM LACTATE, POTASSIUM CHLORIDE, AND CALCIUM CHLORIDE: .6; .31; .03; .02 INJECTION, SOLUTION INTRAVENOUS at 12:30

## 2022-12-19 RX ADMIN — FENTANYL CITRATE 100 MCG: 50 INJECTION, SOLUTION INTRAMUSCULAR; INTRAVENOUS at 12:06

## 2022-12-19 RX ADMIN — SODIUM CHLORIDE, SODIUM LACTATE, POTASSIUM CHLORIDE, AND CALCIUM CHLORIDE: .6; .31; .03; .02 INJECTION, SOLUTION INTRAVENOUS at 11:20

## 2022-12-19 RX ADMIN — GLYCOPYRROLATE 0.2 MCG: 0.2 INJECTION INTRAMUSCULAR; INTRAVENOUS at 12:13

## 2022-12-19 RX ADMIN — ONDANSETRON 4 MG: 2 INJECTION INTRAMUSCULAR; INTRAVENOUS at 13:36

## 2022-12-19 RX ADMIN — GLYCOPYRROLATE 0.4 MG: 0.2 INJECTION INTRAMUSCULAR; INTRAVENOUS at 13:39

## 2022-12-19 RX ADMIN — ROCURONIUM BROMIDE 50 MG: 10 INJECTION, SOLUTION INTRAVENOUS at 11:43

## 2022-12-19 RX ADMIN — PROPOFOL 200 MG: 10 INJECTION, EMULSION INTRAVENOUS at 11:43

## 2022-12-19 NOTE — OP NOTE
OPERATIVE REPORT  PATIENT NAME: Darryn Seaman    :  1950  MRN: [id number]  Pt Location:  OR ROOM 08    SURGERY DATE: 2022    Surgeon(s) and Role:     * Cricket Soria MD - Primary    Preop Diagnosis:  Other hypertrophic disorders of the skin [L91 8]    Post-Op Diagnosis Codes:     * Other hypertrophic disorders of the skin [L91 8]    Procedure(s) (LRB):  MINI FACELIFT (N/A)    Specimen(s):  * No specimens in log *    Estimated Blood Loss:   Minimal    Drains:  * No LDAs found *    Anesthesia Type:   General    Operative Indications: Other hypertrophic disorders of the skin [L91 8]      Operative Findings:      Complications:   None    Procedure and Technique:  The patient was properly identified in the operating room and placed    in the supine position on the bed  She was prepped and draped in the    usual sterile surgical fashion and anesthetized with 1% lidocaine with    epinephrine  I first started by injecting the face and neck with 1% lidocaine with    epinephrine  The face was stated marked with a marking pen  I elevated bilateral pre and postauricular incisions  This was performed 15 blade  We then elevated the skin up off of the underlying smas and platysma bilaterally  Hemostasis was obtained  We plicated the lateral cheek, and neck skin with interrupted 3-0 PDS suture  Once this was done, the excess skin was pulled superiorly and posteriorly and redraped  The excess skin was then    removed with a #15 blade and electrocautery  I then closed with deep layer with 4-0 vicryl  The skin was closed with 4-0 chromic,5-0 fast and 5-0 nylon  We had good symmetry after the facelift  The same procedure was performed on both sides which will be dictated once  The patient was then placed    into a sterile head wrap, awakened from surgery and taken to the    recovery room in stable condition  All counts were correct at the end of the procedure     I was present for the entire procedure    Patient Disposition:  PACU         SIGNATURE: Brenna Michael MD  DATE: December 19, 2022  TIME: 1:45 PM

## 2022-12-19 NOTE — INTERVAL H&P NOTE
H&P reviewed  After examining the patient I find no changes in the patients condition since the H&P had been written      Vitals:    12/19/22 0940   BP: 160/72   Pulse: 59   Resp: 18   Temp: (!) 96 9 °F (36 1 °C)   SpO2: 96%

## 2022-12-19 NOTE — ANESTHESIA POSTPROCEDURE EVALUATION
Post-Op Assessment Note    CV Status:  Stable  Pain Score: 0    Pain management: adequate     Mental Status:  Alert and awake   Hydration Status:  Stable   PONV Controlled:  None   Airway Patency:  Patent      Post Op Vitals Reviewed: Yes      Staff: CRNA         No notable events documented      BP (!) 188/81 (12/19/22 1359)    Temp (!) 97 1 °F (36 2 °C) (12/19/22 1359)    Pulse 70 (12/19/22 1359)   Resp 16 (12/19/22 1359)    SpO2 92 % (12/19/22 1359)

## 2022-12-29 ENCOUNTER — CLINICAL SUPPORT (OUTPATIENT)
Dept: BARIATRICS | Facility: CLINIC | Age: 72
End: 2022-12-29

## 2022-12-29 VITALS — BODY MASS INDEX: 31.14 KG/M2 | WEIGHT: 182.4 LBS | HEIGHT: 64 IN

## 2022-12-29 DIAGNOSIS — R63.5 ABNORMAL WEIGHT GAIN: Primary | ICD-10-CM

## 2023-01-05 ENCOUNTER — CLINICAL SUPPORT (OUTPATIENT)
Dept: BARIATRICS | Facility: CLINIC | Age: 73
End: 2023-01-05

## 2023-01-05 VITALS — WEIGHT: 182.4 LBS | BODY MASS INDEX: 31.14 KG/M2 | HEIGHT: 64 IN

## 2023-01-05 DIAGNOSIS — R63.5 ABNORMAL WEIGHT GAIN: Primary | ICD-10-CM

## 2023-01-17 ENCOUNTER — RA CDI HCC (OUTPATIENT)
Dept: OTHER | Facility: HOSPITAL | Age: 73
End: 2023-01-17

## 2023-01-17 NOTE — PROGRESS NOTES
F10 20  [facility]  coding opportunities          Chart Reviewed number of suggestions sent to Provider: 1     Patients Insurance     Medicare Insurance: [employer]

## 2023-01-19 ENCOUNTER — CLINICAL SUPPORT (OUTPATIENT)
Dept: BARIATRICS | Facility: CLINIC | Age: 73
End: 2023-01-19

## 2023-01-19 VITALS — HEIGHT: 64 IN | WEIGHT: 183.6 LBS | BODY MASS INDEX: 31.34 KG/M2

## 2023-01-19 DIAGNOSIS — R63.5 ABNORMAL WEIGHT GAIN: Primary | ICD-10-CM

## 2023-01-20 NOTE — PROGRESS NOTES
Weight Management Medical Nutrition Assessment  Natalie Munguia is here today for Healthy Core Month 2 f/u  Current Weight: 181 3#  Patient has lost 4# in the past 4 months  Completed a body composition using SECA scale and reviewed results with patient  Reevue indirect calorimter revealed REE is fast ( +15%) compared to the predictive normal for someone her same age, height, and gender  Patient feels she is struggling with grazing on sweets after dinner meal when it is not a true hunger but has increased her mindfulness and is actively incorporating techniques   Discussed alternate sweet snacks for that time frame    Goals: pre plan and pre log night snack (200 calorie range/ 20gm carb), increase physical activity     Patient seen by Medical Provider in past 6 months:  yes  Requested to schedule appointment with Medical Provider: No    Anthropometric Measurements  Start Weight (#): 185 3# (22)  Current Weight (#): 181 3 #  TBW % Change from start weight: 2 1%  Ideal Body Weight (#): 120# (64")  Goal Weight (#): ST-10% LT#  UBW: 120-130#    Weight Loss History  Previous weight loss attempts:Exercise  Counseling with MD  Self Created Diets (Portion Control, Healthy Food Choices, etc )    Food and Nutrition Related History  Wake up: 9 am   Bed Time: 1 am  Struggles with sleep; takes afternoon nap (several hours)     Dietary Recall     Breakfast:scrambled in tortilla (2x)    Snack:skip  Lunch:Chicken Soup  Or yogurt triple zero with no sugar jam   [employer] - prepared sandwich (340 calories)   Snack:skip   Dinner:lean protein / veggies/ salad/ carb - larger portions   Snack: 2 - 6 dark chocolate Lindt     Beverages: water, sparkling ice (8-16 oz), coffee (16 oz w/ almond milk + 1-2 tsp sugar), and alcohol (1-2x/week)  Volume of beverage intake: 48 ounces water    Weekends: Worse, dining out more   Cravings: carbs/sweets  Trouble area of day: evenings     Frequency of Eating out: 1x/week  Food restrictions: no spicy foods   Cooking: self   Food Shopping: self and      Occupation: Retired    Physical Activity  Activity: Pilates 2x  and yoga 1x  Frequency:3x 60min   Physical limitations/barriers to exercise: mobility restrictions of shoulder + neck     Estimated Needs  Energy  SECA: BMR: 1,405 X 1 3 -750-1000 = 826-1076 kcal    [employer] Energy Needs (needs at 181 3#)  BMR: 1,307 kcal  Maintenance calories (sedentary): 1,569kcal  0 5-1# loss weekly sedentary: 3152-4837 kcal  0 5-1# loss weekly lightly active: 9838-8221 kcal    Reevue Indirect Calorimeter TBS:4658 calories            Weight loss without exercise:7057-8989 calories          Weight loss with exercise:  8570-5194 calories              Maintenance:  5913-4933    Protein: 65-82 grams (1 2-1 5g/kg IBW)  Fluid: 64 ounces (35mL/kg IBW)    Nutrition Diagnosis  Yes; Overweight/obesity related to Excess energy intake as evidenced by  BMI more than normative standard for age and sex (obesity-grade I 26-30  9)     Nutrition Intervention    Nutrition Prescription  Calories: 1200 on sedentary days and flex to 1400 calories on physical activity days  Protein: 63-83 gm  Fluid: 64oz    Meal Plan (Rah/Pro)  Breakfast: 200/7-20  Snack: --  Lunch: 300/25  Snack: 100-150/5+  Dinner: 735/41-65  Snack: 100-150/5+    Nutrition Education  Healthy Core Manual  Calorie controlled menu  Lean protein food choices  Healthy snack options  Food journaling tips    Nutrition Counseling  Strategies: meal planning, portion sizes, healthy snack choices, hydration, fiber intake, protein intake, exercise, food logging    Monitoring and Evaluation:    Evaluation criteria  Energy Intake  Meet protein needs  Maintain adequate hydration  Monitor weekly weight  Meal planning/preparation  Food journal   Decreased portions at mealtimes and snacks  Physical activity     Barriers to learning:none  Readiness to change: Preparation:  (Getting ready to change)  and Action:  (Changing behavior)  Comprehension: very good  Expected Compliance: very good

## 2023-01-23 ENCOUNTER — OFFICE VISIT (OUTPATIENT)
Dept: BARIATRICS | Facility: CLINIC | Age: 73
End: 2023-01-23

## 2023-01-23 VITALS — HEIGHT: 64 IN | BODY MASS INDEX: 30.95 KG/M2 | WEIGHT: 181.3 LBS

## 2023-01-23 DIAGNOSIS — R63.5 ABNORMAL WEIGHT GAIN: Primary | ICD-10-CM

## 2023-01-24 ENCOUNTER — OFFICE VISIT (OUTPATIENT)
Dept: INTERNAL MEDICINE CLINIC | Facility: CLINIC | Age: 73
End: 2023-01-24

## 2023-01-24 VITALS
SYSTOLIC BLOOD PRESSURE: 138 MMHG | TEMPERATURE: 98.3 F | WEIGHT: 182 LBS | OXYGEN SATURATION: 95 % | DIASTOLIC BLOOD PRESSURE: 86 MMHG | HEIGHT: 64 IN | HEART RATE: 68 BPM | BODY MASS INDEX: 31.07 KG/M2

## 2023-01-24 DIAGNOSIS — D72.829 LEUKOCYTOSIS, UNSPECIFIED TYPE: ICD-10-CM

## 2023-01-24 DIAGNOSIS — G47.33 OBSTRUCTIVE SLEEP APNEA SYNDROME: ICD-10-CM

## 2023-01-24 DIAGNOSIS — K44.9 HIATAL HERNIA: ICD-10-CM

## 2023-01-24 DIAGNOSIS — I48.0 PAROXYSMAL ATRIAL FIBRILLATION (HCC): Primary | ICD-10-CM

## 2023-01-24 DIAGNOSIS — G40.119 LOCALIZATION-RELATED (FOCAL) (PARTIAL) SYMPTOMATIC EPILEPSY AND EPILEPTIC SYNDROMES WITH SIMPLE PARTIAL SEIZURES, INTRACTABLE, WITHOUT STATUS EPILEPTICUS (HCC): ICD-10-CM

## 2023-01-24 DIAGNOSIS — K21.9 GASTROESOPHAGEAL REFLUX DISEASE, UNSPECIFIED WHETHER ESOPHAGITIS PRESENT: ICD-10-CM

## 2023-01-24 DIAGNOSIS — Z00.00 MEDICARE ANNUAL WELLNESS VISIT, SUBSEQUENT: ICD-10-CM

## 2023-01-24 DIAGNOSIS — E78.2 MIXED HYPERLIPIDEMIA: ICD-10-CM

## 2023-01-24 DIAGNOSIS — F33.42 RECURRENT MAJOR DEPRESSIVE DISORDER, IN FULL REMISSION (HCC): ICD-10-CM

## 2023-01-24 DIAGNOSIS — J06.9 UPPER RESPIRATORY TRACT INFECTION, UNSPECIFIED TYPE: ICD-10-CM

## 2023-01-24 DIAGNOSIS — I10 PRIMARY HYPERTENSION: ICD-10-CM

## 2023-01-24 PROBLEM — J20.9 ACUTE BRONCHITIS: Status: RESOLVED | Noted: 2022-09-07 | Resolved: 2023-01-24

## 2023-01-24 RX ORDER — PANTOPRAZOLE SODIUM 40 MG/10ML
INJECTION, POWDER, LYOPHILIZED, FOR SOLUTION INTRAVENOUS
COMMUNITY

## 2023-01-24 RX ORDER — DIVALPROEX SODIUM 500 MG/1
TABLET, DELAYED RELEASE ORAL
COMMUNITY
Start: 2022-11-09

## 2023-01-24 RX ORDER — FEXOFENADINE HCL 180 MG/1
TABLET ORAL
COMMUNITY

## 2023-01-24 RX ORDER — FAMOTIDINE 20 MG/1
TABLET, FILM COATED ORAL
COMMUNITY

## 2023-01-24 NOTE — ASSESSMENT & PLAN NOTE
-fair control  -continue amlodipine 5mg daily, lisinopril 20mg daily and pindolol 5mg daily  -continue weight loss efforts  -monitor BMP and home BP

## 2023-01-24 NOTE — ASSESSMENT & PLAN NOTE
-with moderate hiatal hernia  -she has not pursued surgical intervention but is in process of losing weight  -consider decrease dose of pantoprazole 40mg to once daily, continue with famotidine at bedtime  -pt on anticoagulation

## 2023-01-24 NOTE — PATIENT INSTRUCTIONS
Medicare Preventive Visit Patient Instructions  Thank you for completing your Welcome to Medicare Visit or Medicare Annual Wellness Visit today  Your next wellness visit will be due in one year (1/25/2024)  The screening/preventive services that you may require over the next 5-10 years are detailed below  Some tests may not apply to you based off risk factors and/or age  Screening tests ordered at today's visit but not completed yet may show as past due  Also, please note that scanned in results may not display below  Preventive Screenings:  Service Recommendations Previous Testing/Comments   Colorectal Cancer Screening  * Colonoscopy    * Fecal Occult Blood Test (FOBT)/Fecal Immunochemical Test (FIT)  * Fecal DNA/Cologuard Test  * Flexible Sigmoidoscopy Age: 39-70 years old   Colonoscopy: every 10 years (may be performed more frequently if at higher risk)  OR  FOBT/FIT: every 1 year  OR  Cologuard: every 3 years  OR  Sigmoidoscopy: every 5 years  Screening may be recommended earlier than age 39 if at higher risk for colorectal cancer  Also, an individualized decision between you and your healthcare provider will decide whether screening between the ages of 74-80 would be appropriate  Colonoscopy: 10/26/2020  FOBT/FIT: Not on file  Cologuard: Not on file  Sigmoidoscopy: Not on file    Screening Current     Breast Cancer Screening Age: 36 years old  Frequency: every 1-2 years  Not required if history of left and right mastectomy Mammogram: 01/14/2022    Screening Current   Cervical Cancer Screening Between the ages of 21-29, pap smear recommended once every 3 years  Between the ages of 33-67, can perform pap smear with HPV co-testing every 5 years     Recommendations may differ for women with a history of total hysterectomy, cervical cancer, or abnormal pap smears in past  Pap Smear: 03/22/2021    Screening Not Indicated   Hepatitis C Screening Once for adults born between 1945 and 1965  More frequently in patients at high risk for Hepatitis C Hep C Antibody: 11/11/2018    Screening Current   Diabetes Screening 1-2 times per year if you're at risk for diabetes or have pre-diabetes Fasting glucose: 104 mg/dL (7/19/2022)  A1C: 5 9 % (8/11/2022)  Screening Current   Cholesterol Screening Once every 5 years if you don't have a lipid disorder  May order more often based on risk factors  Lipid panel: 07/19/2022    Screening Not Indicated  History Lipid Disorder     Other Preventive Screenings Covered by Medicare:  1  Abdominal Aortic Aneurysm (AAA) Screening: covered once if your at risk  You're considered to be at risk if you have a family history of AAA  2  Lung Cancer Screening: covers low dose CT scan once per year if you meet all of the following conditions: (1) Age 50-69; (2) No signs or symptoms of lung cancer; (3) Current smoker or have quit smoking within the last 15 years; (4) You have a tobacco smoking history of at least 20 pack years (packs per day multiplied by number of years you smoked); (5) You get a written order from a healthcare provider  3  Glaucoma Screening: covered annually if you're considered high risk: (1) You have diabetes OR (2) Family history of glaucoma OR (3)  aged 48 and older OR (3)  American aged 72 and older  3  Osteoporosis Screening: covered every 2 years if you meet one of the following conditions: (1) You're estrogen deficient and at risk for osteoporosis based off medical history and other findings; (2) Have a vertebral abnormality; (3) On glucocorticoid therapy for more than 3 months; (4) Have primary hyperparathyroidism; (5) On osteoporosis medications and need to assess response to drug therapy  · Last bone density test (DXA Scan): 09/14/2020   5  HIV Screening: covered annually if you're between the age of 15-65  Also covered annually if you are younger than 13 and older than 72 with risk factors for HIV infection   For pregnant patients, it is covered up to 3 times per pregnancy  Immunizations:  Immunization Recommendations   Influenza Vaccine Annual influenza vaccination during flu season is recommended for all persons aged >= 6 months who do not have contraindications   Pneumococcal Vaccine   * Pneumococcal conjugate vaccine = PCV13 (Prevnar 13), PCV15 (Vaxneuvance), PCV20 (Prevnar 20)  * Pneumococcal polysaccharide vaccine = PPSV23 (Pneumovax) Adults 25-60 years old: 1-3 doses may be recommended based on certain risk factors  Adults 72 years old: 1-2 doses may be recommended based off what pneumonia vaccine you previously received   Hepatitis B Vaccine 3 dose series if at intermediate or high risk (ex: diabetes, end stage renal disease, liver disease)   Tetanus (Td) Vaccine - COST NOT COVERED BY MEDICARE PART B Following completion of primary series, a booster dose should be given every 10 years to maintain immunity against tetanus  Td may also be given as tetanus wound prophylaxis  Tdap Vaccine - COST NOT COVERED BY MEDICARE PART B Recommended at least once for all adults  For pregnant patients, recommended with each pregnancy  Shingles Vaccine (Shingrix) - COST NOT COVERED BY MEDICARE PART B  2 shot series recommended in those aged 48 and above     Health Maintenance Due:      Topic Date Due   • Breast Cancer Screening: Mammogram  01/14/2024   • Colorectal Cancer Screening  10/26/2025   • Hepatitis C Screening  Completed     Immunizations Due:      Topic Date Due   • Influenza Vaccine (1) 09/01/2022   • COVID-19 Vaccine (5 - Booster for Loco Peter series) 09/09/2022     Advance Directives   What are advance directives? Advance directives are legal documents that state your wishes and plans for medical care  These plans are made ahead of time in case you lose your ability to make decisions for yourself  Advance directives can apply to any medical decision, such as the treatments you want, and if you want to donate organs     What are the types of advance directives? There are many types of advance directives, and each state has rules about how to use them  You may choose a combination of any of the following:  · Living will: This is a written record of the treatment you want  You can also choose which treatments you do not want, which to limit, and which to stop at a certain time  This includes surgery, medicine, IV fluid, and tube feedings  · Durable power of  for healthcare [facility]): This is a written record that states who you want to make healthcare choices for you when you are unable to make them for yourself  This person, called a proxy, is usually a family member or a friend  You may choose more than 1 proxy  · Do not resuscitate (DNR) order:  A DNR order is used in case your heart stops beating or you stop breathing  It is a request not to have certain forms of treatment, such as CPR  A DNR order may be included in other types of advance directives  · Medical directive: This covers the care that you want if you are in a coma, near death, or unable to make decisions for yourself  You can list the treatments you want for each condition  Treatment may include pain medicine, surgery, blood transfusions, dialysis, IV or tube feedings, and a ventilator (breathing machine)  · Values history: This document has questions about your views, beliefs, and how you feel and think about life  This information can help others choose the care that you would choose  Why are advance directives important? An advance directive helps you control your care  Although spoken wishes may be used, it is better to have your wishes written down  Spoken wishes can be misunderstood, or not followed  Treatments may be given even if you do not want them  An advance directive may make it easier for your family to make difficult choices about your care  Urinary Incontinence   Urinary incontinence (UI)  is when you lose control of your bladder   UI develops because your bladder cannot store or empty urine properly  The 3 most common types of UI are stress incontinence, urge incontinence, or both  Medicines:   · May be given to help strengthen your bladder control  Report any side effects of medication to your healthcare provider  Do pelvic muscle exercises often:  Your pelvic muscles help you stop urinating  Squeeze these muscles tight for 5 seconds, then relax for 5 seconds  Gradually work up to squeezing for 10 seconds  Do 3 sets of 15 repetitions a day, or as directed  This will help strengthen your pelvic muscles and improve bladder control  Train your bladder:  Go to the bathroom at set times, such as every 2 hours, even if you do not feel the urge to go  You can also try to hold your urine when you feel the urge to go  For example, hold your urine for 5 minutes when you feel the urge to go  As that becomes easier, hold your urine for 10 minutes  Self-care:   · Keep a UI record  Write down how often you leak urine and how much you leak  Make a note of what you were doing when you leaked urine  · Drink liquids as directed  You may need to limit the amount of liquid you drink to help control your urine leakage  Do not drink any liquid right before you go to bed  Limit or do not have drinks that contain caffeine or alcohol  · Prevent constipation  Eat a variety of high-fiber foods  Good examples are high-fiber cereals, beans, vegetables, and whole-grain breads  Walking is the best way to trigger your intestines to have a bowel movement  · Exercise regularly and maintain a healthy weight  Weight loss and exercise will decrease pressure on your bladder and help you control your leakage  · Use a catheter as directed  to help empty your bladder  A catheter is a tiny, plastic tube that is put into your bladder to drain your urine  · Go to behavior therapy as directed  Behavior therapy may be used to help you learn to control your urge to urinate      Weight Management Why it is important to manage your weight:  Being overweight increases your risk of health conditions such as heart disease, high blood pressure, type 2 diabetes, and certain types of cancer  It can also increase your risk for osteoarthritis, sleep apnea, and other respiratory problems  Aim for a slow, steady weight loss  Even a small amount of weight loss can lower your risk of health problems  How to lose weight safely:  A safe and healthy way to lose weight is to eat fewer calories and get regular exercise  You can lose up about 1 pound a week by decreasing the number of calories you eat by 500 calories each day  Healthy meal plan for weight management:  A healthy meal plan includes a variety of foods, contains fewer calories, and helps you stay healthy  A healthy meal plan includes the following:  · Eat whole-grain foods more often  A healthy meal plan should contain fiber  Fiber is the part of grains, fruits, and vegetables that is not broken down by your body  Whole-grain foods are healthy and provide extra fiber in your diet  Some examples of whole-grain foods are whole-wheat breads and pastas, oatmeal, brown rice, and bulgur  · Eat a variety of vegetables every day  Include dark, leafy greens such as spinach, kale, lor greens, and mustard greens  Eat yellow and orange vegetables such as carrots, sweet potatoes, and winter squash  · Eat a variety of fruits every day  Choose fresh or canned fruit (canned in its own juice or light syrup) instead of juice  Fruit juice has very little or no fiber  · Eat low-fat dairy foods  Drink fat-free (skim) milk or 1% milk  Eat fat-free yogurt and low-fat cottage cheese  Try low-fat cheeses such as mozzarella and other reduced-fat cheeses  · Choose meat and other protein foods that are low in fat  Choose beans or other legumes such as split peas or lentils  Choose fish, skinless poultry (chicken or turkey), or lean cuts of red meat (beef or pork)   Before you cook meat or poultry, cut off any visible fat  · Use less fat and oil  Try baking foods instead of frying them  Add less fat, such as margarine, sour cream, regular salad dressing and mayonnaise to foods  Eat fewer high-fat foods  Some examples of high-fat foods include french fries, doughnuts, ice cream, and cakes  · Eat fewer sweets  Limit foods and drinks that are high in sugar  This includes candy, cookies, regular soda, and sweetened drinks  Exercise:  Exercise at least 30 minutes per day on most days of the week  Some examples of exercise include walking, biking, dancing, and swimming  You can also fit in more physical activity by taking the stairs instead of the elevator or parking farther away from stores  Ask your healthcare provider about the best exercise plan for you  © Copyright [employer] 2018 Information is for End User's use only and may not be sold, redistributed or otherwise used for commercial purposes  All illustrations and images included in CareNotes® are the copyrighted property of [employer]  or [facility] Preventive Visit Patient Instructions  Thank you for completing your Welcome to Medicare Visit or Medicare Annual Wellness Visit today  Your next wellness visit will be due in one year (1/25/2024)  The screening/preventive services that you may require over the next 5-10 years are detailed below  Some tests may not apply to you based off risk factors and/or age  Screening tests ordered at today's visit but not completed yet may show as past due  Also, please note that scanned in results may not display below    Preventive Screenings:  Service Recommendations Previous Testing/Comments   Colorectal Cancer Screening  * Colonoscopy    * Fecal Occult Blood Test (FOBT)/Fecal Immunochemical Test (FIT)  * Fecal DNA/Cologuard Test  * Flexible Sigmoidoscopy Age: 39-70 years old   Colonoscopy: every 10 years (may be performed more frequently if at higher risk) OR  FOBT/FIT: every 1 year  OR  Cologuard: every 3 years  OR  Sigmoidoscopy: every 5 years  Screening may be recommended earlier than age 39 if at higher risk for colorectal cancer  Also, an individualized decision between you and your healthcare provider will decide whether screening between the ages of 74-80 would be appropriate  Colonoscopy: 10/26/2020  FOBT/FIT: Not on file  Cologuard: Not on file  Sigmoidoscopy: Not on file    Screening Current     Breast Cancer Screening Age: 36 years old  Frequency: every 1-2 years  Not required if history of left and right mastectomy Mammogram: 01/14/2022    Screening Current   Cervical Cancer Screening Between the ages of 21-29, pap smear recommended once every 3 years  Between the ages of 33-67, can perform pap smear with HPV co-testing every 5 years  Recommendations may differ for women with a history of total hysterectomy, cervical cancer, or abnormal pap smears in past  Pap Smear: 03/22/2021    Screening Not Indicated   Hepatitis C Screening Once for adults born between 1945 and 1965  More frequently in patients at high risk for Hepatitis C Hep C Antibody: 11/11/2018    Screening Current   Diabetes Screening 1-2 times per year if you're at risk for diabetes or have pre-diabetes Fasting glucose: 104 mg/dL (7/19/2022)  A1C: 5 9 % (8/11/2022)  Screening Current   Cholesterol Screening Once every 5 years if you don't have a lipid disorder  May order more often based on risk factors  Lipid panel: 07/19/2022    Screening Not Indicated  History Lipid Disorder     Other Preventive Screenings Covered by Medicare:  6  Abdominal Aortic Aneurysm (AAA) Screening: covered once if your at risk  You're considered to be at risk if you have a family history of AAA    7  Lung Cancer Screening: covers low dose CT scan once per year if you meet all of the following conditions: (1) Age 50-69; (2) No signs or symptoms of lung cancer; (3) Current smoker or have quit smoking within the last 15 years; (4) You have a tobacco smoking history of at least 20 pack years (packs per day multiplied by number of years you smoked); (5) You get a written order from a healthcare provider  8  Glaucoma Screening: covered annually if you're considered high risk: (1) You have diabetes OR (2) Family history of glaucoma OR (3)  aged 48 and older OR (3)  American aged 72 and older  5  Osteoporosis Screening: covered every 2 years if you meet one of the following conditions: (1) You're estrogen deficient and at risk for osteoporosis based off medical history and other findings; (2) Have a vertebral abnormality; (3) On glucocorticoid therapy for more than 3 months; (4) Have primary hyperparathyroidism; (5) On osteoporosis medications and need to assess response to drug therapy  · Last bone density test (DXA Scan): 09/14/2020  10  HIV Screening: covered annually if you're between the age of 12-76  Also covered annually if you are younger than 13 and older than 72 with risk factors for HIV infection  For pregnant patients, it is covered up to 3 times per pregnancy      Immunizations:  Immunization Recommendations   Influenza Vaccine Annual influenza vaccination during flu season is recommended for all persons aged >= 6 months who do not have contraindications   Pneumococcal Vaccine   * Pneumococcal conjugate vaccine = PCV13 (Prevnar 13), PCV15 (Vaxneuvance), PCV20 (Prevnar 20)  * Pneumococcal polysaccharide vaccine = PPSV23 (Pneumovax) Adults 25-60 years old: 1-3 doses may be recommended based on certain risk factors  Adults 72 years old: 1-2 doses may be recommended based off what pneumonia vaccine you previously received   Hepatitis B Vaccine 3 dose series if at intermediate or high risk (ex: diabetes, end stage renal disease, liver disease)   Tetanus (Td) Vaccine - COST NOT COVERED BY MEDICARE PART B Following completion of primary series, a booster dose should be given every 10 years to maintain immunity against tetanus  Td may also be given as tetanus wound prophylaxis  Tdap Vaccine - COST NOT COVERED BY MEDICARE PART B Recommended at least once for all adults  For pregnant patients, recommended with each pregnancy  Shingles Vaccine (Shingrix) - COST NOT COVERED BY MEDICARE PART B  2 shot series recommended in those aged 48 and above     Health Maintenance Due:      Topic Date Due   • Breast Cancer Screening: Mammogram  01/14/2024   • Colorectal Cancer Screening  10/26/2025   • Hepatitis C Screening  Completed     Immunizations Due:      Topic Date Due   • COVID-19 Vaccine (5 - Booster for Loco Armando series) 09/09/2022     Advance Directives   What are advance directives? Advance directives are legal documents that state your wishes and plans for medical care  These plans are made ahead of time in case you lose your ability to make decisions for yourself  Advance directives can apply to any medical decision, such as the treatments you want, and if you want to donate organs  What are the types of advance directives? There are many types of advance directives, and each state has rules about how to use them  You may choose a combination of any of the following:  · Living will: This is a written record of the treatment you want  You can also choose which treatments you do not want, which to limit, and which to stop at a certain time  This includes surgery, medicine, IV fluid, and tube feedings  · Durable power of  for healthcare [place] [facility]): This is a written record that states who you want to make healthcare choices for you when you are unable to make them for yourself  This person, called a proxy, is usually a family member or a friend  You may choose more than 1 proxy  · Do not resuscitate (DNR) order:  A DNR order is used in case your heart stops beating or you stop breathing  It is a request not to have certain forms of treatment, such as CPR   A DNR order may be included in other types of advance directives  · Medical directive: This covers the care that you want if you are in a coma, near death, or unable to make decisions for yourself  You can list the treatments you want for each condition  Treatment may include pain medicine, surgery, blood transfusions, dialysis, IV or tube feedings, and a ventilator (breathing machine)  · Values history: This document has questions about your views, beliefs, and how you feel and think about life  This information can help others choose the care that you would choose  Why are advance directives important? An advance directive helps you control your care  Although spoken wishes may be used, it is better to have your wishes written down  Spoken wishes can be misunderstood, or not followed  Treatments may be given even if you do not want them  An advance directive may make it easier for your family to make difficult choices about your care  Urinary Incontinence   Urinary incontinence (UI)  is when you lose control of your bladder  UI develops because your bladder cannot store or empty urine properly  The 3 most common types of UI are stress incontinence, urge incontinence, or both  Medicines:   · May be given to help strengthen your bladder control  Report any side effects of medication to your healthcare provider  Do pelvic muscle exercises often:  Your pelvic muscles help you stop urinating  Squeeze these muscles tight for 5 seconds, then relax for 5 seconds  Gradually work up to squeezing for 10 seconds  Do 3 sets of 15 repetitions a day, or as directed  This will help strengthen your pelvic muscles and improve bladder control  Train your bladder:  Go to the bathroom at set times, such as every 2 hours, even if you do not feel the urge to go  You can also try to hold your urine when you feel the urge to go  For example, hold your urine for 5 minutes when you feel the urge to go  As that becomes easier, hold your urine for 10 minutes  Self-care:   · Keep a UI record  Write down how often you leak urine and how much you leak  Make a note of what you were doing when you leaked urine  · Drink liquids as directed  You may need to limit the amount of liquid you drink to help control your urine leakage  Do not drink any liquid right before you go to bed  Limit or do not have drinks that contain caffeine or alcohol  · Prevent constipation  Eat a variety of high-fiber foods  Good examples are high-fiber cereals, beans, vegetables, and whole-grain breads  Walking is the best way to trigger your intestines to have a bowel movement  · Exercise regularly and maintain a healthy weight  Weight loss and exercise will decrease pressure on your bladder and help you control your leakage  · Use a catheter as directed  to help empty your bladder  A catheter is a tiny, plastic tube that is put into your bladder to drain your urine  · Go to behavior therapy as directed  Behavior therapy may be used to help you learn to control your urge to urinate  Weight Management   Why it is important to manage your weight:  Being overweight increases your risk of health conditions such as heart disease, high blood pressure, type 2 diabetes, and certain types of cancer  It can also increase your risk for osteoarthritis, sleep apnea, and other respiratory problems  Aim for a slow, steady weight loss  Even a small amount of weight loss can lower your risk of health problems  How to lose weight safely:  A safe and healthy way to lose weight is to eat fewer calories and get regular exercise  You can lose up about 1 pound a week by decreasing the number of calories you eat by 500 calories each day  Healthy meal plan for weight management:  A healthy meal plan includes a variety of foods, contains fewer calories, and helps you stay healthy  A healthy meal plan includes the following:  · Eat whole-grain foods more often  A healthy meal plan should contain fiber   Fiber is the part of grains, fruits, and vegetables that is not broken down by your body  Whole-grain foods are healthy and provide extra fiber in your diet  Some examples of whole-grain foods are whole-wheat breads and pastas, oatmeal, brown rice, and bulgur  · Eat a variety of vegetables every day  Include dark, leafy greens such as spinach, kale, lor greens, and mustard greens  Eat yellow and orange vegetables such as carrots, sweet potatoes, and winter squash  · Eat a variety of fruits every day  Choose fresh or canned fruit (canned in its own juice or light syrup) instead of juice  Fruit juice has very little or no fiber  · Eat low-fat dairy foods  Drink fat-free (skim) milk or 1% milk  Eat fat-free yogurt and low-fat cottage cheese  Try low-fat cheeses such as mozzarella and other reduced-fat cheeses  · Choose meat and other protein foods that are low in fat  Choose beans or other legumes such as split peas or lentils  Choose fish, skinless poultry (chicken or turkey), or lean cuts of red meat (beef or pork)  Before you cook meat or poultry, cut off any visible fat  · Use less fat and oil  Try baking foods instead of frying them  Add less fat, such as margarine, sour cream, regular salad dressing and mayonnaise to foods  Eat fewer high-fat foods  Some examples of high-fat foods include french fries, doughnuts, ice cream, and cakes  · Eat fewer sweets  Limit foods and drinks that are high in sugar  This includes candy, cookies, regular soda, and sweetened drinks  Exercise:  Exercise at least 30 minutes per day on most days of the week  Some examples of exercise include walking, biking, dancing, and swimming  You can also fit in more physical activity by taking the stairs instead of the elevator or parking farther away from stores  Ask your healthcare provider about the best exercise plan for you        © Copyright [employer] 2018 Information is for End User's use only and may not be sold, redistributed or otherwise used for commercial purposes   All illustrations and images included in CareNotes® are the copyrighted property of A D A M , Inc  or [facility][note 3] Emil Paulson

## 2023-01-24 NOTE — ASSESSMENT & PLAN NOTE
-fleicanide added to her regimen as she has been in a fib  -now in NSR  -on pindolol for rate control and eliquis for stroke ppx  -follow up with [facility]

## 2023-01-24 NOTE — PROGRESS NOTES
Assessment and Plan:     Problem List Items Addressed This Visit        Digestive    Gastroesophageal reflux disease     -with moderate hiatal hernia  -she has not pursued surgical intervention but is in process of losing weight  -consider decrease dose of pantoprazole 40mg to once daily, continue with famotidine at bedtime  -pt on anticoagulation         Relevant Medications    famotidine (PEPCID) 20 mg tablet    pantoprazole (PROTONIX) 40 mg injection       Respiratory    Obstructive sleep apnea syndrome     -previously did not tolerate CPAP  -in process of losing weight         Upper respiratory tract infection     -resolving  -neg home COVID tests  -continue symptomatic care            Cardiovascular and Mediastinum    Hypertension     -fair control  -continue amlodipine 5mg daily, lisinopril 20mg daily and pindolol 5mg daily  -continue weight loss efforts  -monitor BMP and home BP         Paroxysmal atrial fibrillation (HCC) - Primary     -fleicanide added to her regimen as she has been in a fib  -now in NSR  -on pindolol for rate control and eliquis for stroke ppx  -follow up with [facility]            Nervous and Auditory    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, intractable, without status epilepticus ([facility] [address] )     -as per Neuro Dr Aamir Chavez  -on depakote ER 500mg twice daily         Relevant Medications    divalproex sodium (DEPAKOTE) 500 mg EC tablet       Other    Recurrent major depressive disorder, in full remission ([facility] [address] )     -now in remission on escitalopram 20mg daily         Hyperlipidemia    Relevant Orders    Lipid panel    Hiatal hernia   Other Visit Diagnoses     Leukocytosis, unspecified type        -noted in ER visit for gastroenteritis  -recheck CBC    Relevant Orders    CBC and differential    Medicare annual wellness visit, subsequent              Urinary Incontinence Plan of Care: counseling topics discussed: practice Kegel (pelvic floor strengthening) exercises, use restroom every 2 hours, limit alcohol, caffeine, spicy foods, and acidic foods, limiting fluid intake 3-4 hours before bed, weight loss and preventing constipation  Preventive health issues were discussed with patient, and age appropriate screening tests were ordered as noted in patient's After Visit Summary  Personalized health advice and appropriate referrals for health education or preventive services given if needed, as noted in patient's After Visit Summary  History of Present Illness:     Patient presents for a Medicare Wellness Visit    66yo female with prediabetes, GERD with moderate hiatal hernia, , HTN, chronic migraine, seizure d/o, HLD, CIC, PAF, PVCs, cervical spondylosis with foraminal stenosis here for follow up care  Had face lift last month  Right before, had episode of gastroenteritis, seen in ED   ended up getting it as well  She has cold symptoms but is subsiding  Initially began with sore throat, nasal drip and coughing  She is using Ayre and delsym  She did not tolerate CPAP in the past   Was evaluated for Inspire device, repeat sleep study were not as severe  She has lost 8 pounds over the past 3 months with dietary modifications  She was started on fleicanide by EP  She did not generally know she was in afib      PHQ-2/9 Depression Screening    Little interest or pleasure in doing things: 0 - not at all  Feeling down, depressed, or hopeless: 0 - not at all  Trouble falling or staying asleep, or sleeping too much: 1 - several days  Feeling tired or having little energy: 1 - several days  Poor appetite or overeatin - several days  Feeling bad about yourself - or that you are a failure or have let   yourself or your family down: 0 - not at all  Trouble concentrating on things, such as reading the newspaper or watching   television: 0 - not at all  Moving or speaking so slowly that other people could have noticed  Or the   opposite - being so fidgety or restless that you have been moving around a   lot more than usual: 0 - not at all  Thoughts that you would be better off dead, or of hurting yourself in some   way: 0 - not at all  PHQ-9 Score: 3   PHQ-9 Interpretation: No or Minimal depression           Patient Care Team:  Leah Feliciano DO as PCP - DO Niraj Marie MD (Sleep Medicine)  Valarie Galdamez MD (Neurology)  MD Mary Little MD (Family Medicine)  Lauren Eden OD (Optometry)  Zach Addison MD (Gastroenterology)     Review of Systems:     Review of Systems   Constitutional: Positive for appetite change and unexpected weight change (weight loss)  HENT: Positive for postnasal drip and rhinorrhea  Respiratory: Positive for cough  Negative for shortness of breath and wheezing  Cardiovascular: Negative for chest pain, palpitations and leg swelling  Gastrointestinal: Negative for abdominal pain, constipation, diarrhea and nausea  Genitourinary: Positive for difficulty urinating  Musculoskeletal: Negative for neck stiffness  Neurological: Positive for headaches  Negative for dizziness  Hematological: Bruises/bleeds easily          Problem List:     Patient Active Problem List   Diagnosis   • Recurrent major depressive disorder, in full remission ([facility] [address] )   • Hyperlipidemia   • Hypertension   • Lichen sclerosus   • Lipoma   • Post-menopausal atrophic vaginitis   • Symptomatic menopausal or female climacteric states   • Arthritis   • Insomnia   • Cervical spondylosis   • Cervicocranial syndrome   • Chronic migraine without aura without status migrainosus, not intractable   • Complex partial seizure with impairment of consciousness (HCC)   • Right hip pain   • Injury of tendon of rotator cuff   • Trochanteric bursitis   • Seizure disorder (HCC)   • Anxiety   • Esophagitis   • Paroxysmal atrial fibrillation ([facility] [address] )   • Current use of long term anticoagulation   • Epilepsia partialis continua with intractable epilepsy ([address] )   • Simple partial seizure with somatosensory or special sensory dysfunction ([address] )   • Obesity (BMI 30 0-34  9)   • Carpal tunnel syndrome, bilateral   • Decreased hearing   • Obstructive sleep apnea syndrome   • Dyspepsia   • Coronary vasospasm (HCC)   • 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, intractable, without status epilepticus (HCC)   • Gastroesophageal reflux disease   • History of hysterectomy   • Nausea   • Hiatal hernia   • Upper respiratory tract infection      Past Medical and Surgical History:     Past Medical History:   Diagnosis Date   • Acid reflux disease    • Acute bronchitis 9/7/2022   • Allergic    • Allergies    • Anxiety    • Arthralgia    • Arthritis    • Atrial fibrillation (HCC)    • Chronic interstitial cystitis    • Chronic pain disorder     neck/ shldrs   • Colon polyp    • Coronary artery disease afib/ Nov 2018   • Depression    • GERD (gastroesophageal reflux disease)    • Headache(784 0)    • Hyperlipidemia    • Hypertension    • Irregular heart beat     afib   • Lichen sclerosus    • Lipoma    • Myalgia 3/16/2016   • Obesity    • Osteopenia    • Paresthesia of both hands 08/06/2019   • Seizures ([address] )    • Shortness of breath     ROJAS   • Simple partial seizures ([address] )     last assessed 3/30/17   • Sleep apnea    • Tachycardia     last assessed 6/24/15     Past Surgical History:   Procedure Laterality Date   • ABDOMINOPLASTY     • APPENDECTOMY     • BLEPHAROPLASTY     • COLONOSCOPY  2020    Dr Nelda Vidales, 5 year f/u    • COSMETIC SURGERY     • FACELIFT     • FRACTURE SURGERY     • HYSTERECTOMY     • OOPHORECTOMY     • AZ RHYTIDECTOMY NECK W/PLATYSMAL TIGHTENING N/A 12/19/2022    Procedure: MINI FACELIFT;  Surgeon: Zuly Ayala MD;  Location: [address] [place] [address];  Service: Plastics   • REDUCTION MAMMAPLASTY     • RHINOPLASTY     • SHOULDER SURGERY Left       Family History:     Family History   Problem Relation Age of Onset   • Stroke Mother         CVA   • Hypertension Mother    • Hypertension Father    • Lung cancer Father    • Cancer Father    • Lung cancer Brother         AGE UNKNOWN   • Hypertension Brother    • No Known Problems Sister    • No Known Problems Maternal Grandmother    • No Known Problems Maternal Grandfather    • Breast cancer Paternal Grandmother    • No Known Problems Paternal Grandfather    • No Known Problems Maternal Aunt    • No Known Problems Maternal Aunt    • No Known Problems Maternal Aunt    • No Known Problems Paternal Aunt    • No Known Problems Paternal Aunt    • No Known Problems Paternal Aunt    • Cancer Brother       Social History:     Social History     Socioeconomic History   • Marital status: /Civil Union     Spouse name: None   • Number of children: None   • Years of education: None   • Highest education level: None   Occupational History   • Occupation: retired school superintendant   Tobacco Use   • Smoking status: Never   • Smokeless tobacco: Never   Vaping Use   • Vaping Use: Never used   Substance and Sexual Activity   • Alcohol use: Yes     Alcohol/week: 1 0 standard drink     Types: 1 Glasses of wine per week     Comment: 1 or 2 glasses per week   • Drug use: No   • Sexual activity: Not Currently     Partners: Male     Birth control/protection: Post-menopausal     Comment: occasionally   Other Topics Concern   • None   Social History Narrative    Exercising regularly     Social Determinants of Health     Financial Resource Strain: Low Risk    • Difficulty of Paying Living Expenses: Not hard at all   Food Insecurity: Not on file   Transportation Needs: No Transportation Needs   • Lack of Transportation (Medical): No   • Lack of Transportation (Non-Medical):  No   Physical Activity: Not on file   Stress: Not on file   Social Connections: Not on file   Intimate Partner Violence: Not on file   Housing Stability: Not on file      Medications and Allergies:     Current Outpatient Medications   Medication Sig Dispense Refill   • Alpha-D-Galactosidase (BEANO PO) Take by mouth     • amLODIPine (NORVASC) 5 mg tablet TAKE 1 TABLET BY MOUTH  DAILY 90 tablet 3   • apixaban (ELIQUIS) 5 mg      • atorvastatin (LIPITOR) 40 mg tablet Take 1 tablet (40 mg total) by mouth daily 90 tablet 0   • AYR SALINE NASAL NA into each nostril 2 (two) times a day     • [place]-Glucosamine-Vit D (OSTEO BI-FLEX ONE PER DAY PO) Take 1 capsule by mouth in the morning With turmeric     • busPIRone (BUSPAR) 15 mg tablet Take 15 mg by mouth 2 (two) times a day     • calcium citrate-Vitamin D 200 mg-250 units Take 1 tablet by mouth daily with breakfast     • divalproex sodium (DEPAKOTE) 500 mg EC tablet      • escitalopram (LEXAPRO) 20 mg tablet Take 1 tablet by mouth daily     • famotidine (PEPCID) 20 mg tablet      • fexofenadine (ALLEGRA) 180 MG tablet      • flecainide (TAMBOCOR) 100 mg tablet Take 100 mg by mouth 2 (two) times a day     • lisinopril (ZESTRIL) 20 mg tablet Take 1 tablet (20 mg total) by mouth daily 90 tablet 3   • MELATONIN PO Take 10 mg by mouth daily at bedtime     • multivitamin (THERAGRAN) TABS Take 1 tablet by mouth daily     • pindolol (VISKEN) 5 mg tablet Take 1 tablet (5 mg total) by mouth daily 90 tablet 3   • Probiotic Product (PROBIOTIC PO) Take 1 capsule by mouth 2 (two) times a day     • valACYclovir (VALTREX) 1,000 mg tablet Take 1,000 mg by mouth if needed     • pantoprazole (PROTONIX) 40 mg injection  (Patient not taking: Reported on 1/24/2023)       No current facility-administered medications for this visit       Allergies   Allergen Reactions   • Codeine Hives   • Erythromycin Base GI Intolerance   • Hydromorphone Itching   • Morphine GI Intolerance   • Oxycodone-Acetaminophen Hives   • Penicillins Rash      Immunizations:     Immunization History   Administered Date(s) Administered   • COVID-19 PFIZER VACCINE 0 3 ML IM 03/05/2021, 03/26/2021, 11/22/2021, 07/15/2022   • COVID-19 Pfizer Vac BIVALENT [place]-sucrose 12 Yr+ IM (BOOSTER ONLY) 10/16/2022   • H1N1, All Formulations 11/10/2009   • INFLUENZA 09/20/2018, 10/01/2019, 09/17/2020, 09/17/2021   • Influenza Split High Dose Preservative Free IM 09/30/2014, 09/28/2016   • Influenza, high dose seasonal 0 7 mL 10/01/2019, 10/16/2022   • Influenza, seasonal, injectable 09/19/2012, 09/25/2013, 10/01/2015, 09/20/2017   • Pneumococcal Conjugate 13-Valent 09/28/2016   • Pneumococcal Polysaccharide PPV23 01/01/2012, 12/13/2021   • Tdap 09/25/2013   • Zoster Vaccine Recombinant 05/03/2021      Health Maintenance:         Topic Date Due   • Breast Cancer Screening: Mammogram  01/14/2024   • Colorectal Cancer Screening  10/26/2025   • Hepatitis C Screening  Completed         Topic Date Due   • COVID-19 Vaccine (5 - Booster for Claudy Roberts series) 09/09/2022      Medicare Screening Tests and Risk Assessments: Kate Cade is here for her Subsequent Wellness visit  Last Medicare Wellness visit information reviewed, patient interviewed and updates made to the record today  Health Risk Assessment:   Patient rates overall health as good  Patient feels that their physical health rating is same  Patient is satisfied with their life  Eyesight was rated as same  Hearing was rated as same  Patient feels that their emotional and mental health rating is same  Patients states they are never, rarely angry  Patient states they are often unusually tired/fatigued  Pain experienced in the last 7 days has been some  Patient's pain rating has been 3/10  Patient states that she has experienced weight loss or gain in last 6 months  Depression Screening:   PHQ-9 Score: 3      Fall Risk Screening: In the past year, patient has experienced: no history of falling in past year      Urinary Incontinence Screening:   Patient has leaked urine accidently in the last six months       Home Safety:  Patient does not have trouble with stairs inside or outside of their home  Patient has working smoke alarms and has working carbon monoxide detector  Home safety hazards include: none  Nutrition:   Current diet is Regular  Medications:   Patient is currently taking over-the-counter supplements  OTC medications include: Ostero Bi-Flex  Patient is able to manage medications  Activities of Daily Living (ADLs)/Instrumental Activities of Daily Living (IADLs):   Walk and transfer into and out of bed and chair?: Yes  Dress and groom yourself?: Yes    Bathe or shower yourself?: Yes    Feed yourself? Yes  Do your laundry/housekeeping?: Yes  Manage your money, pay your bills and track your expenses?: Yes  Make your own meals?: Yes    Do your own shopping?: Yes    Durable Medical Equipment Suppliers  none    Previous Hospitalizations:   Any hospitalizations or ED visits within the last 12 months?: Yes    How many hospitalizations have you had in the last year?: 1-2    Hospitalization Comments: stomach virus    Advance Care Planning:   Living will: Yes    Durable POA for healthcare:  Yes    Advanced directive: Yes    Advanced directive counseling given: No      Comments: Pt to provide copy of Advanced Directives    Cognitive Screening:   Provider or family/friend/caregiver concerned regarding cognition?: No    PREVENTIVE SCREENINGS      Cardiovascular Screening:    General: Screening Not Indicated and History Lipid Disorder      Diabetes Screening:     General: Screening Current      Colorectal Cancer Screening:     General: Screening Current      Breast Cancer Screening:     General: Screening Current      Cervical Cancer Screening:    General: Screening Not Indicated      Osteoporosis Screening:    General: Screening Not Indicated      Abdominal Aortic Aneurysm (AAA) Screening:        General: Screening Not Indicated      Lung Cancer Screening:     General: Screening Not Indicated      Hepatitis C Screening:    General: Screening Current    Screening, Brief Intervention, and Referral to Treatment (SBIRT)    Screening  Typical number of drinks in a day: 0  Typical number of drinks in a week: 1  Interpretation: Low risk drinking behavior  AUDIT-C Screenin) How often did you have a drink containing alcohol in the past year? 2 to 4 times a month  2) How many drinks did you have on a typical day when you were drinking in the past year? 0  3) How often did you have 6 or more drinks on one occasion in the past year? never    AUDIT-C Score: 2  Interpretation: Score 0-2 (female): Negative screen for alcohol misuse    Single Item Drug Screening:  How often have you used an illegal drug (including marijuana) or a prescription medication for non-medical reasons in the past year? never    Single Item Drug Screen Score: 0  Interpretation: Negative screen for possible drug use disorder    Brief Intervention  Alcohol & drug use screenings were reviewed  No concerns regarding substance use disorder identified  Time Spent  Time spent providing alcohol/substance abuse assessment and intervention services: 1 minutes    Other Counseling Topics:   Car/seat belt/driving safety, skin self-exam, sunscreen and regular weightbearing exercise and calcium and vitamin D intake  Immunizations discussed  She is up to date  No results found  Physical Exam:     /86 (BP Location: Left arm, Patient Position: Sitting)   Pulse 68   Temp 98 3 °F (36 8 °C) (Tympanic)   Ht 5' 4" (1 626 m)   Wt 82 6 kg (182 lb)   SpO2 95%   BMI 31 24 kg/m²     Physical Exam  Vitals reviewed  Constitutional:       General: She is not in acute distress  Appearance: She is obese  HENT:      Head: Normocephalic  Right Ear: Tympanic membrane and ear canal normal  There is no impacted cerumen  Left Ear: Tympanic membrane and ear canal normal  There is no impacted cerumen  Nose: Nose normal  No congestion or rhinorrhea  Mouth/Throat:      Mouth: Mucous membranes are moist       Pharynx: Oropharynx is clear  No oropharyngeal exudate or posterior oropharyngeal erythema  Eyes:      Extraocular Movements: Extraocular movements intact  Conjunctiva/sclera: Conjunctivae normal       Pupils: Pupils are equal, round, and reactive to light  Neck:      Thyroid: No thyromegaly or thyroid tenderness  Vascular: No carotid bruit  Cardiovascular:      Rate and Rhythm: Normal rate and regular rhythm  Pulses: Normal pulses  Heart sounds: Normal heart sounds  Pulmonary:      Effort: Pulmonary effort is normal  No respiratory distress  Breath sounds: Normal breath sounds  No wheezing  Abdominal:      General: Bowel sounds are normal  There is no distension  Palpations: Abdomen is soft  Tenderness: There is no abdominal tenderness  Musculoskeletal:      Cervical back: Neck supple  No tenderness  Right lower leg: No edema  Left lower leg: No edema  Lymphadenopathy:      Cervical: No cervical adenopathy  Skin:     Coloration: Skin is not pale  Neurological:      General: No focal deficit present  Mental Status: She is alert and oriented to person, place, and time     Psychiatric:         Mood and Affect: Mood normal           Davey Camacho DO

## 2023-01-26 ENCOUNTER — CLINICAL SUPPORT (OUTPATIENT)
Dept: BARIATRICS | Facility: CLINIC | Age: 73
End: 2023-01-26

## 2023-01-26 VITALS — HEIGHT: 64 IN | BODY MASS INDEX: 31.1 KG/M2 | WEIGHT: 182.2 LBS

## 2023-01-26 DIAGNOSIS — R63.5 ABNORMAL WEIGHT GAIN: Primary | ICD-10-CM

## 2023-02-02 ENCOUNTER — CLINICAL SUPPORT (OUTPATIENT)
Dept: BARIATRICS | Facility: CLINIC | Age: 73
End: 2023-02-02

## 2023-02-02 VITALS — WEIGHT: 183.2 LBS | BODY MASS INDEX: 31.28 KG/M2 | HEIGHT: 64 IN

## 2023-02-02 DIAGNOSIS — R63.5 ABNORMAL WEIGHT GAIN: Primary | ICD-10-CM

## 2023-02-16 DIAGNOSIS — I10 BENIGN ESSENTIAL HYPERTENSION: ICD-10-CM

## 2023-02-16 RX ORDER — LISINOPRIL 20 MG/1
TABLET ORAL
Qty: 90 TABLET | Refills: 3 | Status: SHIPPED | OUTPATIENT
Start: 2023-02-16

## 2023-03-03 ENCOUNTER — APPOINTMENT (OUTPATIENT)
Dept: LAB | Facility: CLINIC | Age: 73
End: 2023-03-03

## 2023-03-03 DIAGNOSIS — Z79.899 ENCOUNTER FOR LONG-TERM (CURRENT) USE OF OTHER MEDICATIONS: ICD-10-CM

## 2023-03-03 DIAGNOSIS — D72.829 LEUKOCYTOSIS, UNSPECIFIED TYPE: ICD-10-CM

## 2023-03-03 DIAGNOSIS — E78.2 MIXED HYPERLIPIDEMIA: ICD-10-CM

## 2023-03-03 LAB
ALBUMIN SERPL BCP-MCNC: 3.3 G/DL (ref 3.5–5)
ALP SERPL-CCNC: 53 U/L (ref 46–116)
ALT SERPL W P-5'-P-CCNC: 21 U/L (ref 12–78)
ANION GAP SERPL CALCULATED.3IONS-SCNC: 4 MMOL/L (ref 4–13)
AST SERPL W P-5'-P-CCNC: 14 U/L (ref 5–45)
BASOPHILS # BLD AUTO: 0.03 THOUSANDS/ÂΜL (ref 0–0.1)
BASOPHILS NFR BLD AUTO: 1 % (ref 0–1)
BILIRUB SERPL-MCNC: 0.33 MG/DL (ref 0.2–1)
BUN SERPL-MCNC: 18 MG/DL (ref 5–25)
CALCIUM ALBUM COR SERPL-MCNC: 10 MG/DL (ref 8.3–10.1)
CALCIUM SERPL-MCNC: 9.4 MG/DL (ref 8.3–10.1)
CHLORIDE SERPL-SCNC: 107 MMOL/L (ref 96–108)
CHOLEST SERPL-MCNC: 165 MG/DL
CO2 SERPL-SCNC: 27 MMOL/L (ref 21–32)
CREAT SERPL-MCNC: 0.7 MG/DL (ref 0.6–1.3)
EOSINOPHIL # BLD AUTO: 0.04 THOUSAND/ÂΜL (ref 0–0.61)
EOSINOPHIL NFR BLD AUTO: 1 % (ref 0–6)
ERYTHROCYTE [DISTWIDTH] IN BLOOD BY AUTOMATED COUNT: 13.2 % (ref 11.6–15.1)
GFR SERPL CREATININE-BSD FRML MDRD: 85 ML/MIN/1.73SQ M
GLUCOSE P FAST SERPL-MCNC: 100 MG/DL (ref 65–99)
HCT VFR BLD AUTO: 41.4 % (ref 34.8–46.1)
HDLC SERPL-MCNC: 60 MG/DL
HGB BLD-MCNC: 13.3 G/DL (ref 11.5–15.4)
IMM GRANULOCYTES # BLD AUTO: 0.02 THOUSAND/UL (ref 0–0.2)
IMM GRANULOCYTES NFR BLD AUTO: 0 % (ref 0–2)
LDLC SERPL CALC-MCNC: 79 MG/DL (ref 0–100)
LYMPHOCYTES # BLD AUTO: 1.36 THOUSANDS/ÂΜL (ref 0.6–4.47)
LYMPHOCYTES NFR BLD AUTO: 30 % (ref 14–44)
MCH RBC QN AUTO: 30.8 PG (ref 26.8–34.3)
MCHC RBC AUTO-ENTMCNC: 32.1 G/DL (ref 31.4–37.4)
MCV RBC AUTO: 96 FL (ref 82–98)
MONOCYTES # BLD AUTO: 0.57 THOUSAND/ÂΜL (ref 0.17–1.22)
MONOCYTES NFR BLD AUTO: 12 % (ref 4–12)
NEUTROPHILS # BLD AUTO: 2.58 THOUSANDS/ÂΜL (ref 1.85–7.62)
NEUTS SEG NFR BLD AUTO: 56 % (ref 43–75)
NONHDLC SERPL-MCNC: 105 MG/DL
NRBC BLD AUTO-RTO: 0 /100 WBCS
PLATELET # BLD AUTO: 293 THOUSANDS/UL (ref 149–390)
PMV BLD AUTO: 11.2 FL (ref 8.9–12.7)
POTASSIUM SERPL-SCNC: 4.6 MMOL/L (ref 3.5–5.3)
PROT SERPL-MCNC: 6.6 G/DL (ref 6.4–8.4)
RBC # BLD AUTO: 4.32 MILLION/UL (ref 3.81–5.12)
SODIUM SERPL-SCNC: 138 MMOL/L (ref 135–147)
TRIGL SERPL-MCNC: 131 MG/DL
VALPROATE SERPL-MCNC: 39 UG/ML (ref 50–100)
WBC # BLD AUTO: 4.6 THOUSAND/UL (ref 4.31–10.16)

## 2023-03-16 ENCOUNTER — CLINICAL SUPPORT (OUTPATIENT)
Dept: BARIATRICS | Facility: CLINIC | Age: 73
End: 2023-03-16

## 2023-03-16 VITALS — BODY MASS INDEX: 31.82 KG/M2 | HEIGHT: 64 IN | WEIGHT: 186.4 LBS

## 2023-03-16 DIAGNOSIS — R63.5 ABNORMAL WEIGHT GAIN: Primary | ICD-10-CM

## 2023-03-29 DIAGNOSIS — K21.9 GASTROESOPHAGEAL REFLUX DISEASE, UNSPECIFIED WHETHER ESOPHAGITIS PRESENT: Primary | ICD-10-CM

## 2023-03-31 ENCOUNTER — HOSPITAL ENCOUNTER (OUTPATIENT)
Dept: RADIOLOGY | Age: 73
Discharge: HOME | End: 2023-03-31

## 2023-03-31 VITALS — BODY MASS INDEX: 31.76 KG/M2 | HEIGHT: 64 IN | WEIGHT: 186 LBS

## 2023-03-31 DIAGNOSIS — Z12.31 ENCOUNTER FOR SCREENING MAMMOGRAM FOR BREAST CANCER: ICD-10-CM

## 2023-04-04 NOTE — PROGRESS NOTES
"Weight Management Medical Nutrition Assessment  [facility] is here today for Healthy Ways follow-up  Current Weight: 187 6#  Patient has had a 6 3# weight gain since our last meeting 23  She stated that she had a health emergency with her  that triggered a period of higher calorie intake and decreased exercise  Patient feels she is struggling with grazing on sweets after dinner meal when it is not a true hunger but has increased her mindfulness and is actively incorporating techniques  She is not formally food logging- discussed just tracking night snacks  Discussed alternate sweet snacks for that time frame- setting timer  She plans to increase her cardio activity as well in the near future- fitness center flyer given       Goals: pre plan and pre log night snack (200 calorie range/ 20gm carb), increase physical activity     Patient seen by Medical Provider in past 6 months:  yes  Requested to schedule appointment with Medical Provider: No    Anthropometric Measurements  Start Weight (#): 185 3# (22)  Current Weight (#): 187 6# #  TBW % Change from start weight: -%  Ideal Body Weight (#): 120# (64\")  Goal Weight (#): ST-10% LT#  UBW: 120-130#    Weight Loss History  Previous weight loss attempts:Exercise  Counseling with MD  Self Created Diets (Portion Control, Healthy Food Choices, etc )    Food and Nutrition Related History  Wake up: 9 am   Bed Time: 1 am  Struggles with sleep; takes afternoon nap (several hours)     Dietary Recall     Breakfast:Grilled cheese sandwhich     Snack:skip  Lunch: yogurt triple zero (60)with no sugar jam   Supermarket - prepared sandwich (340 calories)   Snack:skip   Dinner:lean protein / veggies/ salad with 130 dressing / carb -  portions   Snack: 2 -3 dark chocolate Lindt - low sugar     Beverages: water, sparkling ice (8-16 oz), coffee (16 oz w/ almond milk + 1-2 tsp sugar), and alcohol (1-2x/week)  Volume of beverage intake: oz water    Weekends: Worse, " dining out more   Cravings: carbs/sweets  Trouble area of day: evenings     Frequency of Eating out: 1x/week  Food restrictions: no spicy foods   Cooking: self   Food Shopping: self and      Occupation: Retired    Physical Activity  Activity: [employer] / [employer] training 2x  and yoga 1x  Frequency:3x a week for 60min   Physical limitations/barriers to exercise: mobility restrictions of shoulder + neck     Estimated Needs  Energy    [employer] Needs   BMR: 1336 kcal  Maintenance calories (sedentary): 1603kcal  0 5-1# loss weekly sedentary: 5262-1878 kcal  0 5-1# loss weekly lightly active: 9726-4624 kcal    [employer] Indirect Calorimeter GSK:7555 calories            Weight loss without exercise:9716-1292 calories          Weight loss with exercise:  5747-7195 calories              Maintenance:  1909-0249    Protein: 65-82 grams (1 2-1 5g/kg IBW)  Fluid: 64 ounces (35mL/kg IBW)    Nutrition Diagnosis  Yes; Overweight/obesity related to Excess energy intake as evidenced by  BMI more than normative standard for age and sex (obesity-grade I 26-30  9)     Nutrition Intervention    Nutrition Prescription  Calories: 1200 on sedentary days and flex to 1400 calories on physical activity days  Protein: 63-83 gm  Fluid: 64oz    Meal Plan (Rah/Pro)  Breakfast: 200/7-20  Snack: --  Lunch: 300/25  Snack: 100-150/5+  Dinner: 972/38-65  Snack: 100-150/5+    Nutrition Education  Healthy Core Manual  Calorie controlled menu  Lean protein food choices  Healthy snack options  Food journaling tips    Nutrition Counseling  Strategies: meal planning, portion sizes, healthy snack choices, hydration, fiber intake, protein intake, exercise, food logging    Monitoring and Evaluation:    Evaluation criteria  Energy Intake  Meet protein needs  Maintain adequate hydration  Monitor weekly weight  Meal planning/preparation  Food journal   Decreased portions at mealtimes and snacks  Physical activity     Barriers to learning:none  Readiness to change: Preparation:  (Getting ready to change)  and Action:  (Changing behavior)  Comprehension: very good  Expected Compliance: very good

## 2023-04-05 ENCOUNTER — OFFICE VISIT (OUTPATIENT)
Dept: BARIATRICS | Facility: CLINIC | Age: 73
End: 2023-04-05

## 2023-04-05 VITALS — WEIGHT: 187.6 LBS | HEIGHT: 64 IN | BODY MASS INDEX: 32.03 KG/M2

## 2023-04-05 DIAGNOSIS — R63.5 ABNORMAL WEIGHT GAIN: Primary | ICD-10-CM

## 2023-04-27 ENCOUNTER — CLINICAL SUPPORT (OUTPATIENT)
Dept: BARIATRICS | Facility: CLINIC | Age: 73
End: 2023-04-27

## 2023-04-27 VITALS — BODY MASS INDEX: 32.03 KG/M2 | WEIGHT: 187.6 LBS | HEIGHT: 64 IN

## 2023-04-27 DIAGNOSIS — R63.5 ABNORMAL WEIGHT GAIN: Primary | ICD-10-CM

## 2023-05-04 ENCOUNTER — CLINICAL SUPPORT (OUTPATIENT)
Dept: BARIATRICS | Facility: CLINIC | Age: 73
End: 2023-05-04

## 2023-05-04 VITALS — HEIGHT: 64 IN | BODY MASS INDEX: 31.82 KG/M2 | WEIGHT: 186.4 LBS

## 2023-05-04 DIAGNOSIS — R63.5 ABNORMAL WEIGHT GAIN: Primary | ICD-10-CM

## 2023-05-10 ENCOUNTER — OFFICE VISIT (OUTPATIENT)
Dept: GASTROENTEROLOGY | Facility: AMBULARY SURGERY CENTER | Age: 73
End: 2023-05-10

## 2023-05-10 VITALS
OXYGEN SATURATION: 98 % | BODY MASS INDEX: 31.89 KG/M2 | HEART RATE: 60 BPM | SYSTOLIC BLOOD PRESSURE: 150 MMHG | WEIGHT: 186.8 LBS | DIASTOLIC BLOOD PRESSURE: 80 MMHG | HEIGHT: 64 IN

## 2023-05-10 DIAGNOSIS — K20.90 ESOPHAGITIS: ICD-10-CM

## 2023-05-10 DIAGNOSIS — K21.9 GASTROESOPHAGEAL REFLUX DISEASE, UNSPECIFIED WHETHER ESOPHAGITIS PRESENT: Primary | ICD-10-CM

## 2023-05-10 DIAGNOSIS — K44.9 HIATAL HERNIA: ICD-10-CM

## 2023-05-10 RX ORDER — CLOBETASOL PROPIONATE 0.5 MG/G
OINTMENT TOPICAL
COMMUNITY
Start: 2023-04-26

## 2023-05-10 RX ORDER — FAMOTIDINE 20 MG/1
20 TABLET, FILM COATED ORAL
Qty: 90 TABLET | Refills: 1 | Status: SHIPPED | OUTPATIENT
Start: 2023-05-10

## 2023-05-10 RX ORDER — PANTOPRAZOLE SODIUM 40 MG/1
40 TABLET, DELAYED RELEASE ORAL
Qty: 180 TABLET | Refills: 2 | Status: SHIPPED | OUTPATIENT
Start: 2023-05-10 | End: 2023-08-08

## 2023-05-10 NOTE — PROGRESS NOTES
[facility]  Progress Note - Lexie Grace 74 y o  female MRN: [id number]    Unit/Bed#:  Encounter: [id number]    Assessment/Plan:    1  GERD likely in setting of hiatal hernia, toes are currently well controlled on pantoprazole 40 mg twice daily and famotidine 20 mg at bedtime  Patient is hesitant about undergoing hiatal hernia repair at this time and would like to continue with medical management and weight loss   -Discussed with patient regarding long-term side effects associated with PPI use, she understands and wishes to continue with PPI therapy and H2 blocker instead of hiatal hernia repair   -Advised healthy weight   -Continue to follow GERD diet   -Avoid NSAIDs   -No need for repeat EGD at this time   -Of note, gastric emptying previously was normal, EGD was notable for esophagitis and hiatal hernia  2   Tubular adenoma-repeat colonoscopy in 2025     3   Hepatic cysts      Subjective:   68-year-old female with history of GERD, hiatal hernia, sleep apnea, hypertension, hyperlipidemia, presents for follow-up  Patient was previously recommended repair of the hiatal hernia due to persistent GERD symptoms  She was evaluated by bariatrics however as a part of medical clearance, she was recommended cardiac clearance and was noted to have A-fib for which she is currently on flecainide  While she was medically optimized as per patient report to proceed for bariatric surgery, patient is currently electing to hold off on hiatal hernia repair  Patient reports that she has noted that as long as she takes her PPI, she does not have any symptoms of reflux, dysphagia, odynophagia, loss of appetite or early satiety  Patient has been taking pantoprazole 40 mg twice daily and famotidine at bedtime with control of her symptoms  Denies any melena or hematochezia  Reports that the bowel habits are good    She is up-to-date with her colonoscopy  Gastric emptying study previously was "normal   Labs are reviewed, liver enzymes are normal, CBC normal, liver panel normal         Objective:     Vitals: Blood pressure 150/80, pulse 60, height 5' 4\" (1 626 m), weight 84 7 kg (186 lb 12 8 oz), SpO2 98 %  ,Body mass index is 32 06 kg/m²  [unfilled]    Physical Exam:    GEN: wn/wd, NAD  HEENT: MMM, no cervical or supraclavicular LAD, anciteric  CV: RRR, no m/r/g  CHEST: CTA b/l, no w/r/r  ABD: +BS, soft, NT/ND, no hepatosplenomegaly  EXT: no c/c/e  SKIN: no rashes  NEURO: aaox3      Invasive Devices     None                         Lab, Imaging and other studies:     No visits with results within 1 Day(s) from this visit     Latest known visit with results is:   Appointment on 03/03/2023   Component Date Value   • Cholesterol 03/03/2023 165    • Triglycerides 03/03/2023 131    • HDL, Direct 03/03/2023 60    • LDL Calculated 03/03/2023 79    • Non-HDL-Chol (CHOL-HDL) 03/03/2023 105    • WBC 03/03/2023 4 60    • RBC 03/03/2023 4 32    • Hemoglobin 03/03/2023 13 3    • Hematocrit 03/03/2023 41 4    • MCV 03/03/2023 96    • MCH 03/03/2023 30 8    • MCHC 03/03/2023 32 1    • RDW 03/03/2023 13 2    • MPV 03/03/2023 11 2    • Platelets 94/39/8471 293    • nRBC 03/03/2023 0    • Neutrophils Relative 03/03/2023 56    • Immat GRANS % 03/03/2023 0    • Lymphocytes Relative 03/03/2023 30    • Monocytes Relative 03/03/2023 12    • Eosinophils Relative 03/03/2023 1    • Basophils Relative 03/03/2023 1    • Neutrophils Absolute 03/03/2023 2 58    • Immature Grans Absolute 03/03/2023 0 02    • Lymphocytes Absolute 03/03/2023 1 36    • Monocytes Absolute 03/03/2023 0 57    • Eosinophils Absolute 03/03/2023 0 04    • Basophils Absolute 03/03/2023 0 03    • Sodium 03/03/2023 138    • Potassium 03/03/2023 4 6    • Chloride 03/03/2023 107    • CO2 03/03/2023 27    • ANION GAP 03/03/2023 4    • BUN 03/03/2023 18    • Creatinine 03/03/2023 0 70    • Glucose, Fasting 03/03/2023 100 (H)    • Calcium 03/03/2023 9 4    • Corrected " Calcium 03/03/2023 10 0    • AST 03/03/2023 14    • ALT 03/03/2023 21    • Alkaline Phosphatase 03/03/2023 53    • Total Protein 03/03/2023 6 6    • Albumin 03/03/2023 3 3 (L)    • Total Bilirubin 03/03/2023 0 33    • eGFR 03/03/2023 85    • Valproic Acid, Total 03/03/2023 39 (L)          I have personally reviewed pertinent films in PACS    No current facility-administered medications for this visit  Answers for HPI/ROS submitted by the patient on 5/4/2023  Chronicity: recurrent  Onset: more than 1 year ago  Onset quality: gradual  Frequency: intermittently  Episode duration: 2 Hours  Progression since onset: gradually improving  Pain location: generalized abdominal region  Pain - numeric: 1/10  Pain quality: cramping  Radiates to: does not radiate  anorexia: No  arthralgias: No  belching: Yes  constipation: No  diarrhea: No  dysuria: No  fever: No  flatus: Yes  frequency: Yes  headaches: No  hematochezia: No  hematuria: No  melena: No  myalgias: No  nausea:  No  weight loss: No  vomiting: No  Aggravated by: nothing  Relieved by: nothing

## 2023-05-10 NOTE — LETTER
May 10, 2023     [facility], DO  [address]  [address], [address] [facility]    Patient: Danita Guerrero   YOB: 1950   Date of Visit: 5/10/2023       Dear Dr Dilshad Quintero: Thank you for referring Cristhian East to me for evaluation  Below are my notes for this consultation  If you have questions, please do not hesitate to call me  I look forward to following your patient along with you  Sincerely,        Ki Ortega MD        CC: No Recipients  Ki Ortega MD  5/10/2023 11:29 AM  Sign when Signing Visit  [facility]  Progress Note - Danita Guerrero 74 y o  female MRN: [id number]    Unit/Bed#:  Encounter: [id number]    Assessment/Plan:    1  GERD likely in setting of hiatal hernia, toes are currently well controlled on pantoprazole 40 mg twice daily and famotidine 20 mg at bedtime  Patient is hesitant about undergoing hiatal hernia repair at this time and would like to continue with medical management and weight loss   -Discussed with patient regarding long-term side effects associated with PPI use, she understands and wishes to continue with PPI therapy and H2 blocker instead of hiatal hernia repair   -Advised healthy weight   -Continue to follow GERD diet   -Avoid NSAIDs   -No need for repeat EGD at this time   -Of note, gastric emptying previously was normal, EGD was notable for esophagitis and hiatal hernia  2   Tubular adenoma-repeat colonoscopy in 2025     3   Hepatic cysts      Subjective:   64-year-old female with history of GERD, hiatal hernia, sleep apnea, hypertension, hyperlipidemia, presents for follow-up  Patient was previously recommended repair of the hiatal hernia due to persistent GERD symptoms  She was evaluated by bariatrics however as a part of medical clearance, she was recommended cardiac clearance and was noted to have A-fib for which she is currently on flecainide    While she was medically optimized as "per patient report to proceed for bariatric surgery, patient is currently electing to hold off on hiatal hernia repair  Patient reports that she has noted that as long as she takes her PPI, she does not have any symptoms of reflux, dysphagia, odynophagia, loss of appetite or early satiety  Patient has been taking pantoprazole 40 mg twice daily and famotidine at bedtime with control of her symptoms  Denies any melena or hematochezia  Reports that the bowel habits are good  She is up-to-date with her colonoscopy  Gastric emptying study previously was normal   Labs are reviewed, liver enzymes are normal, CBC normal, liver panel normal         Objective:     Vitals: Blood pressure 150/80, pulse 60, height 5' 4\" (1 626 m), weight 84 7 kg (186 lb 12 8 oz), SpO2 98 %  ,Body mass index is 32 06 kg/m²  [unfilled]    Physical Exam:    GEN: wn/wd, NAD  HEENT: MMM, no cervical or supraclavicular LAD, anciteric  CV: RRR, no m/r/g  CHEST: CTA b/l, no w/r/r  ABD: +BS, soft, NT/ND, no hepatosplenomegaly  EXT: no c/c/e  SKIN: no rashes  NEURO: aaox3      Invasive Devices     None                         Lab, Imaging and other studies:     No visits with results within 1 Day(s) from this visit     Latest known visit with results is:   Appointment on 03/03/2023   Component Date Value   • Cholesterol 03/03/2023 165    • Triglycerides 03/03/2023 131    • HDL, Direct 03/03/2023 60    • LDL Calculated 03/03/2023 79    • Non-HDL-Chol (CHOL-HDL) 03/03/2023 105    • WBC 03/03/2023 4 60    • RBC 03/03/2023 4 32    • Hemoglobin 03/03/2023 13 3    • Hematocrit 03/03/2023 41 4    • MCV 03/03/2023 96    • MCH 03/03/2023 30 8    • MCHC 03/03/2023 32 1    • RDW 03/03/2023 13 2    • MPV 03/03/2023 11 2    • Platelets 34/37/6147 293    • nRBC 03/03/2023 0    • Neutrophils Relative 03/03/2023 56    • Immat GRANS % 03/03/2023 0    • Lymphocytes Relative 03/03/2023 30    • Monocytes Relative 03/03/2023 12    • Eosinophils Relative 03/03/2023 1    • " Basophils Relative 03/03/2023 1    • Neutrophils Absolute 03/03/2023 2 58    • Immature Grans Absolute 03/03/2023 0 02    • Lymphocytes Absolute 03/03/2023 1 36    • Monocytes Absolute 03/03/2023 0 57    • Eosinophils Absolute 03/03/2023 0 04    • Basophils Absolute 03/03/2023 0 03    • Sodium 03/03/2023 138    • Potassium 03/03/2023 4 6    • Chloride 03/03/2023 107    • CO2 03/03/2023 27    • ANION GAP 03/03/2023 4    • BUN 03/03/2023 18    • Creatinine 03/03/2023 0 70    • Glucose, Fasting 03/03/2023 100 (H)    • Calcium 03/03/2023 9 4    • Corrected Calcium 03/03/2023 10 0    • AST 03/03/2023 14    • ALT 03/03/2023 21    • Alkaline Phosphatase 03/03/2023 53    • Total Protein 03/03/2023 6 6    • Albumin 03/03/2023 3 3 (L)    • Total Bilirubin 03/03/2023 0 33    • eGFR 03/03/2023 85    • Valproic Acid, Total 03/03/2023 39 (L)          I have personally reviewed pertinent films in PACS    No current facility-administered medications for this visit  Answers for HPI/ROS submitted by the patient on 5/4/2023  Chronicity: recurrent  Onset: more than 1 year ago  Onset quality: gradual  Frequency: intermittently  Episode duration: 2 Hours  Progression since onset: gradually improving  Pain location: generalized abdominal region  Pain - numeric: 1/10  Pain quality: cramping  Radiates to: does not radiate  anorexia: No  arthralgias: No  belching: Yes  constipation: No  diarrhea: No  dysuria: No  fever: No  flatus: Yes  frequency: Yes  headaches: No  hematochezia: No  hematuria: No  melena: No  myalgias: No  nausea:  No  weight loss: No  vomiting: No  Aggravated by: nothing  Relieved by: nothing

## 2023-05-19 NOTE — PROGRESS NOTES
"Weight Management Medical Nutrition Assessment  [facility] is here today for Healthy Ways follow-up  Current Weight: 186#  Patient has lost 0 4# in the past 2 weeks     Patient feels she is struggling with grazing on sweets after dinner meal when it is not a true hunger but has increased her mindfulness and is actively incorporating techniques  She is not formally food logging or measuring her portions at dinner meal- discussed just tracking night snacks- manually logs given  Discussed alternate sweet snacks for that time frame- setting timer  She plans to increase her cardio activity as well in the near future- planning 2 x walk weekly in her neighborhood       Goals: pre plan and pre log night snack (200 calorie range/ 20gm carb), increase physical activity     Patient seen by Medical Provider in past 6 months:  yes  Requested to schedule appointment with Medical Provider: No    Anthropometric Measurements  Start Weight (#): 185 3# (22)  Current Weight (#): 186#  TBW % Change from start weight: -%  Ideal Body Weight (#): 120# (64\")  Goal Weight (#): ST-10% LT#  UBW: 120-130#    Weight Loss History  Previous weight loss attempts:Exercise  Counseling with MD  Self Created Diets (Portion Control, Healthy Food Choices, etc )    Food and Nutrition Related History  Wake up: 9 am   Bed Time: 1 am  Struggles with sleep; takes afternoon nap (several hours)     Dietary Recall     11:00am -12:00 Breakfast:Grilled cheese sandwhich     Snack:skip  3:00pm Lunch: yogurt triple zero (60)with no sugar jam   [employer] - prepared sandwich (340 calories) or prepared salad ( using all dressing ) - [employer]   Snack:skip   6:00-7:00pm Dinner:lean protein / veggies/ salad with 130 dressing / carb - unmeasured portions   Snack: 2 -3 dark chocolate Lindt - low sugar  Or other sweets in the house    Beverages: water, sparkling ice (8-16 oz), coffee (16 oz w/ almond milk + 1-2 tsp sugar), and alcohol (1-2x/week)  Volume of " beverage intake: oz water    Weekends: Dine out Sat Nights   Cravings: carbs/sweets  Trouble area of day: evenings     Frequency of Eating out: 1x/week  Food restrictions: no spicy foods   Cooking: self   Food Shopping: self and      Occupation: Retired    Physical Activity  Activity: [employer] training 2x  and yoga 1x  Frequency:3x a week for 60min   Physical limitations/barriers to exercise: mobility restrictions of shoulder + neck     Estimated Needs  Energy    [employer] Needs   BMR: 1336 kcal  Maintenance calories (sedentary): 1603kcal  0 5-1# loss weekly sedentary: 2458-6593 kcal  0 5-1# loss weekly lightly active: 7914-4197 kcal    [employer] Indirect Calorimeter WSE:7525 calories            Weight loss without exercise:3904-4919 calories          Weight loss with exercise:  9049-3358 calories              Maintenance:  2764-9960    Protein: 65-82 grams (1 2-1 5g/kg IBW)  Fluid: 64 ounces (35mL/kg IBW)    Nutrition Diagnosis  Yes; Overweight/obesity related to Excess energy intake as evidenced by  BMI more than normative standard for age and sex (obesity-grade I 26-30  9)     Nutrition Intervention    Nutrition Prescription  Calories: 1200 on sedentary days and flex to 1400 calories on physical activity days  Protein: 63-83 gm  Fluid: 64oz    Meal Plan (Rah/Pro)  Breakfast: 200/7-20  Snack: --  Lunch: 300/25  Snack: 100-150/5+  Dinner: 664/40-48  Snack: 100-150/5+    Nutrition Education  Healthy Core Manual  Calorie controlled menu  Lean protein food choices  Healthy snack options  Food journaling tips    Nutrition Counseling  Strategies: meal planning, portion sizes, healthy snack choices, hydration, fiber intake, protein intake, exercise, food logging    Monitoring and Evaluation:    Evaluation criteria  Energy Intake  Meet protein needs  Maintain adequate hydration  Monitor weekly weight  Meal planning/preparation  Food journal   Decreased portions at mealtimes and snacks  Physical activity     Barriers to learning:none  Readiness to change: Preparation:  (Getting ready to change)  and Action:  (Changing behavior)  Comprehension: very good  Expected Compliance: very good

## 2023-05-22 ENCOUNTER — OFFICE VISIT (OUTPATIENT)
Dept: BARIATRICS | Facility: CLINIC | Age: 73
End: 2023-05-22

## 2023-05-22 VITALS — BODY MASS INDEX: 31.76 KG/M2 | WEIGHT: 186 LBS | HEIGHT: 64 IN

## 2023-05-22 DIAGNOSIS — R63.5 ABNORMAL WEIGHT GAIN: ICD-10-CM

## 2023-06-07 ENCOUNTER — HOSPITAL ENCOUNTER (OUTPATIENT)
Dept: NEUROLOGY | Facility: CLINIC | Age: 73
Discharge: HOME | End: 2023-06-07
Payer: MEDICARE

## 2023-06-07 DIAGNOSIS — G40.209 COMPLEX PARTIAL SEIZURES WITH CONSCIOUSNESS IMPAIRED (HCC): ICD-10-CM

## 2023-06-07 PROCEDURE — 95816 EEG AWAKE AND DROWSY: CPT

## 2023-06-12 ENCOUNTER — TELEPHONE (OUTPATIENT)
Dept: BARIATRICS | Facility: CLINIC | Age: 73
End: 2023-06-12

## 2023-06-26 ENCOUNTER — OFFICE VISIT (OUTPATIENT)
Dept: BARIATRICS | Facility: CLINIC | Age: 73
End: 2023-06-26
Payer: MEDICARE

## 2023-06-26 VITALS
SYSTOLIC BLOOD PRESSURE: 125 MMHG | HEART RATE: 69 BPM | HEIGHT: 64 IN | WEIGHT: 190.6 LBS | DIASTOLIC BLOOD PRESSURE: 75 MMHG | BODY MASS INDEX: 32.54 KG/M2 | RESPIRATION RATE: 16 BRPM

## 2023-06-26 DIAGNOSIS — K21.9 GASTROESOPHAGEAL REFLUX DISEASE, UNSPECIFIED WHETHER ESOPHAGITIS PRESENT: ICD-10-CM

## 2023-06-26 DIAGNOSIS — E66.9 OBESITY (BMI 30.0-34.9): Primary | ICD-10-CM

## 2023-06-26 DIAGNOSIS — I10 PRIMARY HYPERTENSION: ICD-10-CM

## 2023-06-26 DIAGNOSIS — G43.709 CHRONIC MIGRAINE WITHOUT AURA WITHOUT STATUS MIGRAINOSUS, NOT INTRACTABLE: ICD-10-CM

## 2023-06-26 DIAGNOSIS — I48.0 PAROXYSMAL ATRIAL FIBRILLATION (HCC): ICD-10-CM

## 2023-06-26 DIAGNOSIS — E78.2 MIXED HYPERLIPIDEMIA: ICD-10-CM

## 2023-06-26 DIAGNOSIS — R73.03 PREDIABETES: ICD-10-CM

## 2023-06-26 DIAGNOSIS — G47.33 OBSTRUCTIVE SLEEP APNEA SYNDROME: ICD-10-CM

## 2023-06-26 PROCEDURE — 99214 OFFICE O/P EST MOD 30 MIN: CPT | Performed by: INTERNAL MEDICINE

## 2023-06-26 NOTE — ASSESSMENT & PLAN NOTE
Based on hemoglobin A1c of 5 9%  Consideration for metformin in the future which may have metabolic benefit

## 2023-06-26 NOTE — ASSESSMENT & PLAN NOTE
Unfortunately the patient cannot take bupropion as she has a history of seizures  Recommend against phentermine as well as she has a history of anxiety disorder  We discussed the potentially obesity genic medications that she is on and she was offered alternatives to discuss with the prescribing doctor  She will be meeting with neurologist in 2 weeks and her primary care doctor and just under 4 weeks  She plans to discuss this with them and contact me with an update in 1 month  She will follow-up with me in person in 3 to 4 months or sooner if needed  Patient instructions:  Discuss potentially switching from lexapro to fluoxetine or sertraline (fluoxetine preferred)  Discuss adding (and perhaps eventually switching to topiramate) in the future  Topiramate with dinner may significantly reduced night eating  Continue meeting with dietician  Continue with regular exercise  Update me on any changes next month      Return visit:  3 month

## 2023-06-26 NOTE — PROGRESS NOTES
Assessment/Plan: Cesar Mcdonough was seen today for follow-up  Diagnoses and all orders for this visit:    Obesity (BMI 30 0-34  9)    Prediabetes    Gastroesophageal reflux disease, unspecified whether esophagitis present    Obstructive sleep apnea syndrome    Primary hypertension    Mixed hyperlipidemia    Chronic migraine without aura without status migrainosus, not intractable    Paroxysmal atrial fibrillation (HCC)       Obesity (BMI 30 0-34 9)  Unfortunately the patient cannot take bupropion as she has a history of seizures  Recommend against phentermine as well as she has a history of anxiety disorder  We discussed the potentially obesity genic medications that she is on and she was offered alternatives to discuss with the prescribing doctor  She will be meeting with neurologist in 2 weeks and her primary care doctor and just under 4 weeks  She plans to discuss this with them and contact me with an update in 1 month  She will follow-up with me in person in 3 to 4 months or sooner if needed  Patient instructions:  Discuss potentially switching from lexapro to fluoxetine or sertraline (fluoxetine preferred)  Discuss adding (and perhaps eventually switching to topiramate) in the future  Topiramate with dinner may significantly reduced night eating  Continue meeting with dietician  Continue with regular exercise  Update me on any changes next month  Return visit:  3 month    Prediabetes  Based on hemoglobin A1c of 5 9%  Consideration for metformin in the future which may have metabolic benefit       Total time spent reviewing chart, interviewing patient, examining patient, discussing plan, answering all questions, and documentin min        ______________________________________________________________________    Subjective:   Chief Complaint   Patient presents with   • Follow-up     MWM OD f/u; Waist-42in     HPI: Kunal Escalera  is a 74 y o  female with history of GERD, hypertension, JORDANA, mixed hyperlipidemia, migraine headaches, and excess weight, here to pursue weight loss management  Previous notes and records have been reviewed  Patient met with Dr Aura Christopher last year  Patient for possible  Laparoscopic PEH repair with possible fundoplication with possible LINX with robotic assist      Patient completed healthy core program and is currently in the [employer] program   She last met with Filomena Mixon on 5/22/2023  Reported struggling with grazing on sweets after dinner  Discussed efforts to mitigate this by preplanning snacks and increasing activity level planning for 2 walks a week in her neighborhood  Healthy core start weight 185 3 on 9/28/2022  Weight today    Has JORDANA but held off on treatment  Inquires on if this may be a barrier to lose weight  Inquires if her current medication may be working against her  HPI  Wt Readings from Last 20 Encounters:   06/26/23 86 5 kg (190 lb 9 6 oz)   05/22/23 84 4 kg (186 lb)   05/10/23 84 7 kg (186 lb 12 8 oz)   05/04/23 84 6 kg (186 lb 6 4 oz)   04/27/23 85 1 kg (187 lb 9 6 oz)   04/20/23 84 2 kg (185 lb 9 6 oz)   04/05/23 85 1 kg (187 lb 9 6 oz)   03/31/23 84 4 kg (186 lb)   03/16/23 84 6 kg (186 lb 6 4 oz)   02/02/23 83 1 kg (183 lb 3 2 oz)   01/26/23 82 6 kg (182 lb 3 2 oz)   01/24/23 82 6 kg (182 lb)   01/23/23 82 2 kg (181 lb 4 8 oz)   01/19/23 83 3 kg (183 lb 9 6 oz)   01/05/23 82 7 kg (182 lb 6 4 oz)   12/29/22 82 7 kg (182 lb 6 4 oz)   12/19/22 81 6 kg (180 lb)   12/05/22 83 9 kg (185 lb)   12/01/22 84 2 kg (185 lb 9 6 oz)   11/30/22 84 kg (185 lb 3 2 oz)     Excess Weight:  Onset 10 years ago  Highest weight: 190 5  Current weight: 190  What has been tried: Diet and Exercise, Physician Supervised Weight Loss Program and [employer] program  Associated symptoms and effects: fatigue and increased joint pain    Hunger/Cravings: night time - one hour after dinner    Dining out:    Hydration: Water, sparkling ice (8 to 16 ounces), coffee (16 ounces with almond milk and 1 to 2 teaspoons of sugar), alcoholic beverages 1-2 drinks/week  Exercise:  3x/week for 60 min  Occupation:  Retired    Past Medical History:   Diagnosis Date   • Acid reflux disease    • Acute bronchitis 9/7/2022   • Allergic    • Allergies    • Anxiety    • Arthralgia    • Arthritis    • Atrial fibrillation [facility])    • Chronic interstitial cystitis    • Chronic pain disorder     neck/ shldrs   • Colon polyp    • Coronary artery disease afib/ Nov 2018   • Depression    • GERD (gastroesophageal reflux disease)    • Headache(784 0)    • Hyperlipidemia    • Hypertension    • Irregular heart beat     afib   • Lichen sclerosus    • Lipoma    • Myalgia 3/16/2016   • Obesity    • Osteopenia    • Paresthesia of both hands 08/06/2019   • Seizures ([address] )    • Shortness of breath     ROJAS   • Simple partial seizures ([address] )     last assessed 3/30/17   • Sleep apnea    • Tachycardia     last assessed 6/24/15     Patient denies personal and family history of  pancreatitis, thyroid cancer, MEN-2 tumors  Denies any hx of glaucoma, seizures, kidney stones, gallstones  Denies Hx of CAD, PAD, palpitations, arrhythmia  Denies uncontrolled anxiety or depression, suicidal behavior or thinking , insomnia or sleep disturbance     Past Surgical History:   Procedure Laterality Date   • ABDOMINOPLASTY     • APPENDECTOMY     • BLEPHAROPLASTY     • COLONOSCOPY  2020    Dr Benjamin Gonzalez, 5 year f/u    • COSMETIC SURGERY     • FACELIFT     • FRACTURE SURGERY     • HYSTERECTOMY     • OOPHORECTOMY     • MN RHYTIDECTOMY NECK W/PLATYSMAL TIGHTENING N/A 12/19/2022    Procedure: MINI FACELIFT;  Surgeon: Lorraine Peter MD;  Location: [facility] MAIN OR;  Service: Plastics   • REDUCTION MAMMAPLASTY     • RHINOPLASTY     • SHOULDER SURGERY Left      The following portions of the patient's history were reviewed and updated as appropriate: allergies, current medications, past "family history, past medical history, past social history, past surgical history, and problem list     Review Of Systems:  Review of Systems   Constitutional: Negative for activity change, appetite change, fatigue and fever  Respiratory: Negative for cough and shortness of breath  Cardiovascular: Negative for chest pain, palpitations and leg swelling  Gastrointestinal: Negative for abdominal pain, constipation, diarrhea, nausea and vomiting  Endocrine: Negative for cold intolerance and heat intolerance  Genitourinary: Negative for difficulty urinating and dysuria  Musculoskeletal: Negative for arthralgias, back pain and gait problem  Skin: Negative for pallor and rash  Neurological: Negative for headaches  Psychiatric/Behavioral: Negative for dysphoric mood, sleep disturbance and suicidal ideas (or HI)  The patient is not nervous/anxious  Objective:  /75   Pulse 69   Resp 16   Ht 5' 4\" (1 626 m)   Wt 86 5 kg (190 lb 9 6 oz)   BMI 32 72 kg/m²   Physical Exam  Vitals and nursing note reviewed  Constitutional:       General: She is not in acute distress  Appearance: Normal appearance  She is not ill-appearing or diaphoretic  Eyes:      General: No scleral icterus  Cardiovascular:      Rate and Rhythm: Normal rate and regular rhythm  Pulses: Normal pulses  Heart sounds: No murmur heard  Pulmonary:      Effort: Pulmonary effort is normal  No respiratory distress  Breath sounds: Normal breath sounds  No wheezing or rhonchi  Abdominal:      General: Bowel sounds are normal  There is no distension  Palpations: Abdomen is soft  There is no mass  Tenderness: There is no abdominal tenderness  Musculoskeletal:      Cervical back: Neck supple  Right lower leg: No edema  Left lower leg: No edema  Lymphadenopathy:      Cervical: No cervical adenopathy  Skin:     Capillary Refill: Capillary refill takes less than 2 seconds        Findings: No " lesion or rash  Neurological:      Mental Status: She is alert and oriented to person, place, and time  Gait: Gait normal    Psychiatric:         Mood and Affect: Mood normal          Behavior: Behavior normal        Labs and Imaging  Recent labs and imaging have been personally reviewed    Lab Results   Component Value Date    WBC 4 60 03/03/2023    HGB 13 3 03/03/2023    HCT 41 4 03/03/2023    MCV 96 03/03/2023     03/03/2023     Lab Results   Component Value Date     01/05/2016    SODIUM 138 03/03/2023    K 4 6 03/03/2023     03/03/2023    CO2 27 03/03/2023    ANIONGAP 7 01/05/2016    AGAP 4 03/03/2023    BUN 18 03/03/2023    CREATININE 0 70 03/03/2023    GLUC 121 12/05/2022    GLUF 100 (H) 03/03/2023    CALCIUM 9 4 03/03/2023    AST 14 03/03/2023    ALT 21 03/03/2023    ALKPHOS 53 03/03/2023    PROT 6 3 (L) 01/05/2016    TP 6 6 03/03/2023    BILITOT 0 30 01/05/2016    TBILI 0 33 03/03/2023    EGFR 85 03/03/2023     Lab Results   Component Value Date    HGBA1C 5 9 (H) 08/11/2022     Lab Results   Component Value Date    CNM1RZFCYFTQ 2 560 09/19/2022     Lab Results   Component Value Date    CHOLESTEROL 165 03/03/2023     Lab Results   Component Value Date    HDL 60 03/03/2023     Lab Results   Component Value Date    TRIG 131 03/03/2023     Lab Results   Component Value Date    LDLCALC 79 03/03/2023

## 2023-06-26 NOTE — PATIENT INSTRUCTIONS
Discuss potentially switching from lexapro to fluoxetine or sertraline (fluoxetine preferred)  Discuss adding (and perhaps eventually switching to topiramate) in the future  Topiramate with dinner may significantly reduced night eating  Continue meeting with dietician  Continue with regular exercise  Update me on any changes next month      Return visit:  3 month

## 2023-07-11 ENCOUNTER — APPOINTMENT (OUTPATIENT)
Dept: LAB | Facility: CLINIC | Age: 73
End: 2023-07-11
Payer: MEDICARE

## 2023-07-11 DIAGNOSIS — Z79.899 ENCOUNTER FOR LONG-TERM (CURRENT) USE OF OTHER MEDICATIONS: ICD-10-CM

## 2023-07-11 LAB — VALPROATE SERPL-MCNC: 74 UG/ML (ref 50–100)

## 2023-07-11 PROCEDURE — 36415 COLL VENOUS BLD VENIPUNCTURE: CPT

## 2023-07-11 PROCEDURE — 80164 ASSAY DIPROPYLACETIC ACD TOT: CPT

## 2023-07-11 RX ORDER — FAMOTIDINE 20 MG/1
TABLET, FILM COATED ORAL
OUTPATIENT
Start: 2023-07-11

## 2023-07-17 ENCOUNTER — RA CDI HCC (OUTPATIENT)
Dept: OTHER | Facility: HOSPITAL | Age: 73
End: 2023-07-17

## 2023-07-17 NOTE — PROGRESS NOTES
F10.20  [address] [facility] coding opportunities          Chart Reviewed number of suggestions sent to Provider: 1     Patients Insurance     Medicare Insurance: [employer]

## 2023-07-19 DIAGNOSIS — I10 BENIGN ESSENTIAL HTN: ICD-10-CM

## 2023-07-19 RX ORDER — AMLODIPINE BESYLATE 5 MG/1
TABLET ORAL
Qty: 90 TABLET | Refills: 3 | Status: SHIPPED | OUTPATIENT
Start: 2023-07-19 | End: 2023-07-24 | Stop reason: SDUPTHER

## 2023-07-21 ENCOUNTER — APPOINTMENT (OUTPATIENT)
Dept: LAB | Facility: CLINIC | Age: 73
End: 2023-07-21
Payer: MEDICARE

## 2023-07-21 DIAGNOSIS — Z79.899 ENCOUNTER FOR LONG-TERM (CURRENT) USE OF OTHER MEDICATIONS: ICD-10-CM

## 2023-07-21 LAB
ALBUMIN SERPL BCP-MCNC: 3.4 G/DL (ref 3.5–5)
ALP SERPL-CCNC: 46 U/L (ref 46–116)
ALT SERPL W P-5'-P-CCNC: 28 U/L (ref 12–78)
ANION GAP SERPL CALCULATED.3IONS-SCNC: 4 MMOL/L
AST SERPL W P-5'-P-CCNC: 22 U/L (ref 5–45)
BASOPHILS # BLD AUTO: 0.03 THOUSANDS/ÂΜL (ref 0–0.1)
BASOPHILS NFR BLD AUTO: 1 % (ref 0–1)
BILIRUB SERPL-MCNC: 0.43 MG/DL (ref 0.2–1)
BUN SERPL-MCNC: 22 MG/DL (ref 5–25)
CALCIUM ALBUM COR SERPL-MCNC: 9.7 MG/DL (ref 8.3–10.1)
CALCIUM SERPL-MCNC: 9.2 MG/DL (ref 8.3–10.1)
CHLORIDE SERPL-SCNC: 108 MMOL/L (ref 96–108)
CO2 SERPL-SCNC: 27 MMOL/L (ref 21–32)
CREAT SERPL-MCNC: 0.85 MG/DL (ref 0.6–1.3)
EOSINOPHIL # BLD AUTO: 0.06 THOUSAND/ÂΜL (ref 0–0.61)
EOSINOPHIL NFR BLD AUTO: 1 % (ref 0–6)
ERYTHROCYTE [DISTWIDTH] IN BLOOD BY AUTOMATED COUNT: 13.2 % (ref 11.6–15.1)
GFR SERPL CREATININE-BSD FRML MDRD: 67 ML/MIN/1.73SQ M
GLUCOSE P FAST SERPL-MCNC: 100 MG/DL (ref 65–99)
HCT VFR BLD AUTO: 43.9 % (ref 34.8–46.1)
HGB BLD-MCNC: 13.7 G/DL (ref 11.5–15.4)
IMM GRANULOCYTES # BLD AUTO: 0.04 THOUSAND/UL (ref 0–0.2)
IMM GRANULOCYTES NFR BLD AUTO: 1 % (ref 0–2)
LYMPHOCYTES # BLD AUTO: 1.38 THOUSANDS/ÂΜL (ref 0.6–4.47)
LYMPHOCYTES NFR BLD AUTO: 26 % (ref 14–44)
MCH RBC QN AUTO: 30.9 PG (ref 26.8–34.3)
MCHC RBC AUTO-ENTMCNC: 31.2 G/DL (ref 31.4–37.4)
MCV RBC AUTO: 99 FL (ref 82–98)
MONOCYTES # BLD AUTO: 0.59 THOUSAND/ÂΜL (ref 0.17–1.22)
MONOCYTES NFR BLD AUTO: 11 % (ref 4–12)
NEUTROPHILS # BLD AUTO: 3.16 THOUSANDS/ÂΜL (ref 1.85–7.62)
NEUTS SEG NFR BLD AUTO: 60 % (ref 43–75)
NRBC BLD AUTO-RTO: 0 /100 WBCS
PLATELET # BLD AUTO: 328 THOUSANDS/UL (ref 149–390)
PMV BLD AUTO: 11 FL (ref 8.9–12.7)
POTASSIUM SERPL-SCNC: 4.6 MMOL/L (ref 3.5–5.3)
PROT SERPL-MCNC: 6.7 G/DL (ref 6.4–8.4)
RBC # BLD AUTO: 4.44 MILLION/UL (ref 3.81–5.12)
SODIUM SERPL-SCNC: 139 MMOL/L (ref 135–147)
VALPROATE SERPL-MCNC: 68 UG/ML (ref 50–100)
WBC # BLD AUTO: 5.26 THOUSAND/UL (ref 4.31–10.16)

## 2023-07-21 PROCEDURE — 80164 ASSAY DIPROPYLACETIC ACD TOT: CPT

## 2023-07-21 PROCEDURE — 36415 COLL VENOUS BLD VENIPUNCTURE: CPT

## 2023-07-21 PROCEDURE — 85025 COMPLETE CBC W/AUTO DIFF WBC: CPT

## 2023-07-21 PROCEDURE — 80053 COMPREHEN METABOLIC PANEL: CPT

## 2023-07-24 ENCOUNTER — OFFICE VISIT (OUTPATIENT)
Dept: INTERNAL MEDICINE CLINIC | Facility: CLINIC | Age: 73
End: 2023-07-24
Payer: MEDICARE

## 2023-07-24 VITALS
BODY MASS INDEX: 32.42 KG/M2 | OXYGEN SATURATION: 95 % | WEIGHT: 189.9 LBS | RESPIRATION RATE: 16 BRPM | TEMPERATURE: 98.5 F | HEIGHT: 64 IN | SYSTOLIC BLOOD PRESSURE: 124 MMHG | HEART RATE: 66 BPM | DIASTOLIC BLOOD PRESSURE: 70 MMHG

## 2023-07-24 DIAGNOSIS — F41.9 ANXIETY: ICD-10-CM

## 2023-07-24 DIAGNOSIS — G47.33 OBSTRUCTIVE SLEEP APNEA SYNDROME: ICD-10-CM

## 2023-07-24 DIAGNOSIS — E78.2 MIXED HYPERLIPIDEMIA: ICD-10-CM

## 2023-07-24 DIAGNOSIS — I10 PRIMARY HYPERTENSION: Primary | ICD-10-CM

## 2023-07-24 DIAGNOSIS — F33.42 RECURRENT MAJOR DEPRESSIVE DISORDER, IN FULL REMISSION (HCC): ICD-10-CM

## 2023-07-24 PROBLEM — J06.9 UPPER RESPIRATORY TRACT INFECTION: Status: RESOLVED | Noted: 2022-09-01 | Resolved: 2023-07-24

## 2023-07-24 PROCEDURE — 99214 OFFICE O/P EST MOD 30 MIN: CPT | Performed by: INTERNAL MEDICINE

## 2023-07-24 RX ORDER — NITROFURANTOIN 25; 75 MG/1; MG/1
CAPSULE ORAL
COMMUNITY

## 2023-07-24 RX ORDER — DEXAMETHASONE SODIUM PHOSPHATE 4 MG/ML
INJECTION, SOLUTION INTRA-ARTICULAR; INTRALESIONAL; INTRAMUSCULAR; INTRAVENOUS; SOFT TISSUE
COMMUNITY
Start: 2023-06-05

## 2023-07-24 RX ORDER — PINDOLOL 5 MG/1
5 TABLET ORAL DAILY
Qty: 90 TABLET | Refills: 3 | Status: SHIPPED | OUTPATIENT
Start: 2023-07-24

## 2023-07-24 RX ORDER — AMLODIPINE BESYLATE 5 MG/1
5 TABLET ORAL DAILY
Qty: 90 TABLET | Refills: 3 | Status: SHIPPED | OUTPATIENT
Start: 2023-07-24

## 2023-07-24 RX ORDER — FLUOXETINE HYDROCHLORIDE 20 MG/1
CAPSULE ORAL
COMMUNITY
Start: 2023-07-12

## 2023-07-24 RX ORDER — ALBUTEROL SULFATE 90 UG/1
AEROSOL, METERED RESPIRATORY (INHALATION)
COMMUNITY

## 2023-07-24 NOTE — ASSESSMENT & PLAN NOTE
-reports waking up gasping  -did not tolerate CPAP, scheduled to see ENT for consideration for Inspire device  -continue weight loss efforts

## 2023-07-24 NOTE — PROGRESS NOTES
Assessment/Plan:    Problem List Items Addressed This Visit        Respiratory    Obstructive sleep apnea syndrome     -reports waking up gasping  -did not tolerate CPAP, scheduled to see ENT for consideration for Inspire device  -continue weight loss efforts            Cardiovascular and Mediastinum    Hypertension - Primary     -controlled  -amlodipine 5mg daily and pindolol 5mg daily renewed. Pt also on lisinopril 20mg daily  -continue weight loss efforts  -adhere to low sodium diet         Relevant Medications    pindolol (VISKEN) 5 mg tablet    amLODIPine (NORVASC) 5 mg tablet       Other    Recurrent major depressive disorder, in full remission ([address])     -more motivated to do activities  -switched from escitalopram 20mg to fluoxetine 20mg daily to aid in weight loss         Relevant Medications    FLUoxetine (PROzac) 20 mg capsule    Hyperlipidemia     -continue atorvastatin as long as tolerated         Anxiety     -switched from escitalopram to fluoxetine to aid in weight loss  -pt will try to wean off buspar in the future            Subjective:      Patient ID: Sundeep Wagner is a 74 y.o. female. HPI  75yo female with GERD with hiatal hernia, JORDANA, HTN, PAF, seizure d/o, MDD, chronic migraine, HLD, CIC, PVCs, cervical spondylosis with foraminal stenosis here for follow up care. She was switched from escitalopram to fluoxetine by her Neurologist three weeks ago. Reports anxiety is not worse and may be a bit better. She feels less depressed as well. She is taking chores that she has been delaying. She has noticed she has more control over what she eats. Three weeks ago she woke from sleeping and gasping for breath. She snores. Has known JORDANA, did not tolerate CPAP. Has appt with ENT for consideration for [facility] GURPREET device.     The following portions of the patient's history were reviewed and updated as appropriate: allergies, current medications, past family history, past medical history, past social history, past surgical history and problem list.      Current Outpatient Medications:   •  albuterol (PROVENTIL HFA,VENTOLIN HFA) 90 mcg/act inhaler, INHALE 2 PUFFS EVERY 6 HOURS AS NEEDED FOR WHEEZING, Disp: , Rfl:   •  Alpha-D-Galactosidase (BEANO PO), Take by mouth, Disp: , Rfl:   •  amLODIPine (NORVASC) 5 mg tablet, Take 1 tablet (5 mg total) by mouth daily, Disp: 90 tablet, Rfl: 3  •  apixaban (ELIQUIS) 5 mg, 2 (two) times a day, Disp: , Rfl:   •  atorvastatin (LIPITOR) 40 mg tablet, Take 1 tablet (40 mg total) by mouth daily, Disp: 90 tablet, Rfl: 0  •  AYR SALINE NASAL NA, into each nostril 2 (two) times a day, Disp: , Rfl:   •  Boswellia-Glucosamine-Vit D (OSTEO BI-FLEX ONE PER DAY PO), Take 1 capsule by mouth in the morning With turmeric, Disp: , Rfl:   •  busPIRone (BUSPAR) 15 mg tablet, Take 15 mg by mouth 2 (two) times a day, Disp: , Rfl:   •  calcium citrate-Vitamin D 200 mg-250 units, Take 1 tablet by mouth daily with breakfast, Disp: , Rfl:   •  clobetasol (TEMOVATE) 0.05 % ointment, PLEASE SEE ATTACHED FOR DETAILED DIRECTIONS, Disp: , Rfl:   •  dexamethasone (DECADRON) 4 mg/mL, Inject 0.5 mL by intramuscular route., Disp: , Rfl:   •  divalproex sodium (DEPAKOTE) 500 mg EC tablet, , Disp: , Rfl:   •  famotidine (PEPCID) 20 mg tablet, Take 1 tablet (20 mg total) by mouth daily at bedtime, Disp: 90 tablet, Rfl: 1  •  fexofenadine (ALLEGRA) 180 MG tablet, , Disp: , Rfl:   •  flecainide (TAMBOCOR) 100 mg tablet, Take 100 mg by mouth 2 (two) times a day, Disp: , Rfl:   •  FLUoxetine (PROzac) 20 mg capsule, TAKE 1 CAPSULE BY MOUTH EVERY DAY IN THE MORNING, Disp: , Rfl:   •  lisinopril (ZESTRIL) 20 mg tablet, TAKE 1 TABLET BY MOUTH  DAILY, Disp: 90 tablet, Rfl: 3  •  Mupirocin (BACTROBAN EX), , Disp: , Rfl:   •  mupirocin (BACTROBAN) 2 % ointment, APPLY TO BIOPSY SITE ON RIGHT LEG ONCE OR TWICE A DAY UNTIL HEALED, Disp: , Rfl:   •  nitrofurantoin (MACROBID) 100 mg capsule, , Disp: , Rfl:   • pantoprazole (PROTONIX) 40 mg tablet, Take 1 tablet (40 mg total) by mouth 2 (two) times a day before meals, Disp: 180 tablet, Rfl: 2  •  pindolol (VISKEN) 5 mg tablet, Take 1 tablet (5 mg total) by mouth daily, Disp: 90 tablet, Rfl: 3  •  Probiotic Product (PROBIOTIC PO), Take 1 capsule by mouth 2 (two) times a day, Disp: , Rfl:   •  valACYclovir (VALTREX) 1,000 mg tablet, Take 1,000 mg by mouth if needed, Disp: , Rfl:     Review of Systems   Constitutional: Positive for appetite change. Negative for activity change and unexpected weight change. HENT: Positive for congestion. Respiratory: Positive for apnea and shortness of breath (on occasion). Negative for cough and wheezing. Cardiovascular: Positive for palpitations. Negative for chest pain and leg swelling. Gastrointestinal:        Reflux   Musculoskeletal: Positive for neck pain and neck stiffness. Neurological: Positive for tremors, seizures and headaches. Psychiatric/Behavioral: Positive for dysphoric mood. The patient is nervous/anxious. Objective:    /70   Pulse 66   Temp 98.5 °F (36.9 °C)   Resp 16   Ht 5' 4" (1.626 m)   Wt 86.1 kg (189 lb 14.4 oz)   SpO2 95%   BMI 32.60 kg/m²      Physical Exam  Vitals reviewed. Constitutional:       Appearance: She is obese. Cardiovascular:      Rate and Rhythm: Normal rate and regular rhythm. Pulses: Normal pulses. Heart sounds: Normal heart sounds. Pulmonary:      Effort: Pulmonary effort is normal. No respiratory distress. Breath sounds: Normal breath sounds. No wheezing. Musculoskeletal:      Right lower leg: No edema. Left lower leg: No edema. Neurological:      Mental Status: She is alert and oriented to person, place, and time. Motor: Tremor present.            Recent Results (from the past 504 hour(s))   Valproic acid level, total    Collection Time: 07/11/23 10:54 AM   Result Value Ref Range    Valproic Acid, Total 74 50 - 100 ug/mL   CBC and differential    Collection Time: 07/21/23 10:15 AM   Result Value Ref Range    WBC 5.26 4.31 - 10.16 Thousand/uL    RBC 4.44 3.81 - 5.12 Million/uL    Hemoglobin 13.7 11.5 - 15.4 g/dL    Hematocrit 43.9 34.8 - 46.1 %    MCV 99 (H) 82 - 98 fL    MCH 30.9 26.8 - 34.3 pg    MCHC 31.2 (L) 31.4 - 37.4 g/dL    RDW 13.2 11.6 - 15.1 %    MPV 11.0 8.9 - 12.7 fL    Platelets 571 210 - 311 Thousands/uL    nRBC 0 /100 WBCs    Neutrophils Relative 60 43 - 75 %    Immat GRANS % 1 0 - 2 %    Lymphocytes Relative 26 14 - 44 %    Monocytes Relative 11 4 - 12 %    Eosinophils Relative 1 0 - 6 %    Basophils Relative 1 0 - 1 %    Neutrophils Absolute 3.16 1.85 - 7.62 Thousands/µL    Immature Grans Absolute 0.04 0.00 - 0.20 Thousand/uL    Lymphocytes Absolute 1.38 0.60 - 4.47 Thousands/µL    Monocytes Absolute 0.59 0.17 - 1.22 Thousand/µL    Eosinophils Absolute 0.06 0.00 - 0.61 Thousand/µL    Basophils Absolute 0.03 0.00 - 0.10 Thousands/µL   Comprehensive metabolic panel    Collection Time: 07/21/23 10:15 AM   Result Value Ref Range    Sodium 139 135 - 147 mmol/L    Potassium 4.6 3.5 - 5.3 mmol/L    Chloride 108 96 - 108 mmol/L    CO2 27 21 - 32 mmol/L    ANION GAP 4 mmol/L    BUN 22 5 - 25 mg/dL    Creatinine 0.85 0.60 - 1.30 mg/dL    Glucose, Fasting 100 (H) 65 - 99 mg/dL    Calcium 9.2 8.3 - 10.1 mg/dL    Corrected Calcium 9.7 8.3 - 10.1 mg/dL    AST 22 5 - 45 U/L    ALT 28 12 - 78 U/L    Alkaline Phosphatase 46 46 - 116 U/L    Total Protein 6.7 6.4 - 8.4 g/dL    Albumin 3.4 (L) 3.5 - 5.0 g/dL    Total Bilirubin 0.43 0.20 - 1.00 mg/dL    eGFR 67 ml/min/1.73sq m   Valproic acid level, total    Collection Time: 07/21/23 10:15 AM   Result Value Ref Range    Valproic Acid, Total 68 50 - 100 ug/mL

## 2023-07-24 NOTE — ASSESSMENT & PLAN NOTE
-more motivated to do activities  -switched from escitalopram 20mg to fluoxetine 20mg daily to aid in weight loss

## 2023-07-24 NOTE — ASSESSMENT & PLAN NOTE
-switched from escitalopram to fluoxetine to aid in weight loss  -pt will try to wean off buspar in the future

## 2023-07-24 NOTE — ASSESSMENT & PLAN NOTE
-controlled  -amlodipine 5mg daily and pindolol 5mg daily renewed.   Pt also on lisinopril 20mg daily  -continue weight loss efforts  -adhere to low sodium diet

## 2023-08-07 ENCOUNTER — TELEPHONE (OUTPATIENT)
Dept: BARIATRICS | Facility: CLINIC | Age: 73
End: 2023-08-07

## 2023-08-07 NOTE — TELEPHONE ENCOUNTER
Received call from Pt re: progress report per Dr Ender Diaz request. Pt said her  neurologist did agree switch from Lexapro to Fluoxetine. Pt is adjusting very well to new meds. Advised Pt message will be sent to provider.

## 2023-08-08 ENCOUNTER — APPOINTMENT (EMERGENCY)
Dept: RADIOLOGY | Facility: HOSPITAL | Age: 73
End: 2023-08-08
Payer: MEDICARE

## 2023-08-08 ENCOUNTER — HOSPITAL ENCOUNTER (EMERGENCY)
Facility: HOSPITAL | Age: 73
Discharge: HOME | End: 2023-08-08
Attending: EMERGENCY MEDICINE
Payer: MEDICARE

## 2023-08-08 VITALS
HEART RATE: 63 BPM | RESPIRATION RATE: 17 BRPM | TEMPERATURE: 97.5 F | OXYGEN SATURATION: 98 % | SYSTOLIC BLOOD PRESSURE: 182 MMHG | DIASTOLIC BLOOD PRESSURE: 70 MMHG

## 2023-08-08 DIAGNOSIS — R93.89 ABNORMAL CT SCAN: ICD-10-CM

## 2023-08-08 DIAGNOSIS — R10.9 ABDOMINAL PAIN: Primary | ICD-10-CM

## 2023-08-08 LAB
ALBUMIN SERPL BCP-MCNC: 3.3 G/DL (ref 3.5–5)
ALP SERPL-CCNC: 50 U/L (ref 46–116)
ALT SERPL W P-5'-P-CCNC: 21 U/L (ref 12–78)
ANION GAP SERPL CALCULATED.3IONS-SCNC: 3 MMOL/L
AST SERPL W P-5'-P-CCNC: 8 U/L (ref 5–45)
BASOPHILS # BLD AUTO: 0.02 THOUSANDS/ÂΜL (ref 0–0.1)
BASOPHILS NFR BLD AUTO: 0 % (ref 0–1)
BILIRUB SERPL-MCNC: 0.32 MG/DL (ref 0.2–1)
BUN SERPL-MCNC: 24 MG/DL (ref 5–25)
CALCIUM ALBUM COR SERPL-MCNC: 9.9 MG/DL (ref 8.3–10.1)
CALCIUM SERPL-MCNC: 9.3 MG/DL (ref 8.3–10.1)
CHLORIDE SERPL-SCNC: 107 MMOL/L (ref 96–108)
CO2 SERPL-SCNC: 29 MMOL/L (ref 21–32)
CREAT SERPL-MCNC: 0.72 MG/DL (ref 0.6–1.3)
EOSINOPHIL # BLD AUTO: 0.05 THOUSAND/ÂΜL (ref 0–0.61)
EOSINOPHIL NFR BLD AUTO: 1 % (ref 0–6)
ERYTHROCYTE [DISTWIDTH] IN BLOOD BY AUTOMATED COUNT: 13 % (ref 11.6–15.1)
GFR SERPL CREATININE-BSD FRML MDRD: 82 ML/MIN/1.73SQ M
GLUCOSE SERPL-MCNC: 93 MG/DL (ref 65–140)
HCT VFR BLD AUTO: 39.8 % (ref 34.8–46.1)
HGB BLD-MCNC: 12.9 G/DL (ref 11.5–15.4)
IMM GRANULOCYTES # BLD AUTO: 0.02 THOUSAND/UL (ref 0–0.2)
IMM GRANULOCYTES NFR BLD AUTO: 0 % (ref 0–2)
LIPASE SERPL-CCNC: 99 U/L (ref 73–393)
LYMPHOCYTES # BLD AUTO: 1.3 THOUSANDS/ÂΜL (ref 0.6–4.47)
LYMPHOCYTES NFR BLD AUTO: 23 % (ref 14–44)
MCH RBC QN AUTO: 31.6 PG (ref 26.8–34.3)
MCHC RBC AUTO-ENTMCNC: 32.4 G/DL (ref 31.4–37.4)
MCV RBC AUTO: 98 FL (ref 82–98)
MONOCYTES # BLD AUTO: 0.7 THOUSAND/ÂΜL (ref 0.17–1.22)
MONOCYTES NFR BLD AUTO: 13 % (ref 4–12)
NEUTROPHILS # BLD AUTO: 3.5 THOUSANDS/ÂΜL (ref 1.85–7.62)
NEUTS SEG NFR BLD AUTO: 63 % (ref 43–75)
NRBC BLD AUTO-RTO: 0 /100 WBCS
PLATELET # BLD AUTO: 234 THOUSANDS/UL (ref 149–390)
PMV BLD AUTO: 10.9 FL (ref 8.9–12.7)
POTASSIUM SERPL-SCNC: 4.6 MMOL/L (ref 3.5–5.3)
PROT SERPL-MCNC: 6.5 G/DL (ref 6.4–8.4)
RBC # BLD AUTO: 4.08 MILLION/UL (ref 3.81–5.12)
SODIUM SERPL-SCNC: 139 MMOL/L (ref 135–147)
WBC # BLD AUTO: 5.59 THOUSAND/UL (ref 4.31–10.16)

## 2023-08-08 PROCEDURE — 99285 EMERGENCY DEPT VISIT HI MDM: CPT | Performed by: EMERGENCY MEDICINE

## 2023-08-08 PROCEDURE — 99284 EMERGENCY DEPT VISIT MOD MDM: CPT

## 2023-08-08 PROCEDURE — 74177 CT ABD & PELVIS W/CONTRAST: CPT

## 2023-08-08 PROCEDURE — 83690 ASSAY OF LIPASE: CPT

## 2023-08-08 PROCEDURE — 96360 HYDRATION IV INFUSION INIT: CPT

## 2023-08-08 PROCEDURE — G1004 CDSM NDSC: HCPCS

## 2023-08-08 PROCEDURE — 93005 ELECTROCARDIOGRAM TRACING: CPT

## 2023-08-08 PROCEDURE — 36415 COLL VENOUS BLD VENIPUNCTURE: CPT

## 2023-08-08 PROCEDURE — 85025 COMPLETE CBC W/AUTO DIFF WBC: CPT

## 2023-08-08 PROCEDURE — 80053 COMPREHEN METABOLIC PANEL: CPT

## 2023-08-08 RX ORDER — CEFUROXIME AXETIL 500 MG/1
500 TABLET ORAL EVERY 12 HOURS SCHEDULED
Qty: 20 TABLET | Refills: 0 | Status: SHIPPED | OUTPATIENT
Start: 2023-08-08 | End: 2023-08-09 | Stop reason: SDUPTHER

## 2023-08-08 RX ORDER — METRONIDAZOLE 500 MG/1
500 TABLET ORAL EVERY 12 HOURS SCHEDULED
Qty: 20 TABLET | Refills: 0 | Status: SHIPPED | OUTPATIENT
Start: 2023-08-08 | End: 2023-08-09 | Stop reason: SDUPTHER

## 2023-08-08 RX ORDER — DICYCLOMINE HCL 20 MG
20 TABLET ORAL ONCE
Status: COMPLETED | OUTPATIENT
Start: 2023-08-08 | End: 2023-08-08

## 2023-08-08 RX ORDER — METRONIDAZOLE 500 MG/1
500 TABLET ORAL ONCE
Status: COMPLETED | OUTPATIENT
Start: 2023-08-08 | End: 2023-08-08

## 2023-08-08 RX ORDER — CEFUROXIME AXETIL 250 MG/1
500 TABLET ORAL EVERY 12 HOURS SCHEDULED
Status: DISCONTINUED | OUTPATIENT
Start: 2023-08-08 | End: 2023-08-08 | Stop reason: HOSPADM

## 2023-08-08 RX ADMIN — METRONIDAZOLE 500 MG: 500 TABLET ORAL at 20:06

## 2023-08-08 RX ADMIN — SODIUM CHLORIDE 1000 ML: 0.9 INJECTION, SOLUTION INTRAVENOUS at 17:17

## 2023-08-08 RX ADMIN — CEFUROXIME AXETIL 500 MG: 250 TABLET, FILM COATED ORAL at 20:07

## 2023-08-08 RX ADMIN — DICYCLOMINE HYDROCHLORIDE 20 MG: 20 TABLET ORAL at 17:18

## 2023-08-08 RX ADMIN — IOHEXOL 100 ML: 350 INJECTION, SOLUTION INTRAVENOUS at 18:38

## 2023-08-08 NOTE — ED ATTENDING ATTESTATION
8/8/2023  I, More Feliciano DO, saw and evaluated the patient. I have discussed the patient with the resident/non-physician practitioner and agree with the resident's/non-physician practitioner's findings, Plan of Care, and MDM as documented in the resident's/non-physician practitioner's note, except where noted. All available labs and Radiology studies were reviewed. I was present for key portions of any procedure(s) performed by the resident/non-physician practitioner and I was immediately available to provide assistance. At this point I agree with the current assessment done in the Emergency Department. I have conducted an independent evaluation of this patient a history and physical is as follows:    Patient is a 15-year-old female with a history of hypertension, hyperlipidemia, previous EVANGELINA/BSO, appendectomy, last abdominal surgery about 30 years ago, presents with her . She said 2 days ago she began having some mild left lower abdominal pain, relatively constant, no nausea, no vomiting, unrelated to food. No dysuria hematuria. No vaginal bleeding or discharge. Mild in severity and she does not need pain medication for it. Initially she felt somewhat constipated, took some laxatives and stool softeners, had a good bowel movement today, no blood or mucus. Still has mild discomfort. He has not had pain like this before.  indicates he is feeling well, no known sick contacts. No travel history, no recent hospitalization or antibiotics. She does have an appointment tomorrow morning with her primary care physician for this symptom. General:  Patient is well-appearing  Head:  Atraumatic  Eyes:  Conjunctiva pink  ENT:  Mucous membranes are moist  Neck:  Supple  Cardiac:  S1-S2, without murmurs  Lungs:  Clear to auscultation bilaterally  Abdomen: Mild left lower abdominal tenderness, no tympany, no rigidity, no guarding. No CVA tenderness.   Extremities:  Normal range of motion  Neurologic:  Awake, fluent speech, normal comprehension, AAOx3  Skin:  Pink warm and dry  Psychiatric:  Alert, pleasant, cooperative        ED Course  ED Course as of 08/08/23 2326   Tue Aug 08, 2023   1740 ECG interpreted me, sinus rhythm, rate of 62, no acute ischemic or infarctive changes, no acute change from December 5, 2022     CT Abdomen pelvis with contrast   Final Result      Focal asymmetric distal transverse colon wall thickening and surrounding mesenteric fat stranding in absence of colonic diverticula. Findings are concerning for a focal colonic neoplasm and less likely acute uncomplicated diverticulitis due to    involvement of a subtle colonic diverticula. Colonoscopy is recommended for further management. Alternatively, short-term repeat CT abdomen pelvis with contrast after antibiotic treatment can be obtained as clinically warranted.          I personally discussed this study with Lorin Doherty on 8/8/2023 7:07 PM.                  Workstation performed: [id number]           Labs Reviewed   CBC AND DIFFERENTIAL - Abnormal       Result Value Ref Range Status    WBC 5.59  4.31 - 10.16 Thousand/uL Final    RBC 4.08  3.81 - 5.12 Million/uL Final    Hemoglobin 12.9  11.5 - 15.4 g/dL Final    Hematocrit 39.8  34.8 - 46.1 % Final    MCV 98  82 - 98 fL Final    MCH 31.6  26.8 - 34.3 pg Final    MCHC 32.4  31.4 - 37.4 g/dL Final    RDW 13.0  11.6 - 15.1 % Final    MPV 10.9  8.9 - 12.7 fL Final    Platelets 158  964 - 390 Thousands/uL Final    nRBC 0  /100 WBCs Final    Neutrophils Relative 63  43 - 75 % Final    Immat GRANS % 0  0 - 2 % Final    Lymphocytes Relative 23  14 - 44 % Final    Monocytes Relative 13 (*) 4 - 12 % Final    Eosinophils Relative 1  0 - 6 % Final    Basophils Relative 0  0 - 1 % Final    Neutrophils Absolute 3.50  1.85 - 7.62 Thousands/µL Final    Immature Grans Absolute 0.02  0.00 - 0.20 Thousand/uL Final    Lymphocytes Absolute 1.30  0.60 - 4.47 Thousands/µL Final Monocytes Absolute 0.70  0.17 - 1.22 Thousand/µL Final    Eosinophils Absolute 0.05  0.00 - 0.61 Thousand/µL Final    Basophils Absolute 0.02  0.00 - 0.10 Thousands/µL Final   COMPREHENSIVE METABOLIC PANEL - Abnormal    Sodium 139  135 - 147 mmol/L Final    Potassium 4.6  3.5 - 5.3 mmol/L Final    Chloride 107  96 - 108 mmol/L Final    CO2 29  21 - 32 mmol/L Final    ANION GAP 3  mmol/L Final    BUN 24  5 - 25 mg/dL Final    Creatinine 0.72  0.60 - 1.30 mg/dL Final    Comment: Standardized to IDMS reference method    Glucose 93  65 - 140 mg/dL Final    Comment: If the patient is fasting, the ADA then defines impaired fasting glucose as > 100 mg/dL and diabetes as > or equal to 123 mg/dL. Specimen collection should occur prior to Sulfasalazine administration due to the potential for falsely depressed results. Specimen collection should occur prior to Sulfapyridine administration due to the potential for falsely elevated results. Calcium 9.3  8.3 - 10.1 mg/dL Final    Corrected Calcium 9.9  8.3 - 10.1 mg/dL Final    AST 8  5 - 45 U/L Final    Comment: Specimen collection should occur prior to Sulfasalazine administration due to the potential for falsely depressed results. ALT 21  12 - 78 U/L Final    Comment: Specimen collection should occur prior to Sulfasalazine and/or Sulfapyridine administration due to the potential for falsely depressed results. Alkaline Phosphatase 50  46 - 116 U/L Final    Total Protein 6.5  6.4 - 8.4 g/dL Final    Albumin 3.3 (*) 3.5 - 5.0 g/dL Final    Total Bilirubin 0.32  0.20 - 1.00 mg/dL Final    Comment: Use of this assay is not recommended for patients undergoing treatment with eltrombopag due to the potential for falsely elevated results.     eGFR 82  ml/min/1.73sq m Final    Narrative:     [place] guidelines for Chronic Kidney Disease (CKD):   •  Stage 1 with normal or high GFR (GFR > 90 mL/min/1.73 square meters)  •  Stage 2 Mild CKD (GFR = 60-89 mL/min/1.73 square meters)  •  Stage 3A Moderate CKD (GFR = 45-59 mL/min/1.73 square meters)  •  Stage 3B Moderate CKD (GFR = 30-44 mL/min/1.73 square meters)  •  Stage 4 Severe CKD (GFR = 15-29 mL/min/1.73 square meters)  •  Stage 5 End Stage CKD (GFR <15 mL/min/1.73 square meters)  Note: GFR calculation is accurate only with a steady state creatinine   LIPASE - Normal    Lipase 99  73 - 393 u/L Final     On reassessment patient feeling more comfortable. At this point patient most likely has diverticulitis. Colonic mass cannot be ruled out, this was discussed with the patient as well as the need for outpatient follow-up. Supportive care, importance of follow-up and return precautions were discussed with the patient, who expressed understanding. DIAGNOSIS:  Acute left lower abdominal pain, acute uncomplicated diverticulitis    MEDICAL DECISION MAKING CODING    The differential diagnosis before testing included (but is not limited to) acute nonspecific abdominal pain, acute diverticulitis, acute adhesions, acute volvulus, and acute intestinal perforation which is a medical condition that poses a threat to life/function. Patient presents with acute new problem with:  Threat to life or bodily function    Chronic conditions affecting care: As per HPI    COLLECTION AND INTERPRETATION OF DATA  Additional history obtained from:   I reviewed prior external notes, including July 24, 2023 PCP office visit    I ordered each unique test  Tests reviewed personally by me:  ECG: See my ED course  Labs: See above  Imaging: I independently reviewed the CT abdomen and pelvis and found focal colonic wall thickening. RISK  Drugs (OTC, Rx, Controlled substances): Prescription management  All of the patient's current prescription medications should be continued.     Treatment:  Consideration of admission: Considered admission however patient's pain is well controlled, she is afebrile, feels comfortable with outpatient management.       Surgery  -I considered surgery may be necessary prior to completion of the work up but afterwards there is no indication for immediate surgery    Social Determinants of Health:  Presentation to ED outside of business hours or on night shift          Critical Care Time  Procedures

## 2023-08-08 NOTE — DISCHARGE INSTRUCTIONS
Your evaluation suggests that your symptoms are due to diverticulitis versus a neoplasm. Please follow up with your primary care physician within two days. You need to get a colonoscopy. Call your GI doctor tomorrow. I prescribed an antibiotic. Take as prescribed. Your findings are attached below. Discuss all with the primary care doctor. Return to the Emergency Department if you experience worsening or concerning symptoms. Thank you for choosing us for your care. CT Abdomen pelvis with contrast: Focal asymmetric distal transverse colon wall thickening and surrounding mesenteric fat stranding in absence of colonic diverticula. Findings are concerning for a focal colonic neoplasm and less likely acute uncomplicated diverticulitis due to , involvement of a subtle colonic diverticula. Colonoscopy is recommended for further management.  Alternatively, short-term repeat CT abdomen pelvis with contrast after antibiotic treatment can be obtained as clinically warranted., I personally discussed this study with Raeann MURPHY on 8/8/2023 7:07 PM., Workstation performed: [id number]

## 2023-08-08 NOTE — ED PROVIDER NOTES
History  Chief Complaint   Patient presents with   • Abdominal Pain     Pt c/o LLQ pain x 3 days. Last BM was this AM      Patient is a 68-year-old female with a significant past medical history of appendectomy, hysterectomy, atrial fibrillation on Eliquis, presenting for evaluation of abdominal pain for the last 3 days. She states that this is primarily in her left lower abdomen. It is relatively constant. It is not associated with anything. She has been having low-volume bowel movements this past days, but is continuing to have nonbloody, nonmelanotic stools, and is passing flatus. Her last bowel movement was this morning. She denies any nausea or vomiting. She denies any fevers. She denies any urinary symptoms. She denies any vaginal bleeding or discharge. She initially thought that she was constipated and took a laxative for this, but has continued to have some lower abdominal discomfort. Prior to Admission Medications   Prescriptions Last Dose Informant Patient Reported? Taking?    AYR SALINE NASAL NA  Self Yes No   Sig: into each nostril 2 (two) times a day   Alpha-D-Galactosidase (BEANO PO)  Self Yes No   Sig: Take by mouth   Boswellia-Glucosamine-Vit D (OSTEO BI-FLEX ONE PER DAY PO)  Self Yes No   Sig: Take 1 capsule by mouth in the morning With turmeric   FLUoxetine (PROzac) 20 mg capsule   Yes No   Sig: TAKE 1 CAPSULE BY MOUTH EVERY DAY IN THE MORNING   Mupirocin (BACTROBAN EX)   Yes No   Probiotic Product (PROBIOTIC PO)  Self Yes No   Sig: Take 1 capsule by mouth 2 (two) times a day   albuterol (PROVENTIL HFA,VENTOLIN HFA) 90 mcg/act inhaler   Yes No   Sig: INHALE 2 PUFFS EVERY 6 HOURS AS NEEDED FOR WHEEZING   amLODIPine (NORVASC) 5 mg tablet   No No   Sig: Take 1 tablet (5 mg total) by mouth daily   apixaban (ELIQUIS) 5 mg  Self Yes No   Si (two) times a day   atorvastatin (LIPITOR) 40 mg tablet  Self No No   Sig: Take 1 tablet (40 mg total) by mouth daily   busPIRone (BUSPAR) 15 mg tablet  Self Yes No   Sig: Take 15 mg by mouth 2 (two) times a day   calcium citrate-Vitamin D 200 mg-250 units  Self Yes No   Sig: Take 1 tablet by mouth daily with breakfast   clobetasol (TEMOVATE) 0.05 % ointment   Yes No   Sig: PLEASE SEE ATTACHED FOR DETAILED DIRECTIONS   divalproex sodium (DEPAKOTE) 500 mg EC tablet  Self Yes No   famotidine (PEPCID) 20 mg tablet   No No   Sig: Take 1 tablet (20 mg total) by mouth daily at bedtime   fexofenadine (ALLEGRA) 180 MG tablet  Self Yes No   flecainide (TAMBOCOR) 100 mg tablet  Self Yes No   Sig: Take 100 mg by mouth 2 (two) times a day   lisinopril (ZESTRIL) 20 mg tablet   No No   Sig: TAKE 1 TABLET BY MOUTH  DAILY   mupirocin (BACTROBAN) 2 % ointment   Yes No   Sig: APPLY TO BIOPSY SITE ON RIGHT LEG ONCE OR TWICE A DAY UNTIL HEALED   nitrofurantoin (MACROBID) 100 mg capsule   Yes No   pantoprazole (PROTONIX) 40 mg tablet   No No   Sig: Take 1 tablet (40 mg total) by mouth 2 (two) times a day before meals   pindolol (VISKEN) 5 mg tablet   No No   Sig: Take 1 tablet (5 mg total) by mouth daily   valACYclovir (VALTREX) 1,000 mg tablet  Self Yes No   Sig: Take 1,000 mg by mouth if needed      Facility-Administered Medications: None       Past Medical History:   Diagnosis Date   • Acid reflux disease    • Acute bronchitis 9/7/2022   • Allergic    • Allergies    • Anxiety    • Arthralgia    • Arthritis    • Atrial fibrillation (HCC)    • Chronic interstitial cystitis    • Chronic pain disorder     neck/ shldrs   • Colon polyp    • Coronary artery disease afib/ Nov. 2018   • Depression    • GERD (gastroesophageal reflux disease)    • Headache(784.0)    • Hyperlipidemia    • Hypertension    • Irregular heart beat     afib   • Lichen sclerosus    • Lipoma    • Myalgia 3/16/2016   • Obesity    • Osteopenia    • Paresthesia of both hands 08/06/2019   • Seizures (HCC)    • Shortness of breath     ROJAS   • Simple partial seizures (HCC)     last assessed 3/30/17   • Sleep apnea • Tachycardia     last assessed 6/24/15   • Upper respiratory tract infection 9/1/2022       Past Surgical History:   Procedure Laterality Date   • ABDOMINOPLASTY     • APPENDECTOMY     • BLEPHAROPLASTY     • COLONOSCOPY  2020    Dr Cruz Hutchison, 5 year f/u    • COSMETIC SURGERY     • FACELIFT     • FRACTURE SURGERY     • HYSTERECTOMY     • OOPHORECTOMY     • NE RHYTIDECTOMY NECK W/PLATYSMAL TIGHTENING N/A 12/19/2022    Procedure: MINI FACELIFT;  Surgeon: Rodrigo García MD;  Location: [facility] MAIN OR;  Service: Plastics   • REDUCTION MAMMAPLASTY     • RHINOPLASTY     • SHOULDER SURGERY Left        Family History   Problem Relation Age of Onset   • Stroke Mother         CVA   • Hypertension Mother    • Hypertension Father    • Lung cancer Father    • No Known Problems Sister    • Lung cancer Brother         AGE UNKNOWN   • Hypertension Brother    • No Known Problems Maternal Grandmother    • No Known Problems Maternal Grandfather    • Breast cancer Paternal Grandmother    • No Known Problems Paternal Grandfather    • No Known Problems Maternal Aunt    • No Known Problems Maternal Aunt    • No Known Problems Maternal Aunt    • No Known Problems Paternal Aunt    • No Known Problems Paternal Aunt    • No Known Problems Paternal Aunt      I have reviewed and agree with the history as documented. E-Cigarette/Vaping   • E-Cigarette Use Never User      E-Cigarette/Vaping Substances   • Nicotine No    • THC No    • CBD No    • Flavoring No    • Other No    • Unknown No      Social History     Tobacco Use   • Smoking status: Never   • Smokeless tobacco: Never   Vaping Use   • Vaping Use: Never used   Substance Use Topics   • Alcohol use: Yes     Alcohol/week: 1.0 standard drink of alcohol     Types: 1 Glasses of wine per week     Comment: 1 or 2 glasses per week   • Drug use: No        Review of Systems   Constitutional: Negative for fever. Respiratory: Negative for shortness of breath. Cardiovascular: Negative for chest pain. Gastrointestinal: Positive for abdominal pain. Negative for nausea and vomiting. Genitourinary: Negative for vaginal bleeding. All other systems reviewed and are negative. Physical Exam  ED Triage Vitals [08/08/23 1546]   Temperature Pulse Respirations Blood Pressure SpO2   97.5 °F (36.4 °C) 65 18 (!) 183/83 98 %      Temp Source Heart Rate Source Patient Position - Orthostatic VS BP Location FiO2 (%)   Temporal Monitor Lying Right arm --      Pain Score       9             Orthostatic Vital Signs  Vitals:    08/08/23 1546 08/08/23 1757   BP: (!) 183/83 (!) 182/70   Pulse: 65 63   Patient Position - Orthostatic VS: Lying Lying       Physical Exam  Vitals and nursing note reviewed. Constitutional:       General: She is not in acute distress. Appearance: Normal appearance. She is not ill-appearing or toxic-appearing. HENT:      Head: Normocephalic and atraumatic. Right Ear: External ear normal.      Left Ear: External ear normal.      Nose: Nose normal.   Eyes:      General: No scleral icterus. Right eye: No discharge. Left eye: No discharge. Extraocular Movements: Extraocular movements intact. Conjunctiva/sclera: Conjunctivae normal.   Cardiovascular:      Rate and Rhythm: Normal rate. Heart sounds: Normal heart sounds. No murmur heard. No friction rub. No gallop. Pulmonary:      Effort: Pulmonary effort is normal. No respiratory distress. Breath sounds: Normal breath sounds. Abdominal:      General: Abdomen is flat. There is no distension. Palpations: Abdomen is soft. There is no mass. Tenderness: There is abdominal tenderness in the left lower quadrant. There is no right CVA tenderness, left CVA tenderness or guarding. Genitourinary:     Comments: Deferred  Skin:     General: Skin is warm and dry. Neurological:      General: No focal deficit present. Mental Status: She is alert.          ED Medications  Medications   sodium chloride 0.9 % bolus 1,000 mL (0 mL Intravenous Stopped 8/8/23 1817)   dicyclomine (BENTYL) tablet 20 mg (20 mg Oral Given 8/8/23 1718)   iohexol (OMNIPAQUE) 350 MG/ML injection (SINGLE-DOSE) 100 mL (100 mL Intravenous Given 8/8/23 1838)   metroNIDAZOLE (FLAGYL) tablet 500 mg (500 mg Oral Given 8/8/23 2006)       Diagnostic Studies  Results Reviewed     Procedure Component Value Units Date/Time    CMP [[id number]]  (Abnormal) Collected: 08/08/23 1718    Lab Status: Final result Specimen: Blood from Arm, Left Updated: 08/08/23 1751     Sodium 139 mmol/L      Potassium 4.6 mmol/L      Chloride 107 mmol/L      CO2 29 mmol/L      ANION GAP 3 mmol/L      BUN 24 mg/dL      Creatinine 0.72 mg/dL      Glucose 93 mg/dL      Calcium 9.3 mg/dL      Corrected Calcium 9.9 mg/dL      AST 8 U/L      ALT 21 U/L      Alkaline Phosphatase 50 U/L      Total Protein 6.5 g/dL      Albumin 3.3 g/dL      Total Bilirubin 0.32 mg/dL      eGFR 82 ml/min/1.73sq m     Narrative:      [place] guidelines for Chronic Kidney Disease (CKD):   •  Stage 1 with normal or high GFR (GFR > 90 mL/min/1.73 square meters)  •  Stage 2 Mild CKD (GFR = 60-89 mL/min/1.73 square meters)  •  Stage 3A Moderate CKD (GFR = 45-59 mL/min/1.73 square meters)  •  Stage 3B Moderate CKD (GFR = 30-44 mL/min/1.73 square meters)  •  Stage 4 Severe CKD (GFR = 15-29 mL/min/1.73 square meters)  •  Stage 5 End Stage CKD (GFR <15 mL/min/1.73 square meters)  Note: GFR calculation is accurate only with a steady state creatinine    Lipase [[id number]]  (Normal) Collected: 08/08/23 1718    Lab Status: Final result Specimen: Blood from Arm, Left Updated: 08/08/23 1751     Lipase 99 u/L     CBC and differential [[id number]]  (Abnormal) Collected: 08/08/23 1718    Lab Status: Final result Specimen: Blood from Arm, Left Updated: 08/08/23 1737     WBC 5.59 Thousand/uL      RBC 4.08 Million/uL      Hemoglobin 12.9 g/dL      Hematocrit 39.8 %      MCV 98 fL      MCH 31.6 pg      MCHC 32.4 g/dL      RDW 13.0 %      MPV 10.9 fL      Platelets 457 Thousands/uL      nRBC 0 /100 WBCs      Neutrophils Relative 63 %      Immat GRANS % 0 %      Lymphocytes Relative 23 %      Monocytes Relative 13 %      Eosinophils Relative 1 %      Basophils Relative 0 %      Neutrophils Absolute 3.50 Thousands/µL      Immature Grans Absolute 0.02 Thousand/uL      Lymphocytes Absolute 1.30 Thousands/µL      Monocytes Absolute 0.70 Thousand/µL      Eosinophils Absolute 0.05 Thousand/µL      Basophils Absolute 0.02 Thousands/µL                  CT Abdomen pelvis with contrast   Final Result by Edilia Lyles MD (08/08 1910)      Focal asymmetric distal transverse colon wall thickening and surrounding mesenteric fat stranding in absence of colonic diverticula. Findings are concerning for a focal colonic neoplasm and less likely acute uncomplicated diverticulitis due to    involvement of a subtle colonic diverticula. Colonoscopy is recommended for further management. Alternatively, short-term repeat CT abdomen pelvis with contrast after antibiotic treatment can be obtained as clinically warranted. I personally discussed this study with Glenn Castro on 8/8/2023 7:07 PM.                  Workstation performed: [id number]               Procedures  Procedures      ED Course  ED Course as of 08/10/23 1342   Tue Aug 08, 2023   1616 Procedure Note: EKG  Date/Time: 08/08/23 4:16 PM   Interpreted by: Igor Zimmerman   Indications / Diagnosis: abdominal pain  ECG reviewed by me, the ED Provider: yes   The EKG demonstrates:  Rhythm: normal sinus  Intervals: normal intervals  Axis: normal axis  QRS/Blocks: normal QRS  ST Changes: No acute ST Changes, no STD/ALANIS.                Identification of Seniors at [facility] Most Recent Value   (ISAR) Identification of Seniors at Risk    Before the illness or injury that brought you to the Emergency, did you need someone to help you on a regular basis? 0 Filed at: 08/08/2023 1547   In the last 24 hours, have you needed more help than usual? 0 Filed at: 08/08/2023 1547   Have you been hospitalized for one or more nights during the past 6 months? 0 Filed at: 08/08/2023 1547   In general, do you see well? 0 Filed at: 08/08/2023 1547   In general, do you have serious problems with your memory? 0 Filed at: 08/08/2023 1547   Do you take more than three different medications every day? 1 Filed at: 08/08/2023 1547   ISAR Score 1 Filed at: 08/08/2023 1547                              Medical Decision Making  Patient is a 64-year-old female presenting for evaluation of abdominal pain. Based on history evaluation, differential diagnosis includes but is not limited to: Diverticulitis, colitis, constipation, bowel obstruction. Plan: CBC, CMP, lipase, Bentyl, fluids, CT abdomen pelvis, reassessment    Labs unremarkable. CT abdomen pelvis remarkable for focal asymmetric distal wall thickening concerning for focal neoplasia versus uncomplicated diverticulitis. Colonoscopy recommended for follow-up. I explained these findings to the patient verbatim, and discussed the fact that this could be cancer. I discussed that I felt the best course of action would be to treat her for uncomplicated diverticulitis as this is on the differential, and then to have her follow-up with a gastroenterologist as well as her primary care doctor to arrange for a colonoscopy. Patient given prescriptions for cefuroxime and Flagyl. Patient's findings written on her AVS and clearly instructed her to follow-up with her primary care doctor regarding the concerns that this could be a neoplasm. Told her to follow-up tomorrow, for which she already has an appointment scheduled and stressed the importance of getting a follow-up colonoscopy to be arranged by the primary care doctor.   Currently, there is an issue with the printers at our hospital so unable to print AVS, however made sure that the patient had MyChart and was able to review these findings with a electronic version of her AVS for her records. Patient's symptoms well-controlled and she feels comfortable being discharged home at this point. Stable for discharge with follow-up as above. All questions answered. Patient discharged home with return precautions. I independently reviewed this patient's chart. I considered her chronic medical conditions in my medical decision making. Amount and/or Complexity of Data Reviewed  Labs: ordered. Radiology: ordered. Risk  Prescription drug management. Disposition  Final diagnoses:   Abdominal pain   Abnormal CT scan     Time reflects when diagnosis was documented in both MDM as applicable and the Disposition within this note     Time User Action Codes Description Comment    8/8/2023  7:47 PM Fatimah Bland Add [R10.9] Abdominal pain     8/8/2023  7:47 PM [address] [R93.89] Abnormal CT scan       ED Disposition     ED Disposition   Discharge    Condition   Stable    Date/Time   Tue Aug 8, 2023  7:41 PM    Comment   Baby Binder discharge to home/self care.                Follow-up Information     Follow up With Specialties Details Why Contact Info Additional [address] [facility] Call in 1 day  [address] [place] [address] [address] [facility] [phone]  [address] [place] [address] [facility], [address] [place] [address],[facility], [address] [place] [address], [facility] [address]    [facility] Call in 1 day  [address]  [facility] [id number] [phone]           Discharge Medication List as of 8/8/2023  8:13 PM      START taking these medications    Details   cefuroxime (CEFTIN) 500 mg tablet Take 1 tablet (500 mg total) by mouth every 12 (twelve) hours for 10 days, Starting Tue 8/8/2023, Until Fri 8/18/2023, Normal      metroNIDAZOLE (FLAGYL) 500 mg tablet Take 1 tablet (500 mg total) by mouth every 12 (twelve) hours for 10 days, Starting Tue 8/8/2023, Until Fri 8/18/2023, Normal         CONTINUE these medications which have NOT CHANGED    Details   albuterol (PROVENTIL HFA,VENTOLIN HFA) 90 mcg/act inhaler INHALE 2 PUFFS EVERY 6 HOURS AS NEEDED FOR WHEEZING, Historical Med      Alpha-D-Galactosidase (BEANO PO) Take by mouth, Historical Med      amLODIPine (NORVASC) 5 mg tablet Take 1 tablet (5 mg total) by mouth daily, Starting Mon 7/24/2023, Normal      apixaban (ELIQUIS) 5 mg 2 (two) times a day, Starting Fri 10/30/2020, Historical Med      atorvastatin (LIPITOR) 40 mg tablet Take 1 tablet (40 mg total) by mouth daily, Starting Mon 12/13/2021, No Print      AYR SALINE NASAL NA into each nostril 2 (two) times a day, Historical Med      Boswellia-Glucosamine-Vit D (OSTEO BI-FLEX ONE PER DAY PO) Take 1 capsule by mouth in the morning With turmeric, Historical Med      busPIRone (BUSPAR) 15 mg tablet Take 15 mg by mouth 2 (two) times a day, Historical Med      calcium citrate-Vitamin D 200 mg-250 units Take 1 tablet by mouth daily with breakfast, Historical Med      clobetasol (TEMOVATE) 0.05 % ointment PLEASE SEE ATTACHED FOR DETAILED DIRECTIONS, Historical Med      divalproex sodium (DEPAKOTE) 500 mg EC tablet Starting Wed 11/9/2022, Historical Med      famotidine (PEPCID) 20 mg tablet Take 1 tablet (20 mg total) by mouth daily at bedtime, Starting Wed 5/10/2023, Normal      fexofenadine (ALLEGRA) 180 MG tablet Historical Med      flecainide (TAMBOCOR) 100 mg tablet Take 100 mg by mouth 2 (two) times a day, Starting Mon 11/28/2022, Historical Med      FLUoxetine (PROzac) 20 mg capsule TAKE 1 CAPSULE BY MOUTH EVERY DAY IN THE MORNING, Historical Med      lisinopril (ZESTRIL) 20 mg tablet TAKE 1 TABLET BY MOUTH  DAILY, Normal      !!  Mupirocin (BACTROBAN EX) Historical Med      !! mupirocin (BACTROBAN) 2 % ointment APPLY TO BIOPSY SITE ON RIGHT LEG ONCE OR TWICE A DAY UNTIL HEALED, Historical Med      nitrofurantoin (MACROBID) 100 mg capsule Historical Med      pantoprazole (PROTONIX) 40 mg tablet Take 1 tablet (40 mg total) by mouth 2 (two) times a day before meals, Starting Wed 5/10/2023, Until Tue 8/8/2023, Normal      pindolol (VISKEN) 5 mg tablet Take 1 tablet (5 mg total) by mouth daily, Starting Mon 7/24/2023, Normal      Probiotic Product (PROBIOTIC PO) Take 1 capsule by mouth 2 (two) times a day, Historical Med      valACYclovir (VALTREX) 1,000 mg tablet Take 1,000 mg by mouth if needed, Historical Med      dexamethasone (DECADRON) 4 mg/mL Inject 0.5 mL by intramuscular route., Historical Med       !! - Potential duplicate medications found. Please discuss with provider. No discharge procedures on file. PDMP Review     None           ED Provider  Attending physically available and evaluated Tunde Gallegos. I managed the patient along with the ED Attending.     Electronically Signed by         Armaan Fishman DO  08/10/23 4470

## 2023-08-09 ENCOUNTER — OFFICE VISIT (OUTPATIENT)
Dept: INTERNAL MEDICINE CLINIC | Facility: CLINIC | Age: 73
End: 2023-08-09
Payer: MEDICARE

## 2023-08-09 ENCOUNTER — TELEPHONE (OUTPATIENT)
Dept: INTERNAL MEDICINE CLINIC | Facility: CLINIC | Age: 73
End: 2023-08-09

## 2023-08-09 VITALS
BODY MASS INDEX: 32.1 KG/M2 | HEART RATE: 63 BPM | WEIGHT: 188 LBS | HEIGHT: 64 IN | SYSTOLIC BLOOD PRESSURE: 128 MMHG | RESPIRATION RATE: 16 BRPM | DIASTOLIC BLOOD PRESSURE: 82 MMHG | OXYGEN SATURATION: 97 % | TEMPERATURE: 97.4 F

## 2023-08-09 DIAGNOSIS — K52.9 COLITIS: Primary | ICD-10-CM

## 2023-08-09 DIAGNOSIS — R10.9 ABDOMINAL PAIN: ICD-10-CM

## 2023-08-09 LAB
ATRIAL RATE: 62 BPM
P AXIS: 72 DEGREES
PR INTERVAL: 194 MS
QRS AXIS: 95 DEGREES
QRSD INTERVAL: 114 MS
QT INTERVAL: 446 MS
QTC INTERVAL: 452 MS
T WAVE AXIS: 60 DEGREES
VENTRICULAR RATE: 62 BPM

## 2023-08-09 PROCEDURE — 99213 OFFICE O/P EST LOW 20 MIN: CPT | Performed by: INTERNAL MEDICINE

## 2023-08-09 PROCEDURE — 93010 ELECTROCARDIOGRAM REPORT: CPT | Performed by: INTERNAL MEDICINE

## 2023-08-09 RX ORDER — METRONIDAZOLE 500 MG/1
500 TABLET ORAL EVERY 12 HOURS SCHEDULED
Qty: 20 TABLET | Refills: 0 | Status: SHIPPED | OUTPATIENT
Start: 2023-08-09 | End: 2023-08-19

## 2023-08-09 RX ORDER — CEFUROXIME AXETIL 500 MG/1
500 TABLET ORAL EVERY 12 HOURS SCHEDULED
Qty: 20 TABLET | Refills: 0 | Status: SHIPPED | OUTPATIENT
Start: 2023-08-09 | End: 2023-08-19

## 2023-08-09 NOTE — PROGRESS NOTES
Assessment/Plan:    Problem List Items Addressed This Visit        Digestive    Colitis - Primary     -involving 4cm of L distal transverse colon measuring 1.3cm thickness with fat stranding  -last colonoscopy 2020 with polyp x2, no diverticulosis noted  -pt to take ceftin and flagyl as prescribed, side effects discussed  -recommend clear liquids x24h and gradually advance as tolerated  -recommend returning to GI for further evaluation to include repeat colonoscopy in 6-8weeks after acute episode subsides            Subjective:      Patient ID: Janki Allison is a 74 y.o. female. HPI    She was evaluated at [facility] 8/8 for L sided abdominal pain. CT a/p showed focal asymmetric wall thickening involving redundant distal transverse colon 1.3cm thickness with fat stranding. She developed abd pain 3d prior to presentation, no stool changes, nausea, fever, weight change. Last colonoscopy 2020 that showed polyps x2, no diverticulosis noted. She was discharged on flagyl and ceftin. Currently, pain is less.     The following portions of the patient's history were reviewed and updated as appropriate: allergies, current medications, past family history, past medical history, past social history, past surgical history and problem list.      Current Outpatient Medications:   •  Alpha-D-Galactosidase (BEANO PO), Take by mouth, Disp: , Rfl:   •  amLODIPine (NORVASC) 5 mg tablet, Take 1 tablet (5 mg total) by mouth daily, Disp: 90 tablet, Rfl: 3  •  apixaban (ELIQUIS) 5 mg, 2 (two) times a day, Disp: , Rfl:   •  atorvastatin (LIPITOR) 40 mg tablet, Take 1 tablet (40 mg total) by mouth daily, Disp: 90 tablet, Rfl: 0  •  AYR SALINE NASAL NA, into each nostril 2 (two) times a day, Disp: , Rfl:   •  Boswellia-Glucosamine-Vit D (OSTEO BI-FLEX ONE PER DAY PO), Take 1 capsule by mouth in the morning With turmeric, Disp: , Rfl:   •  busPIRone (BUSPAR) 15 mg tablet, Take 15 mg by mouth 2 (two) times a day, Disp: , Rfl:   • calcium citrate-Vitamin D 200 mg-250 units, Take 1 tablet by mouth daily with breakfast, Disp: , Rfl:   •  cefuroxime (CEFTIN) 500 mg tablet, Take 1 tablet (500 mg total) by mouth every 12 (twelve) hours for 10 days, Disp: 20 tablet, Rfl: 0  •  clobetasol (TEMOVATE) 0.05 % ointment, PLEASE SEE ATTACHED FOR DETAILED DIRECTIONS, Disp: , Rfl:   •  divalproex sodium (DEPAKOTE) 500 mg EC tablet, , Disp: , Rfl:   •  famotidine (PEPCID) 20 mg tablet, Take 1 tablet (20 mg total) by mouth daily at bedtime, Disp: 90 tablet, Rfl: 1  •  fexofenadine (ALLEGRA) 180 MG tablet, , Disp: , Rfl:   •  flecainide (TAMBOCOR) 100 mg tablet, Take 100 mg by mouth 2 (two) times a day, Disp: , Rfl:   •  FLUoxetine (PROzac) 20 mg capsule, TAKE 1 CAPSULE BY MOUTH EVERY DAY IN THE MORNING, Disp: , Rfl:   •  lisinopril (ZESTRIL) 20 mg tablet, TAKE 1 TABLET BY MOUTH  DAILY, Disp: 90 tablet, Rfl: 3  •  metroNIDAZOLE (FLAGYL) 500 mg tablet, Take 1 tablet (500 mg total) by mouth every 12 (twelve) hours for 10 days, Disp: 20 tablet, Rfl: 0  •  Mupirocin (BACTROBAN EX), , Disp: , Rfl:   •  mupirocin (BACTROBAN) 2 % ointment, APPLY TO BIOPSY SITE ON RIGHT LEG ONCE OR TWICE A DAY UNTIL HEALED, Disp: , Rfl:   •  pindolol (VISKEN) 5 mg tablet, Take 1 tablet (5 mg total) by mouth daily, Disp: 90 tablet, Rfl: 3  •  Probiotic Product (PROBIOTIC PO), Take 1 capsule by mouth 2 (two) times a day, Disp: , Rfl:   •  valACYclovir (VALTREX) 1,000 mg tablet, Take 1,000 mg by mouth if needed, Disp: , Rfl:   •  albuterol (PROVENTIL HFA,VENTOLIN HFA) 90 mcg/act inhaler, INHALE 2 PUFFS EVERY 6 HOURS AS NEEDED FOR WHEEZING, Disp: , Rfl:   •  nitrofurantoin (MACROBID) 100 mg capsule, , Disp: , Rfl:   •  pantoprazole (PROTONIX) 40 mg tablet, Take 1 tablet (40 mg total) by mouth 2 (two) times a day before meals, Disp: 180 tablet, Rfl: 2  No current facility-administered medications for this visit.     Review of Systems   Constitutional: Negative for activity change, appetite change, fever and unexpected weight change. Gastrointestinal: Positive for abdominal pain. Negative for blood in stool, constipation, diarrhea, nausea and vomiting. Denies heartburn   Genitourinary: Negative for difficulty urinating. Musculoskeletal: Positive for back pain. Objective:    /82 (BP Location: Left arm, Patient Position: Sitting, Cuff Size: Large)   Pulse 63   Temp (!) 97.4 °F (36.3 °C) (Tympanic Core)   Resp 16   Ht 5' 4" (1.626 m)   Wt 85.3 kg (188 lb)   SpO2 97%   BMI 32.27 kg/m²      Physical Exam  Vitals reviewed. Constitutional:       General: She is not in acute distress. Appearance: She is obese. Abdominal:      General: Bowel sounds are normal.      Palpations: Abdomen is soft. There is no mass. Tenderness: There is abdominal tenderness (LLQ). There is no right CVA tenderness, left CVA tenderness or guarding. Neurological:      Mental Status: She is alert and oriented to person, place, and time.

## 2023-08-09 NOTE — ASSESSMENT & PLAN NOTE
-involving 4cm of L distal transverse colon measuring 1.3cm thickness with fat stranding  -last colonoscopy 2020 with polyp x2, no diverticulosis noted  -pt to take ceftin and flagyl as prescribed, side effects discussed  -recommend clear liquids x24h and gradually advance as tolerated  -recommend returning to GI for further evaluation to include repeat colonoscopy in 6-8weeks after acute episode subsides

## 2023-08-16 ENCOUNTER — OFFICE VISIT (OUTPATIENT)
Dept: GASTROENTEROLOGY | Facility: AMBULARY SURGERY CENTER | Age: 73
End: 2023-08-16
Payer: MEDICARE

## 2023-08-16 VITALS
DIASTOLIC BLOOD PRESSURE: 68 MMHG | SYSTOLIC BLOOD PRESSURE: 118 MMHG | HEART RATE: 70 BPM | BODY MASS INDEX: 32.44 KG/M2 | WEIGHT: 190 LBS | OXYGEN SATURATION: 99 % | HEIGHT: 64 IN

## 2023-08-16 DIAGNOSIS — K57.92 DIVERTICULITIS: ICD-10-CM

## 2023-08-16 DIAGNOSIS — R93.3 ABNORMAL CT SCAN, COLON: Primary | ICD-10-CM

## 2023-08-16 PROCEDURE — 99214 OFFICE O/P EST MOD 30 MIN: CPT | Performed by: PHYSICIAN ASSISTANT

## 2023-08-16 NOTE — PROGRESS NOTES
[facility] - Outpatient Consultation  Tova Yee 74 y.o. female MRN: [id number]  Encounter: [id number]          ASSESSMENT AND PLAN:      47-year-old female with history of A-fib on Eliquis who presents after ER visit for 3 days of left lower quadrant pain found to have CT scan showing focal asymmetric distal transverse wall thickening in the absence of diverticula concerning for focal colonic neoplasm versus less likely diverticulitis. 1.  Abnormal CT, diverticulitis versus focal colonic neoplasm of transverse colon  Patient's symptoms have been improving. She has a few days left of antibiotic course. Otherwise she reports bowel movements have been regular, no decreased appetite or unintentional weight loss. Lab work unremarkable. Last colonoscopy in 2020 with repeat recommended in 5 years.    -Symptoms appear most consistent with diverticulitis however due to CT scan results need to rule out underlying colonic neoplasm as well. - Recommend patient complete antibiotic course. - We will schedule short-term repeat CT scan for next week for further information. I also sent message to patient's primary GI provider to discuss timing of colonoscopy. If symptoms are in the setting of diverticulitis she would have increased risk of pursuing a more urgent colonoscopy such as perforation. I discussed this with the patient. We will use info from repeat CT to help with timing of this. -Recommend patient continue a low fiber diet at this time. - Advised that if she develops any constipation to start taking MiraLAX daily in order to keep stool soft. -Gave patient ER precautions symptoms    -Patient needs cardiac clearance for Eliquis.  -GoLytely bowel prep. Follow-up after colonoscopy. Patient vies to reach out via [employer] with any questions or concerns in the meantime.   ______________________________________________________________________    Stanford Quinn Imtiaz Son is a pleasant 38-year-old female with history of GERD, JORDANA, A-fib on Eliquis, seizures who presents the office as a new patient for abnormal CT during ER visit. Patient went to the emergency room 8/8/2023 for abdominal pain. CMP and CBC normal.  Lipase normal.  She had CT scan with contrast performed showing focal asymmetric distal transverse colonic wall thickening with surrounding mesenteric fat stranding in the absence of colonic diverticula. Findings most concerning for focal colonic neoplasm and less likely acute uncomplicated diverticulitis. She was discharged on 10-day course of antibiotics. Patient reports around 3 days prior to her office visit she developed left lower quadrant pain which worsened every day. She reports feeling as though she was constipated and took a laxative however the pain got worse prompting her to go to the emergency room. She has been taking the antibiotics and only has a few days left. She reports the pain has slowly resolved and describes it now only as a dull ache. She denies any fevers or chills. She reports her appetite and weight have been stable recently. She reports her bowel movements have been regular and occur a few times a day. She does report noticing stools becoming thinner over the past 3 to 4 weeks. No significant family history of colon cancer. Last colonoscopy 10/2020 with removal of 3 polyps and repeat colonoscopy was then recommended in 5 years. EGD at that time with biopsies negative for H. pylori. Severe hepatitis was noted and repeat EGD performed 2/2022 with biopsies negative for H. pylori, celiac and Santiago's esophagus. She is recommended daily PPI no need for repeat EGD. REVIEW OF SYSTEMS:    CONSTITUTIONAL: Denies any fever, chills, rigors, and weight loss. HEENT: No earache or tinnitus. Denies hearing loss or visual disturbances. CARDIOVASCULAR: No chest pain or palpitations.    RESPIRATORY: Denies any cough, hemoptysis, shortness of breath or dyspnea on exertion. GASTROINTESTINAL: As noted in the History of Present Illness. GENITOURINARY: No problems with urination. Denies any hematuria or dysuria. NEUROLOGIC: No dizziness or vertigo, denies headaches. MUSCULOSKELETAL: Denies any muscle or joint pain. SKIN: Denies skin rashes or itching. ENDOCRINE: Denies excessive thirst. Denies intolerance to heat or cold. PSYCHOSOCIAL: Denies depression or anxiety. Denies any recent memory loss.        Historical Information   Past Medical History:   Diagnosis Date   • Acid reflux disease    • Acute bronchitis 9/7/2022   • Allergic    • Allergies    • Anxiety    • Arthralgia    • Arthritis    • Atrial fibrillation ([address])    • Chronic interstitial cystitis    • Chronic pain disorder     neck/ shldrs   • Colon polyp    • Coronary artery disease afib/ Nov. 2018   • Depression    • GERD (gastroesophageal reflux disease)    • Headache(784.0)    • Hyperlipidemia    • Hypertension    • Irregular heart beat     afib   • Lichen sclerosus    • Lipoma    • Myalgia 3/16/2016   • Obesity    • Osteopenia    • Paresthesia of both hands 08/06/2019   • Seizures (HCC)    • Shortness of breath     ROJAS   • Simple partial seizures ([address])     last assessed 3/30/17   • Sleep apnea    • Tachycardia     last assessed 6/24/15   • Upper respiratory tract infection 9/1/2022     Past Surgical History:   Procedure Laterality Date   • ABDOMINOPLASTY     • APPENDECTOMY     • BLEPHAROPLASTY     • COLONOSCOPY  2020    Dr Anuja Galdamez, 5 year f/u    • COSMETIC SURGERY     • FACELIFT     • FRACTURE SURGERY     • HYSTERECTOMY     • OOPHORECTOMY     • SC RHYTIDECTOMY NECK W/PLATYSMAL TIGHTENING N/A 12/19/2022    Procedure: MINI FACELIFT;  Surgeon: Dawit Chau MD;  Location: [address] [place] [address] OR;  Service: Plastics   • REDUCTION MAMMAPLASTY     • RHINOPLASTY     • SHOULDER SURGERY Left      Social History   Social History     Substance and Sexual Activity   Alcohol Use Yes   • Alcohol/week: 1.0 standard drink of alcohol   • Types: 1 Glasses of wine per week    Comment: 1 or 2 glasses per week     Social History     Substance and Sexual Activity   Drug Use No     Social History     Tobacco Use   Smoking Status Never   Smokeless Tobacco Never     Family History   Problem Relation Age of Onset   • Stroke Mother         CVA   • Hypertension Mother    • Hypertension Father    • Lung cancer Father    • No Known Problems Sister    • Lung cancer Brother         AGE UNKNOWN   • Hypertension Brother    • No Known Problems Maternal Grandmother    • No Known Problems Maternal Grandfather    • Breast cancer Paternal Grandmother    • No Known Problems Paternal Grandfather    • No Known Problems Maternal Aunt    • No Known Problems Maternal Aunt    • No Known Problems Maternal Aunt    • No Known Problems Paternal Aunt    • No Known Problems Paternal Aunt    • No Known Problems Paternal Aunt        Meds/Allergies       Current Outpatient Medications:   •  Alpha-D-Galactosidase (BEANO PO)  •  amLODIPine (NORVASC) 5 mg tablet  •  apixaban (ELIQUIS) 5 mg  •  atorvastatin (LIPITOR) 40 mg tablet  •  AYR SALINE NASAL NA  •  Boswellia-Glucosamine-Vit D (OSTEO BI-FLEX ONE PER DAY PO)  •  busPIRone (BUSPAR) 15 mg tablet  •  calcium citrate-Vitamin D 200 mg-250 units  •  cefuroxime (CEFTIN) 500 mg tablet  •  clobetasol (TEMOVATE) 0.05 % ointment  •  divalproex sodium (DEPAKOTE) 500 mg EC tablet  •  famotidine (PEPCID) 20 mg tablet  •  fexofenadine (ALLEGRA) 180 MG tablet  •  flecainide (TAMBOCOR) 100 mg tablet  •  FLUoxetine (PROzac) 20 mg capsule  •  lisinopril (ZESTRIL) 20 mg tablet  •  metroNIDAZOLE (FLAGYL) 500 mg tablet  •  Mupirocin (BACTROBAN EX)  •  mupirocin (BACTROBAN) 2 % ointment  •  pantoprazole (PROTONIX) 40 mg tablet  •  pindolol (VISKEN) 5 mg tablet  •  Probiotic Product (PROBIOTIC PO)  •  valACYclovir (VALTREX) 1,000 mg tablet  •  albuterol (PROVENTIL HFA,VENTOLIN HFA) 90 mcg/act inhaler  • nitrofurantoin (MACROBID) 100 mg capsule    Allergies   Allergen Reactions   • Codeine Hives   • Erythromycin Base GI Intolerance   • Hydromorphone Itching   • Medical Tape Rash   • Morphine GI Intolerance   • Oxycodone-Acetaminophen Hives   • Penicillins Rash and Hives           Objective     Blood pressure 118/68, pulse 70, height 5' 4" (1.626 m), weight 86.2 kg (190 lb), SpO2 99 %. Body mass index is 32.61 kg/m². PHYSICAL EXAM:      General Appearance:   Alert, cooperative, no distress   HEENT:   Normocephalic, atraumatic, anicteric.     Neck:  Supple, symmetrical, trachea midline   Lungs:   Clear to auscultation bilaterally; no rales, rhonchi or wheezing; respirations unlabored    Heart[de-identified]   Regular rate and rhythm; no murmur, rub, or gallop. Abdomen:   Benign abdomen. No residual left lower quadrant tenderness with palpation. Soft. Bowel sounds present   Genitalia:   Deferred    Rectal:   Deferred    Extremities:  No cyanosis, clubbing or edema    Pulses:  2+ and symmetric    Skin:  No jaundice, rashes, or lesions    Lymph nodes:  No palpable cervical lymphadenopathy        Lab Results:   No visits with results within 1 Day(s) from this visit.    Latest known visit with results is:   Admission on 08/08/2023, Discharged on 08/08/2023   Component Date Value   • Ventricular Rate 08/08/2023 62    • Atrial Rate 08/08/2023 62    • MA Interval 08/08/2023 194    • QRSD Interval 08/08/2023 114    • QT Interval 08/08/2023 446    • QTC Interval 08/08/2023 452    • P Axis 08/08/2023 72    • QRS Axis 08/08/2023 95    • T Wave Axis 08/08/2023 60    • WBC 08/08/2023 5.59    • RBC 08/08/2023 4.08    • Hemoglobin 08/08/2023 12.9    • Hematocrit 08/08/2023 39.8    • MCV 08/08/2023 98    • MCH 08/08/2023 31.6    • MCHC 08/08/2023 32.4    • RDW 08/08/2023 13.0    • MPV 08/08/2023 10.9    • Platelets 87/30/0886 234    • nRBC 08/08/2023 0    • Neutrophils Relative 08/08/2023 63    • Immat GRANS % 08/08/2023 0    • Lymphocytes Relative 08/08/2023 23    • Monocytes Relative 08/08/2023 13 (H)    • Eosinophils Relative 08/08/2023 1    • Basophils Relative 08/08/2023 0    • Neutrophils Absolute 08/08/2023 3.50    • Immature Grans Absolute 08/08/2023 0.02    • Lymphocytes Absolute 08/08/2023 1.30    • Monocytes Absolute 08/08/2023 0.70    • Eosinophils Absolute 08/08/2023 0.05    • Basophils Absolute 08/08/2023 0.02    • Sodium 08/08/2023 139    • Potassium 08/08/2023 4.6    • Chloride 08/08/2023 107    • CO2 08/08/2023 29    • ANION GAP 08/08/2023 3    • BUN 08/08/2023 24    • Creatinine 08/08/2023 0.72    • Glucose 08/08/2023 93    • Calcium 08/08/2023 9.3    • Corrected Calcium 08/08/2023 9.9    • AST 08/08/2023 8    • ALT 08/08/2023 21    • Alkaline Phosphatase 08/08/2023 50    • Total Protein 08/08/2023 6.5    • Albumin 08/08/2023 3.3 (L)    • Total Bilirubin 08/08/2023 0.32    • eGFR 08/08/2023 82    • Lipase 08/08/2023 99          Radiology Results:   CT Abdomen pelvis with contrast    Result Date: 8/8/2023  Narrative: CT ABDOMEN AND PELVIS WITH IV CONTRAST INDICATION:   LLQ abdominal pain. History of colonoscopy 10/26/2020. WBC of 5.5 on 8/8/2023 COMPARISON: CT abdomen pelvis 12/5/2022 TECHNIQUE:  CT examination of the abdomen and pelvis was performed. Multiplanar 2D reformatted images were created from the source data. This examination, like all CT scans performed in the [facility], was performed utilizing techniques to minimize radiation dose exposure, including the use of iterative reconstruction and automated exposure control. Radiation dose length product (DLP) for this visit:  579.6 mGy-cm IV Contrast:  100 mL of iohexol (OMNIPAQUE) Enteric Contrast:  Enteric contrast was not administered. FINDINGS: ABDOMEN LOWER CHEST: Small fat-containing left Bochdalek hernia. Coronary artery calcifications at the heart base. A small hiatal hernia. Lung bases are clear.  LIVER/BILIARY TREE: A few small simple hepatic cysts one or more subcentimeter sharply circumscribed low-density hepatic lesion(s) are noted, too small to accurately characterize, but statistically most likely to represent subcentimeter hepatic cysts. These are grossly stable in appearance compared to 12/5/2022. No suspicious solid hepatic lesion is identified. Hepatic contours are normal.  No biliary dilatation. GALLBLADDER:  No calcified gallstones. No pericholecystic inflammatory change. SPLEEN:  Unremarkable. PANCREAS:  Unremarkable. ADRENAL GLANDS:  Unremarkable. KIDNEYS/URETERS:  No hydronephrosis or urinary tract calculus. Left renal small simple cyst. One or more sharply circumscribed subcentimeter renal hypodensities are present, too small to accurately characterize, and statistically most likely benign findings. According to recent literature (Radiology 2019) no further workup of these findings is recommended. STOMACH AND BOWEL: No significant colonic diverticulosis is seen. There is focal asymmetric wall thickening involving redundant distal transverse colon (601/37) measuring up to 1.3 cm in thickness and involving approximately 4 cm segment. There is associated surrounding inflammatory fat stranding (series 601 image 38). These findings are new compared to 12/5/2022 APPENDIX:  No findings to suggest appendicitis. ABDOMINOPELVIC CAVITY:  No ascites. No pneumoperitoneum. No lymphadenopathy. VESSELS:  Atherosclerotic changes are present. No evidence of aneurysm. PELVIS REPRODUCTIVE ORGANS:  Unremarkable for patient's age. URINARY BLADDER:  Unremarkable. ABDOMINAL WALL/INGUINAL REGIONS:  Unremarkable. OSSEOUS STRUCTURES:  No acute fracture or destructive osseous lesion. Impression: Focal asymmetric distal transverse colon wall thickening and surrounding mesenteric fat stranding in absence of colonic diverticula.  Findings are concerning for a focal colonic neoplasm and less likely acute uncomplicated diverticulitis due to involvement of a subtle colonic diverticula. Colonoscopy is recommended for further management. Alternatively, short-term repeat CT abdomen pelvis with contrast after antibiotic treatment can be obtained as clinically warranted.  I personally discussed this study with Jenifer Em on 8/8/2023 7:07 PM. Workstation performed: [id number]

## 2023-08-23 ENCOUNTER — HOSPITAL ENCOUNTER (OUTPATIENT)
Dept: RADIOLOGY | Facility: HOSPITAL | Age: 73
Discharge: HOME | End: 2023-08-23
Payer: MEDICARE

## 2023-08-23 DIAGNOSIS — R93.3 ABNORMAL CT SCAN, COLON: ICD-10-CM

## 2023-08-23 PROCEDURE — G1004 CDSM NDSC: HCPCS

## 2023-08-23 PROCEDURE — 74177 CT ABD & PELVIS W/CONTRAST: CPT

## 2023-08-23 RX ADMIN — IOHEXOL 85 ML: 350 INJECTION, SOLUTION INTRAVENOUS at 13:03

## 2023-08-24 ENCOUNTER — TELEPHONE (OUTPATIENT)
Dept: GASTROENTEROLOGY | Facility: CLINIC | Age: 73
End: 2023-08-24

## 2023-08-24 NOTE — TELEPHONE ENCOUNTER
Spoke to patient on phone and informed her that repeat CT scan done 8/23 results are not available. Patient aware that CT scan results can take up to 7 days as an outpatient before imaging is read and results are obtained. Patient will be notified by attending provider once results of CT scan are available. Patient is scheduled for colonoscopy on 9/1 with Dr. Maria Fernanda Marino.  Thank you

## 2023-08-24 NOTE — TELEPHONE ENCOUNTER
[address] [facility] [facility],[address] Assessment    Name: Debo Barron  YOB: 1950  Last Height: 5' 4" (1.626 m)  Last weight: 86.2 kg (190 lb)  BMI: 32.61 kg/m²  Procedure: Colon  Diagnosis: see order  Date of procedure: 9/1/23  Prep: Ronnie Rodrigez my chart  Responsible : yes  Phone#: [phone]  Name completing form: Tahira Lacy  Date form completed: 08/24/23      If the patient answers yes to any of these questions, schedule in a hospital  Are you pregnant: No  Do you rely on a wheelchair for mobility: No  Have you been diagnosed with End Stage Renal Disease (ESRD): No  Do you need oxygen during the day: No  Have you had a heart attack or stroke within the past three months: No  Have you had a seizure within the past three months: No  Have you ever been informed by anesthesia that you have a difficult airway: No  Additional Questions  Have you had any cardiac testing or are under the care of a Cardiologist (see cardiac list): Yes (Comment: Obtain Cardiac Clearance)  Cardiac list:   Do you have an implanted cardiac defibrillator: No (Comment:  This patient should be scheduled in the hospital)    Have any bleeding problems, such as anemia or hemophilia (If patient has H&H result below 8, schedule in hospital.  H&H must be within 30 days of procedure): No    Had an organ transplant within the past 3 months: No    Do you have any present infections: No  Do you get short of breath when walking a few blocks: No  Have you been diagnosed with diabetes: No  Comments (provide cardiac provider information if applicable): Polo Aranda

## 2023-08-24 NOTE — TELEPHONE ENCOUNTER
Scheduled date of colonoscopy (as of today): 9/1/23  Physician performing colonoscopy: Dr. Suellen Truong  [facility]   Bowel prep reviewed with patient: Stephanie Park my chart  Instructions reviewed with patient by: ls  Clearances: will fax urgent Eliquis clearance to Dr. Martin Garcia, [facility] cardiologist

## 2023-08-24 NOTE — TELEPHONE ENCOUNTER
Faxed urgent Eliquis clearance to Dr. Alejandra Daniel [phone]. Will call their office tomorrow to make sure clearance request was received [phone].

## 2023-08-24 NOTE — TELEPHONE ENCOUNTER
----- Message from Torri Gage MD sent at 8/23/2023  6:27 PM EDT -----  HI,   Agree with repeat CT and Colon ASAP. May overbook. thanks  ----- Message -----  From: Marcelino Person PA-C  Sent: 8/16/2023   2:00 PM EDT  To: Torri Gage MD; Marcelino Person PA-C    Hi Dr. Cindy Coreas,    This is a patient of yours who last had colonoscopy in 2020. She went to the emergency room for left lower quadrant pain and had CT scan which showed focal asymmetric distal transverse colonic wall thickening most suspicious for colonic neoplasm and less likely diverticulitis. She has been taking antibiotics and has no further pain. I am repeating CAT scan next week. Please let me know when you would like to pursue colonoscopy as I believe she should get this done as soon as possible however CT showed questionable diverticulitis therefore would be higher risk.     Marc Rea

## 2023-08-25 NOTE — TELEPHONE ENCOUNTER
Called Dr. Mukesh Alarcon office, [facility], spoke to Verona Quiroz whom informed clearance request was received and was forwarded to the doctor. Will call again Monday afternoon or Tuesday morning to check on status if do not receive back from them.

## 2023-08-29 NOTE — TELEPHONE ENCOUNTER
Eliquis clearance received back from Dr. Angelica Ingram. Pt may hold Eliquis 2 days prior to the procedure. Will call pt to inform. Will have scanned.

## 2023-09-01 ENCOUNTER — ANESTHESIA (OUTPATIENT)
Dept: GASTROENTEROLOGY | Facility: AMBULARY SURGERY CENTER | Age: 73
End: 2023-09-01

## 2023-09-01 ENCOUNTER — HOSPITAL ENCOUNTER (OUTPATIENT)
Dept: GASTROENTEROLOGY | Facility: AMBULARY SURGERY CENTER | Age: 73
Setting detail: OUTPATIENT SURGERY
End: 2023-09-01
Payer: MEDICARE

## 2023-09-01 ENCOUNTER — ANESTHESIA EVENT (OUTPATIENT)
Dept: GASTROENTEROLOGY | Facility: AMBULARY SURGERY CENTER | Age: 73
End: 2023-09-01

## 2023-09-01 VITALS
DIASTOLIC BLOOD PRESSURE: 67 MMHG | RESPIRATION RATE: 18 BRPM | BODY MASS INDEX: 31.58 KG/M2 | HEART RATE: 62 BPM | TEMPERATURE: 96.5 F | HEIGHT: 64 IN | OXYGEN SATURATION: 97 % | WEIGHT: 185 LBS | SYSTOLIC BLOOD PRESSURE: 152 MMHG

## 2023-09-01 DIAGNOSIS — R93.3 ABNORMAL CT SCAN, COLON: ICD-10-CM

## 2023-09-01 RX ORDER — SODIUM CHLORIDE, SODIUM LACTATE, POTASSIUM CHLORIDE, CALCIUM CHLORIDE 600; 310; 30; 20 MG/100ML; MG/100ML; MG/100ML; MG/100ML
INJECTION, SOLUTION INTRAVENOUS CONTINUOUS PRN
Status: DISCONTINUED | OUTPATIENT
Start: 2023-09-01 | End: 2023-09-01

## 2023-09-01 RX ORDER — PROPOFOL 10 MG/ML
INJECTION, EMULSION INTRAVENOUS AS NEEDED
Status: DISCONTINUED | OUTPATIENT
Start: 2023-09-01 | End: 2023-09-01

## 2023-09-01 RX ADMIN — PROPOFOL 50 MG: 10 INJECTION, EMULSION INTRAVENOUS at 13:37

## 2023-09-01 RX ADMIN — PROPOFOL 50 MG: 10 INJECTION, EMULSION INTRAVENOUS at 13:40

## 2023-09-01 RX ADMIN — PROPOFOL 80 MG: 10 INJECTION, EMULSION INTRAVENOUS at 13:26

## 2023-09-01 RX ADMIN — PROPOFOL 50 MG: 10 INJECTION, EMULSION INTRAVENOUS at 13:35

## 2023-09-01 RX ADMIN — PROPOFOL 50 MG: 10 INJECTION, EMULSION INTRAVENOUS at 13:31

## 2023-09-01 RX ADMIN — PROPOFOL 50 MG: 10 INJECTION, EMULSION INTRAVENOUS at 13:49

## 2023-09-01 RX ADMIN — PROPOFOL 50 MG: 10 INJECTION, EMULSION INTRAVENOUS at 13:44

## 2023-09-01 RX ADMIN — SODIUM CHLORIDE, SODIUM LACTATE, POTASSIUM CHLORIDE, AND CALCIUM CHLORIDE: .6; .31; .03; .02 INJECTION, SOLUTION INTRAVENOUS at 13:13

## 2023-09-01 RX ADMIN — PROPOFOL 50 MG: 10 INJECTION, EMULSION INTRAVENOUS at 13:47

## 2023-09-01 RX ADMIN — PROPOFOL 50 MG: 10 INJECTION, EMULSION INTRAVENOUS at 13:33

## 2023-09-01 RX ADMIN — PROPOFOL 20 MG: 10 INJECTION, EMULSION INTRAVENOUS at 13:28

## 2023-09-01 RX ADMIN — PROPOFOL 50 MG: 10 INJECTION, EMULSION INTRAVENOUS at 13:53

## 2023-09-01 NOTE — H&P
History and Physical - [facility]  Sheryl Curran 74 y.o. female MRN: [id number]    HPI: Sheryl Curran is a 74y.o. year old female who presents for evaluation of abnormal CAT scan. Patient was treated for diverticulitis 1 month ago, had repeat CAT scan which shows improvement in wall thickening and fat stranding involving the transverse colon. Patient reports symptomatic improvement in the left lower quadrant pain but not complete resolution yet.       Review of Systems    Historical Information   Past Medical History:   Diagnosis Date   • Acid reflux disease    • Acute bronchitis 9/7/2022   • Allergic    • Allergies    • Anxiety    • Arthralgia    • Arthritis    • Atrial fibrillation ([address])    • Chronic interstitial cystitis    • Chronic pain disorder     neck/ shldrs   • Colon polyp    • Coronary artery disease afib/ Nov. 2018   • Depression    • GERD (gastroesophageal reflux disease)    • Headache(784.0)    • Hyperlipidemia    • Hypertension    • Irregular heart beat     afib   • Lichen sclerosus    • Lipoma    • Myalgia 3/16/2016   • Obesity    • Osteopenia    • Paresthesia of both hands 08/06/2019   • Seizures (HCC)    • Shortness of breath     ROJAS   • Simple partial seizures ([address])     last assessed 3/30/17   • Sleep apnea    • Tachycardia     last assessed 6/24/15   • Upper respiratory tract infection 9/1/2022     Past Surgical History:   Procedure Laterality Date   • ABDOMINOPLASTY     • APPENDECTOMY     • BLEPHAROPLASTY     • COLONOSCOPY  2020    Dr Ray Maciel, 5 year f/u    • COSMETIC SURGERY     • FACELIFT     • FRACTURE SURGERY     • HYSTERECTOMY     • OOPHORECTOMY     • MI RHYTIDECTOMY NECK W/PLATYSMAL TIGHTENING N/A 12/19/2022    Procedure: MINI FACELIFT;  Surgeon: Tasha Valentine MD;  Location: [address] [place] [address];  Service: Plastics   • REDUCTION MAMMAPLASTY     • RHINOPLASTY     • SHOULDER SURGERY Left      Social History   Social History     Substance and Sexual Activity   Alcohol Use Yes   • Alcohol/week: 2.0 standard drinks of alcohol   • Types: 2 Glasses of wine per week    Comment: 1 or 2 glasses per week     Social History     Substance and Sexual Activity   Drug Use No     Social History     Tobacco Use   Smoking Status Never   Smokeless Tobacco Never     Family History   Problem Relation Age of Onset   • Stroke Mother         CVA   • Hypertension Mother    • Hypertension Father    • Lung cancer Father    • No Known Problems Sister    • Lung cancer Brother         AGE UNKNOWN   • Hypertension Brother    • No Known Problems Maternal Grandmother    • No Known Problems Maternal Grandfather    • Breast cancer Paternal Grandmother    • No Known Problems Paternal Grandfather    • No Known Problems Maternal Aunt    • No Known Problems Maternal Aunt    • No Known Problems Maternal Aunt    • No Known Problems Paternal Aunt    • No Known Problems Paternal Aunt    • No Known Problems Paternal Aunt        Meds/Allergies     (Not in a hospital admission)      Allergies   Allergen Reactions   • Codeine Hives   • Erythromycin Base GI Intolerance   • Hydromorphone Itching   • Medical Tape Rash   • Morphine GI Intolerance   • Oxycodone-Acetaminophen Hives   • Penicillins Rash and Hives       Objective     /66   Pulse 56   Temp (!) 97 °F (36.1 °C) (Temporal)   Resp 19   Ht 5' 4" (1.626 m)   Wt 83.9 kg (185 lb)   SpO2 99%   BMI 31.76 kg/m²       PHYSICAL EXAM    Gen: NAD  CV: RRR  CHEST: Clear  ABD: soft, NT/ND  EXT: no edema  Neuro: AAO      ASSESSMENT/PLAN:  This is a 74y.o. year old female here for evaluation of abnormal CAT scan. Risks of the procedure including but not limited to bleeding, infection, perforation discussed with patient in length. All questions were answered. PLAN:   Procedure: Colonoscopy.

## 2023-09-01 NOTE — ANESTHESIA POSTPROCEDURE EVALUATION
Post-Op Assessment Note    CV Status:  Stable  Pain Score: 0    Pain management: adequate     Mental Status:  Alert and awake   Hydration Status:  Euvolemic   PONV Controlled:  Controlled   Airway Patency:  Patent      Post Op Vitals Reviewed: Yes      Staff: CRNA         No notable events documented.     BP   127/60   Temp     Pulse  60   Resp   18   SpO2   98

## 2023-09-01 NOTE — ANESTHESIA PREPROCEDURE EVALUATION
Procedure:  COLONOSCOPY    Relevant Problems   CARDIO   (+) Chronic migraine without aura without status migrainosus, not intractable   (+) Coronary vasospasm (HCC)   (+) Hyperlipidemia   (+) Hypertension   (+) Paroxysmal atrial fibrillation (HCC)      GI/HEPATIC   (+) Gastroesophageal reflux disease   (+) Hiatal hernia      GYN   (+) History of hysterectomy      MUSCULOSKELETAL   (+) Arthritis   (+) Cervical spondylosis   (+) Coronary vasospasm (HCC)   (+) Hiatal hernia      NEURO/PSYCH   (+) Anxiety   (+) Chronic migraine without aura without status migrainosus, not intractable   (+) Complex partial seizures with consciousness impaired (HCC)   (+) 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 States the last seizure was over a decade ago, still taking medicine. Does have some loss of feeling in her legs approximately 5 years ago, but hasn't had symptoms since. "Cosmetic surgery on nose" without breathing problems. Physical Exam    Airway    Mallampati score: III  TM Distance: >3 FB  Neck ROM: full     Dental       Cardiovascular  Cardiovascular exam normal    Pulmonary  Pulmonary exam normal     Other Findings        Anesthesia Plan  ASA Score- 3     Anesthesia Type- IV sedation with anesthesia with ASA Monitors. Additional Monitors:   Airway Plan:           Plan Factors-Exercise tolerance (METS): >4 METS. Chart reviewed. EKG reviewed. Imaging results reviewed. Existing labs reviewed. Patient summary reviewed.     Patient is not a current smoker. Patient did not smoke on day of surgery. Obstructive sleep apnea risk education given perioperatively. Induction- intravenous. Postoperative Plan- Plan for postoperative opioid use. Informed Consent- Anesthetic plan and risks discussed with patient. I personally reviewed this patient with the CRNA. Discussed and agreed on the Anesthesia Plan with the CRNA. Colton Romo

## 2023-09-21 NOTE — PROGRESS NOTES
Weight Management Medical Nutrition Assessment  Patricia De Leon is here today for 2/3 bundle. Last seen ~3 months ago. Current Weight: 187.2 lbs. She has lost 3.4 lbs x 3 months. At time of last visit with DO it was recommended that she speak with her medical providers regarding a possible change to an alternate antidepressant and/or consider metformin. She did transition to prozac. Feels this has been a good change for her, feeling more in control. Recently in ER for colitis. Per GI has been following a low fiber diet. GI also recommended she start metamucil but she has not done this yet. Reports cravings for chocolate have better. Eating minimally in the middle of the day, recommend increase calories and protein. Suggestions provided. She will f/u with DO in 1 month and myself in ~2 months. Keep up the great work!     Patient seen by Medical Provider in past 6 months:  yes  Requested to schedule appointment with Medical Provider: No    Anthropometric Measurements  Start Weight (#): 185.3# (22)  Current Weight (#): 187.2#  TBW % Change from start weight: -%  Ideal Body Weight (#): 120# (64")  Goal Weight (#): ST-10% LT#  Highest: 190.5 lbs   UBW: 120-130#    Weight Loss History  Previous weight loss attempts:Exercise  Counseling with MD  Self Created Diets (Portion Control, Healthy Food Choices, etc.)    Food and Nutrition Related History  Wake up: 8-9 am   Bed Time: 1 am  Struggles with sleep; takes afternoon nap (several hours)     Dietary Recall   11:00am -12:00 Breakfast: Grilled cheese sandwich (647 bread, 2 pieces cheese), 1 tbsp butter, sometimes applesauce    Snack:skip  4:00pm Lunch: applesauce OR salad OR broth soup  Snack:skip   6:00-7:00pm Dinner:lean protein / veggies/ salad with 130 dressing   Snack:  Skip OR 2 crackers     Beverages: water, sparkling ice, coffee (16 oz w/ almond milk + 1-2 tsp sugar), and alcohol (1-2x/week)  Volume of beverage intake: 64 oz water    Weekends: Dine out Sat Nights Cravings: carbs/sweets  Trouble area of day: evenings     Frequency of Eating out: 1x/week  Food restrictions: no spicy foods   Cooking: self   Food Shopping: self and      Occupation: Retired    Physical Activity  Activity: [employer] / Avolent training 2x  and yoga 1x  Frequency:3x a week for 60min   Physical limitations/barriers to exercise: mobility restrictions of shoulder + neck     Estimated Needs  Energy    B[employer] Energy Needs   BMR: 1336 kcal  Maintenance calories (sedentary): 1603kcal  0.5-1# loss weekly sedentary: 9347-8020 kcal  0.5-1# loss weekly lightly active: 2298-1091 kcal    Reevue Indirect Calorimeter BGV:5678 calories            Weight loss without exercise:2557-4431 calories          Weight loss with exercise:  5228-4912 calories              Maintenance:  2323-1107    Protein: 65-82 grams (1.2-1.5g/kg IBW)  Fluid: 64 ounces (35mL/kg IBW)    Nutrition Diagnosis  Yes; Overweight/obesity related to Excess energy intake as evidenced by  BMI more than normative standard for age and sex (obesity-grade I 32-30. 9)     Nutrition Intervention    Nutrition Prescription  Calories: 1200 on sedentary days and flex to 1400 calories on physical activity days  Protein: 63-83 gm  Fluid: 64oz    Meal Plan (Rah/Pro)  Breakfast: 200/7-20  Snack: --  Lunch: 300/25  Snack: 100-150/5+  Dinner: 665/64-46  Snack: 100-150/5+    Nutrition Education  Healthy Core Manual  Calorie controlled menu  Lean protein food choices  Healthy snack options  Food journaling tips    Nutrition Counseling  Strategies: meal planning, portion sizes, healthy snack choices, hydration, fiber intake, protein intake, exercise, food logging    Monitoring and Evaluation:    Evaluation criteria  Energy Intake  Meet protein needs  Maintain adequate hydration  Monitor weekly weight  Meal planning/preparation  Food journal   Decreased portions at mealtimes and snacks  Physical activity     Barriers to learning:none  Readiness to change: Action:  (Changing behavior)  Comprehension: very good  Expected Compliance: very good

## 2023-09-22 ENCOUNTER — OFFICE VISIT (OUTPATIENT)
Dept: BARIATRICS | Facility: CLINIC | Age: 73
End: 2023-09-22

## 2023-09-22 VITALS — BODY MASS INDEX: 31.96 KG/M2 | WEIGHT: 187.2 LBS | HEIGHT: 64 IN

## 2023-09-22 DIAGNOSIS — R63.5 ABNORMAL WEIGHT GAIN: Primary | ICD-10-CM

## 2023-09-22 PROCEDURE — RECHECK

## 2023-10-30 ENCOUNTER — OFFICE VISIT (OUTPATIENT)
Dept: URGENT CARE | Facility: CLINIC | Age: 73
End: 2023-10-30
Payer: MEDICARE

## 2023-10-30 VITALS
RESPIRATION RATE: 16 BRPM | HEART RATE: 58 BPM | OXYGEN SATURATION: 98 % | TEMPERATURE: 98.2 F | DIASTOLIC BLOOD PRESSURE: 66 MMHG | SYSTOLIC BLOOD PRESSURE: 142 MMHG

## 2023-10-30 DIAGNOSIS — S61.011A LACERATION WITHOUT FOREIGN BODY OF RIGHT THUMB WITHOUT DAMAGE TO NAIL, INITIAL ENCOUNTER: ICD-10-CM

## 2023-10-30 DIAGNOSIS — Z23 ENCOUNTER FOR IMMUNIZATION: Primary | ICD-10-CM

## 2023-10-30 PROCEDURE — 90471 IMMUNIZATION ADMIN: CPT | Performed by: PHYSICIAN ASSISTANT

## 2023-10-30 PROCEDURE — 99213 OFFICE O/P EST LOW 20 MIN: CPT | Performed by: PHYSICIAN ASSISTANT

## 2023-10-30 PROCEDURE — 90715 TDAP VACCINE 7 YRS/> IM: CPT

## 2023-10-30 PROCEDURE — G0463 HOSPITAL OUTPT CLINIC VISIT: HCPCS | Performed by: PHYSICIAN ASSISTANT

## 2023-10-30 RX ORDER — CLINDAMYCIN HYDROCHLORIDE 300 MG/1
300 CAPSULE ORAL 3 TIMES DAILY
Qty: 21 CAPSULE | Refills: 0 | Status: SHIPPED | OUTPATIENT
Start: 2023-10-30 | End: 2023-11-06

## 2023-10-30 NOTE — PROGRESS NOTES
[facility]        NAME: Marc Barker is a 74 y.o. female  : 1950    MRN: [id number]  DATE: 2023  TIME: 5:43 PM    /66   Pulse 58   Temp 98.2 °F (36.8 °C)   Resp 16   SpO2 98%     Assessment and Plan   Encounter for immunization [Z23]  1. Encounter for immunization  Tdap Vaccine greater than or equal to 8yo      2. Laceration without foreign body of right thumb without damage to nail, initial encounter  Tdap Vaccine greater than or equal to 8yo    clindamycin (CLEOCIN) 300 MG capsule            Patient Instructions       Follow up with PCP in 3-5 days. Proceed to  ER if symptoms worsen. Chief Complaint     Chief Complaint   Patient presents with    Thumb Laceration     Pt reports right thumb cut last night while making dinner with a mandolin         History of Present Illness       Pt with right thumb laceration from last francoise from vegetable mandolin slicer         Review of Systems   Review of Systems   Constitutional: Negative. HENT: Negative. Eyes: Negative. Respiratory: Negative. Cardiovascular: Negative. Gastrointestinal: Negative. Endocrine: Negative. Genitourinary: Negative. Musculoskeletal: Negative. Skin: Negative. Allergic/Immunologic: Negative. Neurological: Negative. Hematological: Negative. Psychiatric/Behavioral: Negative. All other systems reviewed and are negative.         Current Medications       Current Outpatient Medications:     clindamycin (CLEOCIN) 300 MG capsule, Take 1 capsule (300 mg total) by mouth 3 (three) times a day for 7 days, Disp: 21 capsule, Rfl: 0    Alpha-D-Galactosidase (BEANO PO), Take by mouth, Disp: , Rfl:     amLODIPine (NORVASC) 5 mg tablet, Take 1 tablet (5 mg total) by mouth daily, Disp: 90 tablet, Rfl: 3    apixaban (ELIQUIS) 5 mg, 2 (two) times a day, Disp: , Rfl:     atorvastatin (LIPITOR) 40 mg tablet, Take 1 tablet (40 mg total) by mouth daily, Disp: 90 tablet, Rfl: 0    AYR SALINE NASAL NA, into each nostril 2 (two) times a day, Disp: , Rfl:     Boswellia-Glucosamine-Vit D (OSTEO BI-FLEX ONE PER DAY PO), Take 1 capsule by mouth in the morning With turmeric, Disp: , Rfl:     busPIRone (BUSPAR) 15 mg tablet, Take 15 mg by mouth 2 (two) times a day, Disp: , Rfl:     calcium citrate-Vitamin D 200 mg-250 units, Take 1 tablet by mouth daily with breakfast, Disp: , Rfl:     clobetasol (TEMOVATE) 0.05 % ointment, PLEASE SEE ATTACHED FOR DETAILED DIRECTIONS, Disp: , Rfl:     divalproex sodium (DEPAKOTE) 500 mg EC tablet, , Disp: , Rfl:     famotidine (PEPCID) 20 mg tablet, TAKE 1 TABLET BY MOUTH DAILY AT  BEDTIME, Disp: 90 tablet, Rfl: 1    fexofenadine (ALLEGRA) 180 MG tablet, , Disp: , Rfl:     flecainide (TAMBOCOR) 100 mg tablet, Take 100 mg by mouth 2 (two) times a day, Disp: , Rfl:     FLUoxetine (PROzac) 20 mg capsule, TAKE 1 CAPSULE BY MOUTH EVERY DAY IN THE MORNING, Disp: , Rfl:     lisinopril (ZESTRIL) 20 mg tablet, TAKE 1 TABLET BY MOUTH  DAILY, Disp: 90 tablet, Rfl: 3    pindolol (VISKEN) 5 mg tablet, Take 1 tablet (5 mg total) by mouth daily, Disp: 90 tablet, Rfl: 3    Probiotic Product (PROBIOTIC PO), Take 1 capsule by mouth 2 (two) times a day, Disp: , Rfl:     valACYclovir (VALTREX) 1,000 mg tablet, Take 1,000 mg by mouth if needed, Disp: , Rfl:     Current Allergies     Allergies as of 10/30/2023 - Reviewed 10/30/2023   Allergen Reaction Noted    Codeine Hives 02/25/2015    Erythromycin base GI Intolerance     Hydromorphone Itching     Medical tape Rash 07/24/2023    Morphine GI Intolerance     Oxycodone-acetaminophen Hives     Penicillins Rash and Hives             The following portions of the patient's history were reviewed and updated as appropriate: allergies, current medications, past family history, past medical history, past social history, past surgical history and problem list.     Past Medical History:   Diagnosis Date    Acid reflux disease     Acute bronchitis 9/7/2022    Allergic     Allergies     Anxiety     Arthralgia     Arthritis     Atrial fibrillation (HCC)     Chronic interstitial cystitis     Chronic pain disorder     neck/ shldrs    Colon polyp     Coronary artery disease afib/ Nov. 2018    Depression     GERD (gastroesophageal reflux disease)     Headache(784.0)     Hyperlipidemia     Hypertension     Irregular heart beat     afib    Lichen sclerosus     Lipoma     Myalgia 3/16/2016    Obesity     Osteopenia     Paresthesia of both hands 08/06/2019    Seizures ([address])     Shortness of breath     ROJAS    Simple partial seizures ([address])     last assessed 3/30/17    Sleep apnea     Tachycardia     last assessed 6/24/15    Upper respiratory tract infection 9/1/2022       Past Surgical History:   Procedure Laterality Date    ABDOMINOPLASTY      APPENDECTOMY      BLEPHAROPLASTY      COLONOSCOPY  2020    Dr Tashia Han, 5 year f/u     COSMETIC SURGERY      FACELIFT      FRACTURE SURGERY      HYSTERECTOMY      OOPHORECTOMY      GA RHYTIDECTOMY NECK W/PLATYSMAL TIGHTENING N/A 12/19/2022    Procedure: MINI FACELIFT;  Surgeon: Mark Morales MD;  Location:  MAIN OR;  Service: Plastics    REDUCTION MAMMAPLASTY      RHINOPLASTY      SHOULDER SURGERY Left        Family History   Problem Relation Age of Onset    Stroke Mother         CVA    Hypertension Mother     Hypertension Father     Lung cancer Father     No Known Problems Sister     Lung cancer Brother         AGE UNKNOWN    Hypertension Brother     No Known Problems Maternal Grandmother     No Known Problems Maternal Grandfather     Breast cancer Paternal Grandmother     No Known Problems Paternal Grandfather     No Known Problems Maternal Aunt     No Known Problems Maternal Aunt     No Known Problems Maternal Aunt     No Known Problems Paternal Aunt     No Known Problems Paternal Aunt     No Known Problems Paternal Aunt          Medications have been verified.         Objective   /66   Pulse 58   Temp 98.2 °F (36.8 °C) Resp 16   SpO2 98%        Physical Exam     Physical Exam  Vitals and nursing note reviewed. Constitutional:       Appearance: Normal appearance. She is normal weight. HENT:      Head: Normocephalic and atraumatic. Cardiovascular:      Rate and Rhythm: Normal rate and regular rhythm. Pulses: Normal pulses. Heart sounds: Normal heart sounds. Pulmonary:      Effort: Pulmonary effort is normal.      Breath sounds: Normal breath sounds. Musculoskeletal:         General: Normal range of motion. Cervical back: Normal range of motion and neck supple. Comments: Right thumb small skin avulsion at corner of nail, pea size     Wound cleaned and gel foam and bulky dressing applied    Skin:     General: Skin is warm. Capillary Refill: Capillary refill takes less than 2 seconds. Neurological:      Mental Status: She is alert and oriented to person, place, and time.    Psychiatric:         Mood and Affect: Mood normal.

## 2023-11-02 ENCOUNTER — OFFICE VISIT (OUTPATIENT)
Dept: BARIATRICS | Facility: CLINIC | Age: 73
End: 2023-11-02
Payer: MEDICARE

## 2023-11-02 VITALS
SYSTOLIC BLOOD PRESSURE: 144 MMHG | BODY MASS INDEX: 32.13 KG/M2 | DIASTOLIC BLOOD PRESSURE: 82 MMHG | HEART RATE: 63 BPM | OXYGEN SATURATION: 96 % | HEIGHT: 64 IN | WEIGHT: 188.2 LBS | RESPIRATION RATE: 18 BRPM

## 2023-11-02 DIAGNOSIS — R73.03 PREDIABETES: ICD-10-CM

## 2023-11-02 DIAGNOSIS — G47.33 OBSTRUCTIVE SLEEP APNEA SYNDROME: ICD-10-CM

## 2023-11-02 DIAGNOSIS — K21.9 GASTROESOPHAGEAL REFLUX DISEASE, UNSPECIFIED WHETHER ESOPHAGITIS PRESENT: ICD-10-CM

## 2023-11-02 DIAGNOSIS — G40.209 COMPLEX PARTIAL SEIZURES WITH CONSCIOUSNESS IMPAIRED (HCC): ICD-10-CM

## 2023-11-02 DIAGNOSIS — I48.0 PAROXYSMAL ATRIAL FIBRILLATION (HCC): ICD-10-CM

## 2023-11-02 DIAGNOSIS — E78.2 MIXED HYPERLIPIDEMIA: ICD-10-CM

## 2023-11-02 DIAGNOSIS — E66.9 OBESITY (BMI 30.0-34.9): Primary | ICD-10-CM

## 2023-11-02 PROBLEM — G47.00 INSOMNIA: Status: RESOLVED | Noted: 2017-04-05 | Resolved: 2023-11-02

## 2023-11-02 PROCEDURE — 99213 OFFICE O/P EST LOW 20 MIN: CPT | Performed by: NURSE PRACTITIONER

## 2023-11-02 NOTE — PROGRESS NOTES
Assessment/Plan:     Obesity (BMI 30.0-34.9)  - Patient is pursuing Conservative Program with bundles with dietician. Previously completed Healthy CORE and Healthy Ways. - Initial weight loss goal of 5-10% weight loss for improved health  - Postmenopausal and S/P hysterectomy.   - Has not been following nutrition as much, but plans on getting back on track. Discussed the importance of lifestyle changes with weight loss medications. If she is still struggling with cravings after getting back on track with nutrition, can consider metformin, as A1C in prediabetes range.   -Not a candidate for Wellbutrin or Contrave due to history of seizure disorder.    -Not a candidate for phentermine due to history of A-fib.  -Would need to be cautious with Topamax given history of seizure disorder.  - Labs reviewed: CMP 8/8/2023 was within acceptable range. Initial Healthy CORE: 185.3 lbs  Last visit: 190 lbs  Current: 188.1 lbs BMI 32.30  Change: +3 lbs (-2 lbs since the last office visit)      Goals:  Do not skip meals. Food log (ie.) [email or web address],[employer],calorieking. com,etc. baritastic (use [facility], dietdoctor. com or smartphone stacey Affine for recipes)  No sugary beverages. At least 64oz of water daily. Increase physical activity by 10 minutes daily. Gradually increase physical activity to a goal of 5 days per week for 30 minutes of MODERATE intensity PLUS 2 days per week of FULL BODY resistance training (use smartphone apps Sensor[employer]an, Home Workout, etc.)  Start food logging, weighing, and measuring food  1200 angelika/day, with flex to 1400 angelika on active days. Continue nutrition recommendations per the dietitian. Increase water intake to at least 64 ounces daily. Keep up the good work with exercise and add in walks during the week to total 5 days/week of cardiovascular exercise. Prediabetes  Will likely improve with weight loss.     Complex partial seizures with consciousness impaired ([address])  - Taking Depakote. Continue management with prescribing provider. Hyperlipidemia  - Taking lipitor. May improve with weight loss and lifestyle modification. Continue management with prescribing provider. Obstructive sleep apnea syndrome  Could not tolerate CPAP in the past.  May improve with weight loss. Paroxysmal atrial fibrillation (HCC)  - Taking flecainide and eliquis. Continue management with prescribing provider. Gastroesophageal reflux disease  - Taking pepcid. May improve with weight loss and lifestyle modification. Continue management with prescribing provider. Nevin Yun was seen today for follow-up. Diagnoses and all orders for this visit:    Obesity (BMI 30.0-34. 9)    Prediabetes    Complex partial seizures with consciousness impaired ([address])    Mixed hyperlipidemia    Obstructive sleep apnea syndrome    Paroxysmal atrial fibrillation (HCC)    Gastroesophageal reflux disease, unspecified whether esophagitis present            Follow up in approximately 3 months with Non-Surgical Physician/Advanced Practitioner. Subjective:   Chief Complaint   Patient presents with    Follow-up       Patient ID: Jacky Bob  is a 74 y.o. female with excess weight/obesity here to pursue weight management. Patient is pursuing Conservative Program with bundles with dietician. Most recent notes and records were reviewed. HPI    Wt Readings from Last 10 Encounters:   11/02/23 85.4 kg (188 lb 3.2 oz)   09/22/23 84.9 kg (187 lb 3.2 oz)   09/01/23 83.9 kg (185 lb)   08/16/23 86.2 kg (190 lb)   08/09/23 85.3 kg (188 lb)   07/24/23 86.1 kg (189 lb 14.4 oz)   06/26/23 86.5 kg (190 lb 9.6 oz)   05/22/23 84.4 kg (186 lb)   05/10/23 84.7 kg (186 lb 12.8 oz)   05/04/23 84.6 kg (186 lb 6.4 oz)     Saw Dr. Stephen Mesa the end of June 2023. Discussed changing from lexapro to fluoxetine or sertraline. Was changed from lexapro to fluoxetine and reports to be doing well with that.      Has not been following nutrition recommendations recently, but plans on getting back on track. Having cravings for sugar and carbs. Not currently food logging. Saw neurologist recently in follow-up and she reports it was suggested to try to work more on lifestyle changes prior to starting weight loss medication. Attempted to review note in care everywhere from [facility], but taking an extended period of time to download. Previously completed Healthy CORE and then Healthy Ways. Currently seeing dietician for bundles. Hydration- 6 glasses of water, decaf coffee  Alcohol- 1 drink per week  Tobacco- denies  Exercise- 3 times per week yoga and pilates and weight bearing exercises 60 minutes  Occupation- retired  Sleep- 8 hours  Has JORDANA, couldn't tolerate CPAP working on weight loss. Colonoscopy: UTD, had Sept 2023, no further colonoscopies needed  Mammogram: UTD, due March 2024      The following portions of the patient's history were reviewed and updated as appropriate: allergies, current medications, past family history, past medical history, past social history, past surgical history, and problem list.    Family History   Problem Relation Age of Onset    Stroke Mother         CVA    Hypertension Mother     Hypertension Father     Lung cancer Father     No Known Problems Sister     Lung cancer Brother         AGE UNKNOWN    Hypertension Brother     No Known Problems Maternal Grandmother     No Known Problems Maternal Grandfather     Breast cancer Paternal Grandmother     No Known Problems Paternal Grandfather     No Known Problems Maternal Aunt     No Known Problems Maternal Aunt     No Known Problems Maternal Aunt     No Known Problems Paternal Aunt     No Known Problems Paternal Aunt     No Known Problems Paternal Aunt    Food logging, weighing, and measuring food. Review of Systems   HENT:  Negative for sore throat. Respiratory:  Positive for cough (allergies).  Negative for shortness of breath. Cardiovascular:  Negative for chest pain and palpitations. Gastrointestinal:  Negative for abdominal pain, constipation, diarrhea, nausea and vomiting. Denies GERD   Musculoskeletal:  Negative for arthralgias and back pain. Skin:  Negative for rash. Psychiatric/Behavioral:  Negative for suicidal ideas (or HI). Denies depression   + anxiety situational       Objective:  /82 (BP Location: Left arm, Patient Position: Sitting, Cuff Size: Large)   Pulse 63   Resp 18   Ht 5' 4" (1.626 m)   Wt 85.4 kg (188 lb 3.2 oz)   SpO2 96%   BMI 32.30 kg/m²     Physical Exam  Vitals and nursing note reviewed. Constitutional   General appearance: Abnormal.  well developed and obese. Eyes No conjunctival injection. Ears, Nose, Mouth, and Throat Oral mucosa moist.   Pulmonary   Respiratory effort: No increased work of breathing or signs of respiratory distress. Cardiovascular     Examination of extremities for edema and/or varicosities: Normal.  no edema. Abdomen   Abdomen: Abnormal.  The abdomen was obese.     Musculoskeletal   Normal range of motion  Neurological   Gait and station: Normal.    Psychiatric   Orientation to person, place and time: Normal.    Affect: appropriate

## 2023-11-02 NOTE — ASSESSMENT & PLAN NOTE
- Taking pepcid. May improve with weight loss and lifestyle modification. Continue management with prescribing provider. MAR and orders reviewed. Chart check completed.

## 2023-11-02 NOTE — ASSESSMENT & PLAN NOTE
- Taking lipitor. May improve with weight loss and lifestyle modification. Continue management with prescribing provider.

## 2023-11-02 NOTE — ASSESSMENT & PLAN NOTE
- Patient is pursuing Conservative Program with bundles with dietician. Previously completed Healthy CORE and Healthy Ways. - Initial weight loss goal of 5-10% weight loss for improved health  - Postmenopausal and S/P hysterectomy.   - Has not been following nutrition as much, but plans on getting back on track. Discussed the importance of lifestyle changes with weight loss medications. If she is still struggling with cravings after getting back on track with nutrition, can consider metformin, as A1C in prediabetes range.   -Not a candidate for Wellbutrin or Contrave due to history of seizure disorder.    -Not a candidate for phentermine due to history of A-fib.  -Would need to be cautious with Topamax given history of seizure disorder.  - Labs reviewed: CMP 8/8/2023 was within acceptable range. Initial Healthy CORE: 185.3 lbs  Last visit: 190 lbs  Current: 188.1 lbs BMI 32.30  Change: +3 lbs (-2 lbs since the last office visit)      Goals:  Do not skip meals. Food log (ie.) www.myfitnesspal.com,[employer],calorieking. com,etc. baritastic (use [employer], dietdoctor. com or smartphone stacey [employer] for recipes)  No sugary beverages. At least 64oz of water daily. Increase physical activity by 10 minutes daily. Gradually increase physical activity to a goal of 5 days per week for 30 minutes of MODERATE intensity PLUS 2 days per week of FULL BODY resistance training (use smartphone apps [employer], Home Workout, etc.)  Start food logging, weighing, and measuring food  1200 angelika/day, with flex to 1400 angelika on active days. Continue nutrition recommendations per the dietitian. Increase water intake to at least 64 ounces daily. Keep up the good work with exercise and add in walks during the week to total 5 days/week of cardiovascular exercise.

## 2024-01-17 DIAGNOSIS — I10 BENIGN ESSENTIAL HYPERTENSION: ICD-10-CM

## 2024-01-18 RX ORDER — LISINOPRIL 20 MG/1
TABLET ORAL
Qty: 90 TABLET | Refills: 3 | Status: SHIPPED | OUTPATIENT
Start: 2024-01-18

## 2024-01-24 ENCOUNTER — APPOINTMENT (OUTPATIENT)
Dept: LAB | Facility: CLINIC | Age: 74
End: 2024-01-24
Payer: MEDICARE

## 2024-01-24 DIAGNOSIS — Z79.899 ENCOUNTER FOR LONG-TERM (CURRENT) USE OF OTHER MEDICATIONS: ICD-10-CM

## 2024-01-24 LAB
ALBUMIN SERPL BCP-MCNC: 4.1 G/DL (ref 3.5–5)
ALP SERPL-CCNC: 53 U/L (ref 34–104)
ALT SERPL W P-5'-P-CCNC: 12 U/L (ref 7–52)
ANION GAP SERPL CALCULATED.3IONS-SCNC: 6 MMOL/L
AST SERPL W P-5'-P-CCNC: 15 U/L (ref 13–39)
BASOPHILS # BLD AUTO: 0.04 THOUSANDS/ÂΜL (ref 0–0.1)
BASOPHILS NFR BLD AUTO: 1 % (ref 0–1)
BILIRUB SERPL-MCNC: 0.42 MG/DL (ref 0.2–1)
BUN SERPL-MCNC: 17 MG/DL (ref 5–25)
CALCIUM SERPL-MCNC: 9.3 MG/DL (ref 8.4–10.2)
CHLORIDE SERPL-SCNC: 103 MMOL/L (ref 96–108)
CO2 SERPL-SCNC: 30 MMOL/L (ref 21–32)
CREAT SERPL-MCNC: 0.92 MG/DL (ref 0.6–1.3)
EOSINOPHIL # BLD AUTO: 0.06 THOUSAND/ÂΜL (ref 0–0.61)
EOSINOPHIL NFR BLD AUTO: 1 % (ref 0–6)
ERYTHROCYTE [DISTWIDTH] IN BLOOD BY AUTOMATED COUNT: 12.4 % (ref 11.6–15.1)
GFR SERPL CREATININE-BSD FRML MDRD: 61 ML/MIN/1.73SQ M
GLUCOSE P FAST SERPL-MCNC: 99 MG/DL (ref 65–99)
HCT VFR BLD AUTO: 42.4 % (ref 34.8–46.1)
HGB BLD-MCNC: 13.7 G/DL (ref 11.5–15.4)
IMM GRANULOCYTES # BLD AUTO: 0.03 THOUSAND/UL (ref 0–0.2)
IMM GRANULOCYTES NFR BLD AUTO: 1 % (ref 0–2)
LYMPHOCYTES # BLD AUTO: 1.33 THOUSANDS/ÂΜL (ref 0.6–4.47)
LYMPHOCYTES NFR BLD AUTO: 26 % (ref 14–44)
MCH RBC QN AUTO: 31.1 PG (ref 26.8–34.3)
MCHC RBC AUTO-ENTMCNC: 32.3 G/DL (ref 31.4–37.4)
MCV RBC AUTO: 96 FL (ref 82–98)
MONOCYTES # BLD AUTO: 0.5 THOUSAND/ÂΜL (ref 0.17–1.22)
MONOCYTES NFR BLD AUTO: 10 % (ref 4–12)
NEUTROPHILS # BLD AUTO: 3.26 THOUSANDS/ÂΜL (ref 1.85–7.62)
NEUTS SEG NFR BLD AUTO: 61 % (ref 43–75)
NRBC BLD AUTO-RTO: 0 /100 WBCS
PLATELET # BLD AUTO: 301 THOUSANDS/UL (ref 149–390)
PMV BLD AUTO: 11.3 FL (ref 8.9–12.7)
POTASSIUM SERPL-SCNC: 4.9 MMOL/L (ref 3.5–5.3)
PROT SERPL-MCNC: 6.6 G/DL (ref 6.4–8.4)
RBC # BLD AUTO: 4.41 MILLION/UL (ref 3.81–5.12)
SODIUM SERPL-SCNC: 139 MMOL/L (ref 135–147)
VALPROATE SERPL-MCNC: 66 UG/ML (ref 50–100)
WBC # BLD AUTO: 5.22 THOUSAND/UL (ref 4.31–10.16)

## 2024-01-24 PROCEDURE — 80053 COMPREHEN METABOLIC PANEL: CPT

## 2024-01-24 PROCEDURE — 36415 COLL VENOUS BLD VENIPUNCTURE: CPT

## 2024-01-24 PROCEDURE — 85025 COMPLETE CBC W/AUTO DIFF WBC: CPT

## 2024-01-24 PROCEDURE — 80164 ASSAY DIPROPYLACETIC ACD TOT: CPT

## 2024-01-30 ENCOUNTER — OFFICE VISIT (OUTPATIENT)
Dept: INTERNAL MEDICINE CLINIC | Facility: CLINIC | Age: 74
End: 2024-01-30
Payer: MEDICARE

## 2024-01-30 VITALS
HEIGHT: 64 IN | TEMPERATURE: 98.2 F | OXYGEN SATURATION: 98 % | DIASTOLIC BLOOD PRESSURE: 78 MMHG | HEART RATE: 57 BPM | RESPIRATION RATE: 16 BRPM | BODY MASS INDEX: 32.1 KG/M2 | SYSTOLIC BLOOD PRESSURE: 132 MMHG | WEIGHT: 188 LBS

## 2024-01-30 DIAGNOSIS — E78.2 MIXED HYPERLIPIDEMIA: ICD-10-CM

## 2024-01-30 DIAGNOSIS — Z00.00 MEDICARE ANNUAL WELLNESS VISIT, SUBSEQUENT: ICD-10-CM

## 2024-01-30 DIAGNOSIS — M53.0 CERVICOCRANIAL SYNDROME: ICD-10-CM

## 2024-01-30 DIAGNOSIS — I10 PRIMARY HYPERTENSION: ICD-10-CM

## 2024-01-30 DIAGNOSIS — F33.42 RECURRENT MAJOR DEPRESSIVE DISORDER, IN FULL REMISSION (HCC): ICD-10-CM

## 2024-01-30 DIAGNOSIS — Z79.01 CURRENT USE OF LONG TERM ANTICOAGULATION: ICD-10-CM

## 2024-01-30 DIAGNOSIS — G47.33 OBSTRUCTIVE SLEEP APNEA SYNDROME: Primary | ICD-10-CM

## 2024-01-30 DIAGNOSIS — I48.0 PAROXYSMAL ATRIAL FIBRILLATION (HCC): ICD-10-CM

## 2024-01-30 PROBLEM — K52.9 COLITIS: Status: RESOLVED | Noted: 2023-08-09 | Resolved: 2024-01-30

## 2024-01-30 PROBLEM — I20.1 CORONARY VASOSPASM (HCC): Status: RESOLVED | Noted: 2020-05-05 | Resolved: 2024-01-30

## 2024-01-30 PROCEDURE — 99214 OFFICE O/P EST MOD 30 MIN: CPT | Performed by: INTERNAL MEDICINE

## 2024-01-30 PROCEDURE — G0439 PPPS, SUBSEQ VISIT: HCPCS | Performed by: INTERNAL MEDICINE

## 2024-01-30 NOTE — PATIENT INSTRUCTIONS
Medicare Preventive Visit Patient Instructions  Thank you for completing your Welcome to Medicare Visit or Medicare Annual Wellness Visit today. Your next wellness visit will be due in one year (1/30/2025).  The screening/preventive services that you may require over the next 5-10 years are detailed below. Some tests may not apply to you based off risk factors and/or age. Screening tests ordered at today's visit but not completed yet may show as past due. Also, please note that scanned in results may not display below.  Preventive Screenings:  Service Recommendations Previous Testing/Comments   Colorectal Cancer Screening  * Colonoscopy    * Fecal Occult Blood Test (FOBT)/Fecal Immunochemical Test (FIT)  * Fecal DNA/Cologuard Test  * Flexible Sigmoidoscopy Age: 45-75 years old   Colonoscopy: every 10 years (may be performed more frequently if at higher risk)  OR  FOBT/FIT: every 1 year  OR  Cologuard: every 3 years  OR  Sigmoidoscopy: every 5 years  Screening may be recommended earlier than age 45 if at higher risk for colorectal cancer. Also, an individualized decision between you and your healthcare provider will decide whether screening between the ages of 76-85 would be appropriate. Colonoscopy: 09/01/2023  FOBT/FIT: 09/01/2023  Cologuard: 09/01/2023  Sigmoidoscopy: 09/01/2023    Screening Current     Breast Cancer Screening Age: 40+ years old  Frequency: every 1-2 years  Not required if history of left and right mastectomy Mammogram: 03/31/2023    Screening Current   Cervical Cancer Screening Between the ages of 21-29, pap smear recommended once every 3 years.   Between the ages of 30-65, can perform pap smear with HPV co-testing every 5 years.   Recommendations may differ for women with a history of total hysterectomy, cervical cancer, or abnormal pap smears in past. Pap Smear: 03/22/2021    Screening Not Indicated   Hepatitis C Screening Once for adults born between 1945 and 1965  More frequently in patients  at high risk for Hepatitis C Hep C Antibody: 11/11/2018    Screening Current   Diabetes Screening 1-2 times per year if you're at risk for diabetes or have pre-diabetes Fasting glucose: 99 mg/dL (1/24/2024)  A1C: 5.9 % (8/11/2022)  Screening Current   Cholesterol Screening Once every 5 years if you don't have a lipid disorder. May order more often based on risk factors. Lipid panel: 03/03/2023    Screening Not Indicated  History Lipid Disorder     Other Preventive Screenings Covered by Medicare:  Abdominal Aortic Aneurysm (AAA) Screening: covered once if your at risk. You're considered to be at risk if you have a family history of AAA.  Lung Cancer Screening: covers low dose CT scan once per year if you meet all of the following conditions: (1) Age 55-77; (2) No signs or symptoms of lung cancer; (3) Current smoker or have quit smoking within the last 15 years; (4) You have a tobacco smoking history of at least 20 pack years (packs per day multiplied by number of years you smoked); (5) You get a written order from a healthcare provider.  Glaucoma Screening: covered annually if you're considered high risk: (1) You have diabetes OR (2) Family history of glaucoma OR (3)  aged 50 and older OR (4)  American aged 65 and older  Osteoporosis Screening: covered every 2 years if you meet one of the following conditions: (1) You're estrogen deficient and at risk for osteoporosis based off medical history and other findings; (2) Have a vertebral abnormality; (3) On glucocorticoid therapy for more than 3 months; (4) Have primary hyperparathyroidism; (5) On osteoporosis medications and need to assess response to drug therapy.   Last bone density test (DXA Scan): 09/14/2020.  HIV Screening: covered annually if you're between the age of 15-65. Also covered annually if you are younger than 15 and older than 65 with risk factors for HIV infection. For pregnant patients, it is covered up to 3 times per  pregnancy.    Immunizations:  Immunization Recommendations   Influenza Vaccine Annual influenza vaccination during flu season is recommended for all persons aged >= 6 months who do not have contraindications   Pneumococcal Vaccine   * Pneumococcal conjugate vaccine = PCV13 (Prevnar 13), PCV15 (Vaxneuvance), PCV20 (Prevnar 20)  * Pneumococcal polysaccharide vaccine = PPSV23 (Pneumovax) Adults 19-65 yo with certain risk factors or if 65+ yo  If never received any pneumonia vaccine: recommend Prevnar 20 (PCV20)  Give PCV20 if previously received 1 dose of PCV13 or PPSV23   Hepatitis B Vaccine 3 dose series if at intermediate or high risk (ex: diabetes, end stage renal disease, liver disease)   Respiratory syncytial virus (RSV) Vaccine - COVERED BY MEDICARE PART D  * RSVPreF3 (Arexvy) CDC recommends that adults 60 years of age and older may receive a single dose of RSV vaccine using shared clinical decision-making (SCDM)   Tetanus (Td) Vaccine - COST NOT COVERED BY MEDICARE PART B Following completion of primary series, a booster dose should be given every 10 years to maintain immunity against tetanus. Td may also be given as tetanus wound prophylaxis.   Tdap Vaccine - COST NOT COVERED BY MEDICARE PART B Recommended at least once for all adults. For pregnant patients, recommended with each pregnancy.   Shingles Vaccine (Shingrix) - COST NOT COVERED BY MEDICARE PART B  2 shot series recommended in those 19 years and older who have or will have weakened immune systems or those 50 years and older     Health Maintenance Due:      Topic Date Due   • Breast Cancer Screening: Mammogram  03/31/2025   • Hepatitis C Screening  Completed   • Colorectal Cancer Screening  Discontinued     Immunizations Due:      Topic Date Due   • COVID-19 Vaccine (7 - 2023-24 season) 12/18/2023     Advance Directives   What are advance directives?  Advance directives are legal documents that state your wishes and plans for medical care. These plans  are made ahead of time in case you lose your ability to make decisions for yourself. Advance directives can apply to any medical decision, such as the treatments you want, and if you want to donate organs.   What are the types of advance directives?  There are many types of advance directives, and each state has rules about how to use them. You may choose a combination of any of the following:  Living will:  This is a written record of the treatment you want. You can also choose which treatments you do not want, which to limit, and which to stop at a certain time. This includes surgery, medicine, IV fluid, and tube feedings.   Durable power of  for healthcare (DPAHC):  This is a written record that states who you want to make healthcare choices for you when you are unable to make them for yourself. This person, called a proxy, is usually a family member or a friend. You may choose more than 1 proxy.  Do not resuscitate (DNR) order:  A DNR order is used in case your heart stops beating or you stop breathing. It is a request not to have certain forms of treatment, such as CPR. A DNR order may be included in other types of advance directives.  Medical directive:  This covers the care that you want if you are in a coma, near death, or unable to make decisions for yourself. You can list the treatments you want for each condition. Treatment may include pain medicine, surgery, blood transfusions, dialysis, IV or tube feedings, and a ventilator (breathing machine).  Values history:  This document has questions about your views, beliefs, and how you feel and think about life. This information can help others choose the care that you would choose.  Why are advance directives important?  An advance directive helps you control your care. Although spoken wishes may be used, it is better to have your wishes written down. Spoken wishes can be misunderstood, or not followed. Treatments may be given even if you do not want  them. An advance directive may make it easier for your family to make difficult choices about your care.   Weight Management   Why it is important to manage your weight:  Being overweight increases your risk of health conditions such as heart disease, high blood pressure, type 2 diabetes, and certain types of cancer. It can also increase your risk for osteoarthritis, sleep apnea, and other respiratory problems. Aim for a slow, steady weight loss. Even a small amount of weight loss can lower your risk of health problems.  How to lose weight safely:  A safe and healthy way to lose weight is to eat fewer calories and get regular exercise. You can lose up about 1 pound a week by decreasing the number of calories you eat by 500 calories each day.   Healthy meal plan for weight management:  A healthy meal plan includes a variety of foods, contains fewer calories, and helps you stay healthy. A healthy meal plan includes the following:  Eat whole-grain foods more often.  A healthy meal plan should contain fiber. Fiber is the part of grains, fruits, and vegetables that is not broken down by your body. Whole-grain foods are healthy and provide extra fiber in your diet. Some examples of whole-grain foods are whole-wheat breads and pastas, oatmeal, brown rice, and bulgur.  Eat a variety of vegetables every day.  Include dark, leafy greens such as spinach, kale, lor greens, and mustard greens. Eat yellow and orange vegetables such as carrots, sweet potatoes, and winter squash.   Eat a variety of fruits every day.  Choose fresh or canned fruit (canned in its own juice or light syrup) instead of juice. Fruit juice has very little or no fiber.  Eat low-fat dairy foods.  Drink fat-free (skim) milk or 1% milk. Eat fat-free yogurt and low-fat cottage cheese. Try low-fat cheeses such as mozzarella and other reduced-fat cheeses.  Choose meat and other protein foods that are low in fat.  Choose beans or other legumes such as split  peas or lentils. Choose fish, skinless poultry (chicken or turkey), or lean cuts of red meat (beef or pork). Before you cook meat or poultry, cut off any visible fat.   Use less fat and oil.  Try baking foods instead of frying them. Add less fat, such as margarine, sour cream, regular salad dressing and mayonnaise to foods. Eat fewer high-fat foods. Some examples of high-fat foods include french fries, doughnuts, ice cream, and cakes.  Eat fewer sweets.  Limit foods and drinks that are high in sugar. This includes candy, cookies, regular soda, and sweetened drinks.  Exercise:  Exercise at least 30 minutes per day on most days of the week. Some examples of exercise include walking, biking, dancing, and swimming. You can also fit in more physical activity by taking the stairs instead of the elevator or parking farther away from stores. Ask your healthcare provider about the best exercise plan for you.      © Copyright [employer] 2018 Information is for End User's use only and may not be sold, redistributed or otherwise used for commercial purposes. All illustrations and images included in CareNotes® are the copyrighted property of [employer], [employer] or [employer]      Medicare Preventive Visit Patient Instructions  Thank you for completing your Welcome to Medicare Visit or Medicare Annual Wellness Visit today. Your next wellness visit will be due in one year (1/30/2025).  The screening/preventive services that you may require over the next 5-10 years are detailed below. Some tests may not apply to you based off risk factors and/or age. Screening tests ordered at today's visit but not completed yet may show as past due. Also, please note that scanned in results may not display below.  Preventive Screenings:  Service Recommendations Previous Testing/Comments   Colorectal Cancer Screening  * Colonoscopy    * Fecal Occult Blood Test (FOBT)/Fecal Immunochemical Test (FIT)  * Fecal DNA/Cologuard Test  * Flexible  Sigmoidoscopy Age: 45-75 years old   Colonoscopy: every 10 years (may be performed more frequently if at higher risk)  OR  FOBT/FIT: every 1 year  OR  Cologuard: every 3 years  OR  Sigmoidoscopy: every 5 years  Screening may be recommended earlier than age 45 if at higher risk for colorectal cancer. Also, an individualized decision between you and your healthcare provider will decide whether screening between the ages of 76-85 would be appropriate. Colonoscopy: 09/01/2023  FOBT/FIT: 09/01/2023  Cologuard: 09/01/2023  Sigmoidoscopy: 09/01/2023    Screening Current     Breast Cancer Screening Age: 40+ years old  Frequency: every 1-2 years  Not required if history of left and right mastectomy Mammogram: 03/31/2023    Screening Current   Cervical Cancer Screening Between the ages of 21-29, pap smear recommended once every 3 years.   Between the ages of 30-65, can perform pap smear with HPV co-testing every 5 years.   Recommendations may differ for women with a history of total hysterectomy, cervical cancer, or abnormal pap smears in past. Pap Smear: 03/22/2021    Screening Not Indicated   Hepatitis C Screening Once for adults born between 1945 and 1965  More frequently in patients at high risk for Hepatitis C Hep C Antibody: 11/11/2018    Screening Current   Diabetes Screening 1-2 times per year if you're at risk for diabetes or have pre-diabetes Fasting glucose: 99 mg/dL (1/24/2024)  A1C: 5.9 % (8/11/2022)  Screening Current   Cholesterol Screening Once every 5 years if you don't have a lipid disorder. May order more often based on risk factors. Lipid panel: 03/03/2023    Screening Not Indicated  History Lipid Disorder     Other Preventive Screenings Covered by Medicare:  Abdominal Aortic Aneurysm (AAA) Screening: covered once if your at risk. You're considered to be at risk if you have a family history of AAA.  Lung Cancer Screening: covers low dose CT scan once per year if you meet all of the following conditions:  (1) Age 55-77; (2) No signs or symptoms of lung cancer; (3) Current smoker or have quit smoking within the last 15 years; (4) You have a tobacco smoking history of at least 20 pack years (packs per day multiplied by number of years you smoked); (5) You get a written order from a healthcare provider.  Glaucoma Screening: covered annually if you're considered high risk: (1) You have diabetes OR (2) Family history of glaucoma OR (3)  aged 50 and older OR (4)  American aged 65 and older  Osteoporosis Screening: covered every 2 years if you meet one of the following conditions: (1) You're estrogen deficient and at risk for osteoporosis based off medical history and other findings; (2) Have a vertebral abnormality; (3) On glucocorticoid therapy for more than 3 months; (4) Have primary hyperparathyroidism; (5) On osteoporosis medications and need to assess response to drug therapy.   Last bone density test (DXA Scan): 09/14/2020.  HIV Screening: covered annually if you're between the age of 15-65. Also covered annually if you are younger than 15 and older than 65 with risk factors for HIV infection. For pregnant patients, it is covered up to 3 times per pregnancy.    Immunizations:  Immunization Recommendations   Influenza Vaccine Annual influenza vaccination during flu season is recommended for all persons aged >= 6 months who do not have contraindications   Pneumococcal Vaccine   * Pneumococcal conjugate vaccine = PCV13 (Prevnar 13), PCV15 (Vaxneuvance), PCV20 (Prevnar 20)  * Pneumococcal polysaccharide vaccine = PPSV23 (Pneumovax) Adults 19-63 yo with certain risk factors or if 65+ yo  If never received any pneumonia vaccine: recommend Prevnar 20 (PCV20)  Give PCV20 if previously received 1 dose of PCV13 or PPSV23   Hepatitis B Vaccine 3 dose series if at intermediate or high risk (ex: diabetes, end stage renal disease, liver disease)   Respiratory syncytial virus (RSV) Vaccine - COVERED BY  MEDICARE PART D  * RSVPreF3 (Arexvy) CDC recommends that adults 60 years of age and older may receive a single dose of RSV vaccine using shared clinical decision-making (SCDM)   Tetanus (Td) Vaccine - COST NOT COVERED BY MEDICARE PART B Following completion of primary series, a booster dose should be given every 10 years to maintain immunity against tetanus. Td may also be given as tetanus wound prophylaxis.   Tdap Vaccine - COST NOT COVERED BY MEDICARE PART B Recommended at least once for all adults. For pregnant patients, recommended with each pregnancy.   Shingles Vaccine (Shingrix) - COST NOT COVERED BY MEDICARE PART B  2 shot series recommended in those 19 years and older who have or will have weakened immune systems or those 50 years and older     Health Maintenance Due:      Topic Date Due   • Breast Cancer Screening: Mammogram  03/31/2025   • Hepatitis C Screening  Completed   • Colorectal Cancer Screening  Discontinued     Immunizations Due:      Topic Date Due   • COVID-19 Vaccine (7 - 2023-24 season) 12/18/2023     Advance Directives   What are advance directives?  Advance directives are legal documents that state your wishes and plans for medical care. These plans are made ahead of time in case you lose your ability to make decisions for yourself. Advance directives can apply to any medical decision, such as the treatments you want, and if you want to donate organs.   What are the types of advance directives?  There are many types of advance directives, and each state has rules about how to use them. You may choose a combination of any of the following:  Living will:  This is a written record of the treatment you want. You can also choose which treatments you do not want, which to limit, and which to stop at a certain time. This includes surgery, medicine, IV fluid, and tube feedings.   Durable power of  for healthcare (DPAHC):  This is a written record that states who you want to make healthcare  choices for you when you are unable to make them for yourself. This person, called a proxy, is usually a family member or a friend. You may choose more than 1 proxy.  Do not resuscitate (DNR) order:  A DNR order is used in case your heart stops beating or you stop breathing. It is a request not to have certain forms of treatment, such as CPR. A DNR order may be included in other types of advance directives.  Medical directive:  This covers the care that you want if you are in a coma, near death, or unable to make decisions for yourself. You can list the treatments you want for each condition. Treatment may include pain medicine, surgery, blood transfusions, dialysis, IV or tube feedings, and a ventilator (breathing machine).  Values history:  This document has questions about your views, beliefs, and how you feel and think about life. This information can help others choose the care that you would choose.  Why are advance directives important?  An advance directive helps you control your care. Although spoken wishes may be used, it is better to have your wishes written down. Spoken wishes can be misunderstood, or not followed. Treatments may be given even if you do not want them. An advance directive may make it easier for your family to make difficult choices about your care.   Weight Management   Why it is important to manage your weight:  Being overweight increases your risk of health conditions such as heart disease, high blood pressure, type 2 diabetes, and certain types of cancer. It can also increase your risk for osteoarthritis, sleep apnea, and other respiratory problems. Aim for a slow, steady weight loss. Even a small amount of weight loss can lower your risk of health problems.  How to lose weight safely:  A safe and healthy way to lose weight is to eat fewer calories and get regular exercise. You can lose up about 1 pound a week by decreasing the number of calories you eat by 500 calories each day.    Healthy meal plan for weight management:  A healthy meal plan includes a variety of foods, contains fewer calories, and helps you stay healthy. A healthy meal plan includes the following:  Eat whole-grain foods more often.  A healthy meal plan should contain fiber. Fiber is the part of grains, fruits, and vegetables that is not broken down by your body. Whole-grain foods are healthy and provide extra fiber in your diet. Some examples of whole-grain foods are whole-wheat breads and pastas, oatmeal, brown rice, and bulgur.  Eat a variety of vegetables every day.  Include dark, leafy greens such as spinach, kale, lor greens, and mustard greens. Eat yellow and orange vegetables such as carrots, sweet potatoes, and winter squash.   Eat a variety of fruits every day.  Choose fresh or canned fruit (canned in its own juice or light syrup) instead of juice. Fruit juice has very little or no fiber.  Eat low-fat dairy foods.  Drink fat-free (skim) milk or 1% milk. Eat fat-free yogurt and low-fat cottage cheese. Try low-fat cheeses such as mozzarella and other reduced-fat cheeses.  Choose meat and other protein foods that are low in fat.  Choose beans or other legumes such as split peas or lentils. Choose fish, skinless poultry (chicken or turkey), or lean cuts of red meat (beef or pork). Before you cook meat or poultry, cut off any visible fat.   Use less fat and oil.  Try baking foods instead of frying them. Add less fat, such as margarine, sour cream, regular salad dressing and mayonnaise to foods. Eat fewer high-fat foods. Some examples of high-fat foods include french fries, doughnuts, ice cream, and cakes.  Eat fewer sweets.  Limit foods and drinks that are high in sugar. This includes candy, cookies, regular soda, and sweetened drinks.  Exercise:  Exercise at least 30 minutes per day on most days of the week. Some examples of exercise include walking, biking, dancing, and swimming. You can also fit in more physical  activity by taking the stairs instead of the elevator or parking farther away from stores. Ask your healthcare provider about the best exercise plan for you.      © Copyright [employer] 2018 Information is for End User's use only and may not be sold, redistributed or otherwise used for commercial purposes. All illustrations and images included in CareNotes® are the copyrighted property of [employer] or [employer]

## 2024-01-30 NOTE — ASSESSMENT & PLAN NOTE
-benign, controlled on current regimen of amlodipine 5mg daily, lisinopril 20mg daily and pindolol 5mg daily

## 2024-01-30 NOTE — ASSESSMENT & PLAN NOTE
-she did not tolerate CPAP and is not a candidate for [facility]  -continue weight loss efforts, sleep with HOB raised

## 2024-01-30 NOTE — ASSESSMENT & PLAN NOTE
-with underlying cervical spondylosis   -completed PT  -continue conservative measures with stretches, heating pad, etc

## 2024-01-30 NOTE — PROGRESS NOTES
Assessment and Plan:     Problem List Items Addressed This Visit     Recurrent major depressive disorder, in full remission (HCC)     -minimal symptoms on fluoxetine 20mg daily         Hyperlipidemia    Relevant Orders    Lipid panel    Comprehensive metabolic panel    Hypertension     -benign, controlled on current regimen of amlodipine 5mg daily, lisinopril 20mg daily and pindolol 5mg daily         Relevant Orders    Comprehensive metabolic panel    TSH, 3rd generation with Free T4 reflex    Cervicocranial syndrome     -with underlying cervical spondylosis   -completed PT  -continue conservative measures with stretches, heating pad, etc         Paroxysmal atrial fibrillation (HCC)     -currently in sinus rhythm on flecainide 100mg twice daily and pindolol 5mg daily         Current use of long term anticoagulation     -on eliquis for stroke ppx secondary to PAF         Obstructive sleep apnea syndrome - Primary     -she did not tolerate CPAP and is not a candidate for INSPIRE  -continue weight loss efforts, sleep with HOB raised        Other Visit Diagnoses     Medicare annual wellness visit, subsequent              Depression Screening and Follow-up Plan: Patient was screened for depression during today's encounter. They screened negative with a PHQ-9 score of 1.      Preventive health issues were discussed with patient, and age appropriate screening tests were ordered as noted in patient's After Visit Summary.  Personalized health advice and appropriate referrals for health education or preventive services given if needed, as noted in patient's After Visit Summary.     History of Present Illness:     Patient presents for a Medicare Wellness Visit    HPI     72yo female with with GERD with hiatal hernia, JORDANA, PAF, seizure d/o, MDD, HTN, chronic migraine, HLD, PVCs, CIC, cervical spondylosis with foraminal stenosis here for follow up care.    Denies bleeding episodes, palpitations, CP.    Continues to have neck  pain, does exercises, stretches, heating pad.    She is not a candidate for Inspire as severity of sleep apnea is minimal.  She did not tolerate CPAP.    She stopped allegra and now is less drowsy but is now having rhinitis.    PHQ-2/9 Depression Screening    Little interest or pleasure in doing things: 0 - not at all  Feeling down, depressed, or hopeless: 1 - several days  Trouble falling or staying asleep, or sleeping too much: 0 - not at all  Feeling tired or having little energy: 0 - not at all  Poor appetite or overeatin - not at all  Feeling bad about yourself - or that you are a failure or have let yourself or your family down: 0 - not at all  Trouble concentrating on things, such as reading the newspaper or watching television: 0 - not at all  Moving or speaking so slowly that other people could have noticed. Or the opposite - being so fidgety or restless that you have been moving around a lot more than usual: 0 - not at all  Thoughts that you would be better off dead, or of hurting yourself in some way: 0 - not at all  PHQ-9 Score: 1  PHQ-9 Interpretation: No or Minimal depression           Patient Care Team:  Carrol Truong DO as PCP - General  DO Bob Andrew MD (Sleep Medicine)  Meryl Walker MD (Neurology)  MD Serene Monaco MD (Family Medicine)  Janny Bailey OD (Optometry)  Alvino Hubbard MD (Gastroenterology)     Review of Systems:     Review of Systems   Constitutional:  Negative for activity change, appetite change and unexpected weight change.   HENT:  Positive for postnasal drip and rhinorrhea.    Respiratory:  Positive for apnea. Negative for cough, shortness of breath and wheezing.    Cardiovascular:  Negative for chest pain, palpitations and leg swelling.   Gastrointestinal:  Negative for abdominal pain, constipation, diarrhea, nausea and vomiting.        Reflux   Genitourinary:  Negative for difficulty urinating.    Musculoskeletal:  Positive for arthralgias, neck pain and neck stiffness.   Neurological:  Positive for headaches. Negative for dizziness.   Hematological:  Does not bruise/bleed easily.   Psychiatric/Behavioral:  Negative for dysphoric mood and sleep disturbance.         Problem List:     Patient Active Problem List   Diagnosis   • Recurrent major depressive disorder, in full remission (HCC)   • Hyperlipidemia   • Hypertension   • Lichen sclerosus   • Lipoma   • Post-menopausal atrophic vaginitis   • Symptomatic menopausal or female climacteric states   • Arthritis   • Cervical spondylosis   • Cervicocranial syndrome   • Chronic migraine without aura without status migrainosus, not intractable   • Complex partial seizures with consciousness impaired (HCC)   • Right hip pain   • Injury of tendon of rotator cuff   • Trochanteric bursitis   • Seizure disorder (HCC)   • Anxiety   • Esophagitis   • Paroxysmal atrial fibrillation (HCC)   • Current use of long term anticoagulation   • 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)   • Simple partial seizure with somatosensory or special sensory dysfunction (HCC)   • Obesity (BMI 30.0-34.9)   • Carpal tunnel syndrome, bilateral   • Decreased hearing   • Obstructive sleep apnea syndrome   • Dyspepsia   • 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, intractable, without status epilepticus (HCC)   • Gastroesophageal reflux disease   • History of hysterectomy   • Nausea   • Hiatal hernia   • Prediabetes      Past Medical and Surgical History:     Past Medical History:   Diagnosis Date   • Acid reflux disease    • Acute bronchitis 09/07/2022   • Allergic    • Allergies    • Anxiety    • Arthralgia    • Arthritis    • Atrial fibrillation (HCC)    • Chronic interstitial cystitis    • Chronic pain disorder     neck/ shldrs   • Colitis    • Colon polyp    • Coronary artery disease afib/ Nov. 2018    • Coronary vasospasm (HCC)    • Depression    • GERD (gastroesophageal reflux disease)    • Headache(784.0)    • Hyperlipidemia    • Hypertension    • Irregular heart beat     afib   • Lichen sclerosus    • Lipoma    • Myalgia 03/16/2016   • Obesity    • Osteopenia    • Paresthesia of both hands 08/06/2019   • Seizures (HCC)    • Shortness of breath     ROJAS   • Simple partial seizures (HCC)     last assessed 3/30/17   • Sleep apnea    • Tachycardia     last assessed 6/24/15   • Upper respiratory tract infection 09/01/2022     Past Surgical History:   Procedure Laterality Date   • ABDOMINOPLASTY     • APPENDECTOMY     • BLEPHAROPLASTY     • COLONOSCOPY  2020    Dr Hubbard, 5 year f/u    • COSMETIC SURGERY     • FACELIFT     • FRACTURE SURGERY     • HYSTERECTOMY     • OOPHORECTOMY     • NV RHYTIDECTOMY NECK W/PLATYSMAL TIGHTENING N/A 12/19/2022    Procedure: MINI FACELIFT;  Surgeon: Kenny Dumont MD;  Location: [facility] OR;  Service: Plastics   • REDUCTION MAMMAPLASTY     • RHINOPLASTY     • SHOULDER SURGERY Left       Family History:     Family History   Problem Relation Age of Onset   • Stroke Mother         CVA   • Hypertension Mother    • Hypertension Father    • Lung cancer Father    • No Known Problems Sister    • Lung cancer Brother         AGE UNKNOWN   • Hypertension Brother    • No Known Problems Maternal Grandmother    • No Known Problems Maternal Grandfather    • Breast cancer Paternal Grandmother    • No Known Problems Paternal Grandfather    • No Known Problems Maternal Aunt    • No Known Problems Maternal Aunt    • No Known Problems Maternal Aunt    • No Known Problems Paternal Aunt    • No Known Problems Paternal Aunt    • No Known Problems Paternal Aunt       Social History:     Social History     Socioeconomic History   • Marital status: /Civil Union     Spouse name: None   • Number of children: None   • Years of education: None   • Highest education level: None   Occupational History   •  Occupation: retired school superintendant   Tobacco Use   • Smoking status: Never   • Smokeless tobacco: Never   Vaping Use   • Vaping status: Never Used   Substance and Sexual Activity   • Alcohol use: Yes     Alcohol/week: 2.0 standard drinks of alcohol     Types: 2 Glasses of wine per week     Comment: 1 or 2 glasses per week   • Drug use: No   • Sexual activity: Not Currently     Partners: Male     Birth control/protection: Post-menopausal     Comment: occasionally   Other Topics Concern   • None   Social History Narrative    Exercising regularly     Social Determinants of Health     Financial Resource Strain: Low Risk  (1/29/2024)    Overall Financial Resource Strain (CARDIA)    • Difficulty of Paying Living Expenses: Not hard at all   Food Insecurity: Not on file   Transportation Needs: No Transportation Needs (1/29/2024)    PRAPARE - Transportation    • Lack of Transportation (Medical): No    • Lack of Transportation (Non-Medical): No   Physical Activity: Not on file   Stress: Not on file   Social Connections: Not on file   Intimate Partner Violence: Not on file   Housing Stability: Not on file      Medications and Allergies:     Current Outpatient Medications   Medication Sig Dispense Refill   • Alpha-D-Galactosidase (BEANO PO) Take by mouth     • amLODIPine (NORVASC) 5 mg tablet Take 1 tablet (5 mg total) by mouth daily 90 tablet 3   • apixaban (ELIQUIS) 5 mg 2 (two) times a day     • atorvastatin (LIPITOR) 40 mg tablet Take 1 tablet (40 mg total) by mouth daily 90 tablet 0   • AYR SALINE NASAL NA into each nostril 2 (two) times a day     • [place]wellia-Glucosamine-Vit D (OSTEO BI-FLEX ONE PER DAY PO) Take 1 capsule by mouth in the morning With turmeric     • busPIRone (BUSPAR) 15 mg tablet Take 15 mg by mouth 2 (two) times a day     • calcium citrate-Vitamin D 200 mg-250 units Take 1 tablet by mouth daily with breakfast     • clobetasol (TEMOVATE) 0.05 % ointment PLEASE SEE ATTACHED FOR DETAILED DIRECTIONS      • divalproex sodium (DEPAKOTE) 500 mg EC tablet      • famotidine (PEPCID) 20 mg tablet TAKE 1 TABLET BY MOUTH DAILY AT  BEDTIME 90 tablet 1   • fexofenadine (ALLEGRA) 180 MG tablet      • flecainide (TAMBOCOR) 100 mg tablet Take 100 mg by mouth 2 (two) times a day     • FLUoxetine (PROzac) 20 mg capsule TAKE 1 CAPSULE BY MOUTH EVERY DAY IN THE MORNING     • lisinopril (ZESTRIL) 20 mg tablet TAKE 1 TABLET BY MOUTH DAILY 90 tablet 3   • pindolol (VISKEN) 5 mg tablet Take 1 tablet (5 mg total) by mouth daily 90 tablet 3     No current facility-administered medications for this visit.     Allergies   Allergen Reactions   • Codeine Hives   • Erythromycin Base GI Intolerance   • Hydromorphone Itching   • Medical Tape Rash   • Morphine GI Intolerance   • Oxycodone-Acetaminophen Hives   • Penicillins Rash and Hives      Immunizations:     Immunization History   Administered Date(s) Administered   • COVID-19 PFIZER VACCINE 0.3 ML IM 03/05/2021, 03/26/2021, 11/22/2021, 07/15/2022   • COVID-19 Pfizer Vac BIVALENT Carson-sucrose 12 Yr+ IM 10/16/2022   • COVID-19 Pfizer mRNA vacc PF carson-sucrose 12 yr and older (Comirnaty) 10/23/2023   • H1N1, All Formulations 11/10/2009, 11/10/2009   • INFLUENZA 09/20/2017, 09/20/2018, 09/20/2018, 10/01/2019, 09/17/2020, 09/17/2021, 10/06/2021, 11/08/2023   • Influenza Split High Dose Preservative Free IM 09/30/2014, 09/28/2016   • Influenza, high dose seasonal 0.7 mL 10/01/2019, 10/16/2022   • Influenza, seasonal, injectable 09/19/2012, 09/25/2013, 10/01/2015, 09/20/2017   • Pneumococcal Conjugate 13-Valent 09/28/2016   • Pneumococcal Polysaccharide PPV23 01/01/2012, 12/13/2021   • Rsv, Unspecified 01/23/2024   • Tdap 09/25/2013, 10/30/2023   • Zoster Vaccine Recombinant 05/03/2021, 05/13/2021, 09/17/2021      Health Maintenance:         Topic Date Due   • Breast Cancer Screening: Mammogram  03/31/2025   • Hepatitis C Screening  Completed   • Colorectal Cancer Screening  Discontinued          Topic Date Due   • COVID-19 Vaccine (7 - 2023-24 season) 12/18/2023      Medicare Screening Tests and Risk Assessments:     Bobby is here for her Subsequent Wellness visit. Last Medicare Wellness visit information reviewed, patient interviewed and updates made to the record today.      Health Risk Assessment:   Patient rates overall health as good. Patient feels that their physical health rating is same. Patient is satisfied with their life. Eyesight was rated as same. Hearing was rated as same. Patient feels that their emotional and mental health rating is same. Patients states they are never, rarely angry. Patient states they are often unusually tired/fatigued. Pain experienced in the last 7 days has been some. Patient's pain rating has been 3/10. Patient states that she has experienced no weight loss or gain in last 6 months. Neck and other joints    Depression Screening:   PHQ-9 Score: 1      Fall Risk Screening:   In the past year, patient has experienced: no history of falling in past year      Urinary Incontinence Screening:   Patient has not leaked urine accidently in the last six months.     Home Safety:  Patient does not have trouble with stairs inside or outside of their home. Patient has working smoke alarms and has working carbon monoxide detector. Home safety hazards include: none.     Nutrition:   Current diet is Regular and No Added Salt.     Medications:   Patient is currently taking over-the-counter supplements. OTC medications include: see medication list. Patient is able to manage medications.     Activities of Daily Living (ADLs)/Instrumental Activities of Daily Living (IADLs):   Walk and transfer into and out of bed and chair?: Yes  Dress and groom yourself?: Yes    Bathe or shower yourself?: Yes    Feed yourself? Yes  Do your laundry/housekeeping?: Yes  Manage your money, pay your bills and track your expenses?: Yes  Make your own meals?: Yes    Do your own shopping?: Yes    Durable  Medical Equipment Suppliers  pt does not know    Previous Hospitalizations:   Any hospitalizations or ED visits within the last 12 months?: No      Advance Care Planning:   Living will: Yes    Durable POA for healthcare: Yes    Advanced directive: Yes      Comments: Advanced care directives are in her chart.    Cognitive Screening:   Provider or family/friend/caregiver concerned regarding cognition?: No    PREVENTIVE SCREENINGS      Cardiovascular Screening:    General: Screening Not Indicated and History Lipid Disorder      Diabetes Screening:     General: Screening Current      Colorectal Cancer Screening:     General: Screening Current      Breast Cancer Screening:     General: Screening Current      Cervical Cancer Screening:    General: Screening Not Indicated      Osteoporosis Screening:    General: Screening Current      Abdominal Aortic Aneurysm (AAA) Screening:        General: Screening Not Indicated      Lung Cancer Screening:     General: Screening Not Indicated      Hepatitis C Screening:    General: Screening Current    Screening, Brief Intervention, and Referral to Treatment (SBIRT)    Screening  Typical number of drinks in a day: 0  Typical number of drinks in a week: 1  Interpretation: Low risk drinking behavior.    Single Item Drug Screening:  How often have you used an illegal drug (including marijuana) or a prescription medication for non-medical reasons in the past year? never    Single Item Drug Screen Score: 0  Interpretation: Negative screen for possible drug use disorder    Brief Intervention  Alcohol & drug use screenings were reviewed. No concerns regarding substance use disorder identified.     Time Spent  Time spent providing alcohol/substance abuse assessment and intervention services: 1 minutes.    Other Counseling Topics:   Car/seat belt/driving safety, skin self-exam, sunscreen and regular weightbearing exercise and calcium and vitamin D intake. Immunizations discussed.  She is up to  "date.    No results found.     Physical Exam:     /78 (BP Location: Left arm, Patient Position: Sitting, Cuff Size: Large)   Pulse 57   Temp 98.2 °F (36.8 °C) (Tympanic Core)   Resp 16   Ht 5' 4\" (1.626 m)   Wt 85.3 kg (188 lb)   SpO2 98%   BMI 32.27 kg/m²     Physical Exam  Vitals reviewed.   Constitutional:       General: She is not in acute distress.  HENT:      Head: Normocephalic.      Right Ear: Tympanic membrane and ear canal normal.      Left Ear: Tympanic membrane and ear canal normal.      Nose: Nose normal.      Mouth/Throat:      Mouth: Mucous membranes are moist.   Eyes:      Extraocular Movements: Extraocular movements intact.      Conjunctiva/sclera: Conjunctivae normal.      Pupils: Pupils are equal, round, and reactive to light.   Cardiovascular:      Rate and Rhythm: Normal rate and regular rhythm.      Pulses: Normal pulses.      Heart sounds: Normal heart sounds.   Pulmonary:      Effort: Pulmonary effort is normal. No respiratory distress.      Breath sounds: Normal breath sounds. No wheezing.   Abdominal:      General: Bowel sounds are normal. There is no distension.      Palpations: Abdomen is soft.      Tenderness: There is no abdominal tenderness.   Musculoskeletal:      Cervical back: Neck supple. No tenderness. Decreased range of motion.      Right lower leg: No edema.      Left lower leg: No edema.   Lymphadenopathy:      Cervical: No cervical adenopathy.   Skin:     Coloration: Skin is not pale.   Neurological:      General: No focal deficit present.      Mental Status: She is alert and oriented to person, place, and time.   Psychiatric:         Mood and Affect: Mood normal.          Recent Results (from the past 336 hour(s))   CBC and differential    Collection Time: 01/24/24  9:21 AM   Result Value Ref Range    WBC 5.22 4.31 - 10.16 Thousand/uL    RBC 4.41 3.81 - 5.12 Million/uL    Hemoglobin 13.7 11.5 - 15.4 g/dL    Hematocrit 42.4 34.8 - 46.1 %    MCV 96 82 - 98 fL    " MCH 31.1 26.8 - 34.3 pg    MCHC 32.3 31.4 - 37.4 g/dL    RDW 12.4 11.6 - 15.1 %    MPV 11.3 8.9 - 12.7 fL    Platelets 301 149 - 390 Thousands/uL    nRBC 0 /100 WBCs    Neutrophils Relative 61 43 - 75 %    Immat GRANS % 1 0 - 2 %    Lymphocytes Relative 26 14 - 44 %    Monocytes Relative 10 4 - 12 %    Eosinophils Relative 1 0 - 6 %    Basophils Relative 1 0 - 1 %    Neutrophils Absolute 3.26 1.85 - 7.62 Thousands/µL    Immature Grans Absolute 0.03 0.00 - 0.20 Thousand/uL    Lymphocytes Absolute 1.33 0.60 - 4.47 Thousands/µL    Monocytes Absolute 0.50 0.17 - 1.22 Thousand/µL    Eosinophils Absolute 0.06 0.00 - 0.61 Thousand/µL    Basophils Absolute 0.04 0.00 - 0.10 Thousands/µL   Comprehensive metabolic panel    Collection Time: 01/24/24  9:21 AM   Result Value Ref Range    Sodium 139 135 - 147 mmol/L    Potassium 4.9 3.5 - 5.3 mmol/L    Chloride 103 96 - 108 mmol/L    CO2 30 21 - 32 mmol/L    ANION GAP 6 mmol/L    BUN 17 5 - 25 mg/dL    Creatinine 0.92 0.60 - 1.30 mg/dL    Glucose, Fasting 99 65 - 99 mg/dL    Calcium 9.3 8.4 - 10.2 mg/dL    AST 15 13 - 39 U/L    ALT 12 7 - 52 U/L    Alkaline Phosphatase 53 34 - 104 U/L    Total Protein 6.6 6.4 - 8.4 g/dL    Albumin 4.1 3.5 - 5.0 g/dL    Total Bilirubin 0.42 0.20 - 1.00 mg/dL    eGFR 61 ml/min/1.73sq m   Valproic acid level, total    Collection Time: 01/24/24  9:21 AM   Result Value Ref Range    Valproic Acid, Total 66 50 - 100 ug/mL       Carrol Truong DO

## 2024-02-10 DIAGNOSIS — K21.9 GASTROESOPHAGEAL REFLUX DISEASE, UNSPECIFIED WHETHER ESOPHAGITIS PRESENT: ICD-10-CM

## 2024-02-12 RX ORDER — FAMOTIDINE 20 MG/1
20 TABLET, FILM COATED ORAL
Qty: 90 TABLET | Refills: 1 | Status: SHIPPED | OUTPATIENT
Start: 2024-02-12

## 2024-02-27 ENCOUNTER — TELEPHONE (OUTPATIENT)
Dept: INTERNAL MEDICINE CLINIC | Facility: CLINIC | Age: 74
End: 2024-02-27

## 2024-02-27 NOTE — TELEPHONE ENCOUNTER
Pt called asking if you can proscribe something for her diarrhea<Pt is currently using  Pepto bismol but is not helping her .    Thanks

## 2024-04-04 ENCOUNTER — OFFICE VISIT (OUTPATIENT)
Dept: BARIATRICS | Facility: CLINIC | Age: 74
End: 2024-04-04
Payer: MEDICARE

## 2024-04-04 ENCOUNTER — TELEPHONE (OUTPATIENT)
Dept: BARIATRICS | Facility: CLINIC | Age: 74
End: 2024-04-04

## 2024-04-04 VITALS
WEIGHT: 187.6 LBS | TEMPERATURE: 98.1 F | DIASTOLIC BLOOD PRESSURE: 65 MMHG | HEART RATE: 62 BPM | HEIGHT: 63 IN | SYSTOLIC BLOOD PRESSURE: 125 MMHG | BODY MASS INDEX: 33.24 KG/M2 | RESPIRATION RATE: 17 BRPM

## 2024-04-04 DIAGNOSIS — R73.03 PREDIABETES: ICD-10-CM

## 2024-04-04 DIAGNOSIS — E66.9 OBESITY (BMI 30.0-34.9): Primary | ICD-10-CM

## 2024-04-04 PROCEDURE — G2211 COMPLEX E/M VISIT ADD ON: HCPCS | Performed by: INTERNAL MEDICINE

## 2024-04-04 PROCEDURE — 99214 OFFICE O/P EST MOD 30 MIN: CPT | Performed by: INTERNAL MEDICINE

## 2024-04-04 RX ORDER — METFORMIN HYDROCHLORIDE 500 MG/1
500 TABLET, EXTENDED RELEASE ORAL 2 TIMES DAILY WITH MEALS
Qty: 360 TABLET | Refills: 0 | Status: SHIPPED | OUTPATIENT
Start: 2024-04-04

## 2024-04-04 RX ORDER — FLUOXETINE 10 MG/1
CAPSULE ORAL
COMMUNITY

## 2024-04-04 RX ORDER — DIVALPROEX SODIUM 500 MG/1
TABLET, DELAYED RELEASE ORAL
COMMUNITY

## 2024-04-04 RX ORDER — PINDOLOL 5 MG/1
TABLET ORAL
COMMUNITY

## 2024-04-04 NOTE — PATIENT INSTRUCTIONS
1. Titrate metformin up to 1000mg twice a day as written below:  Take 1 tablet of metformin each day for 14 days.   Then take 1 tablet twice a day for 14 days  Then take 1 tablet in the morning and 2 tablets in the evening for 14 days.  Then take 2 tablets twice a day.    2.  Discuss topiramate with your neurologist.     3.  Drink at least 64 oz or water a day. Drinking warm warm or tea may be more satiating if you feel hungry or get a craving.    4.  Continue with regular exercise.

## 2024-04-04 NOTE — PROGRESS NOTES
Assessment/Plan:  Doris was seen today for follow-up.    Diagnoses and all orders for this visit:    Obesity (BMI 30.0-34.9)  -     metFORMIN (GLUCOPHAGE-XR) 500 mg 24 hr tablet; Take 1 tablet (500 mg total) by mouth 2 (two) times a day with meals    Prediabetes  -     metFORMIN (GLUCOPHAGE-XR) 500 mg 24 hr tablet; Take 1 tablet (500 mg total) by mouth 2 (two) times a day with meals       No problem-specific Assessment & Plan notes found for this encounter.     1. Titrate metformin up to 1000mg twice a day as written below:  Take 1 tablet of metformin each day for 14 days.   Then take 1 tablet twice a day for 14 days  Then take 1 tablet in the morning and 2 tablets in the evening for 14 days.  Then take 2 tablets twice a day.    2.  Discuss topiramate with your neurologist.     3.  Drink at least 64 oz or water a day. Drinking warm warm or tea may be more satiating if you feel hungry or get a craving.    4.  Continue with regular exercise.    Total time spent reviewing chart, interviewing patient, examining patient, discussing plan, answering all questions, and documentin min.       ______________________________________________________________________      Subjective:   Chief Complaint   Patient presents with    Follow-up     MW-3M F/u       HPI: Bobby Diaz  is a 73 y.o. female with history of GERD, hypertension, JORDANA, mixed hyperlipidemia, migraine headaches, and excess weight, here to pursue weight loss management.  Previous notes and records have been reviewed.  Patient previously completed the healthy core program and is enrolled in the healthy [employer] program.  She last met with Montserrat on 2023.  Healthy core start weight 185 on 2022.  Patient first met with me on 2023 then met with Skylar Johnson on 2023.  When we met in  she was inquiring if any of her medications could be working against her efforts to lose weight.  Discussed potentially switching from Lexapro to fluoxetine or  sertraline.  We also discussed consideration of topiramate to help with night eating or consideration of metformin as she has impaired fasting glucose.    HPI  Wt Readings from Last 20 Encounters:   04/04/24 85.1 kg (187 lb 9.6 oz)   01/30/24 85.3 kg (188 lb)   11/02/23 85.4 kg (188 lb 3.2 oz)   09/22/23 84.9 kg (187 lb 3.2 oz)   09/01/23 83.9 kg (185 lb)   08/16/23 86.2 kg (190 lb)   08/09/23 85.3 kg (188 lb)   07/24/23 86.1 kg (189 lb 14.4 oz)   06/26/23 86.5 kg (190 lb 9.6 oz)   05/22/23 84.4 kg (186 lb)   05/10/23 84.7 kg (186 lb 12.8 oz)   05/04/23 84.6 kg (186 lb 6.4 oz)   04/27/23 85.1 kg (187 lb 9.6 oz)   04/20/23 84.2 kg (185 lb 9.6 oz)   04/05/23 85.1 kg (187 lb 9.6 oz)   03/31/23 84.4 kg (186 lb)   03/16/23 84.6 kg (186 lb 6.4 oz)   02/02/23 83.1 kg (183 lb 3.2 oz)   01/26/23 82.6 kg (182 lb 3.2 oz)   01/24/23 82.6 kg (182 lb)     Hunger/Cravings: Yes. Cravings for peperjack cheese and chocolate.  Hydration: Adequate  Alcohol:  No  Smoking: No  Weight Monitoring: Yes  Sleep:Adequate      Past Medical History:   Diagnosis Date    Acid reflux disease     Acute bronchitis 09/07/2022    Allergic     Allergies     Anxiety     Arthralgia     Arthritis     Atrial fibrillation (HCC)     Chronic interstitial cystitis     Chronic pain disorder     neck/ shldrs    Colitis     Colon polyp     Coronary artery disease afib/ Nov. 2018    Coronary vasospasm (HCC)     Depression     GERD (gastroesophageal reflux disease)     Headache(784.0)     Hyperlipidemia     Hypertension     Irregular heart beat     afib    Lichen sclerosus     Lipoma     Myalgia 03/16/2016    Obesity     Osteopenia     Paresthesia of both hands 08/06/2019    Seizures (HCC)     Shortness of breath     ROJAS    Simple partial seizures (HCC)     last assessed 3/30/17    Sleep apnea     Tachycardia     last assessed 6/24/15    Upper respiratory tract infection 09/01/2022     Past Surgical History:   Procedure Laterality Date    ABDOMINOPLASTY       "APPENDECTOMY      BLEPHAROPLASTY      COLONOSCOPY  2020    Dr Hubbard, 5 year f/u     COSMETIC SURGERY      FACELIFT      FRACTURE SURGERY      HYSTERECTOMY      OOPHORECTOMY      AZ RHYTIDECTOMY NECK W/PLATYSMAL TIGHTENING N/A 12/19/2022    Procedure: MINI FACELIFT;  Surgeon: Kenny Dumont MD;  Location:  MAIN OR;  Service: Plastics    REDUCTION MAMMAPLASTY      RHINOPLASTY      SHOULDER SURGERY Left      The following portions of the patient's history were reviewed and updated as appropriate: allergies, current medications, past family history, past medical history, past social history, past surgical history, and problem list.    Review Of Systems:  Review of Systems    Objective:  /65   Pulse 62   Temp 98.1 °F (36.7 °C)   Resp 17   Ht 5' 3\" (1.6 m)   Wt 85.1 kg (187 lb 9.6 oz)   BMI 33.23 kg/m²   Physical Exam  Vitals and nursing note reviewed.   Constitutional:       General: She is not in acute distress.     Appearance: Normal appearance. She is not ill-appearing or diaphoretic.   Eyes:      General: No scleral icterus.  Cardiovascular:      Rate and Rhythm: Normal rate and regular rhythm.      Pulses: Normal pulses.      Heart sounds: No murmur heard.  Pulmonary:      Effort: Pulmonary effort is normal. No respiratory distress.      Breath sounds: Normal breath sounds. No wheezing or rhonchi.   Abdominal:      General: Bowel sounds are normal. There is no distension.      Palpations: Abdomen is soft. There is no mass.      Tenderness: There is no abdominal tenderness.   Musculoskeletal:      Cervical back: Neck supple.      Right lower leg: No edema.      Left lower leg: No edema.   Lymphadenopathy:      Cervical: No cervical adenopathy.   Skin:     Capillary Refill: Capillary refill takes less than 2 seconds.      Findings: No lesion or rash.   Neurological:      Mental Status: She is alert and oriented to person, place, and time.      Gait: Gait normal.   Psychiatric:         Mood and Affect: " Mood normal.         Behavior: Behavior normal.         Labs and Imaging  Recent labs and imaging have been personally reviewed.  Lab Results   Component Value Date    WBC 5.22 01/24/2024    HGB 13.7 01/24/2024    HCT 42.4 01/24/2024    MCV 96 01/24/2024     01/24/2024     Lab Results   Component Value Date     01/05/2016    SODIUM 139 01/24/2024    K 4.9 01/24/2024     01/24/2024    CO2 30 01/24/2024    ANIONGAP 7 01/05/2016    AGAP 6 01/24/2024    BUN 17 01/24/2024    CREATININE 0.92 01/24/2024    GLUC 93 08/08/2023    GLUF 99 01/24/2024    CALCIUM 9.3 01/24/2024    AST 15 01/24/2024    ALT 12 01/24/2024    ALKPHOS 53 01/24/2024    PROT 6.3 (L) 01/05/2016    TP 6.6 01/24/2024    BILITOT 0.30 01/05/2016    TBILI 0.42 01/24/2024    EGFR 61 01/24/2024     Lab Results   Component Value Date    HGBA1C 5.9 (H) 08/11/2022     Lab Results   Component Value Date    HGE6OPMNRTFK 2.560 09/19/2022     Lab Results   Component Value Date    CHOLESTEROL 165 03/03/2023     Lab Results   Component Value Date    HDL 60 03/03/2023     Lab Results   Component Value Date    TRIG 131 03/03/2023     Lab Results   Component Value Date    LDLCALC 79 03/03/2023

## 2024-04-24 ENCOUNTER — APPOINTMENT (OUTPATIENT)
Dept: LAB | Facility: CLINIC | Age: 74
End: 2024-04-24
Payer: MEDICARE

## 2024-04-24 DIAGNOSIS — E78.2 MIXED HYPERLIPIDEMIA: ICD-10-CM

## 2024-04-24 DIAGNOSIS — I10 PRIMARY HYPERTENSION: ICD-10-CM

## 2024-04-24 DIAGNOSIS — Z79.899 ENCOUNTER FOR LONG-TERM (CURRENT) USE OF OTHER MEDICATIONS: ICD-10-CM

## 2024-04-24 LAB
CHOLEST SERPL-MCNC: 155 MG/DL
HDLC SERPL-MCNC: 49 MG/DL
LDLC SERPL CALC-MCNC: 82 MG/DL (ref 0–100)
NONHDLC SERPL-MCNC: 106 MG/DL
TRIGL SERPL-MCNC: 121 MG/DL
TSH SERPL DL<=0.05 MIU/L-ACNC: 2.35 UIU/ML (ref 0.45–4.5)
VALPROATE SERPL-MCNC: 66 UG/ML (ref 50–100)

## 2024-04-24 PROCEDURE — 80053 COMPREHEN METABOLIC PANEL: CPT

## 2024-04-24 PROCEDURE — 80061 LIPID PANEL: CPT

## 2024-04-24 PROCEDURE — 80164 ASSAY DIPROPYLACETIC ACD TOT: CPT

## 2024-04-24 PROCEDURE — 84443 ASSAY THYROID STIM HORMONE: CPT

## 2024-04-24 PROCEDURE — 36415 COLL VENOUS BLD VENIPUNCTURE: CPT

## 2024-04-25 LAB
ALBUMIN SERPL BCP-MCNC: 3.9 G/DL (ref 3.5–5)
ALP SERPL-CCNC: 48 U/L (ref 34–104)
ALT SERPL W P-5'-P-CCNC: 11 U/L (ref 7–52)
ANION GAP SERPL CALCULATED.3IONS-SCNC: 7 MMOL/L (ref 4–13)
AST SERPL W P-5'-P-CCNC: 11 U/L (ref 13–39)
BILIRUB SERPL-MCNC: 0.34 MG/DL (ref 0.2–1)
BUN SERPL-MCNC: 19 MG/DL (ref 5–25)
CALCIUM SERPL-MCNC: 9.2 MG/DL (ref 8.4–10.2)
CHLORIDE SERPL-SCNC: 104 MMOL/L (ref 96–108)
CO2 SERPL-SCNC: 28 MMOL/L (ref 21–32)
CREAT SERPL-MCNC: 0.73 MG/DL (ref 0.6–1.3)
GFR SERPL CREATININE-BSD FRML MDRD: 80 ML/MIN/1.73SQ M
GLUCOSE P FAST SERPL-MCNC: 83 MG/DL (ref 65–99)
POTASSIUM SERPL-SCNC: 4.9 MMOL/L (ref 3.5–5.3)
PROT SERPL-MCNC: 6.8 G/DL (ref 6.4–8.4)
SODIUM SERPL-SCNC: 139 MMOL/L (ref 135–147)

## 2024-05-16 ENCOUNTER — OFFICE VISIT (OUTPATIENT)
Dept: INTERNAL MEDICINE CLINIC | Facility: CLINIC | Age: 74
End: 2024-05-16
Payer: MEDICARE

## 2024-05-16 VITALS
SYSTOLIC BLOOD PRESSURE: 134 MMHG | HEIGHT: 63 IN | DIASTOLIC BLOOD PRESSURE: 82 MMHG | RESPIRATION RATE: 16 BRPM | TEMPERATURE: 97.9 F | WEIGHT: 187 LBS | HEART RATE: 60 BPM | OXYGEN SATURATION: 97 % | BODY MASS INDEX: 33.13 KG/M2

## 2024-05-16 DIAGNOSIS — F41.9 ANXIETY: ICD-10-CM

## 2024-05-16 DIAGNOSIS — M25.612 DECREASED RANGE OF MOTION OF LEFT SHOULDER: ICD-10-CM

## 2024-05-16 DIAGNOSIS — M25.562 ACUTE PAIN OF LEFT KNEE: Primary | ICD-10-CM

## 2024-05-16 PROCEDURE — 99214 OFFICE O/P EST MOD 30 MIN: CPT | Performed by: INTERNAL MEDICINE

## 2024-05-16 PROCEDURE — G2211 COMPLEX E/M VISIT ADD ON: HCPCS | Performed by: INTERNAL MEDICINE

## 2024-05-16 NOTE — PROGRESS NOTES
Assessment/Plan:    Problem List Items Addressed This Visit     Anxiety     -with acute adjustment disorder  -c/o fatigue but is able to complete her tasks.  4/24 labs reviewed and unremarkable  -admits to increased family stress  -continue fluoxetine        Other Visit Diagnoses     Acute pain of left knee    -  Primary    -lateral aspect  -recommed continue PT with [facility], script provided    Relevant Orders    Ambulatory Referral to Physical Therapy    Decreased range of motion of left shoulder        -would benefit from PT, script provided    Relevant Orders    Ambulatory Referral to Physical Therapy          Subjective:      Patient ID: Bobby Diaz is a 73 y.o. female.    HPI    Complains of extreme fatigue.  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  He is forgetting and not identifying places.  She has more stress in her life.  She is now taking metformin for weight loss and is having loose stools.    Complains of L ankle, L knee and L shoulder and neck pain.  Has R big toe does not flex as much.  [facility]  to [facility] and has become a patient but will need  a referral.  Sometimes in the morning her L knee hurts when she wakes up in the morning.  Has history of fall with L shoulder.    The following portions of the patient's history were reviewed and updated as appropriate: allergies, current medications, past family history, past medical history, past social history, past surgical history and problem list.      Current Outpatient Medications:   •  amLODIPine (NORVASC) 5 mg tablet, Take 1 tablet (5 mg total) by mouth daily, Disp: 90 tablet, Rfl: 3  •  apixaban (ELIQUIS) 5 mg, 2 (two) times a day, Disp: , Rfl:   •  atorvastatin (LIPITOR) 40 mg tablet, Take 1 tablet (40 mg total) by mouth daily, Disp: 90 tablet, Rfl: 0  •  AYR SALINE NASAL NA, into each nostril 2 (two) times a day, Disp: , Rfl:   •  Boswellia-Glucosamine-Vit D (OSTEO BI-FLEX ONE PER DAY  "PO), Take 1 capsule by mouth in the morning With turmeric, Disp: , Rfl:   •  busPIRone (BUSPAR) 15 mg tablet, Take 15 mg by mouth 2 (two) times a day, Disp: , Rfl:   •  calcium citrate-Vitamin D 200 mg-250 units, Take 1 tablet by mouth daily with breakfast, Disp: , Rfl:   •  divalproex sodium (Depakote) 500 mg DR tablet, , Disp: , Rfl:   •  divalproex sodium (DEPAKOTE) 500 mg EC tablet, , Disp: , Rfl:   •  famotidine (PEPCID) 20 mg tablet, TAKE 1 TABLET BY MOUTH DAILY AT  BEDTIME, Disp: 90 tablet, Rfl: 1  •  fexofenadine (ALLEGRA) 180 MG tablet, , Disp: , Rfl:   •  flecainide (TAMBOCOR) 100 mg tablet, Take 100 mg by mouth 2 (two) times a day, Disp: , Rfl:   •  FLUoxetine (PROzac) 10 mg capsule, , Disp: , Rfl:   •  FLUoxetine (PROzac) 20 mg capsule, TAKE 1 CAPSULE BY MOUTH EVERY DAY IN THE MORNING, Disp: , Rfl:   •  lisinopril (ZESTRIL) 20 mg tablet, TAKE 1 TABLET BY MOUTH DAILY, Disp: 90 tablet, Rfl: 3  •  metFORMIN (GLUCOPHAGE-XR) 500 mg 24 hr tablet, Take 1 tablet (500 mg total) by mouth 2 (two) times a day with meals, Disp: 360 tablet, Rfl: 0  •  pindolol (VISKEN) 5 mg tablet, Take 1 tablet (5 mg total) by mouth daily, Disp: 90 tablet, Rfl: 3  •  pindolol (VISKEN) 5 mg tablet, , Disp: , Rfl:   •  Alpha-D-Galactosidase (BEANO PO), Take by mouth, Disp: , Rfl:   •  Lisinopril 1 MG/ML SOLN, , Disp: , Rfl:     Review of Systems   Constitutional:  Positive for fatigue.   Musculoskeletal:  Positive for arthralgias, joint swelling, myalgias, neck pain and neck stiffness.   Neurological:  Negative for numbness.   Psychiatric/Behavioral:  Negative for sleep disturbance. The patient is nervous/anxious.          Objective:    /82 (BP Location: Left arm, Patient Position: Sitting, Cuff Size: Large)   Pulse 60   Temp 97.9 °F (36.6 °C) (Tympanic Core)   Resp 16   Ht 5' 3\" (1.6 m)   Wt 84.8 kg (187 lb)   SpO2 97%   BMI 33.13 kg/m²      Physical Exam  Vitals reviewed.   Musculoskeletal:      Right shoulder: Normal " strength. Normal pulse.      Left shoulder: Decreased range of motion (decreased external rotation and hyperabduction). Normal strength. Normal pulse.      Right knee: Normal pulse.      Left knee: Swelling present. Decreased range of motion. Tenderness present over the lateral joint line. Normal pulse.      Right lower leg: No edema.      Left lower leg: No edema.      Right ankle: No swelling. Normal pulse.      Left ankle: No swelling. Normal pulse.   Neurological:      Mental Status: She is alert and oriented to person, place, and time.   Psychiatric:         Mood and Affect: Mood normal.

## 2024-05-17 NOTE — ASSESSMENT & PLAN NOTE
-with acute adjustment disorder  -c/o fatigue but is able to complete her tasks.  4/24 labs reviewed and unremarkable  -admits to increased family stress  -continue fluoxetine

## 2024-06-03 ENCOUNTER — HOSPITAL ENCOUNTER (EMERGENCY)
Facility: HOSPITAL | Age: 74
Discharge: HOME | End: 2024-06-03
Attending: EMERGENCY MEDICINE
Payer: MEDICARE

## 2024-06-03 ENCOUNTER — OFFICE VISIT (OUTPATIENT)
Dept: INTERNAL MEDICINE CLINIC | Facility: CLINIC | Age: 74
End: 2024-06-03
Payer: MEDICARE

## 2024-06-03 ENCOUNTER — APPOINTMENT (EMERGENCY)
Dept: RADIOLOGY | Facility: HOSPITAL | Age: 74
End: 2024-06-03
Payer: MEDICARE

## 2024-06-03 VITALS
HEART RATE: 79 BPM | SYSTOLIC BLOOD PRESSURE: 158 MMHG | RESPIRATION RATE: 24 BRPM | OXYGEN SATURATION: 93 % | DIASTOLIC BLOOD PRESSURE: 74 MMHG | TEMPERATURE: 99.7 F

## 2024-06-03 VITALS
WEIGHT: 184 LBS | HEIGHT: 63 IN | DIASTOLIC BLOOD PRESSURE: 92 MMHG | HEART RATE: 112 BPM | TEMPERATURE: 99.4 F | SYSTOLIC BLOOD PRESSURE: 142 MMHG | RESPIRATION RATE: 16 BRPM | OXYGEN SATURATION: 97 % | BODY MASS INDEX: 32.6 KG/M2

## 2024-06-03 DIAGNOSIS — R05.9 COUGH, UNSPECIFIED TYPE: ICD-10-CM

## 2024-06-03 DIAGNOSIS — R07.9 CHEST PAIN: ICD-10-CM

## 2024-06-03 DIAGNOSIS — R53.1 GENERALIZED WEAKNESS: ICD-10-CM

## 2024-06-03 DIAGNOSIS — J06.9 URI (UPPER RESPIRATORY INFECTION): Primary | ICD-10-CM

## 2024-06-03 DIAGNOSIS — J06.9 UPPER RESPIRATORY TRACT INFECTION, UNSPECIFIED TYPE: Primary | ICD-10-CM

## 2024-06-03 DIAGNOSIS — I48.91 A-FIB (HCC): ICD-10-CM

## 2024-06-03 LAB
ALBUMIN SERPL BCP-MCNC: 3.7 G/DL (ref 3.5–5)
ALP SERPL-CCNC: 51 U/L (ref 34–104)
ALT SERPL W P-5'-P-CCNC: 12 U/L (ref 7–52)
ANION GAP SERPL CALCULATED.3IONS-SCNC: 7 MMOL/L (ref 4–13)
APTT PPP: 26 SECONDS (ref 23–37)
AST SERPL W P-5'-P-CCNC: 10 U/L (ref 13–39)
ATRIAL RATE: 312 BPM
BASOPHILS # BLD AUTO: 0.05 THOUSANDS/ÂΜL (ref 0–0.1)
BASOPHILS NFR BLD AUTO: 1 % (ref 0–1)
BILIRUB SERPL-MCNC: 0.43 MG/DL (ref 0.2–1)
BUN SERPL-MCNC: 25 MG/DL (ref 5–25)
CALCIUM SERPL-MCNC: 8.8 MG/DL (ref 8.4–10.2)
CARDIAC TROPONIN I PNL SERPL HS: 5 NG/L
CHLORIDE SERPL-SCNC: 103 MMOL/L (ref 96–108)
CO2 SERPL-SCNC: 28 MMOL/L (ref 21–32)
CREAT SERPL-MCNC: 0.94 MG/DL (ref 0.6–1.3)
EOSINOPHIL # BLD AUTO: 0.13 THOUSAND/ÂΜL (ref 0–0.61)
EOSINOPHIL NFR BLD AUTO: 1 % (ref 0–6)
ERYTHROCYTE [DISTWIDTH] IN BLOOD BY AUTOMATED COUNT: 13.2 % (ref 11.6–15.1)
GFR SERPL CREATININE-BSD FRML MDRD: 59 ML/MIN/1.73SQ M
GLUCOSE SERPL-MCNC: 92 MG/DL (ref 65–140)
HCT VFR BLD AUTO: 44.3 % (ref 34.8–46.1)
HGB BLD-MCNC: 14.1 G/DL (ref 11.5–15.4)
IMM GRANULOCYTES # BLD AUTO: 0.14 THOUSAND/UL (ref 0–0.2)
IMM GRANULOCYTES NFR BLD AUTO: 2 % (ref 0–2)
INR PPP: 1.03 (ref 0.84–1.19)
LYMPHOCYTES # BLD AUTO: 1.01 THOUSANDS/ÂΜL (ref 0.6–4.47)
LYMPHOCYTES NFR BLD AUTO: 11 % (ref 14–44)
MCH RBC QN AUTO: 30.5 PG (ref 26.8–34.3)
MCHC RBC AUTO-ENTMCNC: 31.8 G/DL (ref 31.4–37.4)
MCV RBC AUTO: 96 FL (ref 82–98)
MONOCYTES # BLD AUTO: 1.04 THOUSAND/ÂΜL (ref 0.17–1.22)
MONOCYTES NFR BLD AUTO: 12 % (ref 4–12)
NEUTROPHILS # BLD AUTO: 6.67 THOUSANDS/ÂΜL (ref 1.85–7.62)
NEUTS SEG NFR BLD AUTO: 73 % (ref 43–75)
NRBC BLD AUTO-RTO: 0 /100 WBCS
PLATELET # BLD AUTO: 263 THOUSANDS/UL (ref 149–390)
PMV BLD AUTO: 10.4 FL (ref 8.9–12.7)
POTASSIUM SERPL-SCNC: 4.4 MMOL/L (ref 3.5–5.3)
PROT SERPL-MCNC: 6.3 G/DL (ref 6.4–8.4)
PROTHROMBIN TIME: 13.4 SECONDS (ref 11.6–14.5)
QRS AXIS: 96 DEGREES
QRSD INTERVAL: 104 MS
QT INTERVAL: 324 MS
QTC INTERVAL: 438 MS
RBC # BLD AUTO: 4.63 MILLION/UL (ref 3.81–5.12)
SARS-COV-2 AG UPPER RESP QL IA: NEGATIVE
SL AMB POCT RAPID FLU A: NEGATIVE
SL AMB POCT RAPID FLU B: NEGATIVE
SODIUM SERPL-SCNC: 138 MMOL/L (ref 135–147)
T WAVE AXIS: 79 DEGREES
VALID CONTROL: NORMAL
VENTRICULAR RATE: 110 BPM
WBC # BLD AUTO: 9.04 THOUSAND/UL (ref 4.31–10.16)

## 2024-06-03 PROCEDURE — 85610 PROTHROMBIN TIME: CPT

## 2024-06-03 PROCEDURE — 87811 SARS-COV-2 COVID19 W/OPTIC: CPT | Performed by: INTERNAL MEDICINE

## 2024-06-03 PROCEDURE — 93010 ELECTROCARDIOGRAM REPORT: CPT | Performed by: INTERNAL MEDICINE

## 2024-06-03 PROCEDURE — 93005 ELECTROCARDIOGRAM TRACING: CPT

## 2024-06-03 PROCEDURE — G2211 COMPLEX E/M VISIT ADD ON: HCPCS | Performed by: INTERNAL MEDICINE

## 2024-06-03 PROCEDURE — 36415 COLL VENOUS BLD VENIPUNCTURE: CPT

## 2024-06-03 PROCEDURE — 84484 ASSAY OF TROPONIN QUANT: CPT

## 2024-06-03 PROCEDURE — 85730 THROMBOPLASTIN TIME PARTIAL: CPT

## 2024-06-03 PROCEDURE — 99215 OFFICE O/P EST HI 40 MIN: CPT | Performed by: INTERNAL MEDICINE

## 2024-06-03 PROCEDURE — 85025 COMPLETE CBC W/AUTO DIFF WBC: CPT

## 2024-06-03 PROCEDURE — 87804 INFLUENZA ASSAY W/OPTIC: CPT | Performed by: INTERNAL MEDICINE

## 2024-06-03 PROCEDURE — 80053 COMPREHEN METABOLIC PANEL: CPT

## 2024-06-03 PROCEDURE — 96360 HYDRATION IV INFUSION INIT: CPT

## 2024-06-03 PROCEDURE — 99285 EMERGENCY DEPT VISIT HI MDM: CPT

## 2024-06-03 PROCEDURE — 99285 EMERGENCY DEPT VISIT HI MDM: CPT | Performed by: EMERGENCY MEDICINE

## 2024-06-03 PROCEDURE — 71045 X-RAY EXAM CHEST 1 VIEW: CPT

## 2024-06-03 RX ORDER — METOPROLOL TARTRATE 1 MG/ML
5 INJECTION, SOLUTION INTRAVENOUS ONCE
Status: DISCONTINUED | OUTPATIENT
Start: 2024-06-03 | End: 2024-06-03 | Stop reason: HOSPADM

## 2024-06-03 RX ADMIN — DEXAMETHASONE 6 MG: 4 TABLET ORAL at 13:01

## 2024-06-03 RX ADMIN — SODIUM CHLORIDE 500 ML: 0.9 INJECTION, SOLUTION INTRAVENOUS at 11:42

## 2024-06-03 NOTE — ED PROVIDER NOTES
History  Chief Complaint   Patient presents with    URI     Pt saw PCP for uri symptoms; pcp advised pt to be seen at ED for further eval. Pt c/o chest tightness, dizziness, and fatigue.      Is a 73-year-old female presenting to the emergency department for evaluation of weakness, dizziness as well as nasal congestion patient notes sore throat as well as cough and sputum for the past several days.  She has been taking over-the-counter medications without symptomatic relief.  Her primary care sent her to the hospital for further evaluation and rule out pneumonia.  Denies any chest pain nausea vomiting diarrhea constipation dysuria hematuria.          Prior to Admission Medications   Prescriptions Last Dose Informant Patient Reported? Taking?   AYR SALINE NASAL NA  Self Yes No   Sig: into each nostril 2 (two) times a day   Alpha-D-Galactosidase (BEANO PO)  Self Yes No   Sig: Take by mouth   Boswellia-Glucosamine-Vit D (OSTEO BI-FLEX ONE PER DAY PO)  Self Yes No   Sig: Take 1 capsule by mouth in the morning With turmeric   FLUoxetine (PROzac) 10 mg capsule   Yes No   FLUoxetine (PROzac) 20 mg capsule  Self Yes No   Sig: TAKE 1 CAPSULE BY MOUTH EVERY DAY IN THE MORNING   Lisinopril 1 MG/ML SOLN   Yes No   amLODIPine (NORVASC) 5 mg tablet  Self No No   Sig: Take 1 tablet (5 mg total) by mouth daily   apixaban (ELIQUIS) 5 mg  Self Yes No   Si (two) times a day   atorvastatin (LIPITOR) 40 mg tablet  Self No No   Sig: Take 1 tablet (40 mg total) by mouth daily   busPIRone (BUSPAR) 15 mg tablet  Self Yes No   Sig: Take 15 mg by mouth 2 (two) times a day   calcium citrate-Vitamin D 200 mg-250 units  Self Yes No   Sig: Take 1 tablet by mouth daily with breakfast   divalproex sodium (DEPAKOTE) 500 mg EC tablet  Self Yes No   divalproex sodium (Depakote) 500 mg DR tablet   Yes No   famotidine (PEPCID) 20 mg tablet   No No   Sig: TAKE 1 TABLET BY MOUTH DAILY AT  BEDTIME   fexofenadine (ALLEGRA) 180 MG tablet  Self Yes No    flecainide (TAMBOCOR) 100 mg tablet  Self Yes No   Sig: Take 100 mg by mouth 2 (two) times a day   lisinopril (ZESTRIL) 20 mg tablet   No No   Sig: TAKE 1 TABLET BY MOUTH DAILY   metFORMIN (GLUCOPHAGE-XR) 500 mg 24 hr tablet   No No   Sig: Take 1 tablet (500 mg total) by mouth 2 (two) times a day with meals   pindolol (VISKEN) 5 mg tablet  Self No No   Sig: Take 1 tablet (5 mg total) by mouth daily   pindolol (VISKEN) 5 mg tablet   Yes No      Facility-Administered Medications: None       Past Medical History:   Diagnosis Date    Acid reflux disease     Acute bronchitis 09/07/2022    Allergic     Allergies     Anxiety     Arthralgia     Arthritis     Atrial fibrillation (HCC)     Chronic interstitial cystitis     Chronic pain disorder     neck/ shldrs    Colitis     Colon polyp     Coronary artery disease afib/ Nov. 2018    Coronary vasospasm (HCC)     Depression     GERD (gastroesophageal reflux disease)     Headache(784.0)     Hyperlipidemia     Hypertension     Irregular heart beat     afib    Lichen sclerosus     Lipoma     Myalgia 03/16/2016    Obesity     Osteopenia     Paresthesia of both hands 08/06/2019    Seizures (HCC)     Shortness of breath     ROJAS    Simple partial seizures (HCC)     last assessed 3/30/17    Sleep apnea     Tachycardia     last assessed 6/24/15    Upper respiratory tract infection 09/01/2022       Past Surgical History:   Procedure Laterality Date    ABDOMINOPLASTY      APPENDECTOMY      BLEPHAROPLASTY      COLONOSCOPY  2020    Dr Hubbard, 5 year f/u     COSMETIC SURGERY      FACELIFT      FRACTURE SURGERY      HYSTERECTOMY      OOPHORECTOMY      AZ RHYTIDECTOMY NECK W/PLATYSMAL TIGHTENING N/A 12/19/2022    Procedure: MINI FACELIFT;  Surgeon: Kenny Dumont MD;  Location:  MAIN OR;  Service: Plastics    REDUCTION MAMMAPLASTY      RHINOPLASTY      SHOULDER SURGERY Left        Family History   Problem Relation Age of Onset    Stroke Mother         CVA    Hypertension Mother      Hypertension Father     Lung cancer Father     No Known Problems Sister     Lung cancer Brother         AGE UNKNOWN    Hypertension Brother     No Known Problems Maternal Grandmother     No Known Problems Maternal Grandfather     Breast cancer Paternal Grandmother     No Known Problems Paternal Grandfather     No Known Problems Maternal Aunt     No Known Problems Maternal Aunt     No Known Problems Maternal Aunt     No Known Problems Paternal Aunt     No Known Problems Paternal Aunt     No Known Problems Paternal Aunt      I have reviewed and agree with the history as documented.    E-Cigarette/Vaping    E-Cigarette Use Never User      E-Cigarette/Vaping Substances    Nicotine No     THC No     CBD No     Flavoring No     Other No     Unknown No      Social History     Tobacco Use    Smoking status: Never    Smokeless tobacco: Never   Vaping Use    Vaping status: Never Used   Substance Use Topics    Alcohol use: Yes     Alcohol/week: 2.0 standard drinks of alcohol     Types: 2 Glasses of wine per week     Comment: 1 or 2 glasses per week    Drug use: No        Review of Systems   Constitutional:  Positive for activity change and fatigue. Negative for chills and fever.   HENT:  Positive for congestion. Negative for ear pain, sinus pressure, sinus pain and sore throat.    Eyes:  Negative for pain and visual disturbance.   Respiratory:  Positive for cough and chest tightness. Negative for shortness of breath.    Cardiovascular:  Negative for chest pain and palpitations.   Gastrointestinal:  Negative for abdominal pain, constipation, diarrhea, nausea and vomiting.   Genitourinary:  Negative for dysuria and hematuria.   Musculoskeletal:  Negative for arthralgias and back pain.   Skin:  Negative for color change and rash.   Neurological:  Negative for seizures, syncope, weakness, light-headedness and headaches.   Psychiatric/Behavioral:  Negative for agitation and behavioral problems.    All other systems reviewed and are  negative.      Physical Exam  ED Triage Vitals [06/03/24 1043]   Temperature Pulse Respirations Blood Pressure SpO2   99.7 °F (37.6 °C) 93 18 140/71 97 %      Temp Source Heart Rate Source Patient Position - Orthostatic VS BP Location FiO2 (%)   Temporal Monitor -- -- --      Pain Score       --             Orthostatic Vital Signs  Vitals:    06/03/24 1043 06/03/24 1115 06/03/24 1227   BP: 140/71 112/69 158/74   Pulse: 93 (!) 117 79       Physical Exam  Vitals and nursing note reviewed.   Constitutional:       General: She is not in acute distress.     Appearance: She is well-developed.   HENT:      Head: Normocephalic and atraumatic.      Right Ear: External ear normal.      Left Ear: External ear normal.      Nose: Nose normal.      Mouth/Throat:      Mouth: Mucous membranes are dry.   Eyes:      Extraocular Movements: Extraocular movements intact.      Conjunctiva/sclera: Conjunctivae normal.   Cardiovascular:      Rate and Rhythm: Normal rate and regular rhythm.      Heart sounds: Normal heart sounds. No murmur heard.  Pulmonary:      Effort: Pulmonary effort is normal. No respiratory distress.      Breath sounds: Normal breath sounds.   Abdominal:      General: Abdomen is flat.      Palpations: Abdomen is soft.      Tenderness: There is no abdominal tenderness.   Musculoskeletal:         General: No swelling.      Cervical back: Normal range of motion and neck supple.      Right lower leg: No edema.      Left lower leg: No edema.   Skin:     General: Skin is warm and dry.      Capillary Refill: Capillary refill takes less than 2 seconds.   Neurological:      General: No focal deficit present.      Mental Status: She is alert and oriented to person, place, and time.   Psychiatric:         Mood and Affect: Mood normal.         Behavior: Behavior normal.         ED Medications  Medications   sodium chloride 0.9 % bolus 500 mL (0 mL Intravenous Stopped 6/3/24 1301)   dexamethasone (DECADRON) tablet 6 mg (6 mg Oral  Given 6/3/24 1301)       Diagnostic Studies  Results Reviewed       Procedure Component Value Units Date/Time    HS Troponin 0hr (reflex protocol) [[id number]]  (Normal) Collected: 06/03/24 1107    Lab Status: Final result Specimen: Blood from Arm, Left Updated: 06/03/24 1208     hs TnI 0hr 5 ng/L     APTT [[id number]]  (Normal) Collected: 06/03/24 1107    Lab Status: Final result Specimen: Blood from Arm, Left Updated: 06/03/24 1157     PTT 26 seconds     Protime-INR [[id number]]  (Normal) Collected: 06/03/24 1107    Lab Status: Final result Specimen: Blood from Arm, Left Updated: 06/03/24 1157     Protime 13.4 seconds      INR 1.03    Comprehensive metabolic panel [[id number]]  (Abnormal) Collected: 06/03/24 1107    Lab Status: Final result Specimen: Blood from Arm, Left Updated: 06/03/24 1142     Sodium 138 mmol/L      Potassium 4.4 mmol/L      Chloride 103 mmol/L      CO2 28 mmol/L      ANION GAP 7 mmol/L      BUN 25 mg/dL      Creatinine 0.94 mg/dL      Glucose 92 mg/dL      Calcium 8.8 mg/dL      AST 10 U/L      ALT 12 U/L      Alkaline Phosphatase 51 U/L      Total Protein 6.3 g/dL      Albumin 3.7 g/dL      Total Bilirubin 0.43 mg/dL      eGFR 59 ml/min/1.73sq m     Narrative:      National Kidney Disease Foundation guidelines for Chronic Kidney Disease (CKD):     Stage 1 with normal or high GFR (GFR > 90 mL/min/1.73 square meters)    Stage 2 Mild CKD (GFR = 60-89 mL/min/1.73 square meters)    Stage 3A Moderate CKD (GFR = 45-59 mL/min/1.73 square meters)    Stage 3B Moderate CKD (GFR = 30-44 mL/min/1.73 square meters)    Stage 4 Severe CKD (GFR = 15-29 mL/min/1.73 square meters)    Stage 5 End Stage CKD (GFR <15 mL/min/1.73 square meters)  Note: GFR calculation is accurate only with a steady state creatinine    CBC and differential [[id number]]  (Abnormal) Collected: 06/03/24 1107    Lab Status: Final result Specimen: Blood from Arm, Left Updated: 06/03/24 1124     WBC 9.04 Thousand/uL      RBC 4.63 Million/uL       Hemoglobin 14.1 g/dL      Hematocrit 44.3 %      MCV 96 fL      MCH 30.5 pg      MCHC 31.8 g/dL      RDW 13.2 %      MPV 10.4 fL      Platelets 263 Thousands/uL      nRBC 0 /100 WBCs      Segmented % 73 %      Immature Grans % 2 %      Lymphocytes % 11 %      Monocytes % 12 %      Eosinophils Relative 1 %      Basophils Relative 1 %      Absolute Neutrophils 6.67 Thousands/µL      Absolute Immature Grans 0.14 Thousand/uL      Absolute Lymphocytes 1.01 Thousands/µL      Absolute Monocytes 1.04 Thousand/µL      Eosinophils Absolute 0.13 Thousand/µL      Basophils Absolute 0.05 Thousands/µL                    XR chest 1 view portable   ED Interpretation by Annette Maria Palladino, DO (06/03 1208)   No pneumonia, pneumothorax, effusions.  Patient with perihilar adenopathy.      Final Result by Darlene Ramos MD (06/03 1536)      No acute cardiopulmonary disease.               Workstation performed: [id number]               Procedures  ECG 12 Lead Documentation Only    Date/Time: 6/3/2024 11:01 AM    Performed by: Annette Maria Palladino, DO  Authorized by: Annette Maria Palladino, DO    Indications / Diagnosis:  Dizzy, chest pain  ECG reviewed by me, the ED Provider: yes    Patient location:  ED  Previous ECG:     Previous ECG:  Compared to current    Similarity:  Changes noted  Interpretation:     Interpretation: abnormal    Rate:     ECG rate:  110    ECG rate assessment: tachycardic    Rhythm:     Rhythm: atrial fibrillation    Ectopy:     Ectopy: none    QRS:     QRS axis:  Right    QRS intervals:  Normal  Conduction:     Conduction: normal    ST segments:     ST segments:  Non-specific  T waves:     T waves: non-specific          ED Course  ED Course as of 06/03/24 1715   Mon Jun 03, 2024   1059 Blood Pressure: 140/71   1059 Temperature: 99.7 °F (37.6 °C)   1059 Temp Source: Temporal   1059 Pulse: 93   1059 Respirations: 18   1059 SpO2: 97 %   1100 - Given patient's concerns, will do a cardiac workup.   -  Will do an EKG for arrythmia, strain; troponin for same as per protocol for evaluation of ACS.   - CBC for anemia; CMP for kidney function and electrolytes.   - Will check CXR for pneumonia, PTX, fluid overload  - HEART score:  - Disposition per workup.    1129 WBC: 9.04   1129 Hemoglobin: 14.1   1209 hs TnI 0hr: 5   1211 Patient spontaneously broke into normal sinus rhythm after 500 cc bolus of fluid.  Patient aware that she does not have pneumonia.  One-time dose of Decadron for symptomatic relief.  PCP follow-up advised.  Return precautions given.  Patient aware she has no questions or concerns stable for discharge.                             SBIRT 22yo+      Flowsheet Row Most Recent Value   Initial Alcohol Screen: US AUDIT-C     1. How often do you have a drink containing alcohol? 0 Filed at: 06/03/2024 1043   2. How many drinks containing alcohol do you have on a typical day you are drinking?  0 Filed at: 06/03/2024 1043   3a. Male UNDER 65: How often do you have five or more drinks on one occasion? 0 Filed at: 06/03/2024 1043   3b. FEMALE Any Age, or MALE 65+: How often do you have 4 or more drinks on one occassion? 0 Filed at: 06/03/2024 1043   Audit-C Score 0 Filed at: 06/03/2024 1043   YUDITH: How many times in the past year have you...    Used an illegal drug or used a prescription medication for non-medical reasons? Never Filed at: 06/03/2024 1043                  Medical Decision Making  Amount and/or Complexity of Data Reviewed  Labs: ordered. Decision-making details documented in ED Course.  Radiology: ordered and independent interpretation performed.          Disposition  Final diagnoses:   URI (upper respiratory infection)   A-fib (HCC)   Chest pain     Time reflects when diagnosis was documented in both MDM as applicable and the Disposition within this note       Time User Action Codes Description Comment    6/3/2024 12:10 PM Palladino, Annette Add [J06.9] URI (upper respiratory infection)      6/3/2024 12:10 PM Palladino, Annette Add [I48.91] A-fib (HCC)     6/3/2024 12:10 PM Palladino, Annette Add [R07.9] Chest pain           ED Disposition       ED Disposition   Discharge    Condition   Stable    Date/Time   Mon [place] 3, 2024 1239    Comment   Tobylinda NAVARRO  discharge to home/self care.                   Follow-up Information       Follow up With Specialties Details Why Contact Info Additional Information    Carrol Truong, DO Internal Medicine Schedule an appointment as soon as possible for a visit  for follow up [address]  [address], [address]  [facility] [address] [phone]       [facility] Emergency Department Emergency Medicine Go to  As needed, If symptoms worsen [address] [place] [id number] [phone] [facility], [address] [place] [address], [address] [phone]            Discharge Medication List as of 6/3/2024  1:06 PM        CONTINUE these medications which have NOT CHANGED    Details   Alpha-D-Galactosidase (BEANO PO) Take by mouth, Historical Med      amLODIPine (NORVASC) 5 mg tablet Take 1 tablet (5 mg total) by mouth daily, Starting Mon 7/24/2023, Normal      apixaban (ELIQUIS) 5 mg 2 (two) times a day, Starting Fri 10/30/2020, Historical Med      atorvastatin (LIPITOR) 40 mg tablet Take 1 tablet (40 mg total) by mouth daily, Starting Mon 12/13/2021, No Print      AYR SALINE NASAL NA into each nostril 2 (two) times a day, Historical Med      Boswellia-Glucosamine-Vit D (OSTEO BI-FLEX ONE PER DAY PO) Take 1 capsule by mouth in the morning With turmeric, Historical Med      busPIRone (BUSPAR) 15 mg tablet Take 15 mg by mouth 2 (two) times a day, Historical Med      calcium citrate-Vitamin D 200 mg-250 units Take 1 tablet by mouth daily with breakfast, Historical Med      !! divalproex sodium (Depakote) 500 mg DR tablet Historical Med      !! divalproex sodium (DEPAKOTE) 500 mg EC  tablet Starting Wed 11/9/2022, Historical Med      famotidine (PEPCID) 20 mg tablet TAKE 1 TABLET BY MOUTH DAILY AT  BEDTIME, Starting Mon 2/12/2024, Normal      fexofenadine (ALLEGRA) 180 MG tablet Historical Med      flecainide (TAMBOCOR) 100 mg tablet Take 100 mg by mouth 2 (two) times a day, Starting Mon 11/28/2022, Historical Med      !! FLUoxetine (PROzac) 10 mg capsule Historical Med      !! FLUoxetine (PROzac) 20 mg capsule TAKE 1 CAPSULE BY MOUTH EVERY DAY IN THE MORNING, Historical Med      lisinopril (ZESTRIL) 20 mg tablet TAKE 1 TABLET BY MOUTH DAILY, Normal      Lisinopril 1 MG/ML SOLN Historical Med      metFORMIN (GLUCOPHAGE-XR) 500 mg 24 hr tablet Take 1 tablet (500 mg total) by mouth 2 (two) times a day with meals, Starting Thu 4/4/2024, Normal      !! pindolol (VISKEN) 5 mg tablet Take 1 tablet (5 mg total) by mouth daily, Starting Mon 7/24/2023, Normal      !! pindolol (VISKEN) 5 mg tablet Historical Med       !! - Potential duplicate medications found. Please discuss with provider.        No discharge procedures on file.    PDMP Review       None             ED Provider  Attending physically available and evaluated Bobby NAVARRO . I managed the patient along with the ED Attending.    Electronically Signed by           Annette Maria Palladino, DO  06/03/24 8372

## 2024-06-03 NOTE — PROGRESS NOTES
Assessment/Plan:    Problem List Items Addressed This Visit    None  Visit Diagnoses     Upper respiratory tract infection, unspecified type    -  Primary    -4d h/o URI sx, now with dizziness, tachycardia, weakness.  -clinically looks ill, will send to ED for further management    Relevant Orders    Transfer to other facility    Generalized weakness        Relevant Orders    Transfer to other facility    Cough, unspecified type        Relevant Orders    POCT rapid flu A and B (Completed)    POCT Rapid Covid Ag (Completed)    Transfer to other facility          Subjective:      Patient ID: Bobby Diaz is a 73 y.o. female.    HPI  She presents with her .    She feels dizzy, weak, nasal congestion, sore throat, coughing with production over the past four days, fever today.  She has been taking delsym night time and daytime.  Denies sick contacts    Symptoms are same since onset.  She did not take bp meds today.    The following portions of the patient's history were reviewed and updated as appropriate: allergies, current medications, past family history, past medical history, past social history, past surgical history and problem list.      Current Outpatient Medications:   •  amLODIPine (NORVASC) 5 mg tablet, Take 1 tablet (5 mg total) by mouth daily, Disp: 90 tablet, Rfl: 3  •  apixaban (ELIQUIS) 5 mg, 2 (two) times a day, Disp: , Rfl:   •  atorvastatin (LIPITOR) 40 mg tablet, Take 1 tablet (40 mg total) by mouth daily, Disp: 90 tablet, Rfl: 0  •  AYR SALINE NASAL NA, into each nostril 2 (two) times a day, Disp: , Rfl:   •  Boswellia-Glucosamine-Vit D (OSTEO BI-FLEX ONE PER DAY PO), Take 1 capsule by mouth in the morning With turmeric, Disp: , Rfl:   •  busPIRone (BUSPAR) 15 mg tablet, Take 15 mg by mouth 2 (two) times a day, Disp: , Rfl:   •  calcium citrate-Vitamin D 200 mg-250 units, Take 1 tablet by mouth daily with breakfast, Disp: , Rfl:   •  divalproex sodium (Depakote) 500 mg DR tablet, , Disp:  ", Rfl:   •  divalproex sodium (DEPAKOTE) 500 mg EC tablet, , Disp: , Rfl:   •  famotidine (PEPCID) 20 mg tablet, TAKE 1 TABLET BY MOUTH DAILY AT  BEDTIME, Disp: 90 tablet, Rfl: 1  •  fexofenadine (ALLEGRA) 180 MG tablet, , Disp: , Rfl:   •  flecainide (TAMBOCOR) 100 mg tablet, Take 100 mg by mouth 2 (two) times a day, Disp: , Rfl:   •  FLUoxetine (PROzac) 10 mg capsule, , Disp: , Rfl:   •  FLUoxetine (PROzac) 20 mg capsule, TAKE 1 CAPSULE BY MOUTH EVERY DAY IN THE MORNING, Disp: , Rfl:   •  lisinopril (ZESTRIL) 20 mg tablet, TAKE 1 TABLET BY MOUTH DAILY, Disp: 90 tablet, Rfl: 3  •  Lisinopril 1 MG/ML SOLN, , Disp: , Rfl:   •  metFORMIN (GLUCOPHAGE-XR) 500 mg 24 hr tablet, Take 1 tablet (500 mg total) by mouth 2 (two) times a day with meals, Disp: 360 tablet, Rfl: 0  •  pindolol (VISKEN) 5 mg tablet, Take 1 tablet (5 mg total) by mouth daily, Disp: 90 tablet, Rfl: 3  •  pindolol (VISKEN) 5 mg tablet, , Disp: , Rfl:   •  Alpha-D-Galactosidase (BEANO PO), Take by mouth, Disp: , Rfl:     Review of Systems   Constitutional:  Positive for diaphoresis, fatigue and fever.   HENT:  Positive for congestion, postnasal drip and rhinorrhea. Negative for sore throat.    Respiratory:  Positive for cough, shortness of breath and wheezing.    Gastrointestinal:  Positive for nausea. Negative for diarrhea and vomiting.   Neurological:  Positive for dizziness and headaches.         Objective:    /92 (BP Location: Right arm, Patient Position: Supine, Cuff Size: Standard)   Pulse (!) 112   Temp 99.4 °F (37.4 °C)   Resp 16   Ht 5' 3\" (1.6 m)   Wt 83.5 kg (184 lb)   SpO2 97%   BMI 32.59 kg/m²      Physical Exam  Vitals reviewed.   Constitutional:       Appearance: She is ill-appearing and diaphoretic.   HENT:      Right Ear: Tympanic membrane and ear canal normal.      Left Ear: Tympanic membrane and ear canal normal.      Nose: Rhinorrhea present.      Mouth/Throat:      Pharynx: No posterior oropharyngeal erythema. "   Cardiovascular:      Rate and Rhythm: Tachycardia present. Rhythm irregular.      Heart sounds: Normal heart sounds.   Pulmonary:      Effort: Pulmonary effort is normal. No respiratory distress.      Breath sounds: Normal breath sounds. No wheezing.   Musculoskeletal:      Cervical back: No tenderness.      Right lower leg: No edema.      Left lower leg: No edema.   Lymphadenopathy:      Cervical: No cervical adenopathy.   Neurological:      Mental Status: She is alert and oriented to person, place, and time.         Recent Results (from the past 168 hour(s))   POCT Rapid Covid Ag    Collection Time: 06/03/24  9:51 AM   Result Value Ref Range    POCT SARS-CoV-2 Ag Negative Negative    VALID CONTROL Valid    POCT rapid flu A and B    Collection Time: 06/03/24 10:13 AM   Result Value Ref Range    RAPID FLU A negative     RAPID FLU B negative

## 2024-06-03 NOTE — ED ATTENDING ATTESTATION
6/3/2024  I, Eliot Almeida MD, saw and evaluated the patient. I have discussed the patient with the resident/non-physician practitioner and agree with the resident's/non-physician practitioner's findings, Plan of Care, and MDM as documented in the resident's/non-physician practitioner's note, except where noted. All available labs and Radiology studies were reviewed.  I was present for key portions of any procedure(s) performed by the resident/non-physician practitioner and I was immediately available to provide assistance.       At this point I agree with the current assessment done in the Emergency Department.  I have conducted an independent evaluation of this patient a history and physical is as follows:    ED Course     Patient presents for evaluation due to several days of weakness, dizziness, cough, rhinorrhea, and nasal congestion.  Se denies any chest pain or shortness of breath.  No additional complaints.    DDx: Viral syndrome, Pneumona    Plan: Will check labs, chest x-ray to evaluate for pneumonia, and EKG.  Symptomatic treatment.       Critical Care Time  Procedures

## 2024-06-04 ENCOUNTER — TELEPHONE (OUTPATIENT)
Age: 74
End: 2024-06-04

## 2024-06-04 ENCOUNTER — VBI (OUTPATIENT)
Dept: INTERNAL MEDICINE CLINIC | Facility: CLINIC | Age: 74
End: 2024-06-04

## 2024-06-04 NOTE — TELEPHONE ENCOUNTER
Pt calling to schedule ER follow up with Dr. Truong; scheduled 6/17. Pt would like to know if she could be booked sooner since Dr. Truong is who sent pt to ER, but could not reach anyone in office for overbooking. If possible, please contact pt to advise if she can be squeezed in anywhere sooner.

## 2024-06-04 NOTE — LETTER
[facility] INTERNAL MEDICINE  [address], [place]  [place] [address]    Date: 06/06/24    Bobby Diaz  [address] [place] [address]   [place] [address]    Dear Bobby:                                                                                                                                Thank you for choosing [facility] emergency department for care.  Your primary care provider wants to make sure that your ongoing medical care is being addressed. If you require follow up care as a result of your emergency department visit, there are a few things the practice would like you to know.                As part of the network's continuing commitment to caring for our patients, we have added more same day appointments and have extended office hours to meet your medical needs. After hours, on-call physicians are available via your primary care provider's main office line.               We encourage you to contact our office prior to seeking treatment to discuss your symptoms with the medical staff.  Together, we can determine the correct course of action.  A majority of non-emergent conditions such as: common cold, flu-like symptoms, fevers, strains/sprains, dislocations, minor burns, cuts and animal bites can be treated at [facility] facilities. Diagnostic testing is available at some sites.               Of course, if you are experiencing a life threatening medical emergency call 911 or proceed directly to the nearest emergency room.    Your nearest [facility] facility is conveniently located at:    [address] [address]  [phone]  SKIP THE WAIT  Conveniently offered at most [facility] locations  [place] your spot online at [email or web address] or on the [facility]    Sincerely,    [facility] INTERNAL MEDICINE  Dept: [phone]

## 2024-06-05 NOTE — TELEPHONE ENCOUNTER
06/05/24 8:52 AM    Patient contacted post ED visit, second outreach attempt made. Message was left for patient to return a call to the VBI Department at [facility]: Phone [phone].    Thank you.  TANIA GALDAMEZ  [facility]

## 2024-06-05 NOTE — TELEPHONE ENCOUNTER
Left a voicemail letting the Pt  know that there is no Appt available sooner that what she already has . Thanks

## 2024-06-06 NOTE — TELEPHONE ENCOUNTER
06/06/24 9:19 AM    Patient contacted post ED visit, phone outreaches were unsuccessful and a MyChart letter has been sent to the patient as follow-up.    Thank you.  TANIA GALDAMEZ  [facility]

## 2024-06-11 ENCOUNTER — OFFICE VISIT (OUTPATIENT)
Dept: INTERNAL MEDICINE CLINIC | Facility: CLINIC | Age: 74
End: 2024-06-11
Payer: MEDICARE

## 2024-06-11 VITALS
TEMPERATURE: 99.1 F | DIASTOLIC BLOOD PRESSURE: 68 MMHG | HEIGHT: 63 IN | OXYGEN SATURATION: 95 % | WEIGHT: 183.2 LBS | SYSTOLIC BLOOD PRESSURE: 140 MMHG | HEART RATE: 65 BPM | BODY MASS INDEX: 32.46 KG/M2

## 2024-06-11 DIAGNOSIS — J06.9 UPPER RESPIRATORY TRACT INFECTION, UNSPECIFIED TYPE: Primary | ICD-10-CM

## 2024-06-11 PROCEDURE — 99213 OFFICE O/P EST LOW 20 MIN: CPT | Performed by: STUDENT IN AN ORGANIZED HEALTH CARE EDUCATION/TRAINING PROGRAM

## 2024-06-11 PROCEDURE — G2211 COMPLEX E/M VISIT ADD ON: HCPCS | Performed by: STUDENT IN AN ORGANIZED HEALTH CARE EDUCATION/TRAINING PROGRAM

## 2024-06-11 RX ORDER — PREDNISONE 10 MG/1
TABLET ORAL
COMMUNITY

## 2024-06-11 RX ORDER — FLUTICASONE PROPIONATE 50 MCG
1 SPRAY, SUSPENSION (ML) NASAL DAILY
Qty: 15.8 ML | Refills: 1 | Status: SHIPPED | OUTPATIENT
Start: 2024-06-11

## 2024-06-11 RX ORDER — CETIRIZINE HYDROCHLORIDE 10 MG/1
10 TABLET ORAL DAILY
Qty: 30 TABLET | Refills: 3 | Status: SHIPPED | OUTPATIENT
Start: 2024-06-11 | End: 2024-06-11

## 2024-06-11 RX ORDER — BENZONATATE 200 MG/1
200 CAPSULE ORAL 3 TIMES DAILY PRN
Qty: 20 CAPSULE | Refills: 0 | Status: SHIPPED | OUTPATIENT
Start: 2024-06-11

## 2024-06-11 RX ORDER — AZITHROMYCIN 250 MG/1
TABLET, FILM COATED ORAL
Qty: 6 TABLET | Refills: 0 | Status: SHIPPED | OUTPATIENT
Start: 2024-06-11 | End: 2024-06-11

## 2024-06-11 RX ORDER — AZITHROMYCIN 250 MG/1
TABLET, FILM COATED ORAL
Qty: 6 TABLET | Refills: 0 | Status: SHIPPED | OUTPATIENT
Start: 2024-06-11 | End: 2024-06-16

## 2024-06-11 RX ORDER — BENZONATATE 200 MG/1
200 CAPSULE ORAL 3 TIMES DAILY PRN
Qty: 20 CAPSULE | Refills: 0 | Status: SHIPPED | OUTPATIENT
Start: 2024-06-11 | End: 2024-06-11

## 2024-06-11 RX ORDER — FLUTICASONE PROPIONATE 50 MCG
1 SPRAY, SUSPENSION (ML) NASAL DAILY
Qty: 15.8 ML | Refills: 1 | Status: SHIPPED | OUTPATIENT
Start: 2024-06-11 | End: 2024-06-11

## 2024-06-11 RX ORDER — CETIRIZINE HYDROCHLORIDE 10 MG/1
10 TABLET ORAL DAILY
Qty: 30 TABLET | Refills: 3 | Status: SHIPPED | OUTPATIENT
Start: 2024-06-11

## 2024-06-11 NOTE — ASSESSMENT & PLAN NOTE
Symptoms ongoing for 12 days now, limited improvement with OTC meds  COVID/Flu negative in ED  Likely viral vs environmental allergies. Have advised substituting Allegra for Zyrtec as she may have developed desensitization after years of use.   Will treat conservatively as per orders below with flonase, tessalon, and zyrtec. Advised use of OTC marce med sinus rinse.   Pocket script for azithromycin in the event symptoms worsen or persist beyond 14 days.   Will consider steroid taper if URI symptoms worsen.

## 2024-06-11 NOTE — PROGRESS NOTES
Ambulatory Visit  Name: Bobby Diaz      : 1950      MRN: [id number]  Encounter Provider: Gaby Beasley MD  Encounter Date: 2024   Encounter department: [facility] INTERNAL MEDICINE    Assessment & Plan   1. Upper respiratory tract infection, unspecified type  Assessment & Plan:  Symptoms ongoing for 12 days now, limited improvement with OTC meds  COVID/Flu negative in ED  Likely viral vs environmental allergies. Have advised substituting Allegra for Zyrtec as she may have developed desensitization after years of use.   Will treat conservatively as per orders below with flonase, tessalon, and zyrtec. Advised use of OTC marce med sinus rinse.   Pocket script for azithromycin in the event symptoms worsen or persist beyond 14 days.   Will consider steroid taper if URI symptoms worsen.     Orders:  -     fluticasone (FLONASE) 50 mcg/act nasal spray; 1 spray into each nostril daily  -     cetirizine (ZyrTEC) 10 mg tablet; Take 1 tablet (10 mg total) by mouth daily  -     azithromycin (Zithromax) 250 mg tablet; Take 2 tablets (500 mg total) by mouth daily for 1 day, THEN 1 tablet (250 mg total) daily for 4 days.  -     benzonatate (TESSALON) 200 MG capsule; Take 1 capsule (200 mg total) by mouth 3 (three) times a day as needed for cough         History of Present Illness     HPI  This is a very pleasant 73 y.o. female who presents to the clinic for ER follow up. Patient was seen in the ED for weakness, dizziness and URI symptoms on 6/3/2024 after PCP recommended rule out pneumonia. Labs performed in the ED including COVID/Flu and chest xray unremarkable.     Was given two tablets of Prednisone in the ED.     Currently patient reports continued cough productive of clear sputum, sneezing, excessive fatigue, and rhinorrhea.     Has been taking Delsym for cough with limited relief.    Review of Systems   Constitutional:  Negative for chills and fever.   HENT:  Positive for rhinorrhea and sneezing.  Negative for congestion, ear pain, sinus pressure, sinus pain and sore throat.    Eyes:  Negative for visual disturbance.   Respiratory:  Negative for chest tightness, shortness of breath and wheezing.    Cardiovascular:  Negative for chest pain and palpitations.   Gastrointestinal:  Negative for abdominal pain, constipation, diarrhea and vomiting.   Endocrine: Negative for polyuria.   Genitourinary:  Negative for dysuria.   Musculoskeletal:  Negative for myalgias.   Neurological:  Negative for dizziness, syncope and light-headedness.     Past Medical History:   Diagnosis Date    Acid reflux disease     Acute bronchitis 09/07/2022    Allergic     Allergies     Anxiety     Arthralgia     Arthritis     Atrial fibrillation (HCC)     Chronic interstitial cystitis     Chronic pain disorder     neck/ shldrs    Colitis     Colon polyp     Coronary artery disease afib/ Nov. 2018    Coronary vasospasm (HCC)     Depression     GERD (gastroesophageal reflux disease)     Headache(784.0)     Hyperlipidemia     Hypertension     Irregular heart beat     afib    Lichen sclerosus     Lipoma     Myalgia 03/16/2016    Obesity     Osteopenia     Paresthesia of both hands 08/06/2019    Seizures (HCC)     Shortness of breath     ROJAS    Simple partial seizures (HCC)     last assessed 3/30/17    Sleep apnea     Tachycardia     last assessed 6/24/15    Upper respiratory tract infection 09/01/2022     Past Surgical History:   Procedure Laterality Date    ABDOMINOPLASTY      APPENDECTOMY      BLEPHAROPLASTY      COLONOSCOPY  2020    Dr Hubbard, 5 year f/u     COSMETIC SURGERY      FACELIFT      FRACTURE SURGERY      HYSTERECTOMY      OOPHORECTOMY      TX RHYTIDECTOMY NECK W/PLATYSMAL TIGHTENING N/A 12/19/2022    Procedure: MINI FACELIFT;  Surgeon: Kenny Dumont MD;  Location:  MAIN OR;  Service: Plastics    REDUCTION MAMMAPLASTY      RHINOPLASTY      SHOULDER SURGERY Left      Family History   Problem Relation Age of Onset    Stroke Mother          CVA    Hypertension Mother     Hypertension Father     Lung cancer Father     Cancer Father     No Known Problems Sister     Lung cancer Brother         AGE UNKNOWN    Hypertension Brother     Cancer Brother     No Known Problems Maternal Grandmother     No Known Problems Maternal Grandfather     Breast cancer Paternal Grandmother     No Known Problems Paternal Grandfather     No Known Problems Maternal Aunt     No Known Problems Maternal Aunt     No Known Problems Maternal Aunt     No Known Problems Paternal Aunt     No Known Problems Paternal Aunt     No Known Problems Paternal Aunt      Social History     Tobacco Use    Smoking status: Never    Smokeless tobacco: Never   Vaping Use    Vaping status: Never Used   Substance and Sexual Activity    Alcohol use: Yes     Alcohol/week: 1.0 standard drink of alcohol     Types: 1 Glasses of wine per week     Comment: 1 or 2 glasses per week    Drug use: No    Sexual activity: Not Currently     Partners: Male     Birth control/protection: Post-menopausal     Comment: occasionally     Current Outpatient Medications on File Prior to Visit   Medication Sig    amLODIPine (NORVASC) 5 mg tablet Take 1 tablet (5 mg total) by mouth daily    apixaban (ELIQUIS) 5 mg 2 (two) times a day    atorvastatin (LIPITOR) 40 mg tablet Take 1 tablet (40 mg total) by mouth daily    AYR SALINE NASAL NA into each nostril 2 (two) times a day    Boswellia-Glucosamine-Vit D (OSTEO BI-FLEX ONE PER DAY PO) Take 1 capsule by mouth in the morning With turmeric    busPIRone (BUSPAR) 15 mg tablet Take 15 mg by mouth 2 (two) times a day    calcium citrate-Vitamin D 200 mg-250 units Take 1 tablet by mouth daily with breakfast    divalproex sodium (Depakote) 500 mg DR tablet     divalproex sodium (DEPAKOTE) 500 mg EC tablet     famotidine (PEPCID) 20 mg tablet TAKE 1 TABLET BY MOUTH DAILY AT  BEDTIME    fexofenadine (ALLEGRA) 180 MG tablet     flecainide (TAMBOCOR) 100 mg tablet Take 100 mg by mouth 2  "(two) times a day    FLUoxetine (PROzac) 10 mg capsule     FLUoxetine (PROzac) 20 mg capsule TAKE 1 CAPSULE BY MOUTH EVERY DAY IN THE MORNING    lisinopril (ZESTRIL) 20 mg tablet TAKE 1 TABLET BY MOUTH DAILY    Lisinopril 1 MG/ML SOLN     metFORMIN (GLUCOPHAGE-XR) 500 mg 24 hr tablet Take 1 tablet (500 mg total) by mouth 2 (two) times a day with meals    pindolol (VISKEN) 5 mg tablet Take 1 tablet (5 mg total) by mouth daily    pindolol (VISKEN) 5 mg tablet     Alpha-D-Galactosidase (BEANO PO) Take by mouth    predniSONE 10 mg tablet TAPER AS DIRECTED PER PRINTED INSTRUCTIONS (Patient not taking: Reported on 6/11/2024)     Allergies   Allergen Reactions    Codeine Hives    Erythromycin Base GI Intolerance    Hydromorphone Itching    Medical Tape Rash    Morphine GI Intolerance    Oxycodone-Acetaminophen Hives    Penicillins Rash and Hives     Immunization History   Administered Date(s) Administered    COVID-19 PFIZER VACCINE 0.3 ML IM 03/05/2021, 03/26/2021, 11/22/2021, 07/15/2022    COVID-19 Pfizer Vac BIVALENT Carson-sucrose 12 Yr+ IM 10/16/2022    COVID-19 Pfizer mRNA vacc PF carson-sucrose 12 yr and older (Comirnaty) 10/23/2023    H1N1, All Formulations 11/10/2009, 11/10/2009    INFLUENZA 09/20/2017, 09/20/2018, 09/20/2018, 10/01/2019, 09/17/2020, 09/17/2021, 10/06/2021, 11/08/2023    Influenza Split High Dose Preservative Free IM 09/30/2014, 09/28/2016    Influenza, high dose seasonal 0.7 mL 10/01/2019, 10/16/2022    Influenza, seasonal, injectable 09/19/2012, 09/25/2013, 10/01/2015, 09/20/2017    Pneumococcal Conjugate 13-Valent 09/28/2016    Pneumococcal Polysaccharide PPV23 01/01/2012, 12/13/2021    Rsv, Unspecified 01/23/2024    Tdap 09/25/2013, 10/30/2023    Zoster Vaccine Recombinant 05/03/2021, 05/13/2021, 09/17/2021     Objective     /68   Pulse 65   Temp 99.1 °F (37.3 °C) (Tympanic)   Ht 5' 3\" (1.6 m)   Wt 83.1 kg (183 lb 3.2 oz)   SpO2 95%   BMI 32.45 kg/m²     Physical Exam  Vitals and " nursing note reviewed.   Constitutional:       General: She is not in acute distress.     Appearance: She is well-developed.   HENT:      Head: Normocephalic and atraumatic.      Right Ear: A middle ear effusion is present.      Left Ear: A middle ear effusion is present.      Nose:      Right Turbinates: Swollen.      Left Turbinates: Swollen.      Right Sinus: No maxillary sinus tenderness or frontal sinus tenderness.      Left Sinus: No maxillary sinus tenderness or frontal sinus tenderness.   Eyes:      Conjunctiva/sclera: Conjunctivae normal.   Cardiovascular:      Rate and Rhythm: Normal rate and regular rhythm.      Heart sounds: Normal heart sounds. No murmur heard.  Pulmonary:      Effort: Pulmonary effort is normal. No respiratory distress.      Breath sounds: Normal breath sounds.   Abdominal:      Palpations: Abdomen is soft.      Tenderness: There is no abdominal tenderness.   Musculoskeletal:         General: No swelling.      Cervical back: Neck supple.   Skin:     General: Skin is warm and dry.      Capillary Refill: Capillary refill takes less than 2 seconds.   Neurological:      Mental Status: She is alert.   Psychiatric:         Mood and Affect: Mood normal.       Administrative Statements

## 2024-06-17 ENCOUNTER — TELEPHONE (OUTPATIENT)
Age: 74
End: 2024-06-17

## 2024-06-17 NOTE — TELEPHONE ENCOUNTER
Patient called in to reschedule her appointment today at 10:00 AM> Rescheduled for 6/18/24 at 4:00 PM with Dr. NEWTON

## 2024-06-18 ENCOUNTER — OFFICE VISIT (OUTPATIENT)
Dept: BARIATRICS | Facility: CLINIC | Age: 74
End: 2024-06-18
Payer: MEDICARE

## 2024-06-18 VITALS
TEMPERATURE: 97.7 F | HEART RATE: 65 BPM | DIASTOLIC BLOOD PRESSURE: 75 MMHG | BODY MASS INDEX: 32.53 KG/M2 | HEIGHT: 63 IN | RESPIRATION RATE: 17 BRPM | SYSTOLIC BLOOD PRESSURE: 138 MMHG | WEIGHT: 183.6 LBS

## 2024-06-18 DIAGNOSIS — E78.2 MIXED HYPERLIPIDEMIA: ICD-10-CM

## 2024-06-18 DIAGNOSIS — E66.9 OBESITY (BMI 30.0-34.9): Primary | ICD-10-CM

## 2024-06-18 DIAGNOSIS — R73.03 PREDIABETES: ICD-10-CM

## 2024-06-18 PROCEDURE — G2211 COMPLEX E/M VISIT ADD ON: HCPCS | Performed by: INTERNAL MEDICINE

## 2024-06-18 PROCEDURE — 99213 OFFICE O/P EST LOW 20 MIN: CPT | Performed by: INTERNAL MEDICINE

## 2024-06-18 RX ORDER — METFORMIN HYDROCHLORIDE 500 MG/1
500 TABLET, EXTENDED RELEASE ORAL 2 TIMES DAILY WITH MEALS
Qty: 360 TABLET | Refills: 1 | Status: SHIPPED | OUTPATIENT
Start: 2024-06-18

## 2024-06-18 NOTE — PROGRESS NOTES
Assessment/Plan:  Doris was seen today for follow-up.    Diagnoses and all orders for this visit:    Obesity (BMI 30.0-34.9)  -     metFORMIN (GLUCOPHAGE-XR) 500 mg 24 hr tablet; Take 1 tablet (500 mg total) by mouth 2 (two) times a day with meals    Mixed hyperlipidemia    Prediabetes  -     metFORMIN (GLUCOPHAGE-XR) 500 mg 24 hr tablet; Take 1 tablet (500 mg total) by mouth 2 (two) times a day with meals    - Weight not at goal  - Patient is interested in Conservative Program  - Labs reviewed: As below.    General Recommendations:  Nutrition:  Eat breakfast daily.  Do not skip meals.     Food log (ie.) www.myfitnesspal.com, sparkpeople.com, loseit.com, calorieking.com, etc.    Practice mindful eating.  Be sure to set aside time to eat, eat slowly, and savor your food.    Hydration:    At least 64oz of water daily.  No sugar sweetened beverages.  No juice (eat the fruit instead).    Exercise:  Studies have shown that the ideal exercise goal is somewhere between 150 to 300 minutes of moderate intensity exercise a week.  Start with exercising 10 minutes every other day and gradually increase physical activity with a goal of at least 150 minutes of moderate intensity exercise a week, divided over at least 3 days a week.  An example of this would be exercising 30 minutes a day, 5 days a week.  Resistance training can increase muscle mass and increase our resting metabolic rate.   FULL BODY resistance training is recommended 2-3 times a week.  Do not do this on consecutive days to allow for muscle recovery.    Aim for a bare minimum 5000 steps, even on days you do not exercise.    Monitoring:   Weigh yourself daily.  If this causes undue stress, then just weigh yourself once a week.  Weigh yourself the same time of the day with the same amount of clothing on.  Preferably this should be done after waking up, before you eat, and with no clothing or minimal clothing on.    Specific Goals:  Food log (ie.)  "www.myfitnesspal.[employer].com,loseit.com,[employer].com,etc.     No sugary beverages. At least 64oz of water daily.    Gradually increase physical activity to a goal of 5 days per week for 30 minutes of MODERATE intensity PLUS 2 days per week of FULL BODY resistance training    Calorie goal:  7450-2763 calories a day    Continue with metformin 1000mg twice a day    Weight check in 2 months.    Follow-up in 4 months.    Total time spent reviewing chart, interviewing patient, examining patient, discussing plan, answering all questions, and documentin min.       ______________________________________________________________________        Subjective:   Chief Complaint   Patient presents with    Follow-up     MWM-3M F/u;Waist-40in     Patient here to discuss weight associated problems and nutrition goals  HPI: Bobby Diaz  is a 74 y.o. female with history of GERD, hypertension, JORDANA, mixed hyperlipidemia, migraine headaches, excess weight.  Summary taken from note on 2024:  \"Patient previously completed the healthy core program and is enrolled in the healthy [employer] program.  She last met with Montserrat on 2023.  Healthy core start weight 185 on 2022.  Patient first met with me on 2023 then met with Skylar Johnson on 2023.  When we met in  she was inquiring if any of her medications could be working against her efforts to lose weight.  Discussed potentially switching from Lexapro to fluoxetine or sertraline.  We also discussed consideration of topiramate to help with night eating or consideration of metformin as she has impaired fasting glucose.\"  Patient was started on metformin in April.  Weight was 187 at that time.  Weight today 183.  Increased gradually to metformin xr 1000mg twice a day.  Once on the current dose she noticed significant appetitive reduction.  She is very please with its effect.    Drinks about 48-64oz of water.  Yoga (once a week), Pilates based weight training " "(once a week) and PT (just finished knee PT and now starting for shoulder impingement syndrome)  Sleep: \"very good\" 8-9 hours a night    Wt Readings from Last 10 Encounters:   06/18/24 83.3 kg (183 lb 9.6 oz)   06/11/24 83.1 kg (183 lb 3.2 oz)   06/03/24 83.5 kg (184 lb)   05/16/24 84.8 kg (187 lb)   04/04/24 85.1 kg (187 lb 9.6 oz)   01/30/24 85.3 kg (188 lb)   11/02/23 85.4 kg (188 lb 3.2 oz)   09/22/23 84.9 kg (187 lb 3.2 oz)   09/01/23 83.9 kg (185 lb)   08/16/23 86.2 kg (190 lb)     The following portions of the patient's history were reviewed and updated as appropriate: allergies, current medications, past family history, past medical history, past social history, past surgical history, and problem list.    Review Of Systems:  Review of Systems   Constitutional:  Negative for activity change, appetite change, fatigue and fever.   Respiratory:  Negative for cough and shortness of breath.    Cardiovascular:  Negative for chest pain, palpitations and leg swelling.   Gastrointestinal:  Negative for abdominal pain, constipation, diarrhea, nausea and vomiting.   Endocrine: Negative for cold intolerance and heat intolerance.   Genitourinary:  Negative for difficulty urinating and dysuria.   Musculoskeletal:  Negative for arthralgias, back pain and gait problem.   Skin:  Negative for pallor and rash.   Neurological:  Negative for headaches.   Psychiatric/Behavioral:  Negative for dysphoric mood, sleep disturbance and suicidal ideas (or HI). The patient is not nervous/anxious.        Objective:  /75   Pulse 65   Temp 97.7 °F (36.5 °C)   Resp 17   Ht 5' 3\" (1.6 m)   Wt 83.3 kg (183 lb 9.6 oz)   BMI 32.52 kg/m²   Physical Exam  Vitals and nursing note reviewed.   Constitutional:       General: She is not in acute distress.     Appearance: Normal appearance. She is not ill-appearing or diaphoretic.   Eyes:      General: No scleral icterus.  Cardiovascular:      Rate and Rhythm: Normal rate and regular rhythm. "      Pulses: Normal pulses.      Heart sounds: No murmur heard.  Pulmonary:      Effort: Pulmonary effort is normal. No respiratory distress.      Breath sounds: Normal breath sounds. No wheezing or rhonchi.   Musculoskeletal:      Cervical back: Neck supple.      Right lower leg: No edema.      Left lower leg: No edema.   Lymphadenopathy:      Cervical: No cervical adenopathy.   Skin:     Capillary Refill: Capillary refill takes less than 2 seconds.      Findings: No lesion or rash.   Neurological:      Mental Status: She is alert and oriented to person, place, and time.      Gait: Gait normal.   Psychiatric:         Mood and Affect: Mood normal.         Behavior: Behavior normal.       Labs and Imaging  Recent labs and imaging have been personally reviewed.  Lab Results   Component Value Date    WBC 9.04 06/03/2024    HGB 14.1 06/03/2024    HCT 44.3 06/03/2024    MCV 96 06/03/2024     06/03/2024     Lab Results   Component Value Date     01/05/2016    SODIUM 138 06/03/2024    K 4.4 06/03/2024     06/03/2024    CO2 28 06/03/2024    ANIONGAP 7 01/05/2016    AGAP 7 06/03/2024    BUN 25 06/03/2024    CREATININE 0.94 06/03/2024    GLUC 92 06/03/2024    GLUF 83 04/24/2024    CALCIUM 8.8 06/03/2024    AST 10 (L) 06/03/2024    ALT 12 06/03/2024    ALKPHOS 51 06/03/2024    PROT 6.3 (L) 01/05/2016    TP 6.3 (L) 06/03/2024    BILITOT 0.30 01/05/2016    TBILI 0.43 06/03/2024    EGFR 59 06/03/2024     Lab Results   Component Value Date    HGBA1C 5.9 (H) 08/11/2022     Lab Results   Component Value Date    EBR8ZKHQVYTW 2.346 04/24/2024     Lab Results   Component Value Date    CHOLESTEROL 155 04/24/2024     Lab Results   Component Value Date    HDL 49 (L) 04/24/2024     Lab Results   Component Value Date    TRIG 121 04/24/2024     Lab Results   Component Value Date    LDLCALC 82 04/24/2024

## 2024-06-18 NOTE — PATIENT INSTRUCTIONS
- Weight not at goal  - Patient is interested in Conservative Program  - Labs reviewed: As below.    General Recommendations:  Nutrition:  Eat breakfast daily.  Do not skip meals.     Food log (ie.) www.myfitnesspal.com, sparkpeople.com, loseit.com, calorieking.com, etc.    Practice mindful eating.  Be sure to set aside time to eat, eat slowly, and savor your food.    Hydration:    At least 64oz of water daily.  No sugar sweetened beverages.  No juice (eat the fruit instead).    Exercise:  Studies have shown that the ideal exercise goal is somewhere between 150 to 300 minutes of moderate intensity exercise a week.  Start with exercising 10 minutes every other day and gradually increase physical activity with a goal of at least 150 minutes of moderate intensity exercise a week, divided over at least 3 days a week.  An example of this would be exercising 30 minutes a day, 5 days a week.  Resistance training can increase muscle mass and increase our resting metabolic rate.   FULL BODY resistance training is recommended 2-3 times a week.  Do not do this on consecutive days to allow for muscle recovery.    Aim for a bare minimum 5000 steps, even on days you do not exercise.    Monitoring:   Weigh yourself daily.  If this causes undue stress, then just weigh yourself once a week.  Weigh yourself the same time of the day with the same amount of clothing on.  Preferably this should be done after waking up, before you eat, and with no clothing or minimal clothing on.    Specific Goals:  Food log (ie.) www.[employer]com,sparkpeople.com,loseit.com,[employer]com,etc.     No sugary beverages. At least 64oz of water daily.    Gradually increase physical activity to a goal of 5 days per week for 30 minutes of MODERATE intensity PLUS 2 days per week of FULL BODY resistance training    Calorie goal:  5886-7307 calories a day    Continue with metformin 1000mg twice a day    Weight check in 2 months.    Follow-up in 4 months.

## 2024-06-25 DIAGNOSIS — I10 PRIMARY HYPERTENSION: ICD-10-CM

## 2024-06-26 ENCOUNTER — HOSPITAL ENCOUNTER (OUTPATIENT)
Dept: RADIOLOGY | Age: 74
Discharge: HOME | End: 2024-06-26
Payer: MEDICARE

## 2024-06-26 VITALS — HEIGHT: 63 IN | WEIGHT: 182 LBS | BODY MASS INDEX: 32.25 KG/M2

## 2024-06-26 DIAGNOSIS — Z12.31 ENCOUNTER FOR SCREENING MAMMOGRAM FOR MALIGNANT NEOPLASM OF BREAST: ICD-10-CM

## 2024-06-26 PROCEDURE — 77067 SCR MAMMO BI INCL CAD: CPT

## 2024-06-26 PROCEDURE — 77063 BREAST TOMOSYNTHESIS BI: CPT

## 2024-06-26 RX ORDER — AMLODIPINE BESYLATE 5 MG/1
5 TABLET ORAL DAILY
Qty: 90 TABLET | Refills: 1 | Status: SHIPPED | OUTPATIENT
Start: 2024-06-26

## 2024-07-07 DIAGNOSIS — K21.9 GASTROESOPHAGEAL REFLUX DISEASE, UNSPECIFIED WHETHER ESOPHAGITIS PRESENT: ICD-10-CM

## 2024-07-09 RX ORDER — FAMOTIDINE 20 MG/1
20 TABLET, FILM COATED ORAL
Qty: 100 TABLET | Refills: 1 | Status: SHIPPED | OUTPATIENT
Start: 2024-07-09

## 2024-08-06 ENCOUNTER — OFFICE VISIT (OUTPATIENT)
Dept: INTERNAL MEDICINE CLINIC | Facility: CLINIC | Age: 74
End: 2024-08-06
Payer: MEDICARE

## 2024-08-06 VITALS
BODY MASS INDEX: 32.96 KG/M2 | HEIGHT: 63 IN | HEART RATE: 58 BPM | OXYGEN SATURATION: 98 % | DIASTOLIC BLOOD PRESSURE: 68 MMHG | SYSTOLIC BLOOD PRESSURE: 120 MMHG | RESPIRATION RATE: 18 BRPM | WEIGHT: 186 LBS

## 2024-08-06 DIAGNOSIS — M25.512 CHRONIC PAIN OF BOTH SHOULDERS: Primary | ICD-10-CM

## 2024-08-06 DIAGNOSIS — F33.42 RECURRENT MAJOR DEPRESSIVE DISORDER, IN FULL REMISSION (HCC): ICD-10-CM

## 2024-08-06 DIAGNOSIS — K44.9 HIATAL HERNIA: ICD-10-CM

## 2024-08-06 DIAGNOSIS — K21.9 GASTROESOPHAGEAL REFLUX DISEASE, UNSPECIFIED WHETHER ESOPHAGITIS PRESENT: ICD-10-CM

## 2024-08-06 DIAGNOSIS — I10 PRIMARY HYPERTENSION: ICD-10-CM

## 2024-08-06 DIAGNOSIS — G89.29 CHRONIC PAIN OF BOTH SHOULDERS: Primary | ICD-10-CM

## 2024-08-06 DIAGNOSIS — E78.2 MIXED HYPERLIPIDEMIA: ICD-10-CM

## 2024-08-06 DIAGNOSIS — R73.03 PREDIABETES: ICD-10-CM

## 2024-08-06 DIAGNOSIS — M25.511 CHRONIC PAIN OF BOTH SHOULDERS: Primary | ICD-10-CM

## 2024-08-06 PROBLEM — J06.9 UPPER RESPIRATORY TRACT INFECTION: Status: RESOLVED | Noted: 2022-09-01 | Resolved: 2024-08-06

## 2024-08-06 PROBLEM — S46.009A INJURY OF TENDON OF ROTATOR CUFF: Status: RESOLVED | Noted: 2018-08-22 | Resolved: 2024-08-06

## 2024-08-06 PROCEDURE — G2211 COMPLEX E/M VISIT ADD ON: HCPCS | Performed by: INTERNAL MEDICINE

## 2024-08-06 PROCEDURE — 99214 OFFICE O/P EST MOD 30 MIN: CPT | Performed by: INTERNAL MEDICINE

## 2024-08-06 RX ORDER — PANTOPRAZOLE SODIUM 40 MG/1
40 TABLET, DELAYED RELEASE ORAL DAILY
Qty: 90 TABLET | Refills: 1 | Status: SHIPPED | OUTPATIENT
Start: 2024-08-06

## 2024-08-06 NOTE — ASSESSMENT & PLAN NOTE
-benign, normotensive  -continue amlodipine 5mg daily, lisinopril 20mg daily and pindolol 5mg daily

## 2024-08-06 NOTE — ASSESSMENT & PLAN NOTE
-with known hiatal hernia  -pt also on eliquis  -recommend continuing pantoprazole 40mg daily, med prescribed

## 2024-08-06 NOTE — ASSESSMENT & PLAN NOTE
-started on metformin by weight loss management but discontinued due to diarrhea  -recheck A1c  -adhere to low glycemic diet

## 2024-08-13 ENCOUNTER — OFFICE VISIT (OUTPATIENT)
Dept: BARIATRICS | Facility: CLINIC | Age: 74
End: 2024-08-13
Payer: MEDICARE

## 2024-08-13 VITALS
TEMPERATURE: 97.8 F | WEIGHT: 184.6 LBS | HEIGHT: 63 IN | BODY MASS INDEX: 32.71 KG/M2 | DIASTOLIC BLOOD PRESSURE: 78 MMHG | SYSTOLIC BLOOD PRESSURE: 124 MMHG | HEART RATE: 64 BPM | RESPIRATION RATE: 17 BRPM

## 2024-08-13 DIAGNOSIS — E66.9 OBESITY (BMI 30.0-34.9): ICD-10-CM

## 2024-08-13 DIAGNOSIS — E66.9 OBESITY (BMI 30.0-34.9): Primary | ICD-10-CM

## 2024-08-13 PROCEDURE — G2211 COMPLEX E/M VISIT ADD ON: HCPCS | Performed by: INTERNAL MEDICINE

## 2024-08-13 PROCEDURE — 99213 OFFICE O/P EST LOW 20 MIN: CPT | Performed by: INTERNAL MEDICINE

## 2024-08-13 RX ORDER — TIRZEPATIDE 2.5 MG/.5ML
2.5 INJECTION, SOLUTION SUBCUTANEOUS WEEKLY
Qty: 2 ML | Refills: 0 | Status: SHIPPED | OUTPATIENT
Start: 2024-08-13 | End: 2024-08-13 | Stop reason: SDUPTHER

## 2024-08-13 RX ORDER — TIRZEPATIDE 2.5 MG/.5ML
2.5 INJECTION, SOLUTION SUBCUTANEOUS WEEKLY
Qty: 2 ML | Refills: 0 | Status: SHIPPED | OUTPATIENT
Start: 2024-08-13 | End: 2024-09-10

## 2024-08-13 NOTE — PATIENT INSTRUCTIONS
- Weight not at goal  - Patient is interested in Conservative Program  - Labs reviewed: As below.    General Recommendations:  Nutrition:  Eat breakfast daily.  Do not skip meals.     Food log (ie.) www.myfitnesspal.com, sparkpeople.com, loseit.com, calorieking.com, etc.    Practice mindful eating.  Be sure to set aside time to eat, eat slowly, and savor your food.    Hydration:    At least 64oz of water daily.  No sugar sweetened beverages.  No juice (eat the fruit instead).    Exercise:  Studies have shown that the ideal exercise goal is somewhere between 150 to 300 minutes of moderate intensity exercise a week.  Start with exercising 10 minutes every other day and gradually increase physical activity with a goal of at least 150 minutes of moderate intensity exercise a week, divided over at least 3 days a week.  An example of this would be exercising 30 minutes a day, 5 days a week.  Resistance training can increase muscle mass and increase our resting metabolic rate.   FULL BODY resistance training is recommended 2-3 times a week.  Do not do this on consecutive days to allow for muscle recovery.    Aim for a bare minimum 5000 steps, even on days you do not exercise.    Monitoring:   Weigh yourself daily.  If this causes undue stress, then just weigh yourself once a week.  Weigh yourself the same time of the day with the same amount of clothing on.  Preferably this should be done after waking up, before you eat, and with no clothing or minimal clothing on.    Specific Goals:  Food log (ie.) www.myfitnesspal.com,sparkpeople.com,loseit.com,[employer]com,etc.     No sugary beverages. At least 64oz of water daily.    Increase physical activity by 10 minutes daily    START ZEPBOUND  I have sent Zebound to your pharmacy. The prior authorization process will been done through our prior authorization team and can take up to 3 weeks to process through the insurance.      - Start Zepbound 2.5 mg subcutaneously injection  weekly.  You will need a nurse visit in 4 weeks.  If you are doing well at that time the dose will be increased to Zepbound 5mg weekly.     - Side effects of Zepbound include nausea, vomiting, diarrhea, or constipation. Keep an eye on your heart rate while on Zepbound. If you resting heart rate is greater than 100 beats per minutes, please notify me. If you develop severe abdominal pain, stop Zepbound and go to the emergency room, as that could be a sign of pancreatitis.      - Please notify me if you have surgery, upper endoscopy, or colonoscopy scheduled, as we typically hold Zepbound for one week prior to the procedure.   - Zepbound can reduce the effectiveness of oral hormonal birth control (birth control pills). Recommend a barrier backup method such as condoms to prevent pregnancy.       Nurse visit in 4 weeks.    Follow-up in 4 months.

## 2024-08-13 NOTE — PROGRESS NOTES
Assessment/Plan:  Doris was seen today for follow-up.    Diagnoses and all orders for this visit:    Obesity (BMI 30.0-34.9)  -     tirzepatide (Zepbound) 2.5 mg/0.5 mL auto-injector; Inject 0.5 mL (2.5 mg total) under the skin once a week for 28 days      - Weight not at goal  - Patient is interested in Conservative Program  - Labs reviewed: As below.    General Recommendations:  Nutrition:  Eat breakfast daily.  Do not skip meals.     Food log (ie.) www.myfitnesspal.com, sparkpeople.com, loseit.com, calorieking.com, etc.    Practice mindful eating.  Be sure to set aside time to eat, eat slowly, and savor your food.    Hydration:    At least 64oz of water daily.  No sugar sweetened beverages.  No juice (eat the fruit instead).    Exercise:  Studies have shown that the ideal exercise goal is somewhere between 150 to 300 minutes of moderate intensity exercise a week.  Start with exercising 10 minutes every other day and gradually increase physical activity with a goal of at least 150 minutes of moderate intensity exercise a week, divided over at least 3 days a week.  An example of this would be exercising 30 minutes a day, 5 days a week.  Resistance training can increase muscle mass and increase our resting metabolic rate.   FULL BODY resistance training is recommended 2-3 times a week.  Do not do this on consecutive days to allow for muscle recovery.    Aim for a bare minimum 5000 steps, even on days you do not exercise.    Monitoring:   Weigh yourself daily.  If this causes undue stress, then just weigh yourself once a week.  Weigh yourself the same time of the day with the same amount of clothing on.  Preferably this should be done after waking up, before you eat, and with no clothing or minimal clothing on.    Specific Goals:  Food log (ie.) www.myfitnesspal.com,sparkpeople.com,loseit.com,calorieking.com,etc.     No sugary beverages. At least 64oz of water daily.    Increase physical activity by 10 minutes  daily    START ZEPBOUND  I have sent Zebound to your pharmacy. The prior authorization process will been done through our prior authorization team and can take up to 3 weeks to process through the insurance.      - Start Zepbound 2.5 mg subcutaneously injection weekly.  You will need a nurse visit in 4 weeks.  If you are doing well at that time the dose will be increased to Zepbound 5mg weekly.     - Side effects of Zepbound include nausea, vomiting, diarrhea, or constipation. Keep an eye on your heart rate while on Zepbound. If you resting heart rate is greater than 100 beats per minutes, please notify me. If you develop severe abdominal pain, stop Zepbound and go to the emergency room, as that could be a sign of pancreatitis.      - Please notify me if you have surgery, upper endoscopy, or colonoscopy scheduled, as we typically hold Zepbound for one week prior to the procedure.   - Zepbound can reduce the effectiveness of oral hormonal birth control (birth control pills). Recommend a barrier backup method such as condoms to prevent pregnancy.       Nurse visit in 4 weeks.    Follow-up in 4 months    Total time spent reviewing chart, interviewing patient, examining patient, discussing plan, answering all questions, and documentin min.       ______________________________________________________________________    Subjective:   Chief Complaint   Patient presents with    Follow-up     MWM 4M F/u; Waist 41in     Patient here to discuss weight associated problems and nutrition goals  HPI: Bobby Diaz  is a 74 y.o. female with history of GERD, hypertension, hyperlipidemia, JORDANA, migraine headaches, and excess weight.  Weight loss plan:  Conservative Program.   Most recent notes and records were reviewed.    Wt Readings from Last 10 Encounters:   24 83.7 kg (184 lb 9.6 oz)   24 84.4 kg (186 lb)   24 82.6 kg (182 lb)   24 83.3 kg (183 lb 9.6 oz)   24 83.1 kg (183 lb 3.2 oz)  "  06/03/24 83.5 kg (184 lb)   05/16/24 84.8 kg (187 lb)   04/04/24 85.1 kg (187 lb 9.6 oz)   01/30/24 85.3 kg (188 lb)   11/02/23 85.4 kg (188 lb 3.2 oz)     Summary taken from note on 4/4/2024:  \"Patient previously completed the healthy core program and is enrolled in the [facility] program.  She last met with Montserrat on 9/22/2023.  Healthy core start weight 185 on 9/28/2022.  Patient first met with me on 6/26/2023 then met with Skylar Colon on 11/2/2023.  When we met in June she was inquiring if any of her medications could be working against her efforts to lose weight.  Discussed potentially switching from Lexapro to fluoxetine or sertraline.  We also discussed consideration of topiramate to help with night eating or consideration of metformin as she has impaired fasting glucose.\"  Patient was started on metformin in April.  Weight was 187 at that time.  Weight today 183.  Increased gradually to metformin xr 1000mg twice a day.  Once on the current dose she noticed significant appetitive reduction.  She is very please with its effect.\"  Reports that she developed diarrhea and ended up stopping the metformin.      She has a history of seizure disorder therefore is not a candidate for bupropion or Contrave.  Caution must be used with topiramate.  Remain contraindicated due to history of atrial fibrillation.     Hydration: 48-64oz of water.  Exercise:  Yoga (once a week), Pilates based weight training (once a week)  Sleep: Good  Weight Monitoring: Yes      Patient denies personal and family history of  pancreatitis, thyroid cancer, MEN-2 tumors.    The following portions of the patient's history were reviewed and updated as appropriate: allergies, current medications, past family history, past medical history, past social history, past surgical history, and problem list.    Review Of Systems:  Review of Systems   Constitutional:  Negative for activity change, appetite change, fatigue and fever.   Respiratory:  " "Negative for cough and shortness of breath.    Cardiovascular:  Negative for chest pain, palpitations and leg swelling.   Gastrointestinal:  Negative for abdominal pain, constipation, diarrhea, nausea and vomiting.   Endocrine: Negative for cold intolerance and heat intolerance.   Genitourinary:  Negative for difficulty urinating and dysuria.   Musculoskeletal:  Negative for arthralgias, back pain and gait problem.   Skin:  Negative for pallor and rash.   Neurological:  Negative for headaches.   Psychiatric/Behavioral:  Negative for dysphoric mood, sleep disturbance and suicidal ideas (or HI). The patient is not nervous/anxious.        Objective:  /78 (BP Location: Left arm, Patient Position: Sitting)   Pulse 64   Temp 97.8 °F (36.6 °C) (Tympanic)   Resp 17   Ht 5' 3\" (1.6 m)   Wt 83.7 kg (184 lb 9.6 oz)   BMI 32.70 kg/m²   Physical Exam  Vitals and nursing note reviewed.   Constitutional:       General: She is not in acute distress.     Appearance: Normal appearance. She is not ill-appearing or diaphoretic.   Eyes:      General: No scleral icterus.  Cardiovascular:      Rate and Rhythm: Normal rate and regular rhythm.      Pulses: Normal pulses.      Heart sounds: No murmur heard.  Pulmonary:      Effort: Pulmonary effort is normal. No respiratory distress.      Breath sounds: Normal breath sounds. No wheezing or rhonchi.   Abdominal:      General: Bowel sounds are normal. There is no distension.      Palpations: Abdomen is soft. There is no mass.      Tenderness: There is no abdominal tenderness.   Musculoskeletal:      Cervical back: Neck supple.      Right lower leg: No edema.      Left lower leg: No edema.   Lymphadenopathy:      Cervical: No cervical adenopathy.   Skin:     Capillary Refill: Capillary refill takes less than 2 seconds.      Findings: No lesion or rash.   Neurological:      Mental Status: She is alert and oriented to person, place, and time.      Gait: Gait normal.   Psychiatric:    "      Mood and Affect: Mood normal.         Behavior: Behavior normal.         Labs and Imaging  Recent labs and imaging have been personally reviewed.  Lab Results   Component Value Date    WBC 9.04 06/03/2024    HGB 14.1 06/03/2024    HCT 44.3 06/03/2024    MCV 96 06/03/2024     06/03/2024     Lab Results   Component Value Date     01/05/2016    SODIUM 138 06/03/2024    K 4.4 06/03/2024     06/03/2024    CO2 28 06/03/2024    ANIONGAP 7 01/05/2016    AGAP 7 06/03/2024    BUN 25 06/03/2024    CREATININE 0.94 06/03/2024    GLUC 92 06/03/2024    GLUF 83 04/24/2024    CALCIUM 8.8 06/03/2024    AST 10 (L) 06/03/2024    ALT 12 06/03/2024    ALKPHOS 51 06/03/2024    PROT 6.3 (L) 01/05/2016    TP 6.3 (L) 06/03/2024    BILITOT 0.30 01/05/2016    TBILI 0.43 06/03/2024    EGFR 59 06/03/2024     Lab Results   Component Value Date    HGBA1C 5.9 (H) 08/11/2022     Lab Results   Component Value Date    OIV7RRBYAPVT 2.346 04/24/2024     Lab Results   Component Value Date    CHOLESTEROL 155 04/24/2024     Lab Results   Component Value Date    HDL 49 (L) 04/24/2024     Lab Results   Component Value Date    TRIG 121 04/24/2024     Lab Results   Component Value Date    LDLCALC 82 04/24/2024

## 2024-08-14 ENCOUNTER — TELEPHONE (OUTPATIENT)
Dept: BARIATRICS | Facility: CLINIC | Age: 74
End: 2024-08-14

## 2024-08-14 NOTE — TELEPHONE ENCOUNTER
PA for Zepbound 2.5 mg EXCLUDED from plan       Reason:(Screenshot if applicable)    Not covered for weight loss mediations    Message sent to office clinical pool Yes

## 2024-08-14 NOTE — TELEPHONE ENCOUNTER
PA for Zepbound 2.5 mg SUBMITTED     via    [x]CMM-KEY: [id number]  [][employer]-Case ID #    []Faxed to plan   []Other website    []Phone call Case ID #      Office notes sent, clinical questions answered. Awaiting determination    Turnaround time for your insurance to make a decision on your Prior Authorization can take 7-21 business days.

## 2024-09-10 ENCOUNTER — CLINICAL SUPPORT (OUTPATIENT)
Dept: BARIATRICS | Facility: CLINIC | Age: 74
End: 2024-09-10

## 2024-09-10 VITALS
HEART RATE: 65 BPM | SYSTOLIC BLOOD PRESSURE: 138 MMHG | HEIGHT: 63 IN | DIASTOLIC BLOOD PRESSURE: 72 MMHG | WEIGHT: 188.6 LBS | TEMPERATURE: 98 F | BODY MASS INDEX: 33.42 KG/M2 | RESPIRATION RATE: 17 BRPM

## 2024-09-10 DIAGNOSIS — R63.5 ABNORMAL WEIGHT GAIN: Primary | ICD-10-CM

## 2024-09-10 PROCEDURE — RECHECK

## 2024-09-10 NOTE — PROGRESS NOTES
Patient last visit weight: 184lbs   Patient current visit weight: 188.6lbs     If you are taking phentermine or other oral weight loss medications, are you experiencing any of the following symptoms:  Headache:   Blurred Vision:   Chest Pain:   Palpitations:  Insomnia:   SPECIFY ORAL MEDICATION AND DOSAGE:     If you are taking an injectable medication,  are you experiencing any of the following symptoms:  Bloating:   Nausea:  Vomiting:   Constipation:   Diarrhea:  SPECIFY INJECTABLE MEDICATION AND CURRENT DOSAGE:     Patient has not started any weight loss medication. Zepbound does not cover under her medicare plan. Patient states she was doing research and [facility] can get it to her for $300 instead of $900. I advised pt that I would ask provider regarding sending script to [facility] as the office has run into issues in the past with them sending medication to the office. Please advise.       Vitals:    Is BP less than 100/60? NO   Is BP greater than 140/90? NO   Is HR greater than 100? NO   **If yes to any of the above, have patient relax and repeat in 5-10 minutes**    Repeat values:    Is BP less than 100/60?  Is BP greater than 140/90?  Is HR greater than 100?  **If values remain outside of ranges above, please consult provider for next steps**

## 2024-09-11 ENCOUNTER — PATIENT MESSAGE (OUTPATIENT)
Dept: BARIATRICS | Facility: CLINIC | Age: 74
End: 2024-09-11

## 2024-09-11 DIAGNOSIS — E66.9 OBESITY (BMI 30.0-34.9): Primary | ICD-10-CM

## 2024-09-11 RX ORDER — TIRZEPATIDE 2.5 MG/.5ML
2.5 INJECTION, SOLUTION SUBCUTANEOUS WEEKLY
Qty: 2 ML | Refills: 0 | Status: SHIPPED | OUTPATIENT
Start: 2024-09-11 | End: 2024-10-09

## 2024-09-25 ENCOUNTER — OFFICE VISIT (OUTPATIENT)
Dept: BARIATRICS | Facility: CLINIC | Age: 74
End: 2024-09-25
Payer: MEDICARE

## 2024-09-25 VITALS
RESPIRATION RATE: 16 BRPM | BODY MASS INDEX: 33.52 KG/M2 | DIASTOLIC BLOOD PRESSURE: 70 MMHG | SYSTOLIC BLOOD PRESSURE: 124 MMHG | HEART RATE: 61 BPM | WEIGHT: 189.2 LBS | HEIGHT: 63 IN | TEMPERATURE: 98.2 F

## 2024-09-25 DIAGNOSIS — E66.9 OBESITY (BMI 30.0-34.9): Primary | ICD-10-CM

## 2024-09-25 DIAGNOSIS — E66.811 OBESITY (BMI 30.0-34.9): Primary | ICD-10-CM

## 2024-09-25 PROCEDURE — 99212 OFFICE O/P EST SF 10 MIN: CPT | Performed by: PHYSICIAN ASSISTANT

## 2024-09-25 RX ORDER — FLUOXETINE 40 MG/1
CAPSULE ORAL
COMMUNITY
Start: 2024-08-21

## 2024-09-25 NOTE — ASSESSMENT & PLAN NOTE
Doris  is here today for Zepbound Vial/Syringe training.   Injection training provided today  Discussed side effects of medication  Continue Zepbound Vial 2.5mg weekly x 4 weeks  Advised her to contact office in about 2 weeks with update  If she is feeling well with no side effects will send RX for Zepbound Vial 5mg to [facility]  Advised her to let us know if she is having some side effects we can continue with the 2.5mg dose a little longer.   Followup 12/24 as scheduled.

## 2024-09-25 NOTE — PROGRESS NOTES
Problem List Items Addressed This Visit          Other    Obesity (BMI 30.0-34.9) - Primary     Doris  is here today for Zepbound Vial/Syringe training.   Injection training provided today  Discussed side effects of medication  Continue Zepbound Vial 2.5mg weekly x 4 weeks  Advised her to contact office in about 2 weeks with update  If she is feeling well with no side effects will send RX for Zepbound Vial 5mg to [facility]  Advised her to let us know if she is having some side effects we can continue with the 2.5mg dose a little longer.   Followup 12/24 as scheduled.                  Vitals:    09/25/24 0828   BP: 124/70   Pulse: 61   Resp: 16   Temp: 98.2 °F (36.8 °C)      Zepbound 2.5mg/0.5ml Vial brought to office by patient including 0.5ml syringe with needle, alcohol pads, and gauze.  Injection training provided, patient was able to self inject SQ on her Right Upper Abdomen with 0.5ml   Medication   LOT [id number]  [id number]  EXP May 7th, 2026    I have spent a total time of 15 minutes in caring for this patient on the day of the visit/encounter including Instructions for management, Patient and family education, Counseling / Coordination of care, and Documenting in the medical record.

## 2024-10-02 DIAGNOSIS — E66.811 OBESITY (BMI 30.0-34.9): Primary | ICD-10-CM

## 2024-10-02 RX ORDER — TIRZEPATIDE 5 MG/.5ML
5 INJECTION, SOLUTION SUBCUTANEOUS WEEKLY
Qty: 2 ML | Refills: 3 | Status: SHIPPED | OUTPATIENT
Start: 2024-10-02

## 2024-11-20 DIAGNOSIS — I10 PRIMARY HYPERTENSION: ICD-10-CM

## 2024-11-21 RX ORDER — AMLODIPINE BESYLATE 5 MG/1
5 TABLET ORAL DAILY
Qty: 90 TABLET | Refills: 1 | Status: SHIPPED | OUTPATIENT
Start: 2024-11-21

## 2024-12-02 DIAGNOSIS — I10 BENIGN ESSENTIAL HYPERTENSION: ICD-10-CM

## 2024-12-04 RX ORDER — LISINOPRIL 20 MG/1
20 TABLET ORAL DAILY
Qty: 90 TABLET | Refills: 1 | Status: SHIPPED | OUTPATIENT
Start: 2024-12-04

## 2024-12-17 ENCOUNTER — TELEPHONE (OUTPATIENT)
Age: 74
End: 2024-12-17

## 2024-12-18 DIAGNOSIS — J06.9 UPPER RESPIRATORY TRACT INFECTION, UNSPECIFIED TYPE: ICD-10-CM

## 2024-12-18 RX ORDER — CETIRIZINE HYDROCHLORIDE 10 MG/1
10 TABLET ORAL DAILY
Qty: 100 TABLET | Refills: 1 | Status: SHIPPED | OUTPATIENT
Start: 2024-12-18

## 2024-12-18 NOTE — TELEPHONE ENCOUNTER
Reason for call:   [x] Refill   [] Prior Auth  [x] Other: Dr Truong to take over refills    Office:   [x] PCP/Provider - Carrol Truong/ [facility]   [] Specialty/Provider -     Medication: cetirizine (ZyrTEC) 10 mg tablet     Dose/Frequency: Take 1 tablet (10 mg total) by mouth daily,     Quantity: 100    Pharmacy: [facility]/pharmacy #[id number] [facility] [address] [place] [address]     Does the patient have enough for 3 days?   [] Yes   [x] No - Send as HP to POD

## 2025-01-07 ENCOUNTER — OFFICE VISIT (OUTPATIENT)
Dept: BARIATRICS | Facility: CLINIC | Age: 75
End: 2025-01-07
Payer: MEDICARE

## 2025-01-07 VITALS
DIASTOLIC BLOOD PRESSURE: 76 MMHG | RESPIRATION RATE: 16 BRPM | SYSTOLIC BLOOD PRESSURE: 122 MMHG | BODY MASS INDEX: 30.9 KG/M2 | HEIGHT: 63 IN | WEIGHT: 174.4 LBS | HEART RATE: 70 BPM | TEMPERATURE: 97.5 F

## 2025-01-07 DIAGNOSIS — G47.33 OBSTRUCTIVE SLEEP APNEA SYNDROME: ICD-10-CM

## 2025-01-07 DIAGNOSIS — I48.0 PAROXYSMAL ATRIAL FIBRILLATION (HCC): ICD-10-CM

## 2025-01-07 DIAGNOSIS — I10 PRIMARY HYPERTENSION: ICD-10-CM

## 2025-01-07 DIAGNOSIS — E66.811 OBESITY (BMI 30.0-34.9): Primary | ICD-10-CM

## 2025-01-07 PROCEDURE — 99214 OFFICE O/P EST MOD 30 MIN: CPT | Performed by: PHYSICIAN ASSISTANT

## 2025-01-07 RX ORDER — TIRZEPATIDE 5 MG/.5ML
5 INJECTION, SOLUTION SUBCUTANEOUS WEEKLY
Qty: 2 ML | Refills: 1 | Status: SHIPPED | OUTPATIENT
Start: 2025-01-07 | End: 2025-03-04

## 2025-01-07 NOTE — ASSESSMENT & PLAN NOTE
- Patient is pursuing Conservative Program with bundles with dietician. Previously completed Healthy CORE and Healthy Ways.  - Initial weight loss goal of 5-10% weight loss for improved health    To continue on zepbound 5mg.  Currently doing yesy patient assistance.  The medication is approved for JORDANA and she does have JORDANA.  Recommend using the medication for treatment of sleep apnea  -Not a candidate for Wellbutrin or Contrave due to history of seizure disorder.    -Not a candidate for phentermine due to history of A-fib.  - Labs reviewed: CMP 6/3/24 and protein a little low. Discussed increasing.       Initial Healthy CORE: 185.3 lbs  Last visit: 188.1 lbs BMI 32.30  Current: 174  Change: -14.1LB from last visit      Goals:  Recommend protein with breakfast  Discussed starting more regular exercise.  Could consider going with her .

## 2025-01-07 NOTE — PROGRESS NOTES
Assessment/Plan:    Obesity (BMI 30.0-34.9)  - Patient is pursuing Conservative Program with bundles with dietician. Previously completed Healthy CORE and Healthy Ways.  - Initial weight loss goal of 5-10% weight loss for improved health    To continue on zepbound 5mg.  Currently doing yesy patient assistance.  The medication is approved for JORDANA and she does have JORDANA.  Recommend using the medication for treatment of sleep apnea  -Not a candidate for Wellbutrin or Contrave due to history of seizure disorder.    -Not a candidate for phentermine due to history of A-fib.  - Labs reviewed: CMP 6/3/24 and protein a little low. Discussed increasing.       Initial Healthy CORE: 185.3 lbs  Last visit: 188.1 lbs BMI 32.30  Current: 174  Change: -14.1LB from last visit      Goals:  Recommend protein with breakfast  Discussed starting more regular exercise.  Could consider going with her .      Obstructive sleep apnea syndrome  She was prescribed CPAP but does not tolerate it. To continue on zepbound to treat JORDANA.      Hypertension  On norvasc, lisinopril and pindolol        Return in about 6 months (around 7/7/2025) for keep apt in march also .     Diagnoses and all orders for this visit:    Obesity (BMI 30.0-34.9)  -     tirzepatide (Zepbound) 5 mg/0.5 mL auto-injector; Inject 0.5 mL (5 mg total) under the skin once a week    Obstructive sleep apnea syndrome  -     tirzepatide (Zepbound) 5 mg/0.5 mL auto-injector; Inject 0.5 mL (5 mg total) under the skin once a week    Paroxysmal atrial fibrillation (HCC)    Primary hypertension          Subjective:   Chief Complaint   Patient presents with    Follow-up     Mwm f/u4M: waist:40in        Patient ID: Bobby Diaz  is a 74 y.o. female with excess weight/obesity here to pursue weight managment.  Patient is pursuing Conservative Program.     HPI  On zepbound 5mg.  She does feel the medication is helpful.  She will use stool softner or metamacil if needed. She has had  some nausea.  It is usually on injection day.  She feels portion may contribute.     Wt Readings from Last 10 Encounters:   01/07/25 79.1 kg (174 lb 6.4 oz)   09/25/24 85.8 kg (189 lb 3.2 oz)   09/10/24 85.5 kg (188 lb 9.6 oz)   08/13/24 83.7 kg (184 lb 9.6 oz)   08/06/24 84.4 kg (186 lb)   06/26/24 82.6 kg (182 lb)   06/18/24 83.3 kg (183 lb 9.6 oz)   06/11/24 83.1 kg (183 lb 3.2 oz)   06/03/24 83.5 kg (184 lb)   05/16/24 84.8 kg (187 lb)       Food logging:  Increased appetite/cravings:controlled  Exercise:currently not as regular due to caring for her  and a friend.    Hydration:coffee w/creamer, icee flavored water (at least 40 oz)  Cravings: sometimes chocolate    Diet recall:  B: bagel plain or with butter  D: protein (chicken/fish), salad (skips sometimes )and vegetable, starch(skips sometimes )    The following portions of the patient's history were reviewed and updated as appropriate: She  has a past medical history of Acid reflux disease, Acute bronchitis (09/07/2022), Allergic, Allergies, Anxiety, Arthralgia, Arthritis, Atrial fibrillation (HCC), Chronic interstitial cystitis, Chronic pain disorder, Colitis (8/9/2023), Colon polyp, Coronary artery disease (afib/ Nov. 2018), Coronary vasospasm (HCC) (5/5/2020), Depression, GERD (gastroesophageal reflux disease), Headache(784.0), Hyperlipidemia, Hypertension, Injury of tendon of rotator cuff (08/22/2018), Irregular heart beat, Lichen sclerosus, Lipoma, Myalgia (03/16/2016), Obesity, Osteopenia, Paresthesia of both hands (08/06/2019), Seizures (HCC), Shortness of breath, Simple partial seizures (HCC), Sleep apnea, Tachycardia, and Upper respiratory tract infection (09/01/2022).  She   Patient Active Problem List    Diagnosis Date Noted    Chronic pain of both shoulders 08/06/2024    Prediabetes 06/26/2023    Nausea 04/04/2022    Hiatal hernia 04/04/2022    Gastroesophageal reflux disease 02/14/2022    History of hysterectomy 02/14/2022     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, intractable, without status epilepticus (HCC)     Dyspepsia 09/24/2020    Obstructive sleep apnea syndrome 11/26/2019    Decreased hearing 09/04/2019    Carpal tunnel syndrome, bilateral     Obesity (BMI 30.0-34.9) 08/06/2019    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)     Current use of long term anticoagulation 02/07/2019    Simple partial seizure with somatosensory or special sensory dysfunction (HCC) 02/06/2019    Paroxysmal atrial fibrillation (HCC) 12/04/2018    Seizure disorder (HCC) 11/11/2018    Anxiety 11/11/2018    Esophagitis 11/11/2018    Right hip pain 08/22/2018    Trochanteric bursitis 08/22/2018    Arthritis 07/24/2018    Lichen sclerosus 10/02/2017    Cervicocranial syndrome 04/05/2017    Chronic migraine without aura without status migrainosus, not intractable 04/05/2017    Complex partial seizures with consciousness impaired (HCC) 04/05/2017    Cervical spondylosis 02/08/2017    Post-menopausal atrophic vaginitis 02/24/2014    Symptomatic menopausal or female climacteric states 02/24/2014    Lipoma 01/02/2014    Hyperlipidemia 08/20/2012    Hypertension 08/20/2012    Recurrent major depressive disorder, in full remission ([facility]) 07/26/2012     She  has a past surgical history that includes Hysterectomy; Shoulder surgery (Left); Cosmetic surgery; Oophorectomy; Colonoscopy (2020); Reduction mammaplasty; Rhinoplasty; Abdominoplasty; Facelift; Blepharoplasty; pr rhytidectomy neck w/platysmal tightening (N/A, 12/19/2022); Appendectomy; and Fracture surgery.  Her family history includes Breast cancer in her paternal grandmother; Cancer in her brother and father; Hypertension in her brother, father, and mother; Lung cancer in her brother and father; No Known Problems in her maternal aunt, maternal aunt, maternal aunt, maternal grandfather, maternal grandmother, paternal  aunt, paternal aunt, paternal aunt, paternal grandfather, and sister; Stroke in her mother.  She  reports that she has never smoked. She has never used smokeless tobacco. She reports current alcohol use of about 1.0 standard drink of alcohol per week. She reports that she does not use drugs.  Current Outpatient Medications   Medication Sig Dispense Refill    amLODIPine (NORVASC) 5 mg tablet TAKE 1 TABLET BY MOUTH DAILY 90 tablet 1    apixaban (ELIQUIS) 5 mg 2 (two) times a day      atorvastatin (LIPITOR) 40 mg tablet Take 1 tablet (40 mg total) by mouth daily 90 tablet 0    AYR SALINE NASAL NA into each nostril 2 (two) times a day      Boswellia-Glucosamine-Vit D (OSTEO BI-FLEX ONE PER DAY PO) Take 1 capsule by mouth in the morning With turmeric      busPIRone (BUSPAR) 15 mg tablet Take 15 mg by mouth 2 (two) times a day      calcium citrate-Vitamin D 200 mg-250 units Take 1 tablet by mouth daily with breakfast      cetirizine (ZyrTEC) 10 mg tablet Take 1 tablet (10 mg total) by mouth daily 100 tablet 1    divalproex sodium (DEPAKOTE) 500 mg EC tablet       flecainide (TAMBOCOR) 100 mg tablet Take 100 mg by mouth 2 (two) times a day      FLUoxetine (PROzac) 40 MG capsule       lisinopril (ZESTRIL) 20 mg tablet TAKE 1 TABLET BY MOUTH DAILY 90 tablet 1    pantoprazole (PROTONIX) 40 mg tablet Take 1 tablet (40 mg total) by mouth daily 90 tablet 1    pindolol (VISKEN) 5 mg tablet Take 1 tablet (5 mg total) by mouth daily 90 tablet 3    tirzepatide (Zepbound) 5 mg/0.5 mL auto-injector Inject 0.5 mL (5 mg total) under the skin once a week 2 mL 1    Tirzepatide-Weight Management (Zepbound) 5 MG/0.5ML SOLN Inject 5 mg under the skin once a week Inject 5mg  (0.5ml) weekly 2 mL 3    divalproex sodium (Depakote) 500 mg DR tablet  (Patient not taking: Reported on 9/25/2024)      FLUoxetine (PROzac) 20 mg capsule TAKE 1 CAPSULE BY MOUTH EVERY DAY IN THE MORNING (Patient not taking: Reported on 9/25/2024)       No current  facility-administered medications for this visit.     Current Outpatient Medications on File Prior to Visit   Medication Sig    amLODIPine (NORVASC) 5 mg tablet TAKE 1 TABLET BY MOUTH DAILY    apixaban (ELIQUIS) 5 mg 2 (two) times a day    atorvastatin (LIPITOR) 40 mg tablet Take 1 tablet (40 mg total) by mouth daily    AYR SALINE NASAL NA into each nostril 2 (two) times a day    Boswellia-Glucosamine-Vit D (OSTEO BI-FLEX ONE PER DAY PO) Take 1 capsule by mouth in the morning With turmeric    busPIRone (BUSPAR) 15 mg tablet Take 15 mg by mouth 2 (two) times a day    calcium citrate-Vitamin D 200 mg-250 units Take 1 tablet by mouth daily with breakfast    cetirizine (ZyrTEC) 10 mg tablet Take 1 tablet (10 mg total) by mouth daily    divalproex sodium (DEPAKOTE) 500 mg EC tablet     flecainide (TAMBOCOR) 100 mg tablet Take 100 mg by mouth 2 (two) times a day    FLUoxetine (PROzac) 40 MG capsule     lisinopril (ZESTRIL) 20 mg tablet TAKE 1 TABLET BY MOUTH DAILY    pantoprazole (PROTONIX) 40 mg tablet Take 1 tablet (40 mg total) by mouth daily    pindolol (VISKEN) 5 mg tablet Take 1 tablet (5 mg total) by mouth daily    Tirzepatide-Weight Management (Zepbound) 5 MG/0.5ML SOLN Inject 5 mg under the skin once a week Inject 5mg  (0.5ml) weekly    divalproex sodium (Depakote) 500 mg DR tablet  (Patient not taking: Reported on 9/25/2024)    FLUoxetine (PROzac) 20 mg capsule TAKE 1 CAPSULE BY MOUTH EVERY DAY IN THE MORNING (Patient not taking: Reported on 9/25/2024)     No current facility-administered medications on file prior to visit.     She is allergic to codeine, erythromycin base, hydromorphone, medical tape, morphine, oxycodone-acetaminophen, and penicillins..    Review of Systems   Constitutional:  Negative for fever.   Respiratory:  Negative for shortness of breath.    Cardiovascular:  Negative for chest pain and palpitations.   Gastrointestinal:  Positive for constipation and nausea. Negative for abdominal pain,  "diarrhea and vomiting.   Genitourinary:  Negative for difficulty urinating.   Skin:  Negative for rash.   Neurological:  Negative for headaches.   Psychiatric/Behavioral:  Negative for dysphoric mood. The patient is not nervous/anxious.        Objective:    /76   Pulse 70   Temp 97.5 °F (36.4 °C)   Resp 16   Ht 5' 3\" (1.6 m)   Wt 79.1 kg (174 lb 6.4 oz)   BMI 30.89 kg/m²      Physical Exam  Vitals and nursing note reviewed.   Constitutional:       General: She is not in acute distress.     Appearance: She is well-developed. She is obese.   HENT:      Head: Normocephalic and atraumatic.   Eyes:      Conjunctiva/sclera: Conjunctivae normal.   Neck:      Thyroid: No thyromegaly.   Pulmonary:      Effort: Pulmonary effort is normal. No respiratory distress.   Skin:     Findings: No rash (visible).   Neurological:      Mental Status: She is alert and oriented to person, place, and time.   Psychiatric:         Mood and Affect: Mood normal.         Behavior: Behavior normal.         "

## 2025-01-08 ENCOUNTER — TELEPHONE (OUTPATIENT)
Dept: BARIATRICS | Facility: CLINIC | Age: 75
End: 2025-01-08

## 2025-01-08 NOTE — TELEPHONE ENCOUNTER
Reason for call:   [x] Prior Auth  [] Other:     Caller:  [] Patient  [x] Pharmacy  Name: [facility]   Address: [address] [place] [address]   Callback Number: [phone]    Medication: Zepbound     Dose/Frequency: 5 mg/05 mL SOLN. Inject 5 mg under the skin once a week.    Quantity: 2 mL     Ordering Provider:   [] PCP/Provider -   [x] Speciality/Provider - Weight Management

## 2025-01-10 ENCOUNTER — TELEPHONE (OUTPATIENT)
Dept: BARIATRICS | Facility: CLINIC | Age: 75
End: 2025-01-10

## 2025-01-10 NOTE — TELEPHONE ENCOUNTER
PA for Zepbound 5mg SUBMITTED to [facility]     via    [x]CMM-KEY: [id number]  []Surescripts-Case ID #   []Availity-Auth ID # NDC #   []Faxed to plan   []Other website   []Phone call Case ID #     []PA sent as URGENT    All office notes, labs and other pertaining documents and studies sent. Clinical questions answered. Awaiting determination from insurance company.     Turnaround time for your insurance to make a decision on your Prior Authorization can take 7-21 business days.

## 2025-01-13 ENCOUNTER — TELEPHONE (OUTPATIENT)
Age: 75
End: 2025-01-13

## 2025-01-14 DIAGNOSIS — K21.9 GASTROESOPHAGEAL REFLUX DISEASE, UNSPECIFIED WHETHER ESOPHAGITIS PRESENT: ICD-10-CM

## 2025-01-14 DIAGNOSIS — K44.9 HIATAL HERNIA: ICD-10-CM

## 2025-01-14 RX ORDER — PANTOPRAZOLE SODIUM 40 MG/1
40 TABLET, DELAYED RELEASE ORAL DAILY
Qty: 90 TABLET | Refills: 1 | Status: SHIPPED | OUTPATIENT
Start: 2025-01-14

## 2025-01-23 DIAGNOSIS — E66.811 OBESITY (BMI 30.0-34.9): ICD-10-CM

## 2025-01-24 RX ORDER — TIRZEPATIDE 5 MG/.5ML
INJECTION, SOLUTION SUBCUTANEOUS
Qty: 2 ML | Refills: 3 | Status: SHIPPED | OUTPATIENT
Start: 2025-01-24

## 2025-02-10 ENCOUNTER — APPOINTMENT (OUTPATIENT)
Dept: LAB | Facility: CLINIC | Age: 75
End: 2025-02-10
Payer: MEDICARE

## 2025-02-10 DIAGNOSIS — E78.2 MIXED HYPERLIPIDEMIA: ICD-10-CM

## 2025-02-10 DIAGNOSIS — Z79.899 NEED FOR PROPHYLACTIC CHEMOTHERAPY: ICD-10-CM

## 2025-02-10 DIAGNOSIS — R73.03 PREDIABETES: ICD-10-CM

## 2025-02-10 DIAGNOSIS — I10 PRIMARY HYPERTENSION: ICD-10-CM

## 2025-02-10 LAB
BASOPHILS # BLD AUTO: 0.02 THOUSANDS/ΜL (ref 0–0.1)
BASOPHILS NFR BLD AUTO: 1 % (ref 0–1)
EOSINOPHIL # BLD AUTO: 0.04 THOUSAND/ΜL (ref 0–0.61)
EOSINOPHIL NFR BLD AUTO: 1 % (ref 0–6)
ERYTHROCYTE [DISTWIDTH] IN BLOOD BY AUTOMATED COUNT: 13.2 % (ref 11.6–15.1)
EST. AVERAGE GLUCOSE BLD GHB EST-MCNC: 120 MG/DL
HBA1C MFR BLD: 5.8 %
HCT VFR BLD AUTO: 40.1 % (ref 34.8–46.1)
HGB BLD-MCNC: 12.7 G/DL (ref 11.5–15.4)
IMM GRANULOCYTES # BLD AUTO: 0.03 THOUSAND/UL (ref 0–0.2)
IMM GRANULOCYTES NFR BLD AUTO: 1 % (ref 0–2)
LYMPHOCYTES # BLD AUTO: 1.25 THOUSANDS/ΜL (ref 0.6–4.47)
LYMPHOCYTES NFR BLD AUTO: 29 % (ref 14–44)
MCH RBC QN AUTO: 30.9 PG (ref 26.8–34.3)
MCHC RBC AUTO-ENTMCNC: 31.7 G/DL (ref 31.4–37.4)
MCV RBC AUTO: 98 FL (ref 82–98)
MONOCYTES # BLD AUTO: 0.51 THOUSAND/ΜL (ref 0.17–1.22)
MONOCYTES NFR BLD AUTO: 12 % (ref 4–12)
NEUTROPHILS # BLD AUTO: 2.4 THOUSANDS/ΜL (ref 1.85–7.62)
NEUTS SEG NFR BLD AUTO: 56 % (ref 43–75)
NRBC BLD AUTO-RTO: 0 /100 WBCS
PLATELET # BLD AUTO: 275 THOUSANDS/UL (ref 149–390)
PMV BLD AUTO: 10.9 FL (ref 8.9–12.7)
RBC # BLD AUTO: 4.11 MILLION/UL (ref 3.81–5.12)
WBC # BLD AUTO: 4.25 THOUSAND/UL (ref 4.31–10.16)

## 2025-02-10 PROCEDURE — 36415 COLL VENOUS BLD VENIPUNCTURE: CPT

## 2025-02-10 PROCEDURE — 80053 COMPREHEN METABOLIC PANEL: CPT

## 2025-02-10 PROCEDURE — 80061 LIPID PANEL: CPT

## 2025-02-10 PROCEDURE — 83036 HEMOGLOBIN GLYCOSYLATED A1C: CPT

## 2025-02-10 PROCEDURE — 85025 COMPLETE CBC W/AUTO DIFF WBC: CPT

## 2025-02-10 PROCEDURE — 80164 ASSAY DIPROPYLACETIC ACD TOT: CPT

## 2025-02-11 ENCOUNTER — OFFICE VISIT (OUTPATIENT)
Age: 75
End: 2025-02-11
Payer: MEDICARE

## 2025-02-11 VITALS
BODY MASS INDEX: 30.65 KG/M2 | HEART RATE: 63 BPM | WEIGHT: 173 LBS | OXYGEN SATURATION: 99 % | TEMPERATURE: 97.9 F | SYSTOLIC BLOOD PRESSURE: 128 MMHG | HEIGHT: 63 IN | DIASTOLIC BLOOD PRESSURE: 76 MMHG | RESPIRATION RATE: 17 BRPM

## 2025-02-11 DIAGNOSIS — M47.812 CERVICAL SPONDYLOSIS: ICD-10-CM

## 2025-02-11 DIAGNOSIS — G40.119 LOCALIZATION-RELATED (FOCAL) (PARTIAL) SYMPTOMATIC EPILEPSY AND EPILEPTIC SYNDROMES WITH SIMPLE PARTIAL SEIZURES, INTRACTABLE, WITHOUT STATUS EPILEPTICUS (HCC): ICD-10-CM

## 2025-02-11 DIAGNOSIS — E78.2 MIXED HYPERLIPIDEMIA: ICD-10-CM

## 2025-02-11 DIAGNOSIS — E66.811 OBESITY (BMI 30.0-34.9): ICD-10-CM

## 2025-02-11 DIAGNOSIS — F41.9 ANXIETY: ICD-10-CM

## 2025-02-11 DIAGNOSIS — G47.33 OBSTRUCTIVE SLEEP APNEA SYNDROME: Primary | ICD-10-CM

## 2025-02-11 DIAGNOSIS — Z00.00 MEDICARE ANNUAL WELLNESS VISIT, SUBSEQUENT: ICD-10-CM

## 2025-02-11 DIAGNOSIS — F33.42 RECURRENT MAJOR DEPRESSIVE DISORDER, IN FULL REMISSION (HCC): ICD-10-CM

## 2025-02-11 PROBLEM — Z90.710 HISTORY OF HYSTERECTOMY: Status: RESOLVED | Noted: 2022-02-14 | Resolved: 2025-02-11

## 2025-02-11 LAB
ALBUMIN SERPL BCG-MCNC: 3.9 G/DL (ref 3.5–5)
ALP SERPL-CCNC: 44 U/L (ref 34–104)
ALT SERPL W P-5'-P-CCNC: 10 U/L (ref 7–52)
ANION GAP SERPL CALCULATED.3IONS-SCNC: 6 MMOL/L (ref 4–13)
AST SERPL W P-5'-P-CCNC: 12 U/L (ref 13–39)
BILIRUB SERPL-MCNC: 0.24 MG/DL (ref 0.2–1)
BUN SERPL-MCNC: 22 MG/DL (ref 5–25)
CALCIUM SERPL-MCNC: 9.3 MG/DL (ref 8.4–10.2)
CHLORIDE SERPL-SCNC: 105 MMOL/L (ref 96–108)
CHOLEST SERPL-MCNC: 136 MG/DL (ref ?–200)
CO2 SERPL-SCNC: 28 MMOL/L (ref 21–32)
CREAT SERPL-MCNC: 0.77 MG/DL (ref 0.6–1.3)
GFR SERPL CREATININE-BSD FRML MDRD: 75 ML/MIN/1.73SQ M
GLUCOSE P FAST SERPL-MCNC: 89 MG/DL (ref 65–99)
HDLC SERPL-MCNC: 43 MG/DL
LDLC SERPL CALC-MCNC: 76 MG/DL (ref 0–100)
NONHDLC SERPL-MCNC: 93 MG/DL
POTASSIUM SERPL-SCNC: 4.7 MMOL/L (ref 3.5–5.3)
PROT SERPL-MCNC: 6.3 G/DL (ref 6.4–8.4)
SODIUM SERPL-SCNC: 139 MMOL/L (ref 135–147)
TRIGL SERPL-MCNC: 86 MG/DL (ref ?–150)
VALPROATE SERPL-MCNC: 65 UG/ML (ref 50–100)

## 2025-02-11 PROCEDURE — 99214 OFFICE O/P EST MOD 30 MIN: CPT | Performed by: INTERNAL MEDICINE

## 2025-02-11 PROCEDURE — G2211 COMPLEX E/M VISIT ADD ON: HCPCS | Performed by: INTERNAL MEDICINE

## 2025-02-11 PROCEDURE — G0439 PPPS, SUBSEQ VISIT: HCPCS | Performed by: INTERNAL MEDICINE

## 2025-02-11 NOTE — ASSESSMENT & PLAN NOTE
-with foraminal stenosis on MRI  -maintenance PT recommended, referral placed  Orders:  •  Ambulatory Referral to Physical Therapy; Future

## 2025-02-11 NOTE — PROGRESS NOTES
Name: Bobby Diaz      : 1950      MRN: [id number]  Encounter Provider: Carrol Truong DO  Encounter Date: 2025   Encounter department: [facility] INTERNAL MEDICINE    Assessment & Plan  Obstructive sleep apnea syndrome  -unable to tolerate CPAP  -continue weight loss efforts       Cervical spondylosis  -with foraminal stenosis on MRI  -maintenance PT recommended, referral placed  Orders:  •  Ambulatory Referral to Physical Therapy; Future    Mixed hyperlipidemia  -LDL controlled  -continue weight loss efforts  -continue atorvastatin 40mg daily       Anxiety  -with acute adjustment disorder and caretaker stress  -continue fluoxetine 40mg daily       Recurrent major depressive disorder, in full remission (HCC)  -in remission  -continue fluoxetine 40mg daily       Obesity (BMI 30.0-34.9)  -BMI 30 with multiple comorbidities  -on zepbound per Weight Loss Management       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 Walker         Medicare annual wellness visit, subsequent            Preventive health issues were discussed with patient, and age appropriate screening tests were ordered as noted in patient's After Visit Summary. Personalized health advice and appropriate referrals for health education or preventive services given if needed, as noted in patient's After Visit Summary.    History of Present Illness     She was started on zepbound, now on 5mg daily.  Was having difficulty having controlling her appetite.  She has lost almost 20pounds.    She has not monitored her home bp.    PHQ-2/9 Depression Screening    Little interest or pleasure in doing things: 0 - not at all  Feeling down, depressed, or hopeless: 0 - not at all  Trouble falling or staying asleep, or sleeping too much: 0 - not at  all  Feeling tired or having little energy: 1 - several days  Poor appetite or overeatin - not at all  Feeling bad about yourself - or that you are a failure or have let   yourself or your family down: 0 - not at all  Trouble concentrating on things, such as reading the newspaper or watching   television: 0 - not at all  Moving or speaking so slowly that other people could have noticed. Or the   opposite - being so fidgety or restless that you have been moving around a   lot more than usual: 0 - not at all  Thoughts that you would be better off dead, or of hurting yourself in some   way: 0 - not at all  PHQ-9 Score: 1  PHQ-9 Interpretation: No or Minimal depression     She is POA of a friend who is staying with her at her home.  Admits to increased stress.         Patient Care Team:  Carrol Truong DO as PCP - General  DO Bob Andrew MD (Sleep Medicine)  Meryl Walker MD (Neurology)  MD Serene Monaco MD (Family Medicine)  Janny Bailey OD (Optometry)  Alvino Hubbard MD (Gastroenterology)    Review of Systems   Constitutional:  Positive for appetite change and unexpected weight change (intentional weight loss). Negative for activity change.   HENT:  Negative for congestion, postnasal drip and rhinorrhea.    Respiratory:  Positive for apnea.    Cardiovascular:  Negative for chest pain and leg swelling.   Gastrointestinal:  Positive for abdominal pain and constipation. Negative for nausea.   Genitourinary:  Negative for difficulty urinating.   Musculoskeletal:  Positive for neck pain and neck stiffness.   Skin:  Negative for rash.   Neurological:  Positive for seizures and headaches. Negative for dizziness and speech difficulty.   Psychiatric/Behavioral:  Negative for dysphoric mood and sleep disturbance. The patient is nervous/anxious.      Medical History Reviewed by provider this encounter:  Tobacco  Allergies  Meds  Problems  Med Hx  Surg Hx   [facility]       Annual Wellness Visit Questionnaire   Bobby is here for her Subsequent Wellness visit. Last Medicare Wellness visit information reviewed, patient interviewed and updates made to the record today.      Health Risk Assessment:   Patient rates overall health as good. Patient feels that their physical health rating is slightly worse. Patient is satisfied with their life. Eyesight was rated as same. Hearing was rated as same. Patient feels that their emotional and mental health rating is slightly better. Patients states they are never, rarely angry. Patient states they are often unusually tired/fatigued. Pain experienced in the last 7 days has been some. Patient's pain rating has been 3/10. Patient states that she has experienced no weight loss or gain in last 6 months.     Depression Screening:   PHQ-9 Score: 1      Fall Risk Screening:   In the past year, patient has experienced: no history of falling in past year      Urinary Incontinence Screening:   Patient has not leaked urine accidently in the last six months.     Home Safety:  Patient does not have trouble with stairs inside or outside of their home. Patient has working smoke alarms and has working carbon monoxide detector. Home safety hazards include: none.     Nutrition:   Current diet is Regular and No Added Salt.     Medications:   Patient is currently taking over-the-counter supplements. OTC medications include: see medication list. Patient is able to manage medications.     Activities of Daily Living (ADLs)/Instrumental Activities of Daily Living (IADLs):   Walk and transfer into and out of bed and chair?: Yes  Dress and groom yourself?: Yes    Bathe or shower yourself?: Yes    Feed yourself? Yes  Do your laundry/housekeeping?: Yes  Manage your money, pay your bills and track your expenses?: Yes  Make your own meals?: Yes    Do your own shopping?: Yes    Durable Medical Equipment Suppliers  pt does not know    Previous Hospitalizations:    Any hospitalizations or ED visits within the last 12 months?: No      Advance Care Planning:   Living will: Yes    Durable POA for healthcare: Yes    Advanced directive: Yes      Cognitive Screening:   Provider or family/friend/caregiver concerned regarding cognition?: No    PREVENTIVE SCREENINGS      Cardiovascular Screening:    General: Screening Not Indicated and History Lipid Disorder      Diabetes Screening:     General: Screening Current      Colorectal Cancer Screening:     General: Screening Current      Breast Cancer Screening:     General: Screening Current      Cervical Cancer Screening:    General: Screening Not Indicated      Osteoporosis Screening:    General: Screening Current      Abdominal Aortic Aneurysm (AAA) Screening:        General: Screening Not Indicated      Lung Cancer Screening:     General: Screening Not Indicated      Hepatitis C Screening:    General: Screening Current    Screening, Brief Intervention, and Referral to Treatment (SBIRT)     Screening  Typical number of drinks in a day: 0  Typical number of drinks in a week: 0  Interpretation: Low risk drinking behavior.    AUDIT-C Screenin) How often did you have a drink containing alcohol in the past year? 2 to 4 times a month  2) How many drinks did you have on a typical day when you were drinking in the past year? 1 to 2  3) How often did you have 6 or more drinks on one occasion in the past year? never    AUDIT-C Score: 2  Interpretation: Score 0-2 (female): Negative screen for alcohol misuse    Single Item Drug Screening:  How often have you used an illegal drug (including marijuana) or a prescription medication for non-medical reasons in the past year? never    Single Item Drug Screen Score: 0  Interpretation: Negative screen for possible drug use disorder    Brief Intervention  Alcohol & drug use screenings were reviewed. No concerns regarding substance use disorder identified.     Time Spent  Time spent  "screening/evaluating the patient for alcohol misuse: 1 minutes.     Other Counseling Topics:   Car/seat belt/driving safety, skin self-exam, sunscreen and regular weightbearing exercise and calcium and vitamin D intake. Immunizations discussed.  She is up to date.    Social Drivers of Health     Financial Resource Strain: Low Risk  (1/29/2024)    Overall Financial Resource Strain (CARDIA)    • Difficulty of Paying Living Expenses: Not hard at all   Food Insecurity: No Food Insecurity (2/6/2025)    Hunger Vital Sign    • Worried About Running Out of Food in the Last Year: Never true    • Ran Out of Food in the Last Year: Never true   Transportation Needs: No Transportation Needs (2/6/2025)    PRAPARE - Transportation    • Lack of Transportation (Medical): No    • Lack of Transportation (Non-Medical): No   Housing Stability: Low Risk  (2/6/2025)    Housing Stability Vital Sign    • Unable to Pay for Housing in the Last Year: No    • Number of Times Moved in the Last Year: 0    • Homeless in the Last Year: No   Utilities: Not At Risk (2/6/2025)    [facility] Utilities    • Threatened with loss of utilities: No     No results found.    Objective   /76 (BP Location: Left arm, Patient Position: Sitting, Cuff Size: Large)   Pulse 63   Temp 97.9 °F (36.6 °C) (Tympanic Core)   Resp 17   Ht 5' 3\" (1.6 m)   Wt 78.5 kg (173 lb)   SpO2 99%   BMI 30.65 kg/m²     Physical Exam  Vitals reviewed.   Constitutional:       General: She is not in acute distress.     Appearance: She is obese.   HENT:      Head: Normocephalic.      Right Ear: Tympanic membrane and ear canal normal.      Left Ear: Tympanic membrane and ear canal normal.      Nose: Nose normal.      Mouth/Throat:      Mouth: Mucous membranes are moist.   Eyes:      Extraocular Movements: Extraocular movements intact.      Conjunctiva/sclera: Conjunctivae normal.      Pupils: Pupils are equal, round, and reactive to light.   Neck:      Thyroid: No thyromegaly or " thyroid tenderness.   Cardiovascular:      Rate and Rhythm: Normal rate and regular rhythm.      Pulses: Normal pulses.      Heart sounds: Normal heart sounds.   Pulmonary:      Effort: Pulmonary effort is normal. No respiratory distress.      Breath sounds: Normal breath sounds. No wheezing.   Abdominal:      General: Bowel sounds are normal. There is no distension.      Palpations: Abdomen is soft.      Tenderness: There is no abdominal tenderness.   Musculoskeletal:      Cervical back: Neck supple. No tenderness. Muscular tenderness present. Decreased range of motion.      Right lower leg: No edema.      Left lower leg: No edema.   Lymphadenopathy:      Cervical: No cervical adenopathy.   Skin:     Coloration: Skin is not pale.   Neurological:      Mental Status: She is alert and oriented to person, place, and time.   Psychiatric:         Mood and Affect: Mood normal.         Recent Results (from the past 4 weeks)   Comprehensive metabolic panel    Collection Time: 02/10/25  9:40 AM   Result Value Ref Range    Sodium 139 135 - 147 mmol/L    Potassium 4.7 3.5 - 5.3 mmol/L    Chloride 105 96 - 108 mmol/L    CO2 28 21 - 32 mmol/L    ANION GAP 6 4 - 13 mmol/L    BUN 22 5 - 25 mg/dL    Creatinine 0.77 0.60 - 1.30 mg/dL    Glucose, Fasting 89 65 - 99 mg/dL    Calcium 9.3 8.4 - 10.2 mg/dL    AST 12 (L) 13 - 39 U/L    ALT 10 7 - 52 U/L    Alkaline Phosphatase 44 34 - 104 U/L    Total Protein 6.3 (L) 6.4 - 8.4 g/dL    Albumin 3.9 3.5 - 5.0 g/dL    Total Bilirubin 0.24 0.20 - 1.00 mg/dL    eGFR 75 ml/min/1.73sq m   Hemoglobin A1C    Collection Time: 02/10/25  9:40 AM   Result Value Ref Range    Hemoglobin A1C 5.8 (H) Normal 4.0-5.6%; PreDiabetic 5.7-6.4%; Diabetic >=6.5%; Glycemic control for adults with diabetes <7.0% %     mg/dl   Lipid panel    Collection Time: 02/10/25  9:40 AM   Result Value Ref Range    Cholesterol 136 See Comment mg/dL    Triglycerides 86 See Comment mg/dL    HDL, Direct 43 (L) >=50 mg/dL     LDL Calculated 76 0 - 100 mg/dL    Non-HDL-Chol (CHOL-HDL) 93 mg/dl   CBC and differential    Collection Time: 02/10/25  9:40 AM   Result Value Ref Range    WBC 4.25 (L) 4.31 - 10.16 Thousand/uL    RBC 4.11 3.81 - 5.12 Million/uL    Hemoglobin 12.7 11.5 - 15.4 g/dL    Hematocrit 40.1 34.8 - 46.1 %    MCV 98 82 - 98 fL    MCH 30.9 26.8 - 34.3 pg    MCHC 31.7 31.4 - 37.4 g/dL    RDW 13.2 11.6 - 15.1 %    MPV 10.9 8.9 - 12.7 fL    Platelets 275 149 - 390 Thousands/uL    nRBC 0 /100 WBCs    Segmented % 56 43 - 75 %    Immature Grans % 1 0 - 2 %    Lymphocytes % 29 14 - 44 %    Monocytes % 12 4 - 12 %    Eosinophils Relative 1 0 - 6 %    Basophils Relative 1 0 - 1 %    Absolute Neutrophils 2.40 1.85 - 7.62 Thousands/µL    Absolute Immature Grans 0.03 0.00 - 0.20 Thousand/uL    Absolute Lymphocytes 1.25 0.60 - 4.47 Thousands/µL    Absolute Monocytes 0.51 0.17 - 1.22 Thousand/µL    Eosinophils Absolute 0.04 0.00 - 0.61 Thousand/µL    Basophils Absolute 0.02 0.00 - 0.10 Thousands/µL   Valproic acid level, total    Collection Time: 02/10/25  9:40 AM   Result Value Ref Range    Valproic Acid, Total 65 50 - 100 ug/mL

## 2025-02-11 NOTE — PATIENT INSTRUCTIONS
Medicare Preventive Visit Patient Instructions  Thank you for completing your Welcome to Medicare Visit or Medicare Annual Wellness Visit today. Your next wellness visit will be due in one year (2/12/2026).  The screening/preventive services that you may require over the next 5-10 years are detailed below. Some tests may not apply to you based off risk factors and/or age. Screening tests ordered at today's visit but not completed yet may show as past due. Also, please note that scanned in results may not display below.  Preventive Screenings:  Service Recommendations Previous Testing/Comments   Colorectal Cancer Screening  * Colonoscopy    * Fecal Occult Blood Test (FOBT)/Fecal Immunochemical Test (FIT)  * Fecal DNA/Cologuard Test  * Flexible Sigmoidoscopy Age: 45-75 years old   Colonoscopy: every 10 years (may be performed more frequently if at higher risk)  OR  FOBT/FIT: every 1 year  OR  Cologuard: every 3 years  OR  Sigmoidoscopy: every 5 years  Screening may be recommended earlier than age 45 if at higher risk for colorectal cancer. Also, an individualized decision between you and your healthcare provider will decide whether screening between the ages of 76-85 would be appropriate. Colonoscopy: 09/01/2023  FOBT/FIT: Not on file  Cologuard: Not on file  Sigmoidoscopy: Not on file          Breast Cancer Screening Age: 40+ years old  Frequency: every 1-2 years  Not required if history of left and right mastectomy Mammogram: 06/26/2024        Cervical Cancer Screening Between the ages of 21-29, pap smear recommended once every 3 years.   Between the ages of 30-65, can perform pap smear with HPV co-testing every 5 years.   Recommendations may differ for women with a history of total hysterectomy, cervical cancer, or abnormal pap smears in past. Pap Smear: 03/22/2021        Hepatitis C Screening Once for adults born between 1945 and 1965  More frequently in patients at high risk for Hepatitis C Hep C Antibody:  11/11/2018        Diabetes Screening 1-2 times per year if you're at risk for diabetes or have pre-diabetes Fasting glucose: 89 mg/dL (2/10/2025)  A1C: 5.8 % (2/10/2025)      Cholesterol Screening Once every 5 years if you don't have a lipid disorder. May order more often based on risk factors. Lipid panel: 02/10/2025          Other Preventive Screenings Covered by Medicare:  Abdominal Aortic Aneurysm (AAA) Screening: covered once if your at risk. You're considered to be at risk if you have a family history of AAA.  Lung Cancer Screening: covers low dose CT scan once per year if you meet all of the following conditions: (1) Age 55-77; (2) No signs or symptoms of lung cancer; (3) Current smoker or have quit smoking within the last 15 years; (4) You have a tobacco smoking history of at least 20 pack years (packs per day multiplied by number of years you smoked); (5) You get a written order from a healthcare provider.  Glaucoma Screening: covered annually if you're considered high risk: (1) You have diabetes OR (2) Family history of glaucoma OR (3)  aged 50 and older OR (4)  American aged 65 and older  Osteoporosis Screening: covered every 2 years if you meet one of the following conditions: (1) You're estrogen deficient and at risk for osteoporosis based off medical history and other findings; (2) Have a vertebral abnormality; (3) On glucocorticoid therapy for more than 3 months; (4) Have primary hyperparathyroidism; (5) On osteoporosis medications and need to assess response to drug therapy.   Last bone density test (DXA Scan): 09/14/2020.  HIV Screening: covered annually if you're between the age of 15-65. Also covered annually if you are younger than 15 and older than 65 with risk factors for HIV infection. For pregnant patients, it is covered up to 3 times per pregnancy.    Immunizations:  Immunization Recommendations   Influenza Vaccine Annual influenza vaccination during flu season is  recommended for all persons aged >= 6 months who do not have contraindications   Pneumococcal Vaccine   * Pneumococcal conjugate vaccine = PCV13 (Prevnar 13), PCV15 (Vaxneuvance), PCV20 (Prevnar 20)  * Pneumococcal polysaccharide vaccine = PPSV23 (Pneumovax) Adults 19-65 yo with certain risk factors or if 65+ yo  If never received any pneumonia vaccine: recommend Prevnar 20 (PCV20)  Give PCV20 if previously received 1 dose of PCV13 or PPSV23   Hepatitis B Vaccine 3 dose series if at intermediate or high risk (ex: diabetes, end stage renal disease, liver disease)   Respiratory syncytial virus (RSV) Vaccine - COVERED BY MEDICARE PART D  * RSVPreF3 (Arexvy) CDC recommends that adults 60 years of age and older may receive a single dose of RSV vaccine using shared clinical decision-making (SCDM)   Tetanus (Td) Vaccine - COST NOT COVERED BY MEDICARE PART B Following completion of primary series, a booster dose should be given every 10 years to maintain immunity against tetanus. Td may also be given as tetanus wound prophylaxis.   Tdap Vaccine - COST NOT COVERED BY MEDICARE PART B Recommended at least once for all adults. For pregnant patients, recommended with each pregnancy.   Shingles Vaccine (Shingrix) - COST NOT COVERED BY MEDICARE PART B  2 shot series recommended in those 19 years and older who have or will have weakened immune systems or those 50 years and older     Health Maintenance Due:      Topic Date Due   • Breast Cancer Screening: Mammogram  06/26/2026   • Hepatitis C Screening  Completed   • Colorectal Cancer Screening  Discontinued     Immunizations Due:  There are no preventive care reminders to display for this patient.  Advance Directives   What are advance directives?  Advance directives are legal documents that state your wishes and plans for medical care. These plans are made ahead of time in case you lose your ability to make decisions for yourself. Advance directives can apply to any medical  decision, such as the treatments you want, and if you want to donate organs.   What are the types of advance directives?  There are many types of advance directives, and each state has rules about how to use them. You may choose a combination of any of the following:  Living will:  This is a written record of the treatment you want. You can also choose which treatments you do not want, which to limit, and which to stop at a certain time. This includes surgery, medicine, IV fluid, and tube feedings.   Durable power of  for healthcare (DPAHC):  This is a written record that states who you want to make healthcare choices for you when you are unable to make them for yourself. This person, called a proxy, is usually a family member or a friend. You may choose more than 1 proxy.  Do not resuscitate (DNR) order:  A DNR order is used in case your heart stops beating or you stop breathing. It is a request not to have certain forms of treatment, such as CPR. A DNR order may be included in other types of advance directives.  Medical directive:  This covers the care that you want if you are in a coma, near death, or unable to make decisions for yourself. You can list the treatments you want for each condition. Treatment may include pain medicine, surgery, blood transfusions, dialysis, IV or tube feedings, and a ventilator (breathing machine).  Values history:  This document has questions about your views, beliefs, and how you feel and think about life. This information can help others choose the care that you would choose.  Why are advance directives important?  An advance directive helps you control your care. Although spoken wishes may be used, it is better to have your wishes written down. Spoken wishes can be misunderstood, or not followed. Treatments may be given even if you do not want them. An advance directive may make it easier for your family to make difficult choices about your care.   Weight Management   Why  it is important to manage your weight:  Being overweight increases your risk of health conditions such as heart disease, high blood pressure, type 2 diabetes, and certain types of cancer. It can also increase your risk for osteoarthritis, sleep apnea, and other respiratory problems. Aim for a slow, steady weight loss. Even a small amount of weight loss can lower your risk of health problems.  How to lose weight safely:  A safe and healthy way to lose weight is to eat fewer calories and get regular exercise. You can lose up about 1 pound a week by decreasing the number of calories you eat by 500 calories each day.   Healthy meal plan for weight management:  A healthy meal plan includes a variety of foods, contains fewer calories, and helps you stay healthy. A healthy meal plan includes the following:  Eat whole-grain foods more often.  A healthy meal plan should contain fiber. Fiber is the part of grains, fruits, and vegetables that is not broken down by your body. Whole-grain foods are healthy and provide extra fiber in your diet. Some examples of whole-grain foods are whole-wheat breads and pastas, oatmeal, brown rice, and bulgur.  Eat a variety of vegetables every day.  Include dark, leafy greens such as spinach, kale, lor greens, and mustard greens. Eat yellow and orange vegetables such as carrots, sweet potatoes, and winter squash.   Eat a variety of fruits every day.  Choose fresh or canned fruit (canned in its own juice or light syrup) instead of juice. Fruit juice has very little or no fiber.  Eat low-fat dairy foods.  Drink fat-free (skim) milk or 1% milk. Eat fat-free yogurt and low-fat cottage cheese. Try low-fat cheeses such as mozzarella and other reduced-fat cheeses.  Choose meat and other protein foods that are low in fat.  Choose beans or other legumes such as split peas or lentils. Choose fish, skinless poultry (chicken or turkey), or lean cuts of red meat (beef or pork). Before you cook meat or  poultry, cut off any visible fat.   Use less fat and oil.  Try baking foods instead of frying them. Add less fat, such as margarine, sour cream, regular salad dressing and mayonnaise to foods. Eat fewer high-fat foods. Some examples of high-fat foods include french fries, doughnuts, ice cream, and cakes.  Eat fewer sweets.  Limit foods and drinks that are high in sugar. This includes candy, cookies, regular soda, and sweetened drinks.  Exercise:  Exercise at least 30 minutes per day on most days of the week. Some examples of exercise include walking, biking, dancing, and swimming. You can also fit in more physical activity by taking the stairs instead of the elevator or parking farther away from stores. Ask your healthcare provider about the best exercise plan for you.      © Copyright [employer] 2018 Information is for End User's use only and may not be sold, redistributed or otherwise used for commercial purposes. All illustrations and images included in CareNotes® are the copyrighted property of [employer]., Inc[employer] or [email or web address]

## 2025-02-24 ENCOUNTER — TELEPHONE (OUTPATIENT)
Dept: BARIATRICS | Facility: CLINIC | Age: 75
End: 2025-02-24

## 2025-03-17 ENCOUNTER — TELEPHONE (OUTPATIENT)
Age: 75
End: 2025-03-17

## 2025-03-17 NOTE — TELEPHONE ENCOUNTER
[facility] called in regards to fax sent over for plan of care on patient 2/28.  Unable to see in chart.  They will refax.  Please completed when received and fax to [phone].

## 2025-03-25 ENCOUNTER — OFFICE VISIT (OUTPATIENT)
Dept: BARIATRICS | Facility: CLINIC | Age: 75
End: 2025-03-25
Payer: MEDICARE

## 2025-03-25 VITALS
RESPIRATION RATE: 16 BRPM | TEMPERATURE: 97.8 F | WEIGHT: 170 LBS | DIASTOLIC BLOOD PRESSURE: 77 MMHG | HEART RATE: 101 BPM | HEIGHT: 63 IN | BODY MASS INDEX: 30.12 KG/M2 | SYSTOLIC BLOOD PRESSURE: 118 MMHG

## 2025-03-25 DIAGNOSIS — E66.811 OBESITY (BMI 30.0-34.9): Primary | ICD-10-CM

## 2025-03-25 DIAGNOSIS — G47.33 OBSTRUCTIVE SLEEP APNEA SYNDROME: ICD-10-CM

## 2025-03-25 DIAGNOSIS — I10 PRIMARY HYPERTENSION: ICD-10-CM

## 2025-03-25 PROCEDURE — 99214 OFFICE O/P EST MOD 30 MIN: CPT | Performed by: PHYSICIAN ASSISTANT

## 2025-03-25 PROCEDURE — G2211 COMPLEX E/M VISIT ADD ON: HCPCS | Performed by: PHYSICIAN ASSISTANT

## 2025-03-25 RX ORDER — TIRZEPATIDE 5 MG/.5ML
5 INJECTION, SOLUTION SUBCUTANEOUS WEEKLY
Qty: 2 ML | Refills: 1 | Status: SHIPPED | OUTPATIENT
Start: 2025-03-25 | End: 2025-05-20

## 2025-03-25 RX ORDER — FAMOTIDINE 20 MG/1
TABLET, FILM COATED ORAL
COMMUNITY

## 2025-03-25 RX ORDER — FAMOTIDINE 10 MG
TABLET ORAL
COMMUNITY

## 2025-03-25 RX ORDER — BETA-CAROTENE 7500 MCG
CAPSULE ORAL
COMMUNITY

## 2025-03-25 NOTE — ASSESSMENT & PLAN NOTE
- Patient is pursuing Conservative Program with bundles with dietician. Previously completed Healthy CORE and Healthy Ways.  - Initial weight loss goal of 5-10% weight loss for improved health-MET  -labs reviewed from 2/10/25 and protein was  alittle low but she has been increasing      To continue on zepbound 5mg. Shei is using medication for weight loss and JORDANA.  Start weight 189. 10% weight loss currently  -Not a candidate for Wellbutrin or Contrave due to history of seizure disorder.    -Not a candidate for phentermine due to history of A-fib.  - Labs reviewed: CMP 6/3/24 and protein a little low. Discussed increasing.       Initial Healthy CORE: 185.3 lbs  Last visit: 174  Current: 170  Change: -15.3LB from last visit      Goals:  Recommend continuing monitoring protein intake and increasing.   Discussed starting more regular exercise.  Would consider walking

## 2025-03-25 NOTE — PROGRESS NOTES
Assessment/Plan:    Obesity (BMI 30.0-34.9)   - Patient is pursuing Conservative Program with bundles with dietician. Previously completed Healthy CORE and Healthy Ways.  - Initial weight loss goal of 5-10% weight loss for improved health-MET  -labs reviewed from 2/10/25 and protein was  alittle low but she has been increasing      To continue on zepbound 5mg. Shei is using medication for weight loss and JORDANA.  Start weight 189. 10% weight loss currently  -Not a candidate for Wellbutrin or Contrave due to history of seizure disorder.    -Not a candidate for phentermine due to history of A-fib.  - Labs reviewed: CMP 6/3/24 and protein a little low. Discussed increasing.       Initial Healthy CORE: 185.3 lbs  Last visit: 174  Current: 170  Change: -15.3LB from last visit      Goals:  Recommend continuing monitoring protein intake and increasing.   Discussed starting more regular exercise.  Would consider walking    Hypertension  On norvasc, lisinopril and pindolol    Obstructive sleep apnea syndrome  To continue zepound.  Has not tolerated CPAP        Return in about 3 months (around 6/25/2025).       Diagnoses and all orders for this visit:    Obesity (BMI 30.0-34.9)  -     tirzepatide (Zepbound) 5 mg/0.5 mL auto-injector; Inject 0.5 mL (5 mg total) under the skin once a week    Obstructive sleep apnea syndrome  -     tirzepatide (Zepbound) 5 mg/0.5 mL auto-injector; Inject 0.5 mL (5 mg total) under the skin once a week    Primary hypertension    Other orders  -     beta carotene 78020 UNIT capsule  -     famotidine (PEPCID) 10 mg tablet; Take by mouth  -     famotidine (PEPCID) 20 mg tablet          Subjective:   Chief Complaint   Patient presents with    Follow-up     MWM-3M F/u; Waist-37.5in        Patient ID: Bobby Diaz  is a 74 y.o. female with excess weight/obesity here to pursue weight managment.  Patient is pursuing Conservative Program.     HPI  On zepbound 5mg.  She has a friend with memory issues  and is was not as regular with the medication.  She has had nasuea but modifying her diet has helped.  Also notes constipation and doing colace daily.    Wt Readings from Last 10 Encounters:   03/25/25 77.1 kg (170 lb)   02/11/25 78.5 kg (173 lb)   01/07/25 79.1 kg (174 lb 6.4 oz)   09/25/24 85.8 kg (189 lb 3.2 oz)   09/10/24 85.5 kg (188 lb 9.6 oz)   08/13/24 83.7 kg (184 lb 9.6 oz)   08/06/24 84.4 kg (186 lb)   06/26/24 82.6 kg (182 lb)   06/18/24 83.3 kg (183 lb 9.6 oz)   06/11/24 83.1 kg (183 lb 3.2 oz)       Food logging:  Increased appetite/cravings:better controlled on zepbound.less nighttime snacking  Exercise:not currently  Hydration:at least 64 oz water, 2 cups coffee max decaf    Diet Recall:  B or L:grilled cheese and fruit or leftovers  D:chicken/fish/turkey, vegetable, salad, carb (skips often)  The following portions of the patient's history were reviewed and updated as appropriate: She  has a past medical history of Acid reflux disease, Acute bronchitis (09/07/2022), Allergic, Allergies, Anxiety, Arthralgia, Arthritis, Atrial fibrillation (HCC), Chronic interstitial cystitis, Chronic pain disorder, Colitis (08/09/2023), Colon polyp, Coronary artery disease (afib/ Nov. 2018), Coronary vasospasm (HCC) (05/05/2020), Depression, GERD (gastroesophageal reflux disease), Headache(784.0), History of hysterectomy (02/14/2022), Hyperlipidemia, Hypertension, Injury of tendon of rotator cuff (08/22/2018), Irregular heart beat, Lichen sclerosus, Lipoma, Myalgia (03/16/2016), Obesity, Osteopenia, Paresthesia of both hands (08/06/2019), Seizures (HCC), Shortness of breath, Simple partial seizures (HCC), Sleep apnea, Tachycardia, and Upper respiratory tract infection (09/01/2022).  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)     Current use of long term anticoagulation 02/07/2019    Simple partial seizure with somatosensory or special sensory dysfunction (HCC) 02/06/2019    Paroxysmal atrial fibrillation (HCC) 12/04/2018    Seizure disorder (HCC) 11/11/2018    Anxiety 11/11/2018    Esophagitis 11/11/2018    Right hip pain 08/22/2018    Trochanteric bursitis 08/22/2018    Arthritis 07/24/2018    Lichen sclerosus 10/02/2017    Cervicocranial syndrome 04/05/2017    Chronic migraine without aura without status migrainosus, not intractable 04/05/2017    Complex partial seizures with consciousness impaired (HCC) 04/05/2017    Cervical spondylosis 02/08/2017    Post-menopausal atrophic vaginitis 02/24/2014    Symptomatic menopausal or female climacteric states 02/24/2014    Lipoma 01/02/2014    Hyperlipidemia 08/20/2012    Hypertension 08/20/2012    Recurrent major depressive disorder, in full remission ([facility]) 07/26/2012     She  has a past surgical history that includes Hysterectomy; Shoulder surgery (Left); Cosmetic surgery; Oophorectomy; Colonoscopy (2020); Reduction mammaplasty; Rhinoplasty; Abdominoplasty; Facelift; Blepharoplasty; pr rhytidectomy neck w/platysmal tightening (N/A, 12/19/2022); Appendectomy; and Fracture surgery.  Her family history includes Breast cancer in her paternal grandmother; Cancer in her brother and father; Hypertension in her brother, father, and mother; Lung cancer in her brother and father; No Known Problems in her maternal aunt, maternal aunt, maternal aunt, maternal grandfather, maternal grandmother, paternal  aunt, paternal aunt, paternal aunt, paternal grandfather, and sister; Stroke in her mother.  She  reports that she has never smoked. She has never used smokeless tobacco. She reports current alcohol use of about 1.0 standard drink of alcohol per week. She reports that she does not use drugs.  Current Outpatient Medications   Medication Sig Dispense Refill    amLODIPine (NORVASC) 5 mg tablet TAKE 1 TABLET BY MOUTH DAILY 90 tablet 1    apixaban (ELIQUIS) 5 mg 2 (two) times a day      atorvastatin (LIPITOR) 40 mg tablet Take 1 tablet (40 mg total) by mouth daily 90 tablet 0    AYR SALINE NASAL NA into each nostril 2 (two) times a day      beta carotene 63753 UNIT capsule       Boswellia-Glucosamine-Vit D (OSTEO BI-FLEX ONE PER DAY PO) Take 1 capsule by mouth in the morning With turmeric      busPIRone (BUSPAR) 15 mg tablet Take 15 mg by mouth 2 (two) times a day      calcium citrate-Vitamin D 200 mg-250 units Take 1 tablet by mouth daily with breakfast      cetirizine (ZyrTEC) 10 mg tablet Take 1 tablet (10 mg total) by mouth daily 100 tablet 1    divalproex sodium (Depakote) 500 mg DR tablet       famotidine (PEPCID) 10 mg tablet Take by mouth      famotidine (PEPCID) 20 mg tablet       flecainide (TAMBOCOR) 100 mg tablet Take 100 mg by mouth 2 (two) times a day      FLUoxetine (PROzac) 40 MG capsule       lisinopril (ZESTRIL) 20 mg tablet TAKE 1 TABLET BY MOUTH DAILY 90 tablet 1    pantoprazole (PROTONIX) 40 mg tablet TAKE 1 TABLET BY MOUTH DAILY 90 tablet 1    pindolol (VISKEN) 5 mg tablet Take 1 tablet (5 mg total) by mouth daily 90 tablet 3    tirzepatide (Zepbound) 5 mg/0.5 mL auto-injector Inject 0.5 mL (5 mg total) under the skin once a week 2 mL 1    Zepbound 5 MG/0.5ML SOLN INJECT 0.5 ML (5 MG) UNDER THE SKIN ONCE WEEKLY 2 mL 3     No current facility-administered medications for this visit.     Current Outpatient Medications on File Prior to Visit   Medication Sig    amLODIPine (NORVASC) 5 mg tablet TAKE 1  "TABLET BY MOUTH DAILY    apixaban (ELIQUIS) 5 mg 2 (two) times a day    atorvastatin (LIPITOR) 40 mg tablet Take 1 tablet (40 mg total) by mouth daily    AYR SALINE NASAL NA into each nostril 2 (two) times a day    beta carotene 33512 UNIT capsule     Boswellia-Glucosamine-Vit D (OSTEO BI-FLEX ONE PER DAY PO) Take 1 capsule by mouth in the morning With turmeric    busPIRone (BUSPAR) 15 mg tablet Take 15 mg by mouth 2 (two) times a day    calcium citrate-Vitamin D 200 mg-250 units Take 1 tablet by mouth daily with breakfast    cetirizine (ZyrTEC) 10 mg tablet Take 1 tablet (10 mg total) by mouth daily    divalproex sodium (Depakote) 500 mg DR tablet     famotidine (PEPCID) 10 mg tablet Take by mouth    famotidine (PEPCID) 20 mg tablet     flecainide (TAMBOCOR) 100 mg tablet Take 100 mg by mouth 2 (two) times a day    FLUoxetine (PROzac) 40 MG capsule     lisinopril (ZESTRIL) 20 mg tablet TAKE 1 TABLET BY MOUTH DAILY    pantoprazole (PROTONIX) 40 mg tablet TAKE 1 TABLET BY MOUTH DAILY    pindolol (VISKEN) 5 mg tablet Take 1 tablet (5 mg total) by mouth daily    Zepbound 5 MG/0.5ML SOLN INJECT 0.5 ML (5 MG) UNDER THE SKIN ONCE WEEKLY     No current facility-administered medications on file prior to visit.     She is allergic to codeine, erythromycin base, hydromorphone, medical tape, morphine, oxycodone-acetaminophen, and penicillins..    Review of Systems   Constitutional:  Negative for fever.   Respiratory:  Negative for shortness of breath.    Cardiovascular:  Negative for chest pain and palpitations.   Gastrointestinal:  Negative for abdominal pain, constipation, diarrhea and vomiting.   Genitourinary:  Negative for difficulty urinating.   Skin:  Negative for rash.   Neurological:  Negative for headaches.   Psychiatric/Behavioral:  Negative for dysphoric mood. The patient is not nervous/anxious.        Objective:    /77   Pulse 101   Temp 97.8 °F (36.6 °C)   Resp 16   Ht 5' 3\" (1.6 m)   Wt 77.1 kg (170 " lb)   BMI 30.11 kg/m²      Physical Exam  Vitals and nursing note reviewed.   Constitutional:       General: She is not in acute distress.     Appearance: She is well-developed. She is obese.   HENT:      Head: Normocephalic and atraumatic.   Eyes:      Conjunctiva/sclera: Conjunctivae normal.   Neck:      Thyroid: No thyromegaly.   Pulmonary:      Effort: Pulmonary effort is normal. No respiratory distress.   Skin:     Findings: No rash (visible).   Neurological:      Mental Status: She is alert and oriented to person, place, and time.   Psychiatric:         Mood and Affect: Mood normal.         Behavior: Behavior normal.

## 2025-04-20 ENCOUNTER — APPOINTMENT (EMERGENCY)
Dept: RADIOLOGY | Facility: HOSPITAL | Age: 75
End: 2025-04-20
Payer: MEDICARE

## 2025-04-20 ENCOUNTER — HOSPITAL ENCOUNTER (EMERGENCY)
Facility: HOSPITAL | Age: 75
Discharge: HOME | End: 2025-04-20
Attending: SURGERY
Payer: MEDICARE

## 2025-04-20 VITALS
TEMPERATURE: 97.1 F | RESPIRATION RATE: 16 BRPM | OXYGEN SATURATION: 98 % | HEART RATE: 67 BPM | WEIGHT: 170 LBS | BODY MASS INDEX: 30.11 KG/M2 | DIASTOLIC BLOOD PRESSURE: 65 MMHG | SYSTOLIC BLOOD PRESSURE: 136 MMHG
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
## 2025-04-20 PROCEDURE — NC001 PR NO CHARGE: Performed by: EMERGENCY MEDICINE

## 2025-04-20 PROCEDURE — 70450 CT HEAD/BRAIN W/O DYE: CPT

## 2025-04-20 PROCEDURE — 71046 X-RAY EXAM CHEST 2 VIEWS: CPT

## 2025-04-20 PROCEDURE — 99284 EMERGENCY DEPT VISIT MOD MDM: CPT

## 2025-04-20 PROCEDURE — 71045 X-RAY EXAM CHEST 1 VIEW: CPT

## 2025-04-20 PROCEDURE — NC001 PR NO CHARGE: Performed by: NURSE PRACTITIONER

## 2025-04-20 NOTE — DISCHARGE SUMMARY
Discharge Summary - Trauma   Name: Bobby Diaz 74 y.o. female I MRN: [id number]  Unit/Bed#: ED 06 I Date of Admission: 4/20/2025   Date of Service: 4/20/2025 I Hospital Day: 0    Admission Date: 4/20/2025 1338  Discharge Date: 04/20/25  Admitting Diagnosis: Unspecified multiple injuries, initial encounter [T07.XXXA]  Discharge Diagnosis:   Medical Problems       Resolved Problems  Date Reviewed: 2/11/2025   None         HPI: 73 y/o female working in her garden and fell backwards on a rock striking her head.  No LOC, No headache but is on Eliqous.,    Procedures Performed: No orders of the defined types were placed in this encounter.      Summary of Hospital Course: Patient was a level B trauma secondary to a fall in her garden with head strike on Eliqous.  HCT was negative, Radiology questioned an opacity on CXR, but on repeat scan was negative.  She is being discharged home to follow up with her PCP.    Significant Findings, Care, Treatment and Services Provided: XR chest pa & lateral  Result Date: 4/20/2025  Impression: No acute cardiopulmonary disease. Right lung opacity is not visualized and is consistent with artifact. Workstation performed: [id number]     CT head wo contrast  Result Date: 4/20/2025  Impression: No acute intracranial abnormality. I personally discussed this study with BERTHA NELSON on 4/20/2025 2:23 PM. Workstation performed: [id number]     XR chest 1 view  Result Date: 4/20/2025  Impression: 1.  No acute cardiopulmonary disease within limitations of supine imaging. 2.  Small lateral mid right lung density, likely artifactual confluence. A follow-up PA and lateral radiograph, preferably with dual energy technique, is recommended. I personally discussed this study with BERTHA NELSON on 4/20/2025 2:22 PM. Computerized Assisted Algorithm (CAA) may have been used to analyze all applicable images. Workstation performed: [id number]     XR trauma multiple  Result Date: 4/20/2025  Impression: 1.   No acute cardiopulmonary disease within limitations of supine imaging. 2.  Small lateral mid right lung density, likely artifactual confluence. A follow-up PA and lateral radiograph, preferably with dual energy technique, is recommended. I personally discussed this study with BERTHA NELSON on 4/20/2025 2:22 PM. Computerized Assisted Algorithm (CAA) may have been used to analyze all applicable images. Workstation performed: [id number]       Complications: none    Condition at Discharge: stable       Discharge instructions/Information to patient and family:   See After Visit Summary (AVS) for information provided to patient and family.      Provisions for Follow-Up Care:  See after visit summary for information related to follow-up care and any pertinent home health orders.      PCP: Carrol Truong DO    Disposition: Home    Planned Readmission: No     Discharge Medications:  See after visit summary for reconciled discharge medications provided to patient and family.      Discharge Statement:  I have spent a total time of 25 minutes in caring for this patient on the day of the visit/encounter. .

## 2025-04-20 NOTE — ED PROVIDER NOTES
Emergency Department Airway Evaluation and Management Form    History  Obtained from: Patient  Review of patient's allergies indicates no known allergies.    Chief Complaint:  Trauma Alert    HPI: Pt is a 74 y.o. female presents s/p mechanical fall at home. She reports falling backwards and hitting the back of her head. No LOC. Patient is on Eliquis.       I have reviewed and agree with the history as documented.      Physical Exam    Vitals:    04/20/25 1342   BP:    Pulse: 64   Resp: 16   Temp:    SpO2: 96%     Supplemental Oxygen:none    GCS: 15    Neuro: Alert and oriented  Psych: not combative, not anxious, cooperative for exam  Neck: In collar, No JVD, No midline tenderness  Cardio:  Normal  Respiratory: Normal  Mouth:  Normal  Pharynx: Normal    Monitor:  NSR      ED Medications    No current facility-administered medications for this encounter.    Current Outpatient Medications:     amLODIPine (NORVASC) 5 mg tablet, TAKE 1 TABLET BY MOUTH DAILY, Disp: 90 tablet, Rfl: 1    apixaban (ELIQUIS) 5 mg, 2 (two) times a day, Disp: , Rfl:     atorvastatin (LIPITOR) 40 mg tablet, Take 1 tablet (40 mg total) by mouth daily, Disp: 90 tablet, Rfl: 0    AYR SALINE NASAL NA, into each nostril 2 (two) times a day, Disp: , Rfl:     beta carotene 83433 UNIT capsule, , Disp: , Rfl:     Boswellia-Glucosamine-Vit D (OSTEO BI-FLEX ONE PER DAY PO), Take 1 capsule by mouth in the morning With turmeric, Disp: , Rfl:     busPIRone (BUSPAR) 15 mg tablet, Take 15 mg by mouth 2 (two) times a day, Disp: , Rfl:     calcium citrate-Vitamin D 200 mg-250 units, Take 1 tablet by mouth daily with breakfast, Disp: , Rfl:     cetirizine (ZyrTEC) 10 mg tablet, Take 1 tablet (10 mg total) by mouth daily, Disp: 100 tablet, Rfl: 1    divalproex sodium (Depakote) 500 mg DR tablet, , Disp: , Rfl:     famotidine (PEPCID) 10 mg tablet, Take by mouth, Disp: , Rfl:     famotidine (PEPCID) 20 mg tablet, , Disp: , Rfl:     flecainide (TAMBOCOR) 100 mg  tablet, Take 100 mg by mouth 2 (two) times a day, Disp: , Rfl:     FLUoxetine (PROzac) 40 MG capsule, , Disp: , Rfl:     lisinopril (ZESTRIL) 20 mg tablet, TAKE 1 TABLET BY MOUTH DAILY, Disp: 90 tablet, Rfl: 1    pantoprazole (PROTONIX) 40 mg tablet, TAKE 1 TABLET BY MOUTH DAILY, Disp: 90 tablet, Rfl: 1    pindolol (VISKEN) 5 mg tablet, Take 1 tablet (5 mg total) by mouth daily, Disp: 90 tablet, Rfl: 3    tirzepatide (Zepbound) 5 mg/0.5 mL auto-injector, Inject 0.5 mL (5 mg total) under the skin once a week, Disp: 2 mL, Rfl: 1    Zepbound 5 MG/0.5ML SOLN, INJECT 0.5 ML (5 MG) UNDER THE SKIN ONCE WEEKLY, Disp: 2 mL, Rfl: 3      Intubation    No intubation required    Final Diagnosis:  Closed Head Injury    ED Provider  Electronically Signed by          Eliot Almeida MD  04/20/25 [id number]

## 2025-04-20 NOTE — H&P
H&P - Trauma   Name: Bobby Diaz 74 y.o. female I MRN: [id number]  Unit/Bed#: TR 02 I Date of Admission: 4/20/2025   Date of Service: 4/20/2025 I Hospital Day: 0     Assessment & Plan      Trauma Alert: Level B   Model of Arrival: Self    Trauma Team: AP:  René  Consultants:  none    History of Present Illness   Chief Complaint: Head Injury  Mechanism:Fall     Bobby Diaz is a 74 y.o. female who presents after mechanical fall, hitting the back of her head. On aspirin, no LOC. States she was aroking in her garden and fell backwards over a rock.  Denies LOC, no headache.  Moving all extremities and GCS - 15.  On Trauma [facility] x-ray Radiologist was questioning something so did formal films.  Awaiting results and if negative will discharge home.  No other compklaints or injuries    Review of Systems   Constitutional: Negative.    HENT: Negative.     Eyes: Negative.    Respiratory: Negative.     Cardiovascular: Negative.    Gastrointestinal: Negative.    Endocrine: Negative.    Genitourinary: Negative.    Musculoskeletal: Negative.    Skin: Negative.    Allergic/Immunologic: Negative.    Neurological: Negative.    Hematological: Negative.    Psychiatric/Behavioral: Negative.       Medical History Review: I have reviewed the patient's PMH, PSH, Social History, Family History, Meds, and Allergies   Immunization History   Administered Date(s) Administered    COVID-19 PFIZER VACCINE 0.3 ML IM 03/05/2021, 03/26/2021, 11/22/2021, 07/15/2022    COVID-19 Pfizer Vac BIVALENT Carson-sucrose 12 Yr+ IM 10/16/2022    COVID-19 Pfizer mRNA vacc PF carson-sucrose 12 yr and older (Comirnaty) 10/23/2023, 09/13/2024    H1N1, All Formulations 11/10/2009, 11/10/2009    INFLUENZA 09/20/2017, 09/20/2018, 09/20/2018, 10/01/2019, 09/17/2020, 09/17/2021, 10/06/2021, 11/08/2023    Influenza Split High Dose Preservative Free IM 09/30/2014, 09/28/2016    Influenza, high dose seasonal 0.7 mL 10/01/2019, 10/16/2022    Influenza, seasonal,  injectable 09/19/2012, 09/25/2013, 10/01/2015, 09/20/2017    Pneumococcal Conjugate 13-Valent 09/28/2016    Pneumococcal Polysaccharide PPV23 01/01/2012, 12/13/2021    Rsv, Unspecified 01/23/2024    Tdap 09/25/2013, 10/30/2023    Zoster Vaccine Recombinant 05/03/2021, 05/13/2021, 09/17/2021     Last Tetanus: Unknown    No data recordedISAR Score: Did you order a geriatric consult if the score was 2 or greater?: no       Objective :  Temp:  [97.1 °F (36.2 °C)] 97.1 °F (36.2 °C)  HR:  [63] 63  BP: (145)/(65) 145/65  Resp:  [18] 18  SpO2:  [95 %] 95 %  O2 Device: None (Room air)    Initial Vitals:   Temperature: (!) 97.1 °F (36.2 °C) (04/20/25 1335)  Pulse: 63 (04/20/25 1335)  Respirations: 18 (04/20/25 1335)  Blood Pressure: 145/65 (04/20/25 1335)    Primary Survey:   Airway:        Status: patent;        Pre-hospital Interventions: none        Hospital Interventions: none  Breathing:        Pre-hospital Interventions: none       Effort: normal       Right breath sounds: normal       Left breath sounds: normal  Circulation:        Rhythm: regular       Rate: regular   Right Pulses Left Pulses    R radial: 2+    R pedal: 2+     L radial: 2+    L pedal: 2+       Disability:        GCS: Eye: 4; Verbal: 5 Motor: 6 Total: 15       Right Pupil: round;  reactive         Left Pupil:  round;  reactive      R Motor Strength L Motor Strength    R : 5/5  R dorsiflex: 5/5  R plantarflex: 5/5 L : 5/5  L dorsiflex: 5/5  L plantarflex: 5/5        Sensory:  No sensory deficit  Exposure:       Completed: Yes      Secondary Survey:  Physical Exam  Vitals reviewed.   Constitutional:       Appearance: Normal appearance.   HENT:      Head: Normocephalic.      Comments: Hematoma at base of head  Eyes:      Extraocular Movements: Extraocular movements intact.      Pupils: Pupils are equal, round, and reactive to light.   Cardiovascular:      Rate and Rhythm: Normal rate and regular rhythm.      Pulses: Normal pulses.   Pulmonary:      " Effort: Pulmonary effort is normal.      Breath sounds: Normal breath sounds.   Abdominal:      General: There is no distension.      Palpations: Abdomen is soft.      Tenderness: There is no abdominal tenderness. There is no guarding.   Musculoskeletal:         General: Normal range of motion.      Cervical back: Normal range of motion.   Skin:     General: Skin is warm.      Capillary Refill: Capillary refill takes less than 2 seconds.   Neurological:      General: No focal deficit present.      Mental Status: She is alert and oriented to person, place, and time.   Psychiatric:         Mood and Affect: Mood normal.         Behavior: Behavior normal.             Lab Results: I have reviewed the following results:  No results for input(s): \"WBC\", \"HGB\", \"HCT\", \"PLT\", \"BANDSPCT\", \"SODIUM\", \"K\", \"CL\", \"CO2\", \"BUN\", \"CREATININE\", \"GLUC\", \"CAIONIZED\", \"MG\", \"PHOS\", \"AST\", \"ALT\", \"ALB\", \"TBILI\", \"DBILI\", \"ALKPHOS\", \"PTT\", \"INR\", \"HSTNI0\", \"HSTNI2\", \"BNP\", \"LACTICACID\" in the last 72 hours.    Imaging Results: I have personally reviewed pertinent images saved in PACS. CT scan findings (and other pertinent positive findings on images) were discussed with radiology. My interpretation of the images/reports are as follows:  Chest Xray(s): There is a small density in the lateral right midlung field which may represent a confluence of vasculature and scapular edge. This was not present previously. Lungs are otherwise clear. No evidence of a pneumothorax or pleural effusion. Upright images   are more sensitive to detect pneumothoraces if relevant.     Normal cardiomediastinal silhouette accounting for technique and patient positioning.      FAST exam(s): None performed   CT Scan(s): [facility] - negative   Additional Xray(s): Repeat CXR     Other Studies:   "

## 2025-04-21 ENCOUNTER — VBI (OUTPATIENT)
Dept: INTERNAL MEDICINE CLINIC | Facility: CLINIC | Age: 75
End: 2025-04-21

## 2025-04-21 NOTE — LETTER
[facility]  [address] [address]    Date: 04/23/25    Bobby Diaz  [address] [place] [address]   [place] [address]    Dear Bobby:                                                                                                                                Thank you for choosing [facility] emergency department for care.  Your primary care provider wants to make sure that your ongoing medical care is being addressed. If you require follow up care as a result of your emergency department visit, there are a few things the practice would like you to know.                As part of the network's continuing commitment to caring for our patients, we have added more same day appointments and have extended office hours to meet your medical needs. After hours, on-call physicians are available via your primary care provider's main office line.               We encourage you to contact our office prior to seeking treatment to discuss your symptoms with the medical staff.  Together, we can determine the correct course of action.  A majority of non-emergent conditions such as: common cold, flu-like symptoms, fevers, strains/sprains, dislocations, minor burns, cuts and animal bites can be treated at [facility]. Diagnostic testing is available at some sites.               Of course, if you are experiencing a life threatening medical emergency call 911 or proceed directly to the nearest emergency room.    Your nearest [facility] facility is conveniently located at:    [address] [address]  [phone]  SKIP THE WAIT  Conveniently offered at most [facility] locations  [place] your spot online at [email or web address] or on the [facility]    Sincerely,    [facility]  Dept: [phone]

## 2025-04-21 NOTE — TELEPHONE ENCOUNTER
04/21/25 10:02 AM    Patient contacted post ED visit, first outreach attempt made. Message was left for patient to return a call to the [facility] Department at [facility]: Phone [phone].    Thank you.  [facility] Dolan  [facility]

## 2025-04-22 NOTE — TELEPHONE ENCOUNTER
04/22/25 9:17 AM    Patient contacted post ED visit, second outreach attempt made. Message was left for patient to return a call to the [facility] Department at [facility]: Phone [phone].    Thank you.  aKe Dolan  [facility]

## 2025-04-23 NOTE — TELEPHONE ENCOUNTER
04/23/25 9:51 AM    Patient contacted post ED visit, third outreach attempt made. Message was left for patient to return a call to either the V[facility] Department at [facility]: Phone [phone] or the PCP office.     Thank you.  [facility] Dolan  [facility]    04/23/25 9:51 AM    Patient contacted post ED visit, phone outreaches were unsuccessful and a MyChart letter has been sent to the patient as follow-up.    Thank you.  [facility] Dolan  [facility]

## 2025-04-29 DIAGNOSIS — I10 PRIMARY HYPERTENSION: ICD-10-CM

## 2025-04-30 RX ORDER — AMLODIPINE BESYLATE 5 MG/1
5 TABLET ORAL DAILY
Qty: 90 TABLET | Refills: 1 | Status: SHIPPED | OUTPATIENT
Start: 2025-04-30

## 2025-05-02 ENCOUNTER — TELEPHONE (OUTPATIENT)
Age: 75
End: 2025-05-02

## 2025-05-02 NOTE — TELEPHONE ENCOUNTER
Received call from Patient for New Patient - Skin Check. Patient prefers [facility]. Scheduled 11/4/25 9:20 am [facility] [place]. Verified insurance, provided [place] addr.     Patient verbalized understanding.

## 2025-05-13 DIAGNOSIS — I10 BENIGN ESSENTIAL HYPERTENSION: ICD-10-CM

## 2025-05-14 RX ORDER — LISINOPRIL 20 MG/1
20 TABLET ORAL DAILY
Qty: 90 TABLET | Refills: 1 | Status: SHIPPED | OUTPATIENT
Start: 2025-05-14

## 2025-05-30 ENCOUNTER — TELEPHONE (OUTPATIENT)
Dept: BARIATRICS | Facility: CLINIC | Age: 75
End: 2025-05-30

## 2025-05-30 NOTE — TELEPHONE ENCOUNTER
How are you tolerating the medication?   [] Nausea  [] Vomiting  [] Diarrhea  [x] Asymptomatic  [] Other:    Last visit weight: 169 lbs    Current weight: 169 lbs - only checks weight when in the office     Date of last injection: last Friday     How many injections do you have left: 0    Pt would like a refill of the zepbound, stated she'd like the dose increased    She also wanted to let the dr know that her fridge broke so her medication got warm and then when she took it to a friends house and put it in their fridge, she froze so she's out of it early

## 2025-06-01 DIAGNOSIS — E66.811 OBESITY (BMI 30.0-34.9): ICD-10-CM

## 2025-06-01 DIAGNOSIS — G47.33 OBSTRUCTIVE SLEEP APNEA SYNDROME: Primary | ICD-10-CM

## 2025-06-01 DIAGNOSIS — I10 PRIMARY HYPERTENSION: ICD-10-CM

## 2025-06-01 RX ORDER — TIRZEPATIDE 7.5 MG/.5ML
7.5 INJECTION, SOLUTION SUBCUTANEOUS WEEKLY
Qty: 2 ML | Refills: 1 | Status: SHIPPED | OUTPATIENT
Start: 2025-06-01 | End: 2025-07-27

## 2025-06-03 NOTE — PROGRESS NOTES
Pre-operative Clearance  Name: Bobby Diaz      : 1950      MRN: [id number]  Encounter Provider: Román Leong DO  Encounter Date: 2025   Encounter department: [facility] INTERNAL MEDICINE    :  Assessment & Plan  Pre-op examination  Patient presents to clinic today for preoperative examination prior to a left total shoulder replacement through [facility] with Dr. Semaj Guido on 2025.  She had a cardiac risk assessment performed and faxed to orthopedic physician indicating that if bleeding risk is significant and if surgeon prefers to do procedure off of anticoagulation it would be reasonable to hold anticoagulation for 2 days prior to the planned procedure and resume as soon as possible and when deemed safe from surgical perspective.    She had blood work completed on 2025.  CBC within normal limits with adequate platelets, hemoglobin and hematocrit and no elevation of WBCs.  Metabolic profile showed stable electrolytes, liver function, and kidney function.  Her medications were reviewed today and she is compliant with all medications.  Vital signs are stable today and physical exam is benign.  EKG was performed in office today with normal sinus rhythm interpreted by myself.  Patient medically stable and optimized today.  Discussed RCRI risk score of 0 correlating to a 0.5% risk of 30-day major adverse cardiac event.  Patient understands and agrees to proceed with this risk.  Patient may proceed with scheduled surgery without need for further testing or imaging from internal medicine perspective.  Will complete preoperative history and physical examination form and fax that form along with this note, patient's labs, and EKG to surgeons office.    Risk Factor Score (Yes=1, No=0)   Hx of TIA/CVA 0   Hx of prior ischemic heart disease (AMI, unstable angina, Q waves on EKG, CABG) 0   Hx of congestive heart failure 0   Serum Creatinine >2 mg/dl 0   Insulin  dependent diabetes mellitus 0   Total Score 0     Risk of MACE (30-day)     Points Risk % (95% CI), Fabien, 2017 Risk % (95% CI) Haroon, 1999   0 3.9 (2.8-5.4) 0.4 (0.05-1.5)   1 6 (4.9-7.4) 0.9 (0.3-2.1)   2 10.1 (8.1-12.6) 6.6 (3.9-10.3)   3 or more 15 (11.1-20) >11 (5.8-18.4)       Advice    In patients with elevated risk (RCRI >/= 2), assess functional capacity (METs/DASI).   If >4 METs functional capacity, may proceed to surgery. If unknown/poor (<4 METs) functional capacity consider, may need preoperative cardiac testing depending on symptoms and if testing will .         Orders:    POCT ECG    Primary hypertension  Stable.  Patient using amlodipine 5 mg daily, lisinopril 20 mg daily, and pindolol 5 mg daily.  Continue regimen.       Paroxysmal atrial fibrillation (HCC)  Heart regular rate and rhythm on examination today.  EKG demonstrating normal sinus rhythm.  Patient is maintained on Eliquis 5 mg twice daily, pindolol 5 mg daily, flecainide 100 mg twice daily.  Orders:    POCT ECG    Seizure disorder (HCC)  Patient with history of seizure disorder and has been asymptomatic and maintained on Depakote.  Continue at this time.  Continue neurology follow-up.       Mixed hyperlipidemia  Stable.  Continued on atorvastatin 40 mg daily.           Pre-operative Clearance:     Revised Cardiac Risk Index:  RCI RISK CLASS I (0 risk factors, risk of major cardiac complications approximately 0.5%)    Clearance:  Patient is medically optimized (CLEARED) for proposed surgery without any additional cardiac testing.      Medication Instructions:   - ACE Inhibitors or ARBs: Continue this medication up to the evening before surgery/procedure, but do not take the morning of the day of surgery.  - Antidepressants: Continue to take this medication on your normal schedule.  - Antiepileptic meds: Continue to take this medication on your normal schedule.  - Beta blockers:  Continue to take this medication on your  normal schedule.  - Buspirone: Continue to take this medication on your normal schedule.  - Calcium channel blockers: Continue to take this medication on your normal schedule.  - Direct Xa Inhibitor (ie, Eliquis, Xarelto): Stop taking medication 3 days prior to procedure/surgery.  - GLP weight loss meds: Stop medication 1 week prior to procedure/surgery. If undergoing bariatric surgery, then stop medication 4 weeks prior.  - H2 Blocker: Continue to take this medication on your normal schedule.  - Hyperlipidemia meds: Continue to take this medication on your normal schedule.       History of Present Illness     Pre-Op Examination     Surgery: left total shoulder replacement   Anticipated Date of Surgery: 06/17/2025   Surgeon: Dr. Semaj Guido     Ms. Doris Diaz is a 74-year-old female with past medical history of hypertension, chronic migraines, paroxysmal atrial fibrillation on Eliquis, JORDANA, GERD, history of seizure disorder, depression/anxiety, hyperlipidemia.  She presents to clinic today for preoperative examination prior to a left total shoulder replacement through [facility] with Dr. Semaj Guido on 06/17/2025.    She had a cardiac risk assessment performed and faxed to orthopedic physician indicating that if bleeding risk is significant and if surgeon prefers to do procedure off of anticoagulation it would be reasonable to hold anticoagulation for 2 days prior to the planned procedure and resume as soon as possible and when deemed safe from surgical perspective.  She had blood work completed on 06/02/2025.  CBC within normal limits with adequate platelets, hemoglobin and hematocrit and no elevation of WBCs.  Metabolic profile showed stable electrolytes, liver function, and kidney function.     Previous history of bleeding disorders or clots?: No    Previous Anesthesia reaction?: No    Prolonged steroid use in the last 6 months?: No      Assessment of Cardiac Risk:   - Unstable or severe  angina or MI in the last 6 weeks or history of stent placement in the last year?: No    - Decompensated heart failure (e.g. New onset heart failure, NYHA  Class IV heart failure, or worsening existing heart failure)?: No    - Significant arrhythmias such as high grade AV block, symptomatic ventricular arrhythmia, newly recognized ventricular tachycardia, supraventricular tachycardia with resting heart rate >100, or symptomatic bradycardia?: No    - Severe heart valve disease including aortic stenosis or symptomatic mitral stenosis?: No      Pre-operative Risk Factors:  - Elevated-risk surgery: No    - History of cerebrovascular disease: No    - History of ischemic heart disease: No    - History of congestive heart failure: No    - Pre-operative treatment with insulin: No    - Pre-operative creatinine >2 mg/dL: No      Medications of Perioperative Concern:    Anti-coagulants (Coumadin, Xarelto, Pradaxa, Eliquis, Lixiana) and GLP agonists    Review of Systems   Constitutional:  Negative for chills and fever.   HENT:  Negative for congestion, rhinorrhea and sore throat.    Respiratory:  Negative for cough, shortness of breath and wheezing.    Cardiovascular:  Negative for chest pain and leg swelling.   Gastrointestinal:  Negative for abdominal distention, abdominal pain, constipation, diarrhea, nausea and vomiting.   Genitourinary:  Negative for difficulty urinating and dysuria.   Musculoskeletal:  Negative for arthralgias and back pain.   Skin:  Negative for rash and wound.   Neurological:  Negative for dizziness, syncope, weakness, light-headedness and headaches.   Psychiatric/Behavioral:  Negative for agitation, behavioral problems and confusion.      Past Medical History   Past Medical History[1]  Past Surgical History[2]  Family History[3]  Social History[4]  Medications[5]  Allergies   Allergen Reactions    Codeine Hives    Erythromycin Base GI Intolerance    Hydromorphone Itching    Medical Tape Rash    Morphine  GI Intolerance    Oxycodone-Acetaminophen Hives    Penicillins Rash and Hives     Objective   /80   Pulse 68   Temp 98.4 °F (36.9 °C)   Wt 79.8 kg (176 lb)   SpO2 95%   BMI 31.18 kg/m²     Physical Exam  Constitutional:       Appearance: Normal appearance.   HENT:      Right Ear: External ear normal.      Left Ear: External ear normal.     Cardiovascular:      Rate and Rhythm: Normal rate and regular rhythm.      Pulses: Normal pulses.      Heart sounds: Normal heart sounds. No murmur heard.  Pulmonary:      Effort: Pulmonary effort is normal. No respiratory distress.      Breath sounds: Normal breath sounds. No wheezing, rhonchi or rales.   Abdominal:      General: Abdomen is flat. Bowel sounds are normal. There is no distension.      Palpations: Abdomen is soft.      Tenderness: There is no abdominal tenderness.     Musculoskeletal:      Right lower leg: No edema.      Left lower leg: No edema.     Skin:     General: Skin is warm and dry.     Neurological:      Mental Status: She is alert and oriented to person, place, and time.     Psychiatric:         Mood and Affect: Mood normal.         Behavior: Behavior normal.         Thought Content: Thought content normal.           Román Leong DO         [1]   Past Medical History:  Diagnosis Date    Acid reflux disease     Acute bronchitis 09/07/2022    Allergic     Allergies     Anxiety     Arthralgia     Arthritis     Atrial fibrillation (HCC)     Chronic interstitial cystitis     Chronic pain disorder     neck/ shldrs    Colitis 08/09/2023    Colon polyp     Coronary artery disease afib/ Nov. 2018    Coronary vasospasm (HCC) 05/05/2020    Depression     GERD (gastroesophageal reflux disease)     Headache(784.0)     History of hysterectomy 02/14/2022    Hyperlipidemia     Hypertension     Injury of tendon of rotator cuff 08/22/2018    Irregular heart beat     afib    Lichen sclerosus     Lipoma     Myalgia 03/16/2016    Obesity     Osteopenia      Paresthesia of both hands 08/06/2019    Seizures (HCC)     Shortness of breath     ROJAS    Simple partial seizures (HCC)     last assessed 3/30/17    Sleep apnea     Tachycardia     last assessed 6/24/15    Upper respiratory tract infection 09/01/2022   [2]   Past Surgical History:  Procedure Laterality Date    ABDOMINOPLASTY      APPENDECTOMY      BLEPHAROPLASTY      COLONOSCOPY  2020    Dr Hubbard, 5 year f/u     COSMETIC SURGERY      FACELIFT      FRACTURE SURGERY      HYSTERECTOMY      OOPHORECTOMY      WA RHYTIDECTOMY NECK W/PLATYSMAL TIGHTENING N/A 12/19/2022    Procedure: MINI FACELIFT;  Surgeon: Kenny Dumont MD;  Location:  MAIN OR;  Service: Plastics    REDUCTION MAMMAPLASTY      RHINOPLASTY      SHOULDER SURGERY Left    [3]   Family History  Problem Relation Name Age of Onset    Stroke Mother Glendywanda Ashley         CVA    Hypertension Mother Glendywanda Ashley     Hypertension Father Varinder RAMOS Dion     Lung cancer Father Varinder RAMOS Dion     Cancer Father Varinder Ashley     No Known Problems Sister      Lung cancer Brother Varinder Ashley         AGE UNKNOWN    Hypertension Brother Varinder WOLFF [place]     Cancer Brother Deandre Ashley     No Known Problems Maternal Grandmother      No Known Problems Maternal Grandfather      Breast cancer Paternal Grandmother Doris Thornton     No Known Problems Paternal Grandfather      No Known Problems Maternal Aunt      No Known Problems Maternal Aunt      No Known Problems Maternal Aunt      No Known Problems Paternal Aunt      No Known Problems Paternal Aunt      No Known Problems Paternal Aunt     [4]   Social History  Tobacco Use    Smoking status: Never    Smokeless tobacco: Never   Vaping Use    Vaping status: Never Used   Substance and Sexual Activity    Alcohol use: Yes     Alcohol/week: 1.0 standard drink of alcohol     Types: 1 Glasses of wine per week     Comment: 1 or 2 glasses per week    Drug use: No    Sexual activity: Not Currently     Partners: Male      Birth control/protection: Post-menopausal     Comment: occasionally   [5]   Current Outpatient Medications on File Prior to Visit   Medication Sig    amLODIPine (NORVASC) 5 mg tablet TAKE 1 TABLET BY MOUTH DAILY    apixaban (ELIQUIS) 5 mg in the morning and in the evening.    atorvastatin (LIPITOR) 40 mg tablet Take 1 tablet (40 mg total) by mouth daily    AYR SALINE NASAL NA into each nostril in the morning and before bedtime.    beta carotene 07905 UNIT capsule     Boswellia-Glucosamine-Vit D (OSTEO BI-FLEX ONE PER DAY PO) Take 1 capsule by mouth in the morning With turmeric    busPIRone (BUSPAR) 15 mg tablet Take 15 mg by mouth in the morning and 15 mg in the evening.    calcium citrate-Vitamin D 200 mg-250 units Take 1 tablet by mouth daily with breakfast    cetirizine (ZyrTEC) 10 mg tablet Take 1 tablet (10 mg total) by mouth daily    divalproex sodium (Depakote) 500 mg DR tablet     famotidine (PEPCID) 10 mg tablet Take by mouth    famotidine (PEPCID) 20 mg tablet     flecainide (TAMBOCOR) 100 mg tablet Take 100 mg by mouth in the morning and 100 mg before bedtime.    FLUoxetine (PROzac) 40 MG capsule     lisinopril (ZESTRIL) 20 mg tablet TAKE 1 TABLET BY MOUTH DAILY    pantoprazole (PROTONIX) 40 mg tablet TAKE 1 TABLET BY MOUTH DAILY    pindolol (VISKEN) 5 mg tablet Take 1 tablet (5 mg total) by mouth daily    tirzepatide (Zepbound) 7.5 mg/0.5 mL auto-injector Inject 0.5 mL (7.5 mg total) under the skin once a week    Zepbound 5 MG/0.5ML SOLN INJECT 0.5 ML (5 MG) UNDER THE SKIN ONCE WEEKLY

## 2025-06-04 ENCOUNTER — CONSULT (OUTPATIENT)
Age: 75
End: 2025-06-04
Payer: MEDICARE

## 2025-06-04 VITALS
TEMPERATURE: 98.4 F | SYSTOLIC BLOOD PRESSURE: 130 MMHG | BODY MASS INDEX: 31.18 KG/M2 | WEIGHT: 176 LBS | OXYGEN SATURATION: 95 % | DIASTOLIC BLOOD PRESSURE: 80 MMHG | HEART RATE: 68 BPM

## 2025-06-04 DIAGNOSIS — Z01.818 PRE-OP EXAMINATION: Primary | ICD-10-CM

## 2025-06-04 DIAGNOSIS — I48.0 PAROXYSMAL ATRIAL FIBRILLATION (HCC): ICD-10-CM

## 2025-06-04 DIAGNOSIS — G40.909 SEIZURE DISORDER (HCC): ICD-10-CM

## 2025-06-04 DIAGNOSIS — I10 PRIMARY HYPERTENSION: ICD-10-CM

## 2025-06-04 DIAGNOSIS — E78.2 MIXED HYPERLIPIDEMIA: ICD-10-CM

## 2025-06-04 PROCEDURE — 99214 OFFICE O/P EST MOD 30 MIN: CPT | Performed by: STUDENT IN AN ORGANIZED HEALTH CARE EDUCATION/TRAINING PROGRAM

## 2025-06-04 PROCEDURE — 93000 ELECTROCARDIOGRAM COMPLETE: CPT | Performed by: STUDENT IN AN ORGANIZED HEALTH CARE EDUCATION/TRAINING PROGRAM

## 2025-06-04 NOTE — ASSESSMENT & PLAN NOTE
Patient with history of seizure disorder and has been asymptomatic and maintained on Depakote.  Continue at this time.  Continue neurology follow-up.

## 2025-06-04 NOTE — ASSESSMENT & PLAN NOTE
Stable.  Patient using amlodipine 5 mg daily, lisinopril 20 mg daily, and pindolol 5 mg daily.  Continue regimen.

## 2025-06-04 NOTE — ASSESSMENT & PLAN NOTE
Heart regular rate and rhythm on examination today.  EKG demonstrating normal sinus rhythm.  Patient is maintained on Eliquis 5 mg twice daily, pindolol 5 mg daily, flecainide 100 mg twice daily.  Orders:    POCT ECG

## 2025-07-01 ENCOUNTER — OFFICE VISIT (OUTPATIENT)
Dept: BARIATRICS | Facility: CLINIC | Age: 75
End: 2025-07-01
Payer: MEDICARE

## 2025-07-01 VITALS
RESPIRATION RATE: 17 BRPM | TEMPERATURE: 97.8 F | BODY MASS INDEX: 29.06 KG/M2 | WEIGHT: 164 LBS | DIASTOLIC BLOOD PRESSURE: 90 MMHG | HEIGHT: 63 IN | HEART RATE: 72 BPM | SYSTOLIC BLOOD PRESSURE: 130 MMHG

## 2025-07-01 DIAGNOSIS — I10 PRIMARY HYPERTENSION: ICD-10-CM

## 2025-07-01 DIAGNOSIS — G47.33 OBSTRUCTIVE SLEEP APNEA SYNDROME: ICD-10-CM

## 2025-07-01 DIAGNOSIS — E66.3 OVERWEIGHT: Primary | ICD-10-CM

## 2025-07-01 DIAGNOSIS — E66.811 OBESITY (BMI 30.0-34.9): ICD-10-CM

## 2025-07-01 PROCEDURE — 99214 OFFICE O/P EST MOD 30 MIN: CPT | Performed by: PHYSICIAN ASSISTANT

## 2025-07-01 PROCEDURE — G2211 COMPLEX E/M VISIT ADD ON: HCPCS | Performed by: PHYSICIAN ASSISTANT

## 2025-07-01 RX ORDER — OXYCODONE HYDROCHLORIDE 5 MG/1
TABLET ORAL
COMMUNITY

## 2025-07-01 RX ORDER — TIRZEPATIDE 7.5 MG/.5ML
7.5 INJECTION, SOLUTION SUBCUTANEOUS WEEKLY
Qty: 2 ML | Refills: 4 | Status: SHIPPED | OUTPATIENT
Start: 2025-07-01 | End: 2025-11-18

## 2025-07-01 NOTE — ASSESSMENT & PLAN NOTE
- Patient is pursuing Conservative Program with bundles with dietician. Previously completed Healthy CORE and Healthy Ways.  - Initial weight loss goal of 5-10% weight loss for improved health-MET  -labs reviewed from 2/10/25 and protein was  alittle low but she has been increasing      To continue on zepbound 7.5mg. She is using medication for weight loss and JORDANA.  Start weight 189. 10% weight loss currently. She did have to stop for shoulder replacement  -Not a candidate for Wellbutrin or Contrave due to history of seizure disorder.    -Not a candidate for phentermine due to history of A-fib.  - Labs reviewed: CMP 6/3/24 and protein a little low. Discussed increasing.       Initial Healthy CORE: 185.3 lbs  Last visit: 170  Current: 164  Change: -21.3LB (-6lb  from last visit)      Goals:  Recommend continuing monitoring protein intake and increasing. Would add on protein shake currently  When able to will start to increase walking

## 2025-07-01 NOTE — PROGRESS NOTES
Assessment/Plan:    Overweight   - Patient is pursuing Conservative Program with bundles with dietician. Previously completed Healthy CORE and Healthy Ways.  - Initial weight loss goal of 5-10% weight loss for improved health-MET  -labs reviewed from 2/10/25 and protein was  alittle low but she has been increasing      To continue on zepbound 7.5mg. She is using medication for weight loss and JORDANA.  Start weight 189. 10% weight loss currently. She did have to stop for shoulder replacement  -Not a candidate for Wellbutrin or Contrave due to history of seizure disorder.    -Not a candidate for phentermine due to history of A-fib.  - Labs reviewed: CMP 6/3/24 and protein a little low. Discussed increasing.       Initial Healthy CORE: 185.3 lbs  Last visit: 170  Current: 164  Change: -21.3LB (-6lb  from last visit)      Goals:  Recommend continuing monitoring protein intake and increasing. Would add on protein shake currently  When able to will start to increase walking    Obstructive sleep apnea syndrome  To continue zepound.  Has not tolerated CPAP    Hypertension  On norvasc, lisinopril and pindolol         Diagnoses and all orders for this visit:    Overweight    Obstructive sleep apnea syndrome  -     tirzepatide (Zepbound) 7.5 mg/0.5 mL auto-injector; Inject 0.5 mL (7.5 mg total) under the skin once a week    Primary hypertension  -     tirzepatide (Zepbound) 7.5 mg/0.5 mL auto-injector; Inject 0.5 mL (7.5 mg total) under the skin once a week    Obesity (BMI 30.0-34.9)  -     tirzepatide (Zepbound) 7.5 mg/0.5 mL auto-injector; Inject 0.5 mL (7.5 mg total) under the skin once a week    Other orders  -     oxyCODONE (ROXICODONE) 5 immediate release tablet; TAKE 1 TABLET EVERY 4 TO 6 HOURS BY MOUTH          Subjective:   Chief Complaint   Patient presents with    Follow-up     MWM-6M F/u; Waist-38in        Patient ID: Bobby Diaz  is a 75 y.o. female with excess weight/obesity here to pursue weight managment.   Patient is pursuing Conservative Program after healthycore.     HPI  History of Present Illness  Here for followup.  On zepbound currently. She just started the 7.5mg last Friday. She did have more nausea with it but it has subsided.  She had Shoulder replacement prior and had to hold the medication. Currently in PT 3 times a week so activity is limited      Wt Readings from Last 10 Encounters:   07/01/25 74.4 kg (164 lb)   06/04/25 79.8 kg (176 lb)   04/20/25 77.1 kg (170 lb)   03/25/25 77.1 kg (170 lb)   02/11/25 78.5 kg (173 lb)   01/07/25 79.1 kg (174 lb 6.4 oz)   09/25/24 85.8 kg (189 lb 3.2 oz)   09/10/24 85.5 kg (188 lb 9.6 oz)   08/13/24 83.7 kg (184 lb 9.6 oz)   08/06/24 84.4 kg (186 lb)       Food logging:  Increased appetite/cravings:  Exercise:PT 3 x a week  Hydration:not as much as prior  and icee    Diet Recall:  Brunch: 1/2 bagel with PB  Dinner:meat, starch, vegetable and salad (usually doing more protein and vegetable)    The following portions of the patient's history were reviewed and updated as appropriate: She  has a past medical history of Acid reflux disease, Acute bronchitis (09/07/2022), Allergic, Allergies, Anxiety, Arthralgia, Arthritis, Atrial fibrillation (HCC), Chronic interstitial cystitis, Chronic pain disorder, Colitis (08/09/2023), Colon polyp, Coronary artery disease (afib/ Nov. 2018), Coronary vasospasm (HCC) (05/05/2020), Depression, GERD (gastroesophageal reflux disease), Headache(784.0), History of hysterectomy (02/14/2022), Hyperlipidemia, Hypertension, Injury of tendon of rotator cuff (08/22/2018), Irregular heart beat, Lichen sclerosus, Lipoma, Myalgia (03/16/2016), Obesity, Osteopenia, Paresthesia of both hands (08/06/2019), Seizures (HCC), Shortness of breath, Simple partial seizures (HCC), Sleep apnea, Tachycardia, and Upper respiratory tract infection (09/01/2022).  She   Patient Active Problem List    Diagnosis Date Noted    Chronic pain of both shoulders 08/06/2024     Prediabetes 06/26/2023    Nausea 04/04/2022    Hiatal hernia 04/04/2022    Gastroesophageal reflux disease 02/14/2022    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, intractable, without status epilepticus (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) 11/11/2018    Anxiety 11/11/2018    Esophagitis 11/11/2018    Right hip pain 08/22/2018    Trochanteric bursitis 08/22/2018    Arthritis 07/24/2018    Lichen sclerosus 10/02/2017    Cervicocranial syndrome 04/05/2017    Chronic migraine without aura without status migrainosus, not intractable 04/05/2017    Complex partial seizures with consciousness impaired (HCC) 04/05/2017    Cervical spondylosis 02/08/2017    Post-menopausal atrophic vaginitis 02/24/2014    Symptomatic menopausal or female climacteric states 02/24/2014    Lipoma 01/02/2014    Hyperlipidemia 08/20/2012    Hypertension 08/20/2012    Recurrent major depressive disorder, in full remission ([facility]) 07/26/2012     She  has a past surgical history that includes Hysterectomy; Shoulder surgery (Left); Cosmetic surgery; Oophorectomy; Colonoscopy (2020); Reduction mammaplasty; Rhinoplasty; Abdominoplasty; Facelift; Blepharoplasty; pr rhytidectomy neck w/platysmal tightening (N/A, 12/19/2022); Appendectomy; and Fracture surgery.  Her family history includes Breast cancer in her paternal grandmother; Cancer in her brother and father; Hypertension in her brother, father, and mother; Lung cancer in her brother and father; No Known Problems  in her maternal aunt, maternal aunt, maternal aunt, maternal grandfather, maternal grandmother, paternal aunt, paternal aunt, paternal aunt, paternal grandfather, and sister; Stroke in her mother.  She  reports that she has never smoked. She has never used smokeless tobacco. She reports current alcohol use of about 1.0 standard drink of alcohol per week. She reports that she does not use drugs.  Current Outpatient Medications   Medication Sig Dispense Refill    amLODIPine (NORVASC) 5 mg tablet TAKE 1 TABLET BY MOUTH DAILY 90 tablet 1    apixaban (ELIQUIS) 5 mg in the morning and in the evening.      atorvastatin (LIPITOR) 40 mg tablet Take 1 tablet (40 mg total) by mouth daily 90 tablet 0    AYR SALINE NASAL NA into each nostril in the morning and before bedtime.      beta carotene 75401 UNIT capsule       Boswellia-Glucosamine-Vit D (OSTEO BI-FLEX ONE PER DAY PO) Take 1 capsule by mouth in the morning With turmeric      busPIRone (BUSPAR) 15 mg tablet Take 15 mg by mouth in the morning and 15 mg in the evening.      calcium citrate-Vitamin D 200 mg-250 units Take 1 tablet by mouth daily with breakfast      cetirizine (ZyrTEC) 10 mg tablet Take 1 tablet (10 mg total) by mouth daily 100 tablet 1    divalproex sodium (Depakote) 500 mg DR tablet       famotidine (PEPCID) 10 mg tablet Take by mouth      famotidine (PEPCID) 20 mg tablet       flecainide (TAMBOCOR) 100 mg tablet Take 100 mg by mouth in the morning and 100 mg before bedtime.      FLUoxetine (PROzac) 40 MG capsule       lisinopril (ZESTRIL) 20 mg tablet TAKE 1 TABLET BY MOUTH DAILY 90 tablet 1    oxyCODONE (ROXICODONE) 5 immediate release tablet TAKE 1 TABLET EVERY 4 TO 6 HOURS BY MOUTH      pantoprazole (PROTONIX) 40 mg tablet TAKE 1 TABLET BY MOUTH DAILY 90 tablet 1    pindolol (VISKEN) 5 mg tablet Take 1 tablet (5 mg total) by mouth daily 90 tablet 3    tirzepatide (Zepbound) 7.5 mg/0.5 mL auto-injector Inject 0.5 mL (7.5 mg total) under the skin once a  week 2 mL 4     No current facility-administered medications for this visit.     Current Outpatient Medications on File Prior to Visit   Medication Sig    amLODIPine (NORVASC) 5 mg tablet TAKE 1 TABLET BY MOUTH DAILY    apixaban (ELIQUIS) 5 mg in the morning and in the evening.    atorvastatin (LIPITOR) 40 mg tablet Take 1 tablet (40 mg total) by mouth daily    AYR SALINE NASAL NA into each nostril in the morning and before bedtime.    beta carotene 49564 UNIT capsule     Boswellia-Glucosamine-Vit D (OSTEO BI-FLEX ONE PER DAY PO) Take 1 capsule by mouth in the morning With turmeric    busPIRone (BUSPAR) 15 mg tablet Take 15 mg by mouth in the morning and 15 mg in the evening.    calcium citrate-Vitamin D 200 mg-250 units Take 1 tablet by mouth daily with breakfast    cetirizine (ZyrTEC) 10 mg tablet Take 1 tablet (10 mg total) by mouth daily    divalproex sodium (Depakote) 500 mg DR tablet     famotidine (PEPCID) 10 mg tablet Take by mouth    famotidine (PEPCID) 20 mg tablet     flecainide (TAMBOCOR) 100 mg tablet Take 100 mg by mouth in the morning and 100 mg before bedtime.    FLUoxetine (PROzac) 40 MG capsule     lisinopril (ZESTRIL) 20 mg tablet TAKE 1 TABLET BY MOUTH DAILY    oxyCODONE (ROXICODONE) 5 immediate release tablet TAKE 1 TABLET EVERY 4 TO 6 HOURS BY MOUTH    pantoprazole (PROTONIX) 40 mg tablet TAKE 1 TABLET BY MOUTH DAILY    pindolol (VISKEN) 5 mg tablet Take 1 tablet (5 mg total) by mouth daily     No current facility-administered medications on file prior to visit.     She is allergic to codeine, erythromycin base, hydromorphone, medical tape, morphine, oxycodone-acetaminophen, and penicillins..    Review of Systems   Constitutional:  Negative for fever.   Respiratory:  Negative for shortness of breath.    Cardiovascular:  Negative for chest pain and palpitations.   Gastrointestinal:  Negative for abdominal pain, constipation, diarrhea and vomiting.   Genitourinary:  Negative for difficulty  "urinating.   Skin:  Negative for rash.   Neurological:  Negative for headaches.   Psychiatric/Behavioral:  Negative for dysphoric mood.        Objective:    /90   Pulse 72   Temp 97.8 °F (36.6 °C)   Resp 17   Ht 5' 3\" (1.6 m)   Wt 74.4 kg (164 lb)   BMI 29.05 kg/m²      Physical Exam  Vitals and nursing note reviewed.   Constitutional:       General: She is not in acute distress.     Appearance: She is well-developed. She is obese.   HENT:      Head: Normocephalic and atraumatic.     Eyes:      Conjunctiva/sclera: Conjunctivae normal.     Neck:      Thyroid: No thyromegaly.   Pulmonary:      Effort: Pulmonary effort is normal. No respiratory distress.     Skin:     Findings: No rash (visible).     Neurological:      Mental Status: She is alert and oriented to person, place, and time.     Psychiatric:         Behavior: Behavior normal.         "

## 2025-07-10 DIAGNOSIS — I10 PRIMARY HYPERTENSION: Primary | ICD-10-CM

## 2025-07-10 NOTE — TELEPHONE ENCOUNTER
Patient has misplaced order from [facility] for lisinopril dispensed 5/21/25  She needs a short supply to local [facility] that she will pay out of pocket for to bridge until next [facility] refill can be processed 8/20/25    Reason for call:   [x] Refill   [] Prior Auth  [] Other:     Office:   [x] PCP/Provider - Carrol Truong   [] Specialty/Provider -     Medication: lisinopril 20mg    Dose/Frequency: 1 daily    Quantity: 45    Pharmacy: [facility]    Local Pharmacy   Does the patient have enough for 3 days?   [] Yes   [x] No - Send as HP to POD    Mail Away Pharmacy   Does the patient have enough for 10 days?   [] Yes   [] No - Send as HP to POD

## 2025-07-11 RX ORDER — LISINOPRIL 20 MG/1
20 TABLET ORAL DAILY
Qty: 45 TABLET | Refills: 0 | Status: SHIPPED | OUTPATIENT
Start: 2025-07-11

## 2025-07-28 ENCOUNTER — TELEPHONE (OUTPATIENT)
Dept: ADMINISTRATIVE | Facility: OTHER | Age: 75
End: 2025-07-28

## 2025-07-29 ENCOUNTER — OFFICE VISIT (OUTPATIENT)
Age: 75
End: 2025-07-29
Payer: MEDICARE

## 2025-07-29 VITALS
HEART RATE: 72 BPM | DIASTOLIC BLOOD PRESSURE: 84 MMHG | HEIGHT: 63 IN | WEIGHT: 167 LBS | TEMPERATURE: 97.5 F | RESPIRATION RATE: 18 BRPM | BODY MASS INDEX: 29.59 KG/M2 | OXYGEN SATURATION: 96 % | SYSTOLIC BLOOD PRESSURE: 138 MMHG

## 2025-07-29 DIAGNOSIS — Z79.01 CURRENT USE OF LONG TERM ANTICOAGULATION: ICD-10-CM

## 2025-07-29 DIAGNOSIS — H91.90 DECREASED HEARING, UNSPECIFIED LATERALITY: ICD-10-CM

## 2025-07-29 DIAGNOSIS — R73.03 PREDIABETES: ICD-10-CM

## 2025-07-29 DIAGNOSIS — G47.33 OBSTRUCTIVE SLEEP APNEA SYNDROME: ICD-10-CM

## 2025-07-29 DIAGNOSIS — E78.2 MIXED HYPERLIPIDEMIA: ICD-10-CM

## 2025-07-29 DIAGNOSIS — I10 PRIMARY HYPERTENSION: Primary | ICD-10-CM

## 2025-07-29 PROBLEM — G89.29 CHRONIC PAIN OF BOTH SHOULDERS: Status: RESOLVED | Noted: 2024-08-06 | Resolved: 2025-07-29

## 2025-07-29 PROBLEM — R11.0 NAUSEA: Status: RESOLVED | Noted: 2022-04-04 | Resolved: 2025-07-29

## 2025-07-29 PROBLEM — M25.511 CHRONIC PAIN OF BOTH SHOULDERS: Status: RESOLVED | Noted: 2024-08-06 | Resolved: 2025-07-29

## 2025-07-29 PROBLEM — M25.512 CHRONIC PAIN OF BOTH SHOULDERS: Status: RESOLVED | Noted: 2024-08-06 | Resolved: 2025-07-29

## 2025-07-29 PROCEDURE — G2211 COMPLEX E/M VISIT ADD ON: HCPCS | Performed by: INTERNAL MEDICINE

## 2025-07-29 PROCEDURE — 99214 OFFICE O/P EST MOD 30 MIN: CPT | Performed by: INTERNAL MEDICINE

## 2025-07-30 ENCOUNTER — HOSPITAL ENCOUNTER (OUTPATIENT)
Dept: NEUROLOGY | Facility: CLINIC | Age: 75
Discharge: HOME | End: 2025-07-30
Attending: PSYCHIATRY & NEUROLOGY
Payer: MEDICARE

## 2025-07-30 DIAGNOSIS — G40.219 LOCALIZATION-RELATED (FOCAL) (PARTIAL) SYMPTOMATIC EPILEPSY AND EPILEPTIC SYNDROMES WITH COMPLEX PARTIAL SEIZURES, INTRACTABLE, WITHOUT STATUS EPILEPTICUS (HCC): ICD-10-CM

## 2025-07-30 PROCEDURE — 95816 EEG AWAKE AND DROWSY: CPT

## 2025-08-01 ENCOUNTER — APPOINTMENT (OUTPATIENT)
Dept: LAB | Facility: CLINIC | Age: 75
End: 2025-08-01
Payer: MEDICARE

## 2025-08-01 DIAGNOSIS — Z79.899 ENCOUNTER FOR LONG-TERM (CURRENT) USE OF MEDICATIONS: ICD-10-CM

## 2025-08-01 LAB
ALBUMIN SERPL BCG-MCNC: 3.9 G/DL (ref 3.5–5)
ALP SERPL-CCNC: 49 U/L (ref 34–104)
ALT SERPL W P-5'-P-CCNC: 10 U/L (ref 7–52)
ANION GAP SERPL CALCULATED.3IONS-SCNC: 10 MMOL/L (ref 4–13)
AST SERPL W P-5'-P-CCNC: 12 U/L (ref 13–39)
BILIRUB SERPL-MCNC: 0.43 MG/DL (ref 0.2–1)
BUN SERPL-MCNC: 23 MG/DL (ref 5–25)
CALCIUM SERPL-MCNC: 10 MG/DL (ref 8.4–10.2)
CHLORIDE SERPL-SCNC: 101 MMOL/L (ref 96–108)
CO2 SERPL-SCNC: 27 MMOL/L (ref 21–32)
CREAT SERPL-MCNC: 0.88 MG/DL (ref 0.6–1.3)
GFR SERPL CREATININE-BSD FRML MDRD: 63 ML/MIN/1.73SQ M
GLUCOSE P FAST SERPL-MCNC: 92 MG/DL (ref 65–99)
POTASSIUM SERPL-SCNC: 5 MMOL/L (ref 3.5–5.3)
PROT SERPL-MCNC: 6.4 G/DL (ref 6.4–8.4)
SODIUM SERPL-SCNC: 138 MMOL/L (ref 135–147)

## 2025-08-01 PROCEDURE — 80053 COMPREHEN METABOLIC PANEL: CPT

## 2025-08-01 PROCEDURE — 80165 DIPROPYLACETIC ACID FREE: CPT

## 2025-08-03 LAB — VALPROATE FREE SERPL-MCNC: 7.4 UG/ML (ref 6–22)

## 2025-08-13 ENCOUNTER — HOSPITAL ENCOUNTER (OUTPATIENT)
Dept: MAMMOGRAPHY | Facility: MEDICAL CENTER | Age: 75
Discharge: HOME | End: 2025-08-13
Payer: MEDICARE

## 2025-08-19 ENCOUNTER — OFFICE VISIT (OUTPATIENT)
Dept: AUDIOLOGY | Age: 75
End: 2025-08-19
Payer: MEDICARE

## 2025-08-19 DIAGNOSIS — H90.3 SENSORY HEARING LOSS, BILATERAL: Primary | ICD-10-CM

## 2025-08-19 DIAGNOSIS — H91.90 DECREASED HEARING, UNSPECIFIED LATERALITY: ICD-10-CM

## 2025-08-19 PROCEDURE — 92557 COMPREHENSIVE HEARING TEST: CPT | Performed by: AUDIOLOGIST-HEARING AID FITTER

## 2025-08-19 PROCEDURE — 92567 TYMPANOMETRY: CPT | Performed by: AUDIOLOGIST-HEARING AID FITTER

## (undated) DEVICE — SUT ETHILON 5-0 P-3 18 IN 698H

## (undated) DEVICE — STERILE POLYISOPRENE POWDER-FREE SURGICAL GLOVES: Brand: PROTEXIS

## (undated) DEVICE — BASIC PACK: Brand: CONVERTORS

## (undated) DEVICE — SPONGE 4 X 4 XRAY 16 PLY STRL LF RFD

## (undated) DEVICE — SYRINGE 30ML LL

## (undated) DEVICE — SINGLE PORT MANIFOLD: Brand: NEPTUNE 2

## (undated) DEVICE — NEEDLE 25G X 1 1/2

## (undated) DEVICE — PENCIL ELECTROSURG E-Z CLEAN -0035H

## (undated) DEVICE — UNDYED MONOFILAMENT (POLYDIOXANONE), ABSORBABLE SURGICAL SUTURE: Brand: PDS

## (undated) DEVICE — TUBING SUCTION 5MM X 12 FT

## (undated) DEVICE — OCCLUSIVE GAUZE STRIP,3% BISMUTH TRIBROMOPHENATE IN PETROLATUM BLEND: Brand: XEROFORM

## (undated) DEVICE — SUT PLAIN 5-0 PC-1 18 IN 1915G

## (undated) DEVICE — SCD SEQUENTIAL COMPRESSION COMFORT SLEEVE MEDIUM KNEE LENGTH: Brand: KENDALL SCD

## (undated) DEVICE — 1820 FOAM BLOCK NEEDLE COUNTER: Brand: DEVON

## (undated) DEVICE — SPONGE LAP 18 X 18 IN STRL RFD

## (undated) DEVICE — INTENDED FOR TISSUE SEPARATION, AND OTHER PROCEDURES THAT REQUIRE A SHARP SURGICAL BLADE TO PUNCTURE OR CUT.: Brand: BARD-PARKER ® SAFETYLOCK CARBON RIB-BACK BLADES

## (undated) DEVICE — SURGICAL CLIPPER BLADE GENERAL USE

## (undated) DEVICE — SUT VICRYL 4-0 RB-1 27 IN J214H

## (undated) DEVICE — ELECTRODE NEEDLE MEGAFINE 2IN E-Z CLEAN MEGADYNE -0118

## (undated) DEVICE — STANDARD SURGICAL GOWN, L: Brand: CONVERTORS

## (undated) DEVICE — KERLIX BANDAGE ROLL: Brand: KERLIX

## (undated) DEVICE — SUT CHROMIC 4-0 PS-2 18 IN 1637G

## (undated) DEVICE — SKIN MARKER DUAL TIP WITH RULER CAP, FLEXIBLE RULER AND LABELS: Brand: DEVON

## (undated) DEVICE — TIBURON SPLIT SHEET: Brand: CONVERTORS

## (undated) DEVICE — DISPOSABLE OR TOWEL: Brand: CARDINAL HEALTH

## (undated) DEVICE — LIGHT GLOVE GREEN

## (undated) DEVICE — NEEDLE BLUNT 18 G X 1 1/2IN

## (undated) DEVICE — DRESSING XEROFORM 5 X 9

## (undated) DEVICE — COTTON TIP APPLICTOR 2 PK

## (undated) DEVICE — SYRINGE 10ML LL

## (undated) DEVICE — TUBING LIPOSUCTION ASPIRATION 12FT STERILE

## (undated) DEVICE — SUPER SPONGES,MEDIUM: Brand: KERLIX

## (undated) DEVICE — SUT ETHILON 4-0 PS-2 18 IN 1667H